# Patient Record
Sex: FEMALE | Race: WHITE | NOT HISPANIC OR LATINO | Employment: OTHER | URBAN - NONMETROPOLITAN AREA
[De-identification: names, ages, dates, MRNs, and addresses within clinical notes are randomized per-mention and may not be internally consistent; named-entity substitution may affect disease eponyms.]

---

## 2017-03-06 ENCOUNTER — OFFICE VISIT (OUTPATIENT)
Dept: CARDIOLOGY | Facility: CLINIC | Age: 52
End: 2017-03-06

## 2017-03-06 VITALS
HEIGHT: 66 IN | SYSTOLIC BLOOD PRESSURE: 148 MMHG | DIASTOLIC BLOOD PRESSURE: 88 MMHG | BODY MASS INDEX: 47.09 KG/M2 | WEIGHT: 293 LBS | HEART RATE: 81 BPM

## 2017-03-06 DIAGNOSIS — R07.2 PRECORDIAL PAIN: ICD-10-CM

## 2017-03-06 DIAGNOSIS — R94.31 ABNORMAL ECG: Primary | ICD-10-CM

## 2017-03-06 DIAGNOSIS — Z99.89 OSA ON CPAP: ICD-10-CM

## 2017-03-06 DIAGNOSIS — M54.50 CHRONIC MIDLINE LOW BACK PAIN WITHOUT SCIATICA: ICD-10-CM

## 2017-03-06 DIAGNOSIS — G47.33 OSA ON CPAP: ICD-10-CM

## 2017-03-06 DIAGNOSIS — I10 ESSENTIAL HYPERTENSION: ICD-10-CM

## 2017-03-06 DIAGNOSIS — M79.7 FIBROMYALGIA: ICD-10-CM

## 2017-03-06 DIAGNOSIS — G93.32 CFS (CHRONIC FATIGUE SYNDROME): ICD-10-CM

## 2017-03-06 DIAGNOSIS — G89.29 CHRONIC MIDLINE LOW BACK PAIN WITHOUT SCIATICA: ICD-10-CM

## 2017-03-06 PROCEDURE — 93000 ELECTROCARDIOGRAM COMPLETE: CPT | Performed by: INTERNAL MEDICINE

## 2017-03-06 PROCEDURE — 99204 OFFICE O/P NEW MOD 45 MIN: CPT | Performed by: INTERNAL MEDICINE

## 2017-03-06 RX ORDER — ASCORBIC ACID 500 MG
500 TABLET ORAL DAILY
COMMUNITY
End: 2017-10-02

## 2017-03-06 RX ORDER — NABUMETONE 750 MG/1
500 TABLET, FILM COATED ORAL 2 TIMES DAILY PRN
COMMUNITY
End: 2017-10-02

## 2017-03-06 RX ORDER — METHYLPHENIDATE HYDROCHLORIDE 10 MG/1
10 TABLET ORAL 2 TIMES DAILY
COMMUNITY
End: 2017-10-02

## 2017-03-06 NOTE — PROGRESS NOTES
Anthony Meza  [id number]  1965  51 y.o.  female    Referring Provider: Tyrell Rosas MD    Reason for  Visit: Moderate shortness of breath and chest pain at times with exertion  No radiation  No diaphoresis  Compliant with medications    History of present illness:  Anthony Meza is a 51 y.o. yo female with history of shortness of breath and chest pain who presents today for   Chief Complaint   Patient presents with   • Abnormal ECG     NEW   .    History  Past Medical History   Diagnosis Date   • Abnormal ECG    • Arthritis    • CFS (chronic fatigue syndrome)    • Chronic pain disorder    • Fibromyalgia    • Hypertension    • Migraines    ,   Past Surgical History   Procedure Laterality Date   • Cyst removal       from tailbone   • Tubal abdominal ligation     ,   Family History   Problem Relation Age of Onset   • Stroke Mother    • Heart disease Father    • Asthma Father    ,   Social History   Substance Use Topics   • Smoking status: Never Smoker   • Smokeless tobacco: Never Used   • Alcohol use No   ,     Medications  Current Outpatient Prescriptions   Medication Sig Dispense Refill   • acetaminophen (TYLENOL) 325 MG tablet Take 325 mg by mouth every 6 (six) hours as needed for mild pain (1-3).     • aspirin 81 MG tablet Take 81 mg by mouth Daily.     • Cholecalciferol (VITAMIN D3) 5000 UNITS capsule capsule Take 5,000 Units by mouth daily.     • Cyanocobalamin (VITAMIN B-12 PO) Take  by mouth.     • Fish Oil oil      • furosemide (LASIX) 20 MG tablet Take 20 mg by mouth as needed.     • gabapentin (NEURONTIN) 300 MG capsule Take 300 mg by mouth 3 (three) times a day.     • LISINOPRIL PO Take 10 mg by mouth Daily.     • methylphenidate (RITALIN) 10 MG tablet Take 10 mg by mouth 2 (Two) Times a Day.     • milnacipran (SAVELLA) 50 MG tablet tablet Take  by mouth 2 (two) times a day.     • nabumetone (RELAFEN) 750 MG tablet Take 500 mg by mouth 2 (Two) Times a Day As Needed for mild pain (1-3).     • vitamin  "C (ASCORBIC ACID) 500 MG tablet Take 500 mg by mouth Daily.       No current facility-administered medications for this visit.        Allergies:  Penicillins    Review of Systems  Review of Systems   Constitution: Negative.   HENT: Negative.    Eyes: Negative.    Cardiovascular: Positive for chest pain and dyspnea on exertion. Negative for claudication, cyanosis, irregular heartbeat, leg swelling, near-syncope, orthopnea, palpitations, paroxysmal nocturnal dyspnea and syncope.   Respiratory: Negative.    Endocrine: Negative.    Hematologic/Lymphatic: Negative.    Skin: Negative.    Gastrointestinal: Negative for anorexia.   Genitourinary: Negative.    Neurological: Negative.    Psychiatric/Behavioral: Negative.        Objective     Physical Exam:  Visit Vitals   • /88   • Pulse 81   • Ht 66\" (167.6 cm)   • Wt (!) 326 lb (148 kg)   • BMI 52.62 kg/m2     Physical Exam   Constitutional: She appears well-developed.   HENT:   Head: Normocephalic.   Neck: Normal carotid pulses and no JVD present. No tracheal tenderness present. Carotid bruit is not present. No tracheal deviation and no edema present.   Cardiovascular: Regular rhythm, normal heart sounds and normal pulses.    Pulmonary/Chest: Effort normal. No stridor.   Abdominal: Soft.   Neurological: She is alert. She has normal strength. No cranial nerve deficit or sensory deficit.   Skin: Skin is warm.   Psychiatric: She has a normal mood and affect. Her speech is normal and behavior is normal.       Results Review:       ECG 12 Lead  Date/Time: 3/6/2017 9:57 AM  Performed by: ISAEL HOUGH  Authorized by: ISAEL HOUGH   Comparison: compared with previous ECG from 2/16/2017  Similar to previous ECG  Rhythm: sinus rhythm  Rate: normal  QRS axis: normal  Comments: Poor  R wave progression            Assessment/Plan   Patient Active Problem List   Diagnosis   • Abnormal ECG   • Fibromyalgia   • CFS (chronic fatigue syndrome)   • Essential hypertension   • Chronic " midline low back pain without sciatica   • Precordial pain   • HARLAN on CPAP       No palpitations. No significant pedal edema. Compliant with medications and diet. Latest labs and medications reviewed.    Plan:  Echo and DSE  Cannot run on treadmill  TSH in past normal   Close follow up with you as scheduled.  Intensive factor modifications.  See order list.    Counseled regarding disease appropriate diet, fluid, caffeine, stimulants and sodium intake as well as importance of compliance to diet, exercise and regular follow up.  Avoid NSAIDS and COX2 inhibitors. Use Acetaminophen PRN.    Return in about 2 weeks (around 3/20/2017).

## 2017-03-16 ENCOUNTER — HOSPITAL ENCOUNTER (OUTPATIENT)
Dept: CARDIOLOGY | Facility: HOSPITAL | Age: 52
Discharge: HOME | End: 2017-03-16
Attending: INTERNAL MEDICINE

## 2017-03-16 ENCOUNTER — HOSPITAL ENCOUNTER (OUTPATIENT)
Dept: CARDIOLOGY | Facility: HOSPITAL | Age: 52
Discharge: HOME | End: 2017-03-16
Attending: INTERNAL MEDICINE | Admitting: INTERNAL MEDICINE

## 2017-03-16 VITALS
WEIGHT: 293 LBS | SYSTOLIC BLOOD PRESSURE: 147 MMHG | HEIGHT: 66 IN | DIASTOLIC BLOOD PRESSURE: 75 MMHG | BODY MASS INDEX: 47.09 KG/M2

## 2017-03-16 VITALS — HEART RATE: 76 BPM | DIASTOLIC BLOOD PRESSURE: 79 MMHG | SYSTOLIC BLOOD PRESSURE: 141 MMHG

## 2017-03-16 DIAGNOSIS — R07.2 PRECORDIAL PAIN: ICD-10-CM

## 2017-03-16 PROCEDURE — C8929 TTE W OR WO FOL WCON,DOPPLER: HCPCS

## 2017-03-16 PROCEDURE — 25010000003 DOBUTAMINE PER 250 MG: Performed by: INTERNAL MEDICINE

## 2017-03-16 PROCEDURE — 93352 ADMIN ECG CONTRAST AGENT: CPT | Performed by: INTERNAL MEDICINE

## 2017-03-16 PROCEDURE — 93306 TTE W/DOPPLER COMPLETE: CPT | Performed by: INTERNAL MEDICINE

## 2017-03-16 PROCEDURE — 93017 CV STRESS TEST TRACING ONLY: CPT

## 2017-03-16 PROCEDURE — 93350 STRESS TTE ONLY: CPT | Performed by: INTERNAL MEDICINE

## 2017-03-16 PROCEDURE — 25010000002 PERFLUTREN (DEFINITY) 8.476 MG IN SODIUM CHLORIDE 10 ML INJECTION: Performed by: INTERNAL MEDICINE

## 2017-03-16 PROCEDURE — C8928 TTE W OR W/O FOL W/CON,STRES: HCPCS

## 2017-03-16 PROCEDURE — 93018 CV STRESS TEST I&R ONLY: CPT | Performed by: INTERNAL MEDICINE

## 2017-03-16 RX ORDER — DOBUTAMINE HYDROCHLORIDE 100 MG/100ML
10-50 INJECTION INTRAVENOUS
Status: DISCONTINUED | OUTPATIENT
Start: 2017-03-16 | End: 2017-03-17 | Stop reason: HOSPADM

## 2017-03-16 RX ADMIN — SODIUM CHLORIDE 5 ML: 9 INJECTION INTRAMUSCULAR; INTRAVENOUS; SUBCUTANEOUS at 13:18

## 2017-03-16 RX ADMIN — SODIUM CHLORIDE 5 ML: 9 INJECTION INTRAMUSCULAR; INTRAVENOUS; SUBCUTANEOUS at 13:37

## 2017-03-16 RX ADMIN — DOBUTAMINE HYDROCHLORIDE 10 MCG/KG/MIN: 100 INJECTION INTRAVENOUS at 13:29

## 2017-03-18 LAB
BH CV ECHO MEAS - BSA(HAYCOCK): 2.7 M^2
BH CV ECHO MEAS - BSA: 2.4 M^2
BH CV ECHO MEAS - BZI_BMI: 51.8 KILOGRAMS/M^2
BH CV ECHO MEAS - BZI_METRIC_HEIGHT: 167.6 CM
BH CV ECHO MEAS - BZI_METRIC_WEIGHT: 145.6 KG
BH CV STRESS BP STAGE 1: NORMAL
BH CV STRESS BP STAGE 2: NORMAL
BH CV STRESS BP STAGE 3: NORMAL
BH CV STRESS DOSE DOBUTAMINE STAGE 1: 10
BH CV STRESS DOSE DOBUTAMINE STAGE 2: 20
BH CV STRESS DOSE DOBUTAMINE STAGE 3: 30
BH CV STRESS DURATION MIN STAGE 1: 3
BH CV STRESS DURATION MIN STAGE 2: 3
BH CV STRESS DURATION MIN STAGE 3: 4
BH CV STRESS DURATION SEC STAGE 1: 0
BH CV STRESS DURATION SEC STAGE 2: 0
BH CV STRESS DURATION SEC STAGE 3: 30
BH CV STRESS HR STAGE 1: 104
BH CV STRESS HR STAGE 2: 121
BH CV STRESS HR STAGE 3: 144
BH CV STRESS PROTOCOL 1: NORMAL
BH CV STRESS RECOVERY BP: NORMAL MMHG
BH CV STRESS RECOVERY HR: 100 BPM
BH CV STRESS STAGE 1: 1
BH CV STRESS STAGE 2: 2
BH CV STRESS STAGE 3: 3
LV EF 2D ECHO EST: 55 %
MAXIMAL PREDICTED HEART RATE: 168 BPM
PERCENT MAX PREDICTED HR: 85.71 %
STRESS BASELINE BP: NORMAL MMHG
STRESS BASELINE HR: 80 BPM
STRESS PERCENT HR: 101 %
STRESS POST EXERCISE DUR MIN: 10 MIN
STRESS POST EXERCISE DUR SEC: 30 SEC
STRESS POST PEAK BP: NORMAL MMHG
STRESS POST PEAK HR: 144 BPM
STRESS TARGET HR: 143 BPM

## 2017-03-19 LAB
BH CV ECHO MEAS - AO MAX PG (FULL): 11.7 MMHG
BH CV ECHO MEAS - AO MAX PG: 18.8 MMHG
BH CV ECHO MEAS - AO MEAN PG (FULL): 8 MMHG
BH CV ECHO MEAS - AO MEAN PG: 12 MMHG
BH CV ECHO MEAS - AO ROOT AREA (BSA CORRECTED): 1.1
BH CV ECHO MEAS - AO ROOT AREA: 6.2 CM^2
BH CV ECHO MEAS - AO ROOT DIAM: 2.8 CM
BH CV ECHO MEAS - AO V2 MAX: 217 CM/SEC
BH CV ECHO MEAS - AO V2 MEAN: 162 CM/SEC
BH CV ECHO MEAS - AO V2 VTI: 52.9 CM
BH CV ECHO MEAS - AVA(I,A): 1.8 CM^2
BH CV ECHO MEAS - AVA(I,D): 1.8 CM^2
BH CV ECHO MEAS - AVA(V,A): 1.8 CM^2
BH CV ECHO MEAS - AVA(V,D): 1.8 CM^2
BH CV ECHO MEAS - BSA(HAYCOCK): 2.7 M^2
BH CV ECHO MEAS - BSA: 2.5 M^2
BH CV ECHO MEAS - BZI_BMI: 52.6 KILOGRAMS/M^2
BH CV ECHO MEAS - BZI_METRIC_HEIGHT: 167.6 CM
BH CV ECHO MEAS - BZI_METRIC_WEIGHT: 147.9 KG
BH CV ECHO MEAS - CONTRAST EF 4CH: 63.6 ML/M^2
BH CV ECHO MEAS - EDV(CUBED): 91.1 ML
BH CV ECHO MEAS - EDV(MOD-SP4): 89.1 ML
BH CV ECHO MEAS - EDV(TEICH): 92.4 ML
BH CV ECHO MEAS - EF(CUBED): 80.7 %
BH CV ECHO MEAS - EF(MOD-SP4): 63.6 %
BH CV ECHO MEAS - EF(TEICH): 73.4 %
BH CV ECHO MEAS - ESV(CUBED): 17.6 ML
BH CV ECHO MEAS - ESV(MOD-SP4): 32.4 ML
BH CV ECHO MEAS - ESV(TEICH): 24.6 ML
BH CV ECHO MEAS - FS: 42.2 %
BH CV ECHO MEAS - IVS/LVPW: 1
BH CV ECHO MEAS - IVSD: 1.4 CM
BH CV ECHO MEAS - LA DIMENSION: 3.7 CM
BH CV ECHO MEAS - LA/AO: 1.3
BH CV ECHO MEAS - LV DIASTOLIC VOL/BSA (35-75): 36.2 ML/M^2
BH CV ECHO MEAS - LV MASS(C)D: 248.4 GRAMS
BH CV ECHO MEAS - LV MASS(C)DI: 100.9 GRAMS/M^2
BH CV ECHO MEAS - LV MAX PG: 7.2 MMHG
BH CV ECHO MEAS - LV MEAN PG: 4 MMHG
BH CV ECHO MEAS - LV SYSTOLIC VOL/BSA (12-30): 13.2 ML/M^2
BH CV ECHO MEAS - LV V1 MAX: 134 CM/SEC
BH CV ECHO MEAS - LV V1 MEAN: 94.2 CM/SEC
BH CV ECHO MEAS - LV V1 VTI: 33.4 CM
BH CV ECHO MEAS - LVIDD: 4.5 CM
BH CV ECHO MEAS - LVIDS: 2.6 CM
BH CV ECHO MEAS - LVLD AP4: 8.2 CM
BH CV ECHO MEAS - LVLS AP4: 6.5 CM
BH CV ECHO MEAS - LVOT AREA (M): 2.8 CM^2
BH CV ECHO MEAS - LVOT AREA: 2.8 CM^2
BH CV ECHO MEAS - LVOT DIAM: 1.9 CM
BH CV ECHO MEAS - LVPWD: 1.4 CM
BH CV ECHO MEAS - MV A MAX VEL: 96 CM/SEC
BH CV ECHO MEAS - MV DEC TIME: 0.28 SEC
BH CV ECHO MEAS - MV E MAX VEL: 106 CM/SEC
BH CV ECHO MEAS - MV E/A: 1.1
BH CV ECHO MEAS - RAP SYSTOLE: 5 MMHG
BH CV ECHO MEAS - RVSP: 34.8 MMHG
BH CV ECHO MEAS - SI(AO): 132.3 ML/M^2
BH CV ECHO MEAS - SI(CUBED): 29.9 ML/M^2
BH CV ECHO MEAS - SI(LVOT): 38.5 ML/M^2
BH CV ECHO MEAS - SI(MOD-SP4): 23 ML/M^2
BH CV ECHO MEAS - SI(TEICH): 27.6 ML/M^2
BH CV ECHO MEAS - SV(AO): 325.7 ML
BH CV ECHO MEAS - SV(CUBED): 73.5 ML
BH CV ECHO MEAS - SV(LVOT): 94.7 ML
BH CV ECHO MEAS - SV(MOD-SP4): 56.7 ML
BH CV ECHO MEAS - SV(TEICH): 67.8 ML
BH CV ECHO MEAS - TR MAX VEL: 273 CM/SEC
E/E' RATIO: 11.1
LEFT ATRIUM VOLUME INDEX: 20.6 ML/M2
LEFT ATRIUM VOLUME: 50.6 CM3
LV EF 2D ECHO EST: 60 %

## 2017-04-03 ENCOUNTER — OFFICE VISIT (OUTPATIENT)
Dept: CARDIOLOGY | Facility: CLINIC | Age: 52
End: 2017-04-03

## 2017-04-03 VITALS
WEIGHT: 293 LBS | DIASTOLIC BLOOD PRESSURE: 80 MMHG | HEIGHT: 66 IN | SYSTOLIC BLOOD PRESSURE: 130 MMHG | BODY MASS INDEX: 47.09 KG/M2 | HEART RATE: 97 BPM

## 2017-04-03 DIAGNOSIS — G47.33 OSA ON CPAP: ICD-10-CM

## 2017-04-03 DIAGNOSIS — M79.7 FIBROMYALGIA: ICD-10-CM

## 2017-04-03 DIAGNOSIS — I10 ESSENTIAL HYPERTENSION: Primary | ICD-10-CM

## 2017-04-03 DIAGNOSIS — M54.50 CHRONIC MIDLINE LOW BACK PAIN WITHOUT SCIATICA: ICD-10-CM

## 2017-04-03 DIAGNOSIS — G89.29 CHRONIC MIDLINE LOW BACK PAIN WITHOUT SCIATICA: ICD-10-CM

## 2017-04-03 DIAGNOSIS — R94.31 ABNORMAL ECG: ICD-10-CM

## 2017-04-03 DIAGNOSIS — Z99.89 OSA ON CPAP: ICD-10-CM

## 2017-04-03 DIAGNOSIS — G93.32 CFS (CHRONIC FATIGUE SYNDROME): ICD-10-CM

## 2017-04-03 PROCEDURE — 99214 OFFICE O/P EST MOD 30 MIN: CPT | Performed by: INTERNAL MEDICINE

## 2017-04-03 RX ORDER — LISINOPRIL 20 MG/1
20 TABLET ORAL DAILY
COMMUNITY
End: 2017-10-02

## 2017-04-03 NOTE — PROGRESS NOTES
Anthony Meza  [id number]  1965  51 y.o.  female    Referring Provider: Tyrell Rosas MD    Reason for  Visit: Moderate shortness of breath and chest pain at times with exertion. Chest pain resolved now. Moderate WHITE  Compliant with medications    History of present illness:  Anthony Meza is a 51 y.o. yo female with history of shortness of breath and chest pain who presents today for   Chief Complaint   Patient presents with   • Hypertension     2 wk fu   • Migraine   • Shortness of Breath   .    History  Past Medical History:   Diagnosis Date   • Abnormal ECG    • Arthritis    • CFS (chronic fatigue syndrome)    • Chronic pain disorder    • Fibromyalgia    • Hypertension    • Migraines    ,   Past Surgical History:   Procedure Laterality Date   • CYST REMOVAL      from tailbone   • TUBAL ABDOMINAL LIGATION     ,   Family History   Problem Relation Age of Onset   • Stroke Mother    • Heart disease Father    • Asthma Father    ,   Social History   Substance Use Topics   • Smoking status: Never Smoker   • Smokeless tobacco: Never Used   • Alcohol use No   ,     Medications  Current Outpatient Prescriptions   Medication Sig Dispense Refill   • acetaminophen (TYLENOL) 325 MG tablet Take 325 mg by mouth every 6 (six) hours as needed for mild pain (1-3).     • aspirin 81 MG tablet Take 81 mg by mouth Daily.     • Cholecalciferol (VITAMIN D3) 5000 UNITS capsule capsule Take 5,000 Units by mouth daily.     • Cyanocobalamin (VITAMIN B-12 PO) Take  by mouth.     • Fish Oil oil      • furosemide (LASIX) 20 MG tablet Take 20 mg by mouth as needed.     • gabapentin (NEURONTIN) 300 MG capsule Take 300 mg by mouth 3 (three) times a day.     • lisinopril (PRINIVIL,ZESTRIL) 20 MG tablet Take 20 mg by mouth Daily.     • methylphenidate (RITALIN) 10 MG tablet Take 10 mg by mouth 2 (Two) Times a Day.     • milnacipran (SAVELLA) 50 MG tablet tablet Take  by mouth 2 (two) times a day.     • nabumetone (RELAFEN) 750 MG tablet  "Take 500 mg by mouth 2 (Two) Times a Day As Needed for mild pain (1-3).     • vitamin C (ASCORBIC ACID) 500 MG tablet Take 500 mg by mouth Daily.       No current facility-administered medications for this visit.        Allergies:  Penicillins    Review of Systems  Review of Systems   Constitution: Negative.   HENT: Negative.    Eyes: Negative.    Cardiovascular: Positive for dyspnea on exertion. Negative for claudication, cyanosis, irregular heartbeat, leg swelling, near-syncope, orthopnea, palpitations, paroxysmal nocturnal dyspnea and syncope.   Respiratory: Negative.    Endocrine: Negative.    Hematologic/Lymphatic: Negative.    Skin: Negative.    Gastrointestinal: Negative for anorexia.   Genitourinary: Negative.    Neurological: Negative.    Psychiatric/Behavioral: Negative.        Objective     Physical Exam:  /80  Pulse 97  Ht 66\" (167.6 cm)  Wt (!) 325 lb (147 kg)  BMI 52.46 kg/m2  Physical Exam   Constitutional: She appears well-developed.   HENT:   Head: Normocephalic.   Neck: Normal carotid pulses and no JVD present. No tracheal tenderness present. Carotid bruit is not present. No tracheal deviation and no edema present.   Cardiovascular: Regular rhythm, normal heart sounds and normal pulses.    Pulmonary/Chest: Effort normal. No stridor.   Abdominal: Soft.   Neurological: She is alert. She has normal strength. No cranial nerve deficit or sensory deficit.   Skin: Skin is warm.   Psychiatric: She has a normal mood and affect. Her speech is normal and behavior is normal.       Results Review:     Procedures    Assessment/Plan   Patient Active Problem List   Diagnosis   • Abnormal ECG   • Fibromyalgia   • CFS (chronic fatigue syndrome)   • Essential hypertension   • Chronic midline low back pain without sciatica   • Precordial pain   • HARLAN on CPAP       No palpitations. No significant pedal edema. Compliant with medications and diet. Latest labs and medications reviewed.  Stress test normal  LV " diastolic dysfunction Normal LVEF    Plan:  Suggest weight loss clinic   She wants to think about it  TSH in past normal   Close follow up with you as scheduled.  Intensive factor modifications.  See order list.    Counseled regarding disease appropriate diet, fluid, caffeine, stimulants and sodium intake as well as importance of compliance to diet, exercise and regular follow up.  Avoid NSAIDS and COX2 inhibitors. Use Acetaminophen PRN.    Return in about 6 months (around 10/3/2017).

## 2017-05-01 ENCOUNTER — TELEPHONE (OUTPATIENT)
Dept: NEUROSURGERY | Age: 52
End: 2017-05-01

## 2017-06-27 ENCOUNTER — TELEPHONE (OUTPATIENT)
Dept: NEUROSURGERY | Age: 52
End: 2017-06-27

## 2017-06-28 ENCOUNTER — TELEPHONE (OUTPATIENT)
Dept: NEUROSURGERY | Age: 52
End: 2017-06-28

## 2017-06-28 ENCOUNTER — OFFICE VISIT (OUTPATIENT)
Dept: NEUROSURGERY | Age: 52
End: 2017-06-28
Payer: MEDICAID

## 2017-06-28 VITALS
BODY MASS INDEX: 47.09 KG/M2 | DIASTOLIC BLOOD PRESSURE: 80 MMHG | WEIGHT: 293 LBS | OXYGEN SATURATION: 98 % | SYSTOLIC BLOOD PRESSURE: 112 MMHG | HEIGHT: 66 IN | HEART RATE: 91 BPM

## 2017-06-28 DIAGNOSIS — M79.7 FIBROMYALGIA: Primary | ICD-10-CM

## 2017-06-28 DIAGNOSIS — G47.33 OSA (OBSTRUCTIVE SLEEP APNEA): ICD-10-CM

## 2017-06-28 PROCEDURE — 99203 OFFICE O/P NEW LOW 30 MIN: CPT | Performed by: PSYCHIATRY & NEUROLOGY

## 2017-06-28 RX ORDER — FUROSEMIDE 20 MG/1
20 TABLET ORAL
COMMUNITY
End: 2017-08-29

## 2017-06-28 RX ORDER — ACETAMINOPHEN 325 MG/1
325 TABLET ORAL
COMMUNITY
End: 2017-08-29 | Stop reason: SDUPTHER

## 2017-06-28 NOTE — TELEPHONE ENCOUNTER
I gave Cruz Goss the HIM MAYO to fill out and sign so that we could request the records from Dr. Ruben Acosta. It was filled out, but he/she? Did not sign it. I mailed it to them with a request that they sign and bring back into our office or mail it. I scanned the unsigned form into media. Thanks!   Cameron Chaney

## 2017-08-28 ENCOUNTER — HOSPITAL ENCOUNTER (EMERGENCY)
Facility: HOSPITAL | Age: 52
Discharge: HOME | End: 2017-08-29
Admitting: EMERGENCY MEDICINE

## 2017-08-28 DIAGNOSIS — T16.1XXA FOREIGN BODY IN EAR, RIGHT, INITIAL ENCOUNTER: Primary | ICD-10-CM

## 2017-08-28 PROCEDURE — 99282 EMERGENCY DEPT VISIT SF MDM: CPT

## 2017-08-29 ENCOUNTER — OFFICE VISIT (OUTPATIENT)
Dept: NEUROSURGERY | Age: 52
End: 2017-08-29
Payer: MEDICAID

## 2017-08-29 VITALS
HEIGHT: 66 IN | BODY MASS INDEX: 47.09 KG/M2 | HEART RATE: 92 BPM | WEIGHT: 293 LBS | TEMPERATURE: 97.8 F | DIASTOLIC BLOOD PRESSURE: 68 MMHG | SYSTOLIC BLOOD PRESSURE: 132 MMHG | RESPIRATION RATE: 18 BRPM | OXYGEN SATURATION: 100 %

## 2017-08-29 VITALS
HEART RATE: 87 BPM | BODY MASS INDEX: 47.09 KG/M2 | WEIGHT: 293 LBS | OXYGEN SATURATION: 99 % | SYSTOLIC BLOOD PRESSURE: 136 MMHG | HEIGHT: 66 IN | DIASTOLIC BLOOD PRESSURE: 78 MMHG

## 2017-08-29 DIAGNOSIS — M79.7 FIBROMYALGIA: Primary | ICD-10-CM

## 2017-08-29 DIAGNOSIS — G47.33 OSA (OBSTRUCTIVE SLEEP APNEA): ICD-10-CM

## 2017-08-29 PROCEDURE — 99214 OFFICE O/P EST MOD 30 MIN: CPT | Performed by: PSYCHIATRY & NEUROLOGY

## 2017-08-29 RX ORDER — DULOXETIN HYDROCHLORIDE 30 MG/1
30 CAPSULE, DELAYED RELEASE ORAL DAILY
Qty: 7 CAPSULE | Refills: 0 | Status: SHIPPED | OUTPATIENT
Start: 2017-08-29 | End: 2017-12-12 | Stop reason: CLARIF

## 2017-08-29 RX ORDER — ACETAMINOPHEN 325 MG/1
650 TABLET ORAL DAILY PRN
COMMUNITY

## 2017-08-29 RX ORDER — DULOXETIN HYDROCHLORIDE 60 MG/1
60 CAPSULE, DELAYED RELEASE ORAL DAILY
Qty: 30 CAPSULE | Refills: 5 | Status: SHIPPED | OUTPATIENT
Start: 2017-08-29 | End: 2018-11-06 | Stop reason: SDUPTHER

## 2017-10-02 ENCOUNTER — OFFICE VISIT (OUTPATIENT)
Dept: CARDIOLOGY | Facility: CLINIC | Age: 52
End: 2017-10-02

## 2017-10-02 VITALS
SYSTOLIC BLOOD PRESSURE: 130 MMHG | HEART RATE: 105 BPM | DIASTOLIC BLOOD PRESSURE: 88 MMHG | HEIGHT: 66 IN | BODY MASS INDEX: 47.09 KG/M2 | OXYGEN SATURATION: 97 % | WEIGHT: 293 LBS

## 2017-10-02 DIAGNOSIS — M54.50 CHRONIC MIDLINE LOW BACK PAIN WITHOUT SCIATICA: ICD-10-CM

## 2017-10-02 DIAGNOSIS — G93.32 CFS (CHRONIC FATIGUE SYNDROME): ICD-10-CM

## 2017-10-02 DIAGNOSIS — I48.91 ATRIAL FIBRILLATION, UNSPECIFIED TYPE (HCC): ICD-10-CM

## 2017-10-02 DIAGNOSIS — G89.29 CHRONIC MIDLINE LOW BACK PAIN WITHOUT SCIATICA: ICD-10-CM

## 2017-10-02 DIAGNOSIS — Z99.89 OSA ON CPAP: ICD-10-CM

## 2017-10-02 DIAGNOSIS — R94.31 ABNORMAL ECG: ICD-10-CM

## 2017-10-02 DIAGNOSIS — G47.33 OSA ON CPAP: ICD-10-CM

## 2017-10-02 DIAGNOSIS — I10 ESSENTIAL HYPERTENSION: Primary | ICD-10-CM

## 2017-10-02 DIAGNOSIS — I48.0 PAROXYSMAL ATRIAL FIBRILLATION (HCC): ICD-10-CM

## 2017-10-02 DIAGNOSIS — M79.7 FIBROMYALGIA: ICD-10-CM

## 2017-10-02 PROBLEM — R07.2 PRECORDIAL PAIN: Status: RESOLVED | Noted: 2017-03-06 | Resolved: 2017-10-02

## 2017-10-02 PROCEDURE — 99214 OFFICE O/P EST MOD 30 MIN: CPT | Performed by: INTERNAL MEDICINE

## 2017-10-02 PROCEDURE — 93000 ELECTROCARDIOGRAM COMPLETE: CPT | Performed by: INTERNAL MEDICINE

## 2017-10-02 RX ORDER — WARFARIN SODIUM 5 MG/1
5 TABLET ORAL
Qty: 60 TABLET | Refills: 11 | Status: SHIPPED | OUTPATIENT
Start: 2017-10-02 | End: 2017-12-11 | Stop reason: SDUPTHER

## 2017-10-02 RX ORDER — IBUPROFEN 800 MG/1
800 TABLET ORAL EVERY 6 HOURS PRN
COMMUNITY
End: 2017-10-02

## 2017-10-02 NOTE — PROGRESS NOTES
Anthony Meza  [id number]  1965  51 y.o.  female    Referring Provider: Tyrell Rosas MD    Reason for  Visit: Moderate shortness of breath and chest pain at times with exertion. Chest pain resolved now. Moderate WHITE  Compliant with medications New onset atrial fibrillation   With with rapid ventricular response    obstructive sleep apnea on CPAP    History of present illness:  Anthony Meza is a 51 y.o. yo female with history of shortness of breath and chest pain who presents today for   Chief Complaint   Patient presents with   • Hypertension     6 MON FU    • Shortness of Breath     WITH EXERTION    • Edema     HANDS AND FEET    .    History  Past Medical History:   Diagnosis Date   • Abnormal ECG    • Arthritis    • CFS (chronic fatigue syndrome)    • Chronic pain disorder    • Fibromyalgia    • Hypertension    • Migraines    ,   Past Surgical History:   Procedure Laterality Date   • CYST REMOVAL      from tailbone   • TUBAL ABDOMINAL LIGATION     ,   Family History   Problem Relation Age of Onset   • Stroke Mother    • Heart disease Father    • Asthma Father    ,   Social History   Substance Use Topics   • Smoking status: Never Smoker   • Smokeless tobacco: Never Used   • Alcohol use No   ,     Medications  Current Outpatient Prescriptions   Medication Sig Dispense Refill   • acetaminophen (TYLENOL) 325 MG tablet Take 325 mg by mouth every 6 (six) hours as needed for mild pain (1-3).     • aspirin 81 MG tablet Take 81 mg by mouth Daily.     • metoprolol tartrate (LOPRESSOR) 25 MG tablet Take 0.5 tablets by mouth 2 (Two) Times a Day. 60 tablet 11   • warfarin (COUMADIN) 5 MG tablet Take 1 tablet by mouth Daily. 60 tablet 11     No current facility-administered medications for this visit.        Allergies:  Penicillins    Review of Systems  Review of Systems   Constitution: Positive for weakness.   HENT: Negative.    Eyes: Negative.    Cardiovascular: Positive for dyspnea on exertion. Negative for  "claudication, cyanosis, irregular heartbeat, leg swelling, near-syncope, orthopnea, palpitations, paroxysmal nocturnal dyspnea and syncope.   Respiratory: Negative.    Endocrine: Negative.    Hematologic/Lymphatic: Negative.    Skin: Negative.    Musculoskeletal: Positive for arthritis and back pain.   Gastrointestinal: Negative for anorexia.   Genitourinary: Negative.    Psychiatric/Behavioral: Negative.        Objective     Physical Exam:  /88  Pulse 105  Ht 66\" (167.6 cm)  Wt (!) 348 lb (158 kg)  SpO2 97%  BMI 56.17 kg/m2  Physical Exam   Constitutional: She appears well-developed.   HENT:   Head: Normocephalic.   Neck: Normal carotid pulses and no JVD present. No tracheal tenderness present. Carotid bruit is not present. No tracheal deviation and no edema present.   Cardiovascular: Normal heart sounds and normal pulses.  An irregularly irregular rhythm present.   Pulmonary/Chest: Effort normal. No stridor.   Abdominal: Soft.   Neurological: She is alert. She has normal strength. No cranial nerve deficit or sensory deficit.   Skin: Skin is warm.   Psychiatric: She has a normal mood and affect. Her speech is normal and behavior is normal.       Results Review:       ECG 12 Lead  Date/Time: 10/2/2017 10:27 AM  Performed by: ISAEL HOUGH  Authorized by: ISAEL HOUGH   Comparison: compared with previous ECG from 3/6/2017  Comparison to previous ECG: New onset atrial fibrillation  Rhythm: atrial fibrillation  Rhythm comments: onset atrial fibrillation  Rate: tachycardic  Conduction: conduction normal  ST Segments: ST segments normal  QRS axis: right  Clinical impression: abnormal ECG  Comments: Poor R wave progression            Assessment/Plan   Patient Active Problem List   Diagnosis   • Abnormal ECG   • Fibromyalgia   • CFS (chronic fatigue syndrome)   • Essential hypertension   • Chronic midline low back pain without sciatica   • HARLAN on CPAP   • Atrial fibrillation   • Paroxysmal atrial fibrillation   • " Atrial fibrillation, unspecified type       No palpitations. No significant pedal edema. Compliant with medications and diet. Latest labs and medications reviewed.  Stress test normal  LV diastolic dysfunction Normal LVEF    Plan:    Recommend starting Coumadin therapy  Refer to [facility]madin Clinic  Target INR 2-3  Monitor for any signs of bleeding including red or dark stools. Fall precautions.   Lopressor 12.5 mg BID  Holter  Suggest weight loss clinic   She wants to think about it  TSH in past normal   Close follow up with you as scheduled.  Intensive factor modifications.  See order list.    Counseled regarding disease appropriate diet, fluid, caffeine, stimulants and sodium intake as well as importance of compliance to diet, exercise and regular follow up.  Avoid NSAIDS and COX2 inhibitors. Use Acetaminophen PRN.    Return in about 6 weeks (around 11/13/2017).

## 2017-10-04 ENCOUNTER — ANTICOAGULATION VISIT (OUTPATIENT)
Dept: CARDIOLOGY | Facility: CLINIC | Age: 52
End: 2017-10-04

## 2017-10-04 DIAGNOSIS — I48.91 ATRIAL FIBRILLATION, UNSPECIFIED TYPE (HCC): ICD-10-CM

## 2017-10-04 DIAGNOSIS — I48.0 PAROXYSMAL ATRIAL FIBRILLATION (HCC): ICD-10-CM

## 2017-10-04 DIAGNOSIS — I48.91 ATRIAL FIBRILLATION, UNSPECIFIED TYPE (HCC): Primary | ICD-10-CM

## 2017-10-06 ENCOUNTER — ANTICOAGULATION VISIT (OUTPATIENT)
Dept: CARDIOLOGY | Facility: CLINIC | Age: 52
End: 2017-10-06

## 2017-10-09 ENCOUNTER — LAB (OUTPATIENT)
Dept: LAB | Facility: HOSPITAL | Age: 52
End: 2017-10-09

## 2017-10-09 DIAGNOSIS — I48.91 ATRIAL FIBRILLATION, UNSPECIFIED TYPE (HCC): ICD-10-CM

## 2017-10-09 LAB — PERFORM POINT OF CARE ON DEVICE: NORMAL

## 2017-10-09 PROCEDURE — 85610 PROTHROMBIN TIME: CPT | Performed by: FAMILY MEDICINE

## 2017-10-10 ENCOUNTER — ANTICOAGULATION VISIT (OUTPATIENT)
Dept: CARDIOLOGY | Facility: CLINIC | Age: 52
End: 2017-10-10

## 2017-10-10 LAB
INR PPP: 1.1 (ref 0.91–1.09)
PROTHROMBIN TIME: 12.8 SECONDS (ref 10–13.8)

## 2017-10-16 ENCOUNTER — ANTICOAGULATION VISIT (OUTPATIENT)
Dept: CARDIOLOGY | Facility: CLINIC | Age: 52
End: 2017-10-16

## 2017-10-16 ENCOUNTER — TRANSCRIBE ORDERS (OUTPATIENT)
Dept: GENERAL RADIOLOGY | Facility: HOSPITAL | Age: 52
End: 2017-10-16

## 2017-10-16 ENCOUNTER — LAB (OUTPATIENT)
Dept: LAB | Facility: HOSPITAL | Age: 52
End: 2017-10-16

## 2017-10-16 DIAGNOSIS — I48.91 ATRIAL FIBRILLATION, UNSPECIFIED TYPE (HCC): Primary | ICD-10-CM

## 2017-10-16 LAB
INR PPP: 1.7 (ref 0.91–1.09)
PERFORM POINT OF CARE ON DEVICE: NORMAL
PROTHROMBIN TIME: 19.9 SECONDS (ref 10–13.8)

## 2017-10-16 PROCEDURE — 85610 PROTHROMBIN TIME: CPT | Performed by: FAMILY MEDICINE

## 2017-10-23 ENCOUNTER — LAB (OUTPATIENT)
Dept: LAB | Facility: HOSPITAL | Age: 52
End: 2017-10-23

## 2017-10-23 ENCOUNTER — TRANSCRIBE ORDERS (OUTPATIENT)
Dept: ADMINISTRATIVE | Facility: HOSPITAL | Age: 52
End: 2017-10-23

## 2017-10-23 ENCOUNTER — ANTICOAGULATION VISIT (OUTPATIENT)
Dept: CARDIOLOGY | Facility: CLINIC | Age: 52
End: 2017-10-23

## 2017-10-23 DIAGNOSIS — I48.91 ATRIAL FIBRILLATION, UNSPECIFIED TYPE (HCC): Primary | ICD-10-CM

## 2017-10-23 LAB
INR PPP: 2.6 (ref 0.91–1.09)
PERFORM POINT OF CARE ON DEVICE: NORMAL
PROTHROMBIN TIME: 31.2 SECONDS (ref 10–13.8)

## 2017-10-23 PROCEDURE — 85610 PROTHROMBIN TIME: CPT | Performed by: FAMILY MEDICINE

## 2017-10-30 ENCOUNTER — TRANSCRIBE ORDERS (OUTPATIENT)
Dept: ADMINISTRATIVE | Facility: HOSPITAL | Age: 52
End: 2017-10-30

## 2017-10-30 ENCOUNTER — ANTICOAGULATION VISIT (OUTPATIENT)
Dept: CARDIOLOGY | Facility: CLINIC | Age: 52
End: 2017-10-30

## 2017-10-30 ENCOUNTER — OFFICE VISIT (OUTPATIENT)
Dept: LAB | Facility: HOSPITAL | Age: 52
End: 2017-10-30

## 2017-10-30 DIAGNOSIS — I48.91 ATRIAL FIBRILLATION, UNSPECIFIED TYPE (HCC): Primary | ICD-10-CM

## 2017-10-30 LAB
INR PPP: 2.2 (ref 0.91–1.09)
PERFORM POINT OF CARE ON DEVICE: NORMAL
PROTHROMBIN TIME: 26.3 SECONDS (ref 10–13.8)

## 2017-10-30 PROCEDURE — 85610 PROTHROMBIN TIME: CPT | Performed by: FAMILY MEDICINE

## 2017-11-13 ENCOUNTER — TRANSCRIBE ORDERS (OUTPATIENT)
Dept: LAB | Facility: HOSPITAL | Age: 52
End: 2017-11-13

## 2017-11-13 ENCOUNTER — ANTICOAGULATION VISIT (OUTPATIENT)
Dept: CARDIOLOGY | Facility: CLINIC | Age: 52
End: 2017-11-13

## 2017-11-13 ENCOUNTER — APPOINTMENT (OUTPATIENT)
Dept: LAB | Facility: HOSPITAL | Age: 52
End: 2017-11-13

## 2017-11-13 ENCOUNTER — LAB (OUTPATIENT)
Dept: LAB | Facility: HOSPITAL | Age: 52
End: 2017-11-13
Attending: INTERNAL MEDICINE

## 2017-11-13 DIAGNOSIS — I48.91 ATRIAL FIBRILLATION, UNSPECIFIED TYPE (HCC): Primary | ICD-10-CM

## 2017-11-13 DIAGNOSIS — I48.91 ATRIAL FIBRILLATION, UNSPECIFIED TYPE (HCC): ICD-10-CM

## 2017-11-13 LAB
INR PPP: 2.5 (ref 0.91–1.09)
PERFORM POINT OF CARE ON DEVICE: NORMAL
PROTHROMBIN TIME: 29.6 SECONDS (ref 10–13.8)

## 2017-11-13 PROCEDURE — 85610 PROTHROMBIN TIME: CPT | Performed by: FAMILY MEDICINE

## 2017-11-14 ENCOUNTER — OFFICE VISIT (OUTPATIENT)
Dept: CARDIOLOGY | Facility: CLINIC | Age: 52
End: 2017-11-14

## 2017-11-14 VITALS
SYSTOLIC BLOOD PRESSURE: 132 MMHG | HEART RATE: 100 BPM | WEIGHT: 293 LBS | DIASTOLIC BLOOD PRESSURE: 92 MMHG | HEIGHT: 66 IN | BODY MASS INDEX: 47.09 KG/M2

## 2017-11-14 DIAGNOSIS — G93.32 CFS (CHRONIC FATIGUE SYNDROME): ICD-10-CM

## 2017-11-14 DIAGNOSIS — M79.7 FIBROMYALGIA: ICD-10-CM

## 2017-11-14 DIAGNOSIS — M54.50 CHRONIC MIDLINE LOW BACK PAIN WITHOUT SCIATICA: ICD-10-CM

## 2017-11-14 DIAGNOSIS — G89.29 CHRONIC MIDLINE LOW BACK PAIN WITHOUT SCIATICA: ICD-10-CM

## 2017-11-14 DIAGNOSIS — I10 ESSENTIAL HYPERTENSION: Primary | ICD-10-CM

## 2017-11-14 DIAGNOSIS — I48.91 ATRIAL FIBRILLATION, UNSPECIFIED TYPE (HCC): ICD-10-CM

## 2017-11-14 DIAGNOSIS — I48.0 PAROXYSMAL ATRIAL FIBRILLATION (HCC): ICD-10-CM

## 2017-11-14 DIAGNOSIS — R94.31 ABNORMAL ECG: ICD-10-CM

## 2017-11-14 PROCEDURE — 93000 ELECTROCARDIOGRAM COMPLETE: CPT | Performed by: INTERNAL MEDICINE

## 2017-11-14 PROCEDURE — 99214 OFFICE O/P EST MOD 30 MIN: CPT | Performed by: INTERNAL MEDICINE

## 2017-11-14 RX ORDER — DULOXETIN HYDROCHLORIDE 60 MG/1
60 CAPSULE, DELAYED RELEASE ORAL DAILY
COMMUNITY
Start: 2017-08-29 | End: 2019-07-12

## 2017-11-14 NOTE — PROGRESS NOTES
Anthony Meza  [id number]  1965  51 y.o.  female    Referring Provider: Tyrell Rosas MD    Reason for  Visit:  Routine follow up.  Subjective   Mild exertional shortness of breath on exertion relieved with rest  Mild cough  Occasional wheezing  Going on for several months  No palpitations  No associated chest pain  No significant pedal edema  No fever or chills  No significant expectoration  No hemoptysis  No presyncope or syncope   obstructive sleep apnea on CPAP    History of present illness:  Anthony Meza is a 51 y.o. yo female with history of atrial fibrillation    who presents today for   Chief Complaint   Patient presents with   • Atrial Fibrillation     6 wk f/u - recent holter   .    History  Past Medical History:   Diagnosis Date   • Abnormal ECG    • Arthritis    • Atrial fibrillation    • CFS (chronic fatigue syndrome)    • Chronic pain disorder    • Fibromyalgia    • Hypertension    • Migraines    ,   Past Surgical History:   Procedure Laterality Date   • CYST REMOVAL      from tailbone   • TUBAL ABDOMINAL LIGATION     ,   Family History   Problem Relation Age of Onset   • Stroke Mother    • Heart disease Father    • Asthma Father    ,   Social History   Substance Use Topics   • Smoking status: Never Smoker   • Smokeless tobacco: Never Used   • Alcohol use No   ,     Medications  Current Outpatient Prescriptions   Medication Sig Dispense Refill   • Acetaminophen-Caffeine (TENSION HEADACHE PO) Take  by mouth As Needed.     • aspirin 81 MG tablet Take 81 mg by mouth Daily.     • DULoxetine (CYMBALTA) 60 MG capsule Take 60 mg by mouth.     • metoprolol tartrate (LOPRESSOR) 25 MG tablet Take 1 tablet by mouth 2 (Two) Times a Day. 180 tablet 3   • warfarin (COUMADIN) 5 MG tablet Take 1 tablet by mouth Daily. 60 tablet 11   • acetaminophen (TYLENOL) 325 MG tablet Take 325 mg by mouth every 6 (six) hours as needed for mild pain (1-3).       No current facility-administered medications for this  "visit.        Allergies:  Penicillins    Review of Systems  Review of Systems   Constitution: Positive for weakness.   HENT: Negative.    Eyes: Negative.    Cardiovascular: Positive for dyspnea on exertion. Negative for claudication, cyanosis, irregular heartbeat, leg swelling, near-syncope, orthopnea, palpitations, paroxysmal nocturnal dyspnea and syncope.   Respiratory: Positive for cough.    Endocrine: Negative.    Hematologic/Lymphatic: Negative.    Skin: Negative.    Musculoskeletal: Positive for arthritis and back pain.   Gastrointestinal: Negative for anorexia.   Genitourinary: Negative.    Psychiatric/Behavioral: Negative.        Objective     Physical Exam:  /92  Pulse 100  Ht 66\" (167.6 cm)  Wt (!) 341 lb (155 kg)  BMI 55.04 kg/m2  Physical Exam   Constitutional: She appears well-developed.   HENT:   Head: Normocephalic.   Neck: Normal carotid pulses and no JVD present. No tracheal tenderness present. Carotid bruit is not present. No tracheal deviation and no edema present.   Cardiovascular: Normal heart sounds and normal pulses.  An irregularly irregular rhythm present.   Pulmonary/Chest: Effort normal. No stridor.   Abdominal: Soft.   Neurological: She is alert. She has normal strength. No cranial nerve deficit or sensory deficit.   Skin: Skin is warm.   Psychiatric: She has a normal mood and affect. Her speech is normal and behavior is normal.       Results Review:       ECG 12 Lead  Date/Time: 11/14/2017 11:39 AM  Performed by: ISAEL HOUGH  Authorized by: ISAEL HOUGH   Comparison: compared with previous ECG from 10/2/2017  Comparison to previous ECG: Ventricular rate similar  Rhythm: atrial fibrillation  Rate: normal  Clinical impression: abnormal ECG  Comments: Low voltage            Assessment/Plan   Patient Active Problem List   Diagnosis   • Abnormal ECG   • Fibromyalgia   • CFS (chronic fatigue syndrome)   • Essential hypertension   • Chronic midline low back pain without sciatica   • " HARLAN on CPAP   • Atrial fibrillation   • Paroxysmal atrial fibrillation   • Atrial fibrillation, unspecified type       No palpitations. No significant pedal edema. Compliant with medications and diet. Latest labs and medications reviewed.  Stress test normal  LV diastolic dysfunction Normal LVEF    Plan:    Continue same medications  Coumadin for Target INR 2-3 weekly  Monitor for any signs of bleeding including red or dark stools. Fall precautions.   Increase Lopressor to 25 mg BID  DC cardioversion weekly INR above 2 x 4 weeks  Monitor for any signs of bleeding including red or dark stools. Fall precautions.   Patient is asked to monitor BP at home or work, several times per month and return with written values at next office visit.   Close follow up with you as scheduled.  Intensive factor modifications.  See order list.    Counseled regarding disease appropriate diet, fluid, caffeine, stimulants and sodium intake as well as importance of compliance to diet, exercise and regular follow up.  Avoid NSAIDS and COX2 inhibitors. Use Acetaminophen PRN.    Return in about 3 months (around 2/14/2018).

## 2017-11-20 ENCOUNTER — LAB (OUTPATIENT)
Dept: LAB | Facility: HOSPITAL | Age: 52
End: 2017-11-20

## 2017-11-20 ENCOUNTER — TRANSCRIBE ORDERS (OUTPATIENT)
Dept: ADMINISTRATIVE | Facility: HOSPITAL | Age: 52
End: 2017-11-20

## 2017-11-20 DIAGNOSIS — I48.91 ATRIAL FIBRILLATION, UNSPECIFIED TYPE (HCC): Primary | ICD-10-CM

## 2017-11-20 LAB
INR PPP: 2.5 (ref 0.91–1.09)
PERFORM POINT OF CARE ON DEVICE: NORMAL
PROTHROMBIN TIME: 29.5 SECONDS (ref 10–13.8)

## 2017-11-20 PROCEDURE — 85610 PROTHROMBIN TIME: CPT | Performed by: FAMILY MEDICINE

## 2017-11-22 ENCOUNTER — OFFICE VISIT (OUTPATIENT)
Dept: RETAIL CLINIC | Facility: CLINIC | Age: 52
End: 2017-11-22

## 2017-11-22 VITALS
HEIGHT: 66 IN | WEIGHT: 293 LBS | HEART RATE: 84 BPM | DIASTOLIC BLOOD PRESSURE: 105 MMHG | OXYGEN SATURATION: 97 % | SYSTOLIC BLOOD PRESSURE: 158 MMHG | BODY MASS INDEX: 47.09 KG/M2 | RESPIRATION RATE: 20 BRPM | TEMPERATURE: 98.4 F

## 2017-11-22 DIAGNOSIS — Z20.818 EXPOSURE TO STREP THROAT: ICD-10-CM

## 2017-11-22 DIAGNOSIS — J06.9 ACUTE URI: Primary | ICD-10-CM

## 2017-11-22 LAB
EXPIRATION DATE: NORMAL
INTERNAL CONTROL: NORMAL
Lab: NORMAL
S PYO AG THROAT QL: NEGATIVE

## 2017-11-22 PROCEDURE — 87880 STREP A ASSAY W/OPTIC: CPT | Performed by: NURSE PRACTITIONER

## 2017-11-22 PROCEDURE — 99213 OFFICE O/P EST LOW 20 MIN: CPT | Performed by: NURSE PRACTITIONER

## 2017-11-22 RX ORDER — CEFDINIR 300 MG/1
300 CAPSULE ORAL 2 TIMES DAILY
Qty: 20 CAPSULE | Refills: 0 | Status: SHIPPED | OUTPATIENT
Start: 2017-11-22 | End: 2017-12-02

## 2017-11-22 RX ORDER — CEFPROZIL 500 MG/1
500 TABLET, FILM COATED ORAL DAILY
Qty: 10 TABLET | Refills: 0 | Status: SHIPPED | OUTPATIENT
Start: 2017-11-22 | End: 2017-11-22

## 2017-11-22 NOTE — PROGRESS NOTES
Chief Complaint   Patient presents with   • Cough   • Sore Throat     Subjective   Terris DOREEN Meza is a 51 y.o. female who presents to the clinic today with new complaints of an acute onset of fatigue, sore throat, congestion, cough, headaches and subjective fever x 4 days. She also has had post tussive emesis x 2-3 episodes. She states she was at holiday dinner last weekend where she had been exposed to strep throat. Several family members have tested positive for strep infection since family dinner. She denies any nausea/dirrhea, wheezing or difficulty breathing. She has not tried any OTC medications. She has not had any prior treatment.  HPI      Current Outpatient Prescriptions:   •  acetaminophen (TYLENOL) 325 MG tablet, Take 325 mg by mouth every 6 (six) hours as needed for mild pain (1-3)., Disp: , Rfl:   •  Acetaminophen-Caffeine (TENSION HEADACHE PO), Take  by mouth As Needed., Disp: , Rfl:   •  aspirin 81 MG tablet, Take 81 mg by mouth Daily., Disp: , Rfl:   •  DULoxetine (CYMBALTA) 60 MG capsule, Take 60 mg by mouth., Disp: , Rfl:   •  metoprolol tartrate (LOPRESSOR) 25 MG tablet, Take 1 tablet by mouth 2 (Two) Times a Day., Disp: 180 tablet, Rfl: 3  •  warfarin (COUMADIN) 5 MG tablet, Take 1 tablet by mouth Daily., Disp: 60 tablet, Rfl: 11    Allergies:  Penicillins    Past Medical History:   Diagnosis Date   • Abnormal ECG    • Arthritis    • Atrial fibrillation    • CFS (chronic fatigue syndrome)    • Chronic pain disorder    • Fibromyalgia    • Hypertension    • Migraines      Past Surgical History:   Procedure Laterality Date   • CYST REMOVAL      from tailbone   • TUBAL ABDOMINAL LIGATION       Family History   Problem Relation Age of Onset   • Stroke Mother    • Heart disease Father    • Asthma Father      Social History   Substance Use Topics   • Smoking status: Never Smoker   • Smokeless tobacco: Never Used   • Alcohol use No       Review of Systems  Review of Systems   Constitutional: Positive  "for fatigue and fever.   HENT: Positive for congestion, postnasal drip, rhinorrhea and sore throat.    Eyes: Negative.    Respiratory: Positive for cough. Negative for wheezing.    Cardiovascular: Negative.    Gastrointestinal: Positive for vomiting (post-tussive).   Endocrine: Negative.    Genitourinary: Negative.    Musculoskeletal: Negative.    Skin: Negative.    Allergic/Immunologic: Negative.    Neurological: Positive for headaches.   Hematological: Negative.    Psychiatric/Behavioral: Negative.        Objective   BP (!) 158/105  Pulse 84  Temp 98.4 °F (36.9 °C)  Resp 20  Ht 66\" (167.6 cm)  Wt (!) 336 lb 6.4 oz (153 kg)  SpO2 97%  BMI 54.3 kg/m2      Physical Exam   Constitutional: She is oriented to person, place, and time. She appears well-developed and well-nourished.   HENT:   Head: Normocephalic and atraumatic.   Right Ear: Tympanic membrane normal.   Left Ear: Tympanic membrane normal.   Nose: Mucosal edema and rhinorrhea present.   Mouth/Throat: Posterior oropharyngeal erythema (with PND and cobblestoning) present. Tonsils are 2+ on the right. Tonsils are 2+ on the left. No tonsillar exudate.   Eyes: EOM are normal. Pupils are equal, round, and reactive to light.   Neck: Normal range of motion. Neck supple.   Cardiovascular: Normal rate and regular rhythm.    Pulmonary/Chest: Effort normal and breath sounds normal.   Neurological: She is alert and oriented to person, place, and time.   Skin: Skin is warm and dry.       Assessment/Plan     Anthony was seen today for cough and sore throat.    Diagnoses and all orders for this visit:    Acute URI    Exposure to strep throat  -     POCT rapid strep A - negative    Other orders  --     cefdinir (OMNICEF) 300 MG capsule; Take 1 capsule by mouth 2 (Two) Times a Day for 10 days.    Discussed due to long weekend, exposure to infection and consideration of current medical conditions and antibiotic can be started. Discussed the low risk of cross sensitivity " reaction with PCN allergy. Advised to discontinue if rash occurs. Encourage saline nasal rinses as pt can not take decongestants or steroids at this time due to cardiac issues.

## 2017-11-22 NOTE — PATIENT INSTRUCTIONS
"Upper Respiratory Infection, Adult  Most upper respiratory infections (URIs) are a viral infection of the air passages leading to the lungs. A URI affects the nose, throat, and upper air passages. The most common type of URI is nasopharyngitis and is typically referred to as \"the common cold.\"  URIs run their course and usually go away on their own. Most of the time, a URI does not require medical attention, but sometimes a bacterial infection in the upper airways can follow a viral infection. This is called a secondary infection. Sinus and middle ear infections are common types of secondary upper respiratory infections.  Bacterial pneumonia can also complicate a URI. A URI can worsen asthma and chronic obstructive pulmonary disease (COPD). Sometimes, these complications can require emergency medical care and may be life threatening.   CAUSES  Almost all URIs are caused by viruses. A virus is a type of germ and can spread from one person to another.   RISKS FACTORS  You may be at risk for a URI if:   · You smoke.    · You have chronic heart or lung disease.  · You have a weakened defense (immune) system.    · You are very young or very old.    · You have nasal allergies or asthma.  · You work in crowded or poorly ventilated areas.  · You work in health care facilities or schools.  SIGNS AND SYMPTOMS   Symptoms typically develop 2-3 days after you come in contact with a cold virus. Most viral URIs last 7-10 days. However, viral URIs from the influenza virus (flu virus) can last 14-18 days and are typically more severe. Symptoms may include:   · Runny or stuffy (congested) nose.    · Sneezing.    · Cough.    · Sore throat.    · Headache.    · Fatigue.    · Fever.    · Loss of appetite.    · Pain in your forehead, behind your eyes, and over your cheekbones (sinus pain).  · Muscle aches.    DIAGNOSIS   Your health care provider may diagnose a URI by:  · Physical exam.  · Tests to check that your symptoms are not due to " another condition such as:  ¨ Strep throat.  ¨ Sinusitis.  ¨ Pneumonia.  ¨ Asthma.  TREATMENT   A URI goes away on its own with time. It cannot be cured with medicines, but medicines may be prescribed or recommended to relieve symptoms. Medicines may help:  · Reduce your fever.  · Reduce your cough.  · Relieve nasal congestion.  HOME CARE INSTRUCTIONS   · Take medicines only as directed by your health care provider.    · Gargle warm saltwater or take cough drops to comfort your throat as directed by your health care provider.  · Use a warm mist humidifier or inhale steam from a shower to increase air moisture. This may make it easier to breathe.  · Drink enough fluid to keep your urine clear or pale yellow.    · Eat soups and other clear broths and maintain good nutrition.    · Rest as needed.    · Return to work when your temperature has returned to normal or as your health care provider advises. You may need to stay home longer to avoid infecting others. You can also use a face mask and careful hand washing to prevent spread of the virus.  · Increase the usage of your inhaler if you have asthma.    · Do not use any tobacco products, including cigarettes, chewing tobacco, or electronic cigarettes. If you need help quitting, ask your health care provider.  PREVENTION   The best way to protect yourself from getting a cold is to practice good hygiene.   · Avoid oral or hand contact with people with cold symptoms.    · Wash your hands often if contact occurs.    There is no clear evidence that vitamin C, vitamin E, echinacea, or exercise reduces the chance of developing a cold. However, it is always recommended to get plenty of rest, exercise, and practice good nutrition.   SEEK MEDICAL CARE IF:   · You are getting worse rather than better.    · Your symptoms are not controlled by medicine.    · You have chills.  · You have worsening shortness of breath.  · You have brown or red mucus.  · You have yellow or brown nasal  discharge.  · You have pain in your face, especially when you bend forward.  · You have a fever.  · You have swollen neck glands.  · You have pain while swallowing.  · You have white areas in the back of your throat.  SEEK IMMEDIATE MEDICAL CARE IF:   · You have severe or persistent:    Headache.    Ear pain.    Sinus pain.    Chest pain.  · You have chronic lung disease and any of the following:    Wheezing.    Prolonged cough.    Coughing up blood.    A change in your usual mucus.  · You have a stiff neck.  · You have changes in your:    Vision.    Hearing.    Thinking.    Mood.  MAKE SURE YOU:   · Understand these instructions.  · Will watch your condition.  · Will get help right away if you are not doing well or get worse.     This information is not intended to replace advice given to you by your health care provider. Make sure you discuss any questions you have with your health care provider.     Document Released: 06/13/2002 Document Revised: 05/03/2016 Document Reviewed: 03/25/2015  [employer] Patient Education ©2017 [employer]

## 2017-11-28 ENCOUNTER — ANESTHESIA (OUTPATIENT)
Dept: CARDIOLOGY | Facility: HOSPITAL | Age: 52
End: 2017-11-28

## 2017-11-28 ENCOUNTER — ANESTHESIA EVENT (OUTPATIENT)
Dept: CARDIOLOGY | Facility: HOSPITAL | Age: 52
End: 2017-11-28

## 2017-11-28 ENCOUNTER — HOSPITAL ENCOUNTER (OUTPATIENT)
Dept: CARDIOLOGY | Facility: HOSPITAL | Age: 52
Discharge: HOME | End: 2017-11-28
Attending: INTERNAL MEDICINE | Admitting: INTERNAL MEDICINE

## 2017-11-28 VITALS
WEIGHT: 293 LBS | HEIGHT: 65 IN | DIASTOLIC BLOOD PRESSURE: 93 MMHG | HEART RATE: 81 BPM | TEMPERATURE: 98 F | OXYGEN SATURATION: 100 % | SYSTOLIC BLOOD PRESSURE: 148 MMHG | RESPIRATION RATE: 17 BRPM | BODY MASS INDEX: 48.82 KG/M2

## 2017-11-28 DIAGNOSIS — I48.91 ATRIAL FIBRILLATION, UNSPECIFIED TYPE (HCC): ICD-10-CM

## 2017-11-28 PROCEDURE — 93005 ELECTROCARDIOGRAM TRACING: CPT | Performed by: INTERNAL MEDICINE

## 2017-11-28 PROCEDURE — 85610 PROTHROMBIN TIME: CPT

## 2017-11-28 PROCEDURE — 92960 CARDIOVERSION ELECTRIC EXT: CPT | Performed by: INTERNAL MEDICINE

## 2017-11-28 PROCEDURE — 93010 ELECTROCARDIOGRAM REPORT: CPT | Performed by: INTERNAL MEDICINE

## 2017-11-28 PROCEDURE — 92960 CARDIOVERSION ELECTRIC EXT: CPT

## 2017-11-28 PROCEDURE — 25010000002 PROPOFOL 10 MG/ML EMULSION: Performed by: NURSE ANESTHETIST, CERTIFIED REGISTERED

## 2017-11-28 RX ORDER — PROPOFOL 10 MG/ML
VIAL (ML) INTRAVENOUS AS NEEDED
Status: DISCONTINUED | OUTPATIENT
Start: 2017-11-28 | End: 2017-11-28 | Stop reason: SURG

## 2017-11-28 RX ORDER — SODIUM CHLORIDE 9 MG/ML
20 INJECTION, SOLUTION INTRAVENOUS CONTINUOUS
Status: DISCONTINUED | OUTPATIENT
Start: 2017-11-28 | End: 2017-11-29 | Stop reason: HOSPADM

## 2017-11-28 RX ORDER — SODIUM CHLORIDE 0.9 % (FLUSH) 0.9 %
1-10 SYRINGE (ML) INJECTION AS NEEDED
Status: DISCONTINUED | OUTPATIENT
Start: 2017-11-28 | End: 2017-11-29 | Stop reason: HOSPADM

## 2017-11-28 RX ORDER — LIDOCAINE HYDROCHLORIDE 20 MG/ML
INJECTION, SOLUTION INFILTRATION; PERINEURAL AS NEEDED
Status: DISCONTINUED | OUTPATIENT
Start: 2017-11-28 | End: 2017-11-28 | Stop reason: SURG

## 2017-11-28 RX ADMIN — SODIUM CHLORIDE 20 ML/HR: 9 INJECTION, SOLUTION INTRAVENOUS at 12:51

## 2017-11-28 RX ADMIN — PROPOFOL 70 MG: 10 INJECTION, EMULSION INTRAVENOUS at 13:53

## 2017-11-28 RX ADMIN — LIDOCAINE HYDROCHLORIDE 50 MG: 20 INJECTION, SOLUTION INFILTRATION; PERINEURAL at 13:53

## 2017-11-28 NOTE — ANESTHESIA POSTPROCEDURE EVALUATION
Patient: Anthony Meza    Procedure Summary     Date Anesthesia Start Anesthesia Stop Room / Location    11/28/17 1348 1402 [facility] CLOSE OBSERVATION UNIT       Procedure Diagnosis Scheduled Providers Provider    [facility] Atrial fibrillation, unspecified type  (AF) MD Jorge Hall CRNA          Anesthesia Type: MAC  Last vitals  BP   124/69 (11/28/17 1401)   Temp   98 °F (36.7 °C) (11/28/17 1100)   Pulse   105 (11/28/17 1401)   Resp   12 (11/28/17 1401)     SpO2   98 % (11/28/17 1401)     Post Anesthesia Care and Evaluation    Patient location during evaluation: PACU  Patient participation: complete - patient participated  Level of consciousness: awake and alert  Pain management: adequate  Airway patency: patent  Anesthetic complications: No anesthetic complications    Cardiovascular status: acceptable  Respiratory status: acceptable  Hydration status: acceptable

## 2017-11-28 NOTE — ANESTHESIA PREPROCEDURE EVALUATION
Anesthesia Evaluation     Patient summary reviewed   history of anesthetic complications (hypotension):  NPO Solid Status: > 8 hours  NPO Liquid Status: > 8 hours     Airway   Mallampati: II  TM distance: >3 FB  Neck ROM: full  no difficulty expected  Dental - normal exam     Pulmonary - normal exam   (+) sleep apnea on CPAP,   (-) asthma, not a smoker  Cardiovascular     PT is on anticoagulation therapy  Patient on routine beta blocker and Beta blocker given within 24 hours of surgery  Rhythm: irregular  Rate: normal    (+) hypertension, dysrhythmias Atrial Fib,   (-) past MI, CAD      Neuro/Psych  (-) seizures, TIA, CVA  GI/Hepatic/Renal/Endo    (+) morbid obesity,   (-) liver disease, no renal disease, diabetes    Musculoskeletal     (+) chronic pain, myalgias,   Abdominal    Substance History      OB/GYN          Other   (+) arthritis                                   Anesthesia Plan    ASA 3     MAC     intravenous induction   Anesthetic plan and risks discussed with patient.

## 2017-11-29 LAB
INR PPP: 3 (ref 0.91–1.09)
PROTHROMBIN TIME: 35.4 SECONDS (ref 10–13.8)

## 2017-12-04 ENCOUNTER — OFFICE VISIT (OUTPATIENT)
Dept: LAB | Facility: HOSPITAL | Age: 52
End: 2017-12-04

## 2017-12-04 ENCOUNTER — TRANSCRIBE ORDERS (OUTPATIENT)
Dept: ADMINISTRATIVE | Facility: HOSPITAL | Age: 52
End: 2017-12-04

## 2017-12-04 DIAGNOSIS — I48.91 ATRIAL FIBRILLATION, UNSPECIFIED TYPE (HCC): Primary | ICD-10-CM

## 2017-12-04 LAB
INR PPP: 3 (ref 0.91–1.09)
PERFORM POINT OF CARE ON DEVICE: NORMAL
PROTHROMBIN TIME: 36.2 SECONDS (ref 10–13.8)

## 2017-12-04 PROCEDURE — 85610 PROTHROMBIN TIME: CPT | Performed by: FAMILY MEDICINE

## 2017-12-05 ENCOUNTER — ANTICOAGULATION VISIT (OUTPATIENT)
Dept: CARDIOLOGY | Facility: CLINIC | Age: 52
End: 2017-12-05

## 2017-12-11 ENCOUNTER — ANTICOAGULATION VISIT (OUTPATIENT)
Dept: CARDIOLOGY | Facility: CLINIC | Age: 52
End: 2017-12-11

## 2017-12-11 ENCOUNTER — LAB (OUTPATIENT)
Dept: LAB | Facility: HOSPITAL | Age: 52
End: 2017-12-11
Attending: INTERNAL MEDICINE

## 2017-12-11 ENCOUNTER — TRANSCRIBE ORDERS (OUTPATIENT)
Dept: LAB | Facility: HOSPITAL | Age: 52
End: 2017-12-11

## 2017-12-11 DIAGNOSIS — I48.91 ATRIAL FIBRILLATION, UNSPECIFIED TYPE (HCC): ICD-10-CM

## 2017-12-11 DIAGNOSIS — I48.91 ATRIAL FIBRILLATION, UNSPECIFIED TYPE (HCC): Primary | ICD-10-CM

## 2017-12-11 LAB
INR PPP: 1.83 (ref 0.91–1.09)
PERFORM POINT OF CARE ON DEVICE: NORMAL
PROTHROMBIN TIME: 21.8 SECONDS (ref 11.9–14.6)

## 2017-12-11 PROCEDURE — 36415 COLL VENOUS BLD VENIPUNCTURE: CPT

## 2017-12-11 PROCEDURE — 85610 PROTHROMBIN TIME: CPT | Performed by: INTERNAL MEDICINE

## 2017-12-11 RX ORDER — WARFARIN SODIUM 5 MG/1
TABLET ORAL
Qty: 60 TABLET | Refills: 11 | Status: SHIPPED | OUTPATIENT
Start: 2017-12-11 | End: 2018-01-25

## 2017-12-12 ENCOUNTER — OFFICE VISIT (OUTPATIENT)
Dept: NEUROSURGERY | Age: 52
End: 2017-12-12
Payer: MEDICAID

## 2017-12-12 VITALS
BODY MASS INDEX: 47.09 KG/M2 | OXYGEN SATURATION: 97 % | DIASTOLIC BLOOD PRESSURE: 64 MMHG | SYSTOLIC BLOOD PRESSURE: 128 MMHG | WEIGHT: 293 LBS | HEIGHT: 66 IN | HEART RATE: 66 BPM

## 2017-12-12 DIAGNOSIS — R51.9 INTRACTABLE HEADACHE, UNSPECIFIED CHRONICITY PATTERN, UNSPECIFIED HEADACHE TYPE: ICD-10-CM

## 2017-12-12 DIAGNOSIS — M79.7 FIBROMYALGIA: Primary | ICD-10-CM

## 2017-12-12 DIAGNOSIS — G47.33 OSA (OBSTRUCTIVE SLEEP APNEA): ICD-10-CM

## 2017-12-12 PROCEDURE — G8484 FLU IMMUNIZE NO ADMIN: HCPCS | Performed by: PSYCHIATRY & NEUROLOGY

## 2017-12-12 PROCEDURE — 1036F TOBACCO NON-USER: CPT | Performed by: PSYCHIATRY & NEUROLOGY

## 2017-12-12 PROCEDURE — 99214 OFFICE O/P EST MOD 30 MIN: CPT | Performed by: PSYCHIATRY & NEUROLOGY

## 2017-12-12 PROCEDURE — G8427 DOCREV CUR MEDS BY ELIG CLIN: HCPCS | Performed by: PSYCHIATRY & NEUROLOGY

## 2017-12-12 PROCEDURE — 3014F SCREEN MAMMO DOC REV: CPT | Performed by: PSYCHIATRY & NEUROLOGY

## 2017-12-12 PROCEDURE — 3017F COLORECTAL CA SCREEN DOC REV: CPT | Performed by: PSYCHIATRY & NEUROLOGY

## 2017-12-12 PROCEDURE — G8417 CALC BMI ABV UP PARAM F/U: HCPCS | Performed by: PSYCHIATRY & NEUROLOGY

## 2017-12-12 RX ORDER — TOPIRAMATE 50 MG/1
50 TABLET, FILM COATED ORAL 2 TIMES DAILY
Qty: 60 TABLET | Refills: 5 | Status: SHIPPED | OUTPATIENT
Start: 2017-12-12 | End: 2018-11-06 | Stop reason: SDUPTHER

## 2017-12-12 RX ORDER — WARFARIN SODIUM 5 MG/1
TABLET ORAL
COMMUNITY
Start: 2017-12-11

## 2017-12-12 NOTE — PROGRESS NOTES
[facility] Neurology Office Note      Patient:   Cale Infante  MR#:    [id number]  Account Number:                         YOB: 1965  Date of Evaluation:  12/12/2017  Time of Note:                          10:50 AM  Primary/Referring Physician:  Toñito Miller   Consulting Physician:  Desitn Hobbs D.O.    FOLLOW UP VISIT    Chief Complaint   Patient presents with    Chronic Pain     4 month follow up patient stated that nshe has good days and bad / also being seen by a heart dr . Had to hve heart shocked back into [facility]       HISTORY OF PRESENT ILLNESS    Cale Infante is a 47y.o. year old female here for fibromyalgia. Patient was diagnosed around 3 years ago. Symptoms vary, muscles aches, widespread pain, involving all four extremities and varies in severity. Doing about the same since last seen. She is has tried neurontin, savella, and elavil in the past.  Has also been on ritalin, recently stopped. Workup completed at [facility], NCS, imaging, labs. NCS reviewed and c/w polyneuropathy. MRI negative per Dr. Pipe Haddad records. Still notes fatigue. Denies focal weakness, visual changes. She does have DIMITRI and uses CPAP nightly. She also notes back and neck pain as well. No overt changes since last seen, waxing and waning. Still about the same symptomatically. Some improvement noted on Cymbalta, tolerating 60 mg daily well. Recently seen by cardiology, atrial fibrillation noted, s/p cardioversion. Past Medical History:   Diagnosis Date    Atrial fibrillation ([address]     Fibromyalgia        Past Surgical History:   Procedure Laterality Date    ANKLE SURGERY Left     CYST REMOVAL      Tail bone    TUBAL LIGATION      WISDOM TOOTH EXTRACTION         History reviewed. No pertinent family history. Social History     Social History    Marital status: Unknown     Spouse name: N/A    Number of children: N/A    Years of education: N/A     Occupational History    Not on file.      Social History Main Topics    Smoking status: Never Smoker    Smokeless tobacco: Never Used    Alcohol use No    Drug use: No    Sexual activity: Yes     Other Topics Concern    Not on file     Social History Narrative    No narrative on file       Current Outpatient Prescriptions   Medication Sig Dispense Refill    warfarin (COUMADIN) 5 MG tablet Patient taking 7.5mg daily except 5mg Monday & Friday. Dose subject to change r/t results of INR.  metoprolol tartrate (LOPRESSOR) 25 MG tablet TAKE 1 TABLET BY MOUTH 2 (TWO) TIMES A DAY. 3    acetaminophen (TYLENOL) 325 MG tablet Take 325 mg by mouth daily as needed       DULoxetine (CYMBALTA) 60 MG extended release capsule Take 1 capsule by mouth daily 30 capsule 5     No current facility-administered medications for this visit.         Allergies   Allergen Reactions    Penicillins Rash         REVIEW OF SYSTEMS    Constitutional: []Fever []Sweats []Chills [] Recent Injury [x]Fatigue  [x] Denies all unless marked  HEENT:[]Headache  [] Head Injury  [] Sore Throat  [] Ear Pain  []Dizziness [] Hearing Loss []Trouble Swallowing []Voice Change  [] Eye Pain  [] Eye Injection []Visual Disturbance  [] Ptosis  [] Tinnitus [x] Denies all unless marked  Spine:  [x] Neck pain  [x] Back pain  [] Sciaticia  [x] Denies all unless marked  Cardiovascular:[]Chest Pain []Palpitations [] Heart Disease  [x] Denies all unless marked  Pulmonary: [x]Shortness of Breath []Cough  []Wheezing  [x] Denies all unless marked  Gastrointestinal:  []Abdominal Pain  []Blood in Stool  []Diarrhea []Constipation []Nausea  []Vomiting  [x] Denies all unless marked  Genitourinary:  [] Dysuria [] Enuresis [] Incontinence [] Frequency/Urgency  [] Hematuria  [x] Denies all unless marked  Musculoskeletal: [x] Joint Pain [x] Myalgias [] Joint Swelling [] Neck Stiffness  [x] Denies all unless marked  Skin:[] Rash [] Pallor [] Color Change [] Wound  [x] Denies all unless marked  Neurological:[] Visual Disturbance [] and SCM testing normal bilaterally  COMMENTS:   Motor   [x]5/5 strength x 4 extremities  [x]Normal bulk and tone  [x]No tremor present  [x]No rigidity or bradykinesia noted  COMMENTS:   Sensory  [x]Sensation intact to light touch, pin prick, vibration, and proprioception BLE  []Sensation intact to light touch, pin prick, vibration, and proprioception BUE  COMMENTS:   Coordination [x]FTN normal bilaterally   []HTS normal bilaterally  []VONNIE normal bilaterally. COMMENTS:   Reflexes  [x]Symmetric and non-pathological  [x]Toes down going bilaterally  [x]No clonus present  COMMENTS:   Gait                  [x]Normal steady gait    []Ataxic    []Spastic     []Magnetic     []Shuffling  COMMENTS:       LABS RECORD AND IMAGING REVIEW (As below and per HPI)    Records reviewed. Dr. Marcello Pinzon records reviewed again today. NCS c/w polyneuropathy, no other records from primary care. Still no labs or MRI reports. ASSESSMENT:    Jeana Pressley is a 47y.o. year old female here for possible fibromyalgia. Has underlying DIMITRI as well. MRI was stable, and largely unrevealing per Dr. Marcello Pinzon records. NCS c/w polyneuropathy. No labs or formal MRI reports in records. Noting more headaches since last seen, frequency, duration has worsened. PLAN:  1. Need prior workup and records from [facility], formal MRI reports and labs, will re-request today. 2.  Failed neurontin, savella, elavil, and has been on ritalin in the recent past.  Doing better on Cymbalta 60 mg daily, will continue. 3.  Further workup pending record review. 4.  Continue using CPAP for DIMITRI. Consider sleep re-eval as well if worsens. 5.  Will call if worsens  6. Follow up with cardiology, atrial fibrillation noted, on coumadin and lopressor   7. Noting more headaches, plan MRI Brain, will give a trial of Topamax if worsens. Limit OTC pain meds to avoid rebound. Exam non-focal, no papilledema.      Jeffery Gillespie,   Board Certified Neurology

## 2017-12-19 ENCOUNTER — ANTICOAGULATION VISIT (OUTPATIENT)
Dept: CARDIOLOGY | Facility: CLINIC | Age: 52
End: 2017-12-19

## 2017-12-19 ENCOUNTER — APPOINTMENT (OUTPATIENT)
Dept: LAB | Facility: HOSPITAL | Age: 52
End: 2017-12-19

## 2017-12-19 ENCOUNTER — TELEPHONE (OUTPATIENT)
Dept: NEUROLOGY | Age: 52
End: 2017-12-19

## 2017-12-19 ENCOUNTER — TRANSCRIBE ORDERS (OUTPATIENT)
Dept: ADMINISTRATIVE | Facility: HOSPITAL | Age: 52
End: 2017-12-19

## 2017-12-19 ENCOUNTER — LAB (OUTPATIENT)
Dept: LAB | Facility: HOSPITAL | Age: 52
End: 2017-12-19
Attending: INTERNAL MEDICINE

## 2017-12-19 DIAGNOSIS — I48.91 ATRIAL FIBRILLATION, UNSPECIFIED TYPE (HCC): Primary | ICD-10-CM

## 2017-12-19 DIAGNOSIS — I48.91 ATRIAL FIBRILLATION, UNSPECIFIED TYPE (HCC): ICD-10-CM

## 2017-12-19 LAB
INR PPP: 1.54 (ref 0.91–1.09)
PERFORM POINT OF CARE ON DEVICE: NORMAL
PROTHROMBIN TIME: 19 SECONDS (ref 11.9–14.6)

## 2017-12-19 PROCEDURE — 85610 PROTHROMBIN TIME: CPT | Performed by: INTERNAL MEDICINE

## 2017-12-19 PROCEDURE — 36415 COLL VENOUS BLD VENIPUNCTURE: CPT

## 2017-12-27 ENCOUNTER — LAB (OUTPATIENT)
Dept: LAB | Facility: HOSPITAL | Age: 52
End: 2017-12-27
Attending: INTERNAL MEDICINE

## 2017-12-27 ENCOUNTER — ANTICOAGULATION VISIT (OUTPATIENT)
Dept: CARDIOLOGY | Facility: CLINIC | Age: 52
End: 2017-12-27

## 2017-12-27 DIAGNOSIS — I48.91 ATRIAL FIBRILLATION, UNSPECIFIED TYPE (HCC): ICD-10-CM

## 2017-12-27 LAB
INR PPP: 1.6 (ref 0.91–1.09)
PROTHROMBIN TIME: 19.4 SECONDS (ref 10–13.8)

## 2017-12-27 PROCEDURE — 85610 PROTHROMBIN TIME: CPT | Performed by: FAMILY MEDICINE

## 2017-12-27 PROCEDURE — 36415 COLL VENOUS BLD VENIPUNCTURE: CPT

## 2017-12-28 ENCOUNTER — HOSPITAL ENCOUNTER (OUTPATIENT)
Dept: MRI IMAGING | Age: 52
Discharge: HOME | End: 2017-12-28
Payer: MEDICAID

## 2017-12-28 DIAGNOSIS — R51.9 INTRACTABLE HEADACHE, UNSPECIFIED CHRONICITY PATTERN, UNSPECIFIED HEADACHE TYPE: ICD-10-CM

## 2017-12-28 PROCEDURE — 70553 MRI BRAIN STEM W/O & W/DYE: CPT

## 2017-12-28 PROCEDURE — A9577 INJ MULTIHANCE: HCPCS | Performed by: PSYCHIATRY & NEUROLOGY

## 2017-12-28 PROCEDURE — 6360000004 HC RX CONTRAST MEDICATION: Performed by: PSYCHIATRY & NEUROLOGY

## 2017-12-28 RX ADMIN — GADOBENATE DIMEGLUMINE 20 ML: 529 INJECTION, SOLUTION INTRAVENOUS at 14:06

## 2018-01-02 ENCOUNTER — TRANSCRIBE ORDERS (OUTPATIENT)
Dept: LAB | Facility: HOSPITAL | Age: 53
End: 2018-01-02

## 2018-01-02 ENCOUNTER — LAB (OUTPATIENT)
Dept: LAB | Facility: HOSPITAL | Age: 53
End: 2018-01-02
Attending: INTERNAL MEDICINE

## 2018-01-02 DIAGNOSIS — I48.91 ATRIAL FIBRILLATION, UNSPECIFIED TYPE (HCC): Primary | ICD-10-CM

## 2018-01-02 DIAGNOSIS — I48.91 ATRIAL FIBRILLATION, UNSPECIFIED TYPE (HCC): ICD-10-CM

## 2018-01-02 LAB
INR PPP: 2.2 (ref 0.91–1.09)
PERFORM POINT OF CARE ON DEVICE: NORMAL
PROTHROMBIN TIME: 26.1 SECONDS (ref 10–13.8)

## 2018-01-02 PROCEDURE — 85610 PROTHROMBIN TIME: CPT | Performed by: FAMILY MEDICINE

## 2018-01-03 ENCOUNTER — ANTICOAGULATION VISIT (OUTPATIENT)
Dept: CARDIOLOGY | Facility: CLINIC | Age: 53
End: 2018-01-03

## 2018-01-08 ENCOUNTER — TELEPHONE (OUTPATIENT)
Dept: CARDIOLOGY | Facility: CLINIC | Age: 53
End: 2018-01-08

## 2018-01-08 NOTE — TELEPHONE ENCOUNTER
PT STATES THAT SHE HAD BEEN SOA & EXPERIENCING PALPITATIONS AGAIN. SHE ALSO HAS NO ENERGY & WOULD LIKE TO BE SEEN SOONER IF POSSIBLE. SHE IS S/P [facility] & THINKS SHE MAY BE BACK IN AFIB - APPT MADE FOR 1/10/18 @ 8 AM.    SCHEDULING/PT NOTIFIED

## 2018-01-10 ENCOUNTER — HOSPITAL ENCOUNTER (OUTPATIENT)
Dept: CARDIOLOGY | Facility: HOSPITAL | Age: 53
Discharge: HOME | End: 2018-01-10
Admitting: INTERNAL MEDICINE

## 2018-01-10 ENCOUNTER — ANTICOAGULATION VISIT (OUTPATIENT)
Dept: CARDIOLOGY | Facility: CLINIC | Age: 53
End: 2018-01-10

## 2018-01-10 ENCOUNTER — OFFICE VISIT (OUTPATIENT)
Dept: CARDIOLOGY | Facility: CLINIC | Age: 53
End: 2018-01-10

## 2018-01-10 ENCOUNTER — HOSPITAL ENCOUNTER (OUTPATIENT)
Facility: HOSPITAL | Age: 53
Setting detail: HOSPITAL OUTPATIENT SURGERY
End: 2018-01-10
Attending: INTERNAL MEDICINE | Admitting: INTERNAL MEDICINE

## 2018-01-10 VITALS
WEIGHT: 293 LBS | HEIGHT: 66 IN | BODY MASS INDEX: 47.09 KG/M2 | HEART RATE: 87 BPM | DIASTOLIC BLOOD PRESSURE: 80 MMHG | SYSTOLIC BLOOD PRESSURE: 132 MMHG | OXYGEN SATURATION: 97 %

## 2018-01-10 DIAGNOSIS — Z99.89 OSA ON CPAP: ICD-10-CM

## 2018-01-10 DIAGNOSIS — Z82.49 FAMILY HISTORY OF EARLY CAD: ICD-10-CM

## 2018-01-10 DIAGNOSIS — M79.7 FIBROMYALGIA: ICD-10-CM

## 2018-01-10 DIAGNOSIS — R07.2 PRECORDIAL PAIN: ICD-10-CM

## 2018-01-10 DIAGNOSIS — I48.0 PAROXYSMAL ATRIAL FIBRILLATION (HCC): ICD-10-CM

## 2018-01-10 DIAGNOSIS — I10 ESSENTIAL HYPERTENSION: ICD-10-CM

## 2018-01-10 DIAGNOSIS — G93.32 CFS (CHRONIC FATIGUE SYNDROME): ICD-10-CM

## 2018-01-10 DIAGNOSIS — G89.29 CHRONIC MIDLINE LOW BACK PAIN WITHOUT SCIATICA: ICD-10-CM

## 2018-01-10 DIAGNOSIS — R94.31 ABNORMAL ECG: Primary | ICD-10-CM

## 2018-01-10 DIAGNOSIS — G47.33 OSA ON CPAP: ICD-10-CM

## 2018-01-10 DIAGNOSIS — M54.50 CHRONIC MIDLINE LOW BACK PAIN WITHOUT SCIATICA: ICD-10-CM

## 2018-01-10 PROBLEM — I48.91 ATRIAL FIBRILLATION, UNSPECIFIED TYPE: Status: RESOLVED | Noted: 2017-10-02 | Resolved: 2018-01-10

## 2018-01-10 LAB
ALBUMIN SERPL-MCNC: 4 G/DL (ref 3.5–5)
ALBUMIN/GLOB SERPL: 1.3 G/DL (ref 1.1–2.5)
ALP SERPL-CCNC: 107 U/L (ref 24–120)
ALT SERPL W P-5'-P-CCNC: 36 U/L (ref 0–54)
ANION GAP SERPL CALCULATED.3IONS-SCNC: 10 MMOL/L (ref 4–13)
AST SERPL-CCNC: 20 U/L (ref 7–45)
BASOPHILS # BLD AUTO: 0.03 10*3/MM3 (ref 0–0.2)
BASOPHILS NFR BLD AUTO: 0.5 % (ref 0–2)
BILIRUB SERPL-MCNC: 0.4 MG/DL (ref 0.1–1)
BUN BLD-MCNC: 16 MG/DL (ref 5–21)
BUN/CREAT SERPL: 20.8 (ref 7–25)
CALCIUM SPEC-SCNC: 9.8 MG/DL (ref 8.4–10.4)
CHLORIDE SERPL-SCNC: 109 MMOL/L (ref 98–110)
CO2 SERPL-SCNC: 29 MMOL/L (ref 24–31)
CREAT BLD-MCNC: 0.77 MG/DL (ref 0.5–1.4)
DEPRECATED RDW RBC AUTO: 47.8 FL (ref 40–54)
EOSINOPHIL # BLD AUTO: 0.12 10*3/MM3 (ref 0–0.7)
EOSINOPHIL NFR BLD AUTO: 1.8 % (ref 0–4)
ERYTHROCYTE [DISTWIDTH] IN BLOOD BY AUTOMATED COUNT: 14.2 % (ref 12–15)
GFR SERPL CREATININE-BSD FRML MDRD: 78 ML/MIN/1.73
GLOBULIN UR ELPH-MCNC: 3 GM/DL
GLUCOSE BLD-MCNC: 97 MG/DL (ref 70–100)
HCT VFR BLD AUTO: 43.7 % (ref 37–47)
HGB BLD-MCNC: 14.1 G/DL (ref 12–16)
IMM GRANULOCYTES # BLD: 0.01 10*3/MM3 (ref 0–0.03)
IMM GRANULOCYTES NFR BLD: 0.2 % (ref 0–5)
INR PPP: 2.17 (ref 0.91–1.09)
LYMPHOCYTES # BLD AUTO: 2.02 10*3/MM3 (ref 0.72–4.86)
LYMPHOCYTES NFR BLD AUTO: 30.5 % (ref 15–45)
MCH RBC QN AUTO: 29.4 PG (ref 28–32)
MCHC RBC AUTO-ENTMCNC: 32.3 G/DL (ref 33–36)
MCV RBC AUTO: 91.2 FL (ref 82–98)
MONOCYTES # BLD AUTO: 0.5 10*3/MM3 (ref 0.19–1.3)
MONOCYTES NFR BLD AUTO: 7.5 % (ref 4–12)
NEUTROPHILS # BLD AUTO: 3.95 10*3/MM3 (ref 1.87–8.4)
NEUTROPHILS NFR BLD AUTO: 59.5 % (ref 39–78)
NRBC BLD MANUAL-RTO: 0 /100 WBC (ref 0–0)
PLATELET # BLD AUTO: 224 10*3/MM3 (ref 130–400)
PMV BLD AUTO: 10.8 FL (ref 6–12)
POTASSIUM BLD-SCNC: 4.1 MMOL/L (ref 3.5–5.3)
PROT SERPL-MCNC: 7 G/DL (ref 6.3–8.7)
PROTHROMBIN TIME: 25 SECONDS (ref 11.9–14.6)
RBC # BLD AUTO: 4.79 10*6/MM3 (ref 4.2–5.4)
SODIUM BLD-SCNC: 148 MMOL/L (ref 135–145)
WBC NRBC COR # BLD: 6.63 10*3/MM3 (ref 4.8–10.8)

## 2018-01-10 PROCEDURE — 36415 COLL VENOUS BLD VENIPUNCTURE: CPT

## 2018-01-10 PROCEDURE — 99214 OFFICE O/P EST MOD 30 MIN: CPT | Performed by: INTERNAL MEDICINE

## 2018-01-10 PROCEDURE — 80053 COMPREHEN METABOLIC PANEL: CPT | Performed by: INTERNAL MEDICINE

## 2018-01-10 PROCEDURE — 85025 COMPLETE CBC W/AUTO DIFF WBC: CPT | Performed by: INTERNAL MEDICINE

## 2018-01-10 PROCEDURE — 93000 ELECTROCARDIOGRAM COMPLETE: CPT | Performed by: INTERNAL MEDICINE

## 2018-01-10 PROCEDURE — 85610 PROTHROMBIN TIME: CPT | Performed by: INTERNAL MEDICINE

## 2018-01-10 RX ORDER — SODIUM CHLORIDE 9 MG/ML
3 INJECTION, SOLUTION INTRAVENOUS ONCE
Status: CANCELLED | OUTPATIENT
Start: 2018-01-10 | End: 2018-01-10

## 2018-01-10 NOTE — PROGRESS NOTES
Anthony Meza  [id number]  1965  52 y.o.  female    Referring Provider: Tyrell Rosas MD    Reason for  Visit:  Routine follow up.    Subjective   Moderate  exertional shortness of breath on exertion relieved with rest  Mild cough  Occasional wheezing  Going on for several months  No palpitations  associated chest pain  No significant pedal edema  No fever or chills  No significant expectoration  No hemoptysis  No presyncope or syncope   obstructive sleep apnea on CPAP  Chest pain with exertion, moderate substernal, pressure like. Lasts less than 5 minutes  No diaphoresis  No nausea  No radiation  Precipitated with exertion  Moderate associated dyspnea    Chest pain increasing in severity and frequency    History of present illness:  Anthony Meza is a 52 y.o. yo female with history of atrial fibrillation    who presents today for   Chief Complaint   Patient presents with   • Shortness of Breath     On Excersion    • Rapid Heart Rate     Started 2 1/2 weeks ago    • Fatigue   • Atrial Fibrillation   .    History  Past Medical History:   Diagnosis Date   • A-fib    • Abnormal ECG    • Arthritis    • Atrial fibrillation    • CFS (chronic fatigue syndrome)    • Chronic pain disorder    • Fibromyalgia    • Hypertension    • Migraines    ,   Past Surgical History:   Procedure Laterality Date   • CYST REMOVAL      from tailbone   • TUBAL ABDOMINAL LIGATION     ,   Family History   Problem Relation Age of Onset   • Stroke Mother    • Heart disease Father    • Asthma Father    ,   Social History   Substance Use Topics   • Smoking status: Never Smoker   • Smokeless tobacco: Never Used   • Alcohol use No   ,     Medications  Current Outpatient Prescriptions   Medication Sig Dispense Refill   • acetaminophen (TYLENOL) 325 MG tablet Take 325 mg by mouth every 6 (six) hours as needed for mild pain (1-3).     • Acetaminophen-Caffeine (TENSION HEADACHE PO) Take  by mouth As Needed.     • aspirin 81 MG tablet Take 81 mg  "by mouth Daily.     • DULoxetine (CYMBALTA) 60 MG capsule Take 60 mg by mouth Daily.     • metoprolol tartrate (LOPRESSOR) 25 MG tablet Take 1 tablet by mouth 2 (Two) Times a Day. 180 tablet 3   • warfarin (COUMADIN) 5 MG tablet Patient taking 7.5mg daily except 5mg Monday & Friday.  Dose subject to change r/t results of INR. (Patient taking differently: Patient taking 7.5mg daily except 10mg Monday & Friday.  Dose subject to change r/t results of INR.) 60 tablet 11     No current facility-administered medications for this visit.        Allergies:  Penicillins    Review of Systems  Review of Systems   Constitution: Positive for weakness.   HENT: Negative.    Eyes: Negative.    Cardiovascular: Positive for chest pain and dyspnea on exertion. Negative for claudication, cyanosis, irregular heartbeat, leg swelling, near-syncope, orthopnea, palpitations, paroxysmal nocturnal dyspnea and syncope.   Respiratory: Positive for cough.    Endocrine: Negative.    Hematologic/Lymphatic: Negative.    Skin: Negative.    Musculoskeletal: Positive for arthritis and back pain.   Gastrointestinal: Negative for anorexia.   Genitourinary: Negative.    Psychiatric/Behavioral: Negative.      Results for orders placed during the hospital encounter of 03/16/17   Adult Stress Echo Only    Narrative · Left ventricular function is normal. Estimated EF = 55%.  · Normal stress echo with no significant echocardiographic evidence for   myocardial ischemia        Objective     Physical Exam:  /80 (BP Location: Left arm, Patient Position: Sitting, Cuff Size: Adult)  Pulse 87  Ht 167.6 cm (66\")  Wt (!) 152 kg (336 lb)  SpO2 97%  BMI 54.23 kg/m2  Physical Exam   Constitutional: She appears well-developed.   HENT:   Head: Normocephalic.   Neck: Normal carotid pulses and no JVD present. No tracheal tenderness present. Carotid bruit is not present. No tracheal deviation and no edema present.   Cardiovascular: Normal heart sounds and normal " pulses.  An irregularly irregular rhythm present.   Pulmonary/Chest: Effort normal. No stridor.   Abdominal: Soft.   Neurological: She is alert. She has normal strength. No cranial nerve deficit or sensory deficit.   Skin: Skin is warm.   Psychiatric: She has a normal mood and affect. Her speech is normal and behavior is normal.       Results Review:       ECG 12 Lead  Date/Time: 1/10/2018 8:42 AM  Performed by: ISAEL HOUGH  Authorized by: ISAEL HOUGH   Comparison: compared with previous ECG from 11/28/2017  Similar to previous ECG  Rhythm: sinus rhythm  Rate: normal  Conduction: conduction normal  ST Segments: ST segments normal  QRS axis: normal  Clinical impression: abnormal ECG and non-specific ECG  Comments: Poor R wave progression             Assessment/Plan   Patient Active Problem List   Diagnosis   • Abnormal ECG   • Fibromyalgia   • CFS (chronic fatigue syndrome)   • Essential hypertension   • Chronic midline low back pain without sciatica   • Precordial pain   • HARLAN on CPAP   • Atrial fibrillation   • Family history of early CAD father in 50 s       No palpitations. No significant pedal edema. Compliant with medications and diet. Latest labs and medications reviewed.  Stress test normal  LV diastolic dysfunction Normal LVEF    Plan:    Continue same medications  Coumadin for Target INR 2-3 weekly  Monitor for any signs of bleeding including red or dark stools. Fall precautions.   Increase Lopressor to 25 mg BID  DC cardioversion weekly INR above 2 x 4 weeks  Monitor for any signs of bleeding including red or dark stools. Fall precautions.   Patient is asked to monitor BP at home or work, several times per month and return with written values at next office visit.   Close follow up with you as scheduled.  Intensive factor modifications.  See order list.    Counseled regarding disease appropriate diet, fluid, caffeine, stimulants and sodium intake as well as importance of compliance to diet, exercise and  regular follow up.  Avoid NSAIDS and COX2 inhibitors. Use Acetaminophen PRN.  Recommend cardiac catheterization, selective coronary angiography, left ventriculography and percutaneous coronary intervention with application of arteriotomy hemostatic closure device.  I discussed cardiac catheterization, the procedure, risks (including bleeding, infection, vascular damage [including minor oozing, bruising, bleeding, and up to and including but not limited to the need for vascular surgery, emergency cardiothoracic surgery, contrast reaction, renal failure, respiratory failure, heart attack, stroke, arrhythmia and even death), benefits, and alternatives and the patient has voiced understanding and is willing to proceed.  No contraindication to bare metal stent  placement if required  Further recommendations pending results of cardiac catheterization    The patient was advised that NSAID-type medications have three  very important potential side effects: cardiovascular complications, gastrointestinal irritation including hemorrhage and renal injuries.  Do not use anti-inflammatories such as Motrin/ibuprofen, Alleve/naprosyn, Mobic and like medications Use Tylenol instead.The patient expresses understanding of these issues and questions were answered.   Continue ECASA 81 mg daily regimen if applicable given risk factors and monitor for any signs of bleeding including red or dark stools. Fall precautions.      Return in about 3 months (around 4/10/2018).

## 2018-01-24 ENCOUNTER — TRANSCRIBE ORDERS (OUTPATIENT)
Dept: ADMINISTRATIVE | Facility: HOSPITAL | Age: 53
End: 2018-01-24

## 2018-01-24 ENCOUNTER — LAB (OUTPATIENT)
Dept: LAB | Facility: HOSPITAL | Age: 53
End: 2018-01-24
Attending: INTERNAL MEDICINE

## 2018-01-24 ENCOUNTER — ANTICOAGULATION VISIT (OUTPATIENT)
Dept: CARDIOLOGY | Facility: CLINIC | Age: 53
End: 2018-01-24

## 2018-01-24 DIAGNOSIS — I48.91 ATRIAL FIBRILLATION, UNSPECIFIED TYPE (HCC): ICD-10-CM

## 2018-01-24 DIAGNOSIS — I48.91 ATRIAL FIBRILLATION, UNSPECIFIED TYPE (HCC): Primary | ICD-10-CM

## 2018-01-24 LAB
INR PPP: 1.9 (ref 0.91–1.09)
PERFORM POINT OF CARE ON DEVICE: NORMAL
PROTHROMBIN TIME: 22.9 SECONDS (ref 10–13.8)

## 2018-01-24 PROCEDURE — 85610 PROTHROMBIN TIME: CPT | Performed by: FAMILY MEDICINE

## 2018-01-25 ENCOUNTER — APPOINTMENT (OUTPATIENT)
Dept: GENERAL RADIOLOGY | Facility: HOSPITAL | Age: 53
End: 2018-01-25

## 2018-01-25 ENCOUNTER — HOSPITAL ENCOUNTER (OUTPATIENT)
Facility: HOSPITAL | Age: 53
Setting detail: OBSERVATION
Discharge: HOME | End: 2018-01-29
Attending: INTERNAL MEDICINE | Admitting: INTERNAL MEDICINE

## 2018-01-25 DIAGNOSIS — R07.9 CHEST PAIN, UNSPECIFIED TYPE: Primary | ICD-10-CM

## 2018-01-25 LAB
ALBUMIN SERPL-MCNC: 3.7 G/DL (ref 3.5–5)
ALBUMIN/GLOB SERPL: 1.3 G/DL (ref 1.1–2.5)
ALP SERPL-CCNC: 116 U/L (ref 24–120)
ALT SERPL W P-5'-P-CCNC: 32 U/L (ref 0–54)
AMYLASE SERPL-CCNC: 46 U/L (ref 30–110)
ANION GAP SERPL CALCULATED.3IONS-SCNC: 9 MMOL/L (ref 4–13)
APTT PPP: 35.6 SECONDS (ref 24.1–34.8)
ARTERIAL PATENCY WRIST A: POSITIVE
AST SERPL-CCNC: 18 U/L (ref 7–45)
ATMOSPHERIC PRESS: 763 MMHG
BACTERIA UR QL AUTO: ABNORMAL /HPF
BASE EXCESS BLDA CALC-SCNC: 4.6 MMOL/L (ref 0–2)
BASOPHILS # BLD AUTO: 0.02 10*3/MM3 (ref 0–0.2)
BASOPHILS NFR BLD AUTO: 0.3 % (ref 0–2)
BDY SITE: ABNORMAL
BILIRUB SERPL-MCNC: 0.6 MG/DL (ref 0.1–1)
BILIRUB UR QL STRIP: NEGATIVE
BODY TEMPERATURE: 37 C
BUN BLD-MCNC: 11 MG/DL (ref 5–21)
BUN/CREAT SERPL: 17.2 (ref 7–25)
CALCIUM SPEC-SCNC: 8.7 MG/DL (ref 8.4–10.4)
CHLORIDE SERPL-SCNC: 107 MMOL/L (ref 98–110)
CLARITY UR: CLEAR
CO2 SERPL-SCNC: 28 MMOL/L (ref 24–31)
COLOR UR: YELLOW
CREAT BLD-MCNC: 0.64 MG/DL (ref 0.5–1.4)
D DIMER PPP FEU-MCNC: 0.32 MG/L (FEU) (ref 0–0.5)
DEPRECATED RDW RBC AUTO: 46.8 FL (ref 40–54)
EOSINOPHIL # BLD AUTO: 0.12 10*3/MM3 (ref 0–0.7)
EOSINOPHIL NFR BLD AUTO: 1.9 % (ref 0–4)
ERYTHROCYTE [DISTWIDTH] IN BLOOD BY AUTOMATED COUNT: 14.3 % (ref 12–15)
GFR SERPL CREATININE-BSD FRML MDRD: 97 ML/MIN/1.73
GLOBULIN UR ELPH-MCNC: 2.9 GM/DL
GLUCOSE BLD-MCNC: 91 MG/DL (ref 70–100)
GLUCOSE UR STRIP-MCNC: NEGATIVE MG/DL
HCO3 BLDA-SCNC: 28.2 MMOL/L (ref 20–26)
HCT VFR BLD AUTO: 40.9 % (ref 37–47)
HGB BLD-MCNC: 13.4 G/DL (ref 12–16)
HGB UR QL STRIP.AUTO: NEGATIVE
HOLD SPECIMEN: NORMAL
HOLD SPECIMEN: NORMAL
HYALINE CASTS UR QL AUTO: ABNORMAL /LPF
IMM GRANULOCYTES # BLD: 0.01 10*3/MM3 (ref 0–0.03)
IMM GRANULOCYTES NFR BLD: 0.2 % (ref 0–5)
INR PPP: 1.47 (ref 0.91–1.09)
INR PPP: 1.66 (ref 0.91–1.09)
KETONES UR QL STRIP: NEGATIVE
LEUKOCYTE ESTERASE UR QL STRIP.AUTO: ABNORMAL
LIPASE SERPL-CCNC: 40 U/L (ref 23–203)
LYMPHOCYTES # BLD AUTO: 1.8 10*3/MM3 (ref 0.72–4.86)
LYMPHOCYTES NFR BLD AUTO: 28.9 % (ref 15–45)
Lab: ABNORMAL
MCH RBC QN AUTO: 29.4 PG (ref 28–32)
MCHC RBC AUTO-ENTMCNC: 32.8 G/DL (ref 33–36)
MCV RBC AUTO: 89.7 FL (ref 82–98)
MODALITY: ABNORMAL
MONOCYTES # BLD AUTO: 0.38 10*3/MM3 (ref 0.19–1.3)
MONOCYTES NFR BLD AUTO: 6.1 % (ref 4–12)
NEUTROPHILS # BLD AUTO: 3.89 10*3/MM3 (ref 1.87–8.4)
NEUTROPHILS NFR BLD AUTO: 62.6 % (ref 39–78)
NITRITE UR QL STRIP: NEGATIVE
NRBC BLD MANUAL-RTO: 0 /100 WBC (ref 0–0)
NT-PROBNP SERPL-MCNC: 81.4 PG/ML (ref 0–900)
PCO2 BLDA: 37.8 MM HG (ref 35–45)
PH BLDA: 7.48 PH UNITS (ref 7.35–7.45)
PH UR STRIP.AUTO: 8 [PH] (ref 5–8)
PLATELET # BLD AUTO: 198 10*3/MM3 (ref 130–400)
PMV BLD AUTO: 10.8 FL (ref 6–12)
PO2 BLDA: 81.8 MM HG (ref 83–108)
POTASSIUM BLD-SCNC: 3.9 MMOL/L (ref 3.5–5.3)
PROT SERPL-MCNC: 6.6 G/DL (ref 6.3–8.7)
PROT UR QL STRIP: NEGATIVE
PROTHROMBIN TIME: 18.3 SECONDS (ref 11.9–14.6)
PROTHROMBIN TIME: 20.2 SECONDS (ref 11.9–14.6)
RBC # BLD AUTO: 4.56 10*6/MM3 (ref 4.2–5.4)
RBC # UR: ABNORMAL /HPF
REF LAB TEST METHOD: ABNORMAL
SAO2 % BLDCOA: 97.7 % (ref 94–99)
SODIUM BLD-SCNC: 144 MMOL/L (ref 135–145)
SP GR UR STRIP: 1.01 (ref 1–1.03)
SQUAMOUS #/AREA URNS HPF: ABNORMAL /HPF
TROPONIN I SERPL-MCNC: <0.012 NG/ML (ref 0–0.03)
UROBILINOGEN UR QL STRIP: ABNORMAL
VENTILATOR MODE: ABNORMAL
WBC NRBC COR # BLD: 6.22 10*3/MM3 (ref 4.8–10.8)
WBC UR QL AUTO: ABNORMAL /HPF
WHOLE BLOOD HOLD SPECIMEN: NORMAL
WHOLE BLOOD HOLD SPECIMEN: NORMAL

## 2018-01-25 PROCEDURE — 93010 ELECTROCARDIOGRAM REPORT: CPT | Performed by: INTERNAL MEDICINE

## 2018-01-25 PROCEDURE — 83880 ASSAY OF NATRIURETIC PEPTIDE: CPT | Performed by: NURSE PRACTITIONER

## 2018-01-25 PROCEDURE — 82150 ASSAY OF AMYLASE: CPT | Performed by: NURSE PRACTITIONER

## 2018-01-25 PROCEDURE — 93005 ELECTROCARDIOGRAM TRACING: CPT

## 2018-01-25 PROCEDURE — 85610 PROTHROMBIN TIME: CPT | Performed by: NURSE PRACTITIONER

## 2018-01-25 PROCEDURE — G0378 HOSPITAL OBSERVATION PER HR: HCPCS

## 2018-01-25 PROCEDURE — 85025 COMPLETE CBC W/AUTO DIFF WBC: CPT | Performed by: NURSE PRACTITIONER

## 2018-01-25 PROCEDURE — 85730 THROMBOPLASTIN TIME PARTIAL: CPT | Performed by: NURSE PRACTITIONER

## 2018-01-25 PROCEDURE — 36415 COLL VENOUS BLD VENIPUNCTURE: CPT | Performed by: NURSE PRACTITIONER

## 2018-01-25 PROCEDURE — 36600 WITHDRAWAL OF ARTERIAL BLOOD: CPT

## 2018-01-25 PROCEDURE — 99285 EMERGENCY DEPT VISIT HI MDM: CPT

## 2018-01-25 PROCEDURE — 84484 ASSAY OF TROPONIN QUANT: CPT | Performed by: NURSE PRACTITIONER

## 2018-01-25 PROCEDURE — 87086 URINE CULTURE/COLONY COUNT: CPT | Performed by: NURSE PRACTITIONER

## 2018-01-25 PROCEDURE — 82803 BLOOD GASES ANY COMBINATION: CPT

## 2018-01-25 PROCEDURE — 83690 ASSAY OF LIPASE: CPT | Performed by: NURSE PRACTITIONER

## 2018-01-25 PROCEDURE — 71045 X-RAY EXAM CHEST 1 VIEW: CPT

## 2018-01-25 PROCEDURE — 99220 PR INITIAL OBSERVATION CARE/DAY 70 MINUTES: CPT | Performed by: INTERNAL MEDICINE

## 2018-01-25 PROCEDURE — 80053 COMPREHEN METABOLIC PANEL: CPT | Performed by: NURSE PRACTITIONER

## 2018-01-25 PROCEDURE — 93005 ELECTROCARDIOGRAM TRACING: CPT | Performed by: NURSE PRACTITIONER

## 2018-01-25 PROCEDURE — 81001 URINALYSIS AUTO W/SCOPE: CPT | Performed by: NURSE PRACTITIONER

## 2018-01-25 PROCEDURE — 85379 FIBRIN DEGRADATION QUANT: CPT | Performed by: NURSE PRACTITIONER

## 2018-01-25 RX ORDER — METOPROLOL TARTRATE 50 MG/1
50 TABLET, FILM COATED ORAL ONCE
Status: DISCONTINUED | OUTPATIENT
Start: 2018-01-26 | End: 2018-01-29 | Stop reason: HOSPADM

## 2018-01-25 RX ORDER — MAGNESIUM 200 MG
1 TABLET ORAL DAILY
COMMUNITY

## 2018-01-25 RX ORDER — MELATONIN
1000 DAILY
Status: DISCONTINUED | OUTPATIENT
Start: 2018-01-25 | End: 2018-01-29 | Stop reason: HOSPADM

## 2018-01-25 RX ORDER — ACETAMINOPHEN 325 MG/1
325 TABLET ORAL EVERY 6 HOURS PRN
Status: DISCONTINUED | OUTPATIENT
Start: 2018-01-25 | End: 2018-01-27

## 2018-01-25 RX ORDER — MELATONIN
1000 DAILY
COMMUNITY

## 2018-01-25 RX ORDER — METOPROLOL TARTRATE 50 MG/1
50 TABLET, FILM COATED ORAL ONCE
Status: COMPLETED | OUTPATIENT
Start: 2018-01-25 | End: 2018-01-25

## 2018-01-25 RX ORDER — SODIUM CHLORIDE 0.9 % (FLUSH) 0.9 %
10 SYRINGE (ML) INJECTION AS NEEDED
Status: DISCONTINUED | OUTPATIENT
Start: 2018-01-25 | End: 2018-01-29 | Stop reason: HOSPADM

## 2018-01-25 RX ORDER — NITROGLYCERIN 0.4 MG/1
0.4 TABLET SUBLINGUAL
Status: DISCONTINUED | OUTPATIENT
Start: 2018-01-25 | End: 2018-01-29 | Stop reason: HOSPADM

## 2018-01-25 RX ORDER — WARFARIN SODIUM 7.5 MG/1
7.5 TABLET ORAL
Status: DISCONTINUED | OUTPATIENT
Start: 2018-01-25 | End: 2018-01-27

## 2018-01-25 RX ORDER — ASPIRIN 81 MG/1
81 TABLET ORAL DAILY
Status: DISCONTINUED | OUTPATIENT
Start: 2018-01-25 | End: 2018-01-29 | Stop reason: HOSPADM

## 2018-01-25 RX ORDER — WARFARIN SODIUM 5 MG/1
5 TABLET ORAL
COMMUNITY
End: 2018-04-26 | Stop reason: SDUPTHER

## 2018-01-25 RX ORDER — DULOXETIN HYDROCHLORIDE 30 MG/1
60 CAPSULE, DELAYED RELEASE ORAL DAILY
Status: DISCONTINUED | OUTPATIENT
Start: 2018-01-25 | End: 2018-01-29 | Stop reason: HOSPADM

## 2018-01-25 RX ADMIN — METOPROLOL TARTRATE 50 MG: 50 TABLET, FILM COATED ORAL at 20:00

## 2018-01-25 RX ADMIN — DULOXETINE HYDROCHLORIDE 60 MG: 30 CAPSULE, DELAYED RELEASE ORAL at 17:15

## 2018-01-25 RX ADMIN — CHOLECALCIFEROL (VITAMIN D3) 25 MCG (1,000 UNIT) TABLET 1000 UNITS: TABLET at 17:15

## 2018-01-25 RX ADMIN — Medication 81 MG: at 17:15

## 2018-01-25 RX ADMIN — METOPROLOL TARTRATE 25 MG: 25 TABLET, FILM COATED ORAL at 20:00

## 2018-01-25 RX ADMIN — WARFARIN SODIUM 7.5 MG: 7.5 TABLET ORAL at 17:15

## 2018-01-25 RX ADMIN — NITROGLYCERIN 0.4 MG: 0.4 TABLET SUBLINGUAL at 11:51

## 2018-01-26 ENCOUNTER — APPOINTMENT (OUTPATIENT)
Dept: CT IMAGING | Facility: HOSPITAL | Age: 53
End: 2018-01-26

## 2018-01-26 LAB
GLUCOSE BLDC GLUCOMTR-MCNC: 106 MG/DL (ref 70–130)
INR PPP: 1.54 (ref 0.91–1.09)
PROTHROMBIN TIME: 19 SECONDS (ref 11.9–14.6)
TROPONIN I SERPL-MCNC: <0.012 NG/ML (ref 0–0.03)

## 2018-01-26 PROCEDURE — 82962 GLUCOSE BLOOD TEST: CPT

## 2018-01-26 PROCEDURE — G0378 HOSPITAL OBSERVATION PER HR: HCPCS

## 2018-01-26 PROCEDURE — 0 IOPAMIDOL PER 1 ML: Performed by: INTERNAL MEDICINE

## 2018-01-26 PROCEDURE — 96372 THER/PROPH/DIAG INJ SC/IM: CPT

## 2018-01-26 PROCEDURE — 85610 PROTHROMBIN TIME: CPT | Performed by: NURSE PRACTITIONER

## 2018-01-26 PROCEDURE — 99225 PR SBSQ OBSERVATION CARE/DAY 25 MINUTES: CPT | Performed by: INTERNAL MEDICINE

## 2018-01-26 PROCEDURE — 75574 CT ANGIO HRT W/3D IMAGE: CPT

## 2018-01-26 PROCEDURE — 25010000002 ENOXAPARIN PER 10 MG: Performed by: INTERNAL MEDICINE

## 2018-01-26 RX ORDER — HYDROCHLOROTHIAZIDE 25 MG/1
12.5 TABLET ORAL DAILY
Status: DISCONTINUED | OUTPATIENT
Start: 2018-01-26 | End: 2018-01-29 | Stop reason: HOSPADM

## 2018-01-26 RX ORDER — POTASSIUM CHLORIDE 750 MG/1
10 CAPSULE, EXTENDED RELEASE ORAL DAILY
Status: DISCONTINUED | OUTPATIENT
Start: 2018-01-26 | End: 2018-01-29 | Stop reason: HOSPADM

## 2018-01-26 RX ORDER — METOPROLOL TARTRATE 5 MG/5ML
INJECTION INTRAVENOUS
Status: DISPENSED
Start: 2018-01-26 | End: 2018-01-26

## 2018-01-26 RX ORDER — METOPROLOL TARTRATE 5 MG/5ML
INJECTION INTRAVENOUS
Status: COMPLETED | OUTPATIENT
Start: 2018-01-26 | End: 2018-01-26

## 2018-01-26 RX ORDER — NITROGLYCERIN 0.4 MG/1
TABLET SUBLINGUAL
Status: DISPENSED
Start: 2018-01-26 | End: 2018-01-26

## 2018-01-26 RX ADMIN — METOPROLOL TARTRATE 5 MG: 5 INJECTION, SOLUTION INTRAVENOUS at 08:55

## 2018-01-26 RX ADMIN — ENOXAPARIN SODIUM 150 MG: 150 INJECTION SUBCUTANEOUS at 20:58

## 2018-01-26 RX ADMIN — IOPAMIDOL 150 ML: 755 INJECTION, SOLUTION INTRAVENOUS at 09:15

## 2018-01-26 RX ADMIN — NITROGLYCERIN 0.4 MG: 0.4 TABLET SUBLINGUAL at 09:02

## 2018-01-26 RX ADMIN — WARFARIN SODIUM 7.5 MG: 7.5 TABLET ORAL at 18:19

## 2018-01-26 RX ADMIN — POTASSIUM CHLORIDE 10 MEQ: 750 CAPSULE, EXTENDED RELEASE ORAL at 21:08

## 2018-01-26 RX ADMIN — CHOLECALCIFEROL (VITAMIN D3) 25 MCG (1,000 UNIT) TABLET 1000 UNITS: TABLET at 10:11

## 2018-01-26 RX ADMIN — HYDROCHLOROTHIAZIDE 12.5 MG: 25 TABLET ORAL at 21:00

## 2018-01-26 RX ADMIN — DULOXETINE HYDROCHLORIDE 60 MG: 30 CAPSULE, DELAYED RELEASE ORAL at 10:11

## 2018-01-26 RX ADMIN — METOPROLOL TARTRATE 25 MG: 25 TABLET, FILM COATED ORAL at 20:59

## 2018-01-26 RX ADMIN — Medication 81 MG: at 10:11

## 2018-01-27 ENCOUNTER — APPOINTMENT (OUTPATIENT)
Dept: GENERAL RADIOLOGY | Facility: HOSPITAL | Age: 53
End: 2018-01-27

## 2018-01-27 LAB
BACTERIA SPEC AEROBE CULT: ABNORMAL
BACTERIA SPEC AEROBE CULT: ABNORMAL
INR PPP: 1.71 (ref 0.91–1.09)
PROTHROMBIN TIME: 20.7 SECONDS (ref 11.9–14.6)

## 2018-01-27 PROCEDURE — 74220 X-RAY XM ESOPHAGUS 1CNTRST: CPT

## 2018-01-27 PROCEDURE — 36415 COLL VENOUS BLD VENIPUNCTURE: CPT | Performed by: NURSE PRACTITIONER

## 2018-01-27 PROCEDURE — 85610 PROTHROMBIN TIME: CPT | Performed by: NURSE PRACTITIONER

## 2018-01-27 PROCEDURE — 96372 THER/PROPH/DIAG INJ SC/IM: CPT

## 2018-01-27 PROCEDURE — 25010000002 ENOXAPARIN PER 10 MG: Performed by: INTERNAL MEDICINE

## 2018-01-27 PROCEDURE — G0378 HOSPITAL OBSERVATION PER HR: HCPCS

## 2018-01-27 PROCEDURE — 99225 PR SBSQ OBSERVATION CARE/DAY 25 MINUTES: CPT | Performed by: INTERNAL MEDICINE

## 2018-01-27 RX ORDER — ACETAMINOPHEN 325 MG/1
650 TABLET ORAL EVERY 4 HOURS PRN
Status: DISCONTINUED | OUTPATIENT
Start: 2018-01-27 | End: 2018-01-29 | Stop reason: HOSPADM

## 2018-01-27 RX ADMIN — DULOXETINE HYDROCHLORIDE 60 MG: 30 CAPSULE, DELAYED RELEASE ORAL at 11:39

## 2018-01-27 RX ADMIN — ENOXAPARIN SODIUM 150 MG: 150 INJECTION SUBCUTANEOUS at 20:53

## 2018-01-27 RX ADMIN — Medication 81 MG: at 11:38

## 2018-01-27 RX ADMIN — POTASSIUM CHLORIDE 10 MEQ: 750 CAPSULE, EXTENDED RELEASE ORAL at 11:38

## 2018-01-27 RX ADMIN — CHOLECALCIFEROL (VITAMIN D3) 25 MCG (1,000 UNIT) TABLET 1000 UNITS: TABLET at 11:37

## 2018-01-27 RX ADMIN — HYDROCHLOROTHIAZIDE 12.5 MG: 25 TABLET ORAL at 11:38

## 2018-01-27 RX ADMIN — ENOXAPARIN SODIUM 150 MG: 150 INJECTION SUBCUTANEOUS at 09:18

## 2018-01-27 RX ADMIN — METOPROLOL TARTRATE 25 MG: 25 TABLET, FILM COATED ORAL at 11:38

## 2018-01-27 RX ADMIN — METOPROLOL TARTRATE 25 MG: 25 TABLET, FILM COATED ORAL at 20:53

## 2018-01-27 RX ADMIN — ACETAMINOPHEN 325 MG: 325 TABLET, FILM COATED ORAL at 11:37

## 2018-01-27 RX ADMIN — ACETAMINOPHEN 650 MG: 325 TABLET, FILM COATED ORAL at 17:32

## 2018-01-27 RX ADMIN — WARFARIN SODIUM 7.5 MG: 7.5 TABLET ORAL at 17:32

## 2018-01-28 LAB
INR PPP: 1.86 (ref 0.91–1.09)
PROTHROMBIN TIME: 22.1 SECONDS (ref 11.9–14.6)

## 2018-01-28 PROCEDURE — 25010000002 ENOXAPARIN PER 10 MG: Performed by: INTERNAL MEDICINE

## 2018-01-28 PROCEDURE — 96372 THER/PROPH/DIAG INJ SC/IM: CPT

## 2018-01-28 PROCEDURE — 85610 PROTHROMBIN TIME: CPT | Performed by: NURSE PRACTITIONER

## 2018-01-28 PROCEDURE — G0378 HOSPITAL OBSERVATION PER HR: HCPCS

## 2018-01-28 RX ADMIN — METOPROLOL TARTRATE 25 MG: 25 TABLET, FILM COATED ORAL at 21:56

## 2018-01-28 RX ADMIN — ENOXAPARIN SODIUM 150 MG: 150 INJECTION SUBCUTANEOUS at 09:34

## 2018-01-28 RX ADMIN — Medication 81 MG: at 09:35

## 2018-01-28 RX ADMIN — METOPROLOL TARTRATE 25 MG: 25 TABLET, FILM COATED ORAL at 13:43

## 2018-01-28 RX ADMIN — HYDROCHLOROTHIAZIDE 12.5 MG: 25 TABLET ORAL at 13:43

## 2018-01-28 RX ADMIN — ENOXAPARIN SODIUM 150 MG: 150 INJECTION SUBCUTANEOUS at 20:11

## 2018-01-28 RX ADMIN — CHOLECALCIFEROL (VITAMIN D3) 25 MCG (1,000 UNIT) TABLET 1000 UNITS: TABLET at 09:34

## 2018-01-28 RX ADMIN — DULOXETINE HYDROCHLORIDE 60 MG: 30 CAPSULE, DELAYED RELEASE ORAL at 09:35

## 2018-01-28 RX ADMIN — POTASSIUM CHLORIDE 10 MEQ: 750 CAPSULE, EXTENDED RELEASE ORAL at 13:43

## 2018-01-29 VITALS
HEIGHT: 66 IN | HEART RATE: 73 BPM | WEIGHT: 293 LBS | RESPIRATION RATE: 12 BRPM | OXYGEN SATURATION: 95 % | TEMPERATURE: 99 F | DIASTOLIC BLOOD PRESSURE: 84 MMHG | BODY MASS INDEX: 47.09 KG/M2 | SYSTOLIC BLOOD PRESSURE: 121 MMHG

## 2018-01-29 LAB
INR PPP: 1.82 (ref 0.91–1.09)
PROTHROMBIN TIME: 21.7 SECONDS (ref 11.9–14.6)

## 2018-01-29 PROCEDURE — G0378 HOSPITAL OBSERVATION PER HR: HCPCS

## 2018-01-29 PROCEDURE — 25010000002 FENTANYL CITRATE (PF) 100 MCG/2ML SOLUTION: Performed by: INTERNAL MEDICINE

## 2018-01-29 PROCEDURE — C1894 INTRO/SHEATH, NON-LASER: HCPCS | Performed by: INTERNAL MEDICINE

## 2018-01-29 PROCEDURE — 25010000002 MIDAZOLAM PER 1 MG: Performed by: INTERNAL MEDICINE

## 2018-01-29 PROCEDURE — 93458 L HRT ARTERY/VENTRICLE ANGIO: CPT | Performed by: INTERNAL MEDICINE

## 2018-01-29 PROCEDURE — C1769 GUIDE WIRE: HCPCS | Performed by: INTERNAL MEDICINE

## 2018-01-29 PROCEDURE — 25010000002 DIPHENHYDRAMINE PER 50 MG: Performed by: INTERNAL MEDICINE

## 2018-01-29 PROCEDURE — 99152 MOD SED SAME PHYS/QHP 5/>YRS: CPT | Performed by: INTERNAL MEDICINE

## 2018-01-29 PROCEDURE — 0 IOPAMIDOL PER 1 ML: Performed by: INTERNAL MEDICINE

## 2018-01-29 PROCEDURE — 85610 PROTHROMBIN TIME: CPT | Performed by: NURSE PRACTITIONER

## 2018-01-29 PROCEDURE — 36415 COLL VENOUS BLD VENIPUNCTURE: CPT | Performed by: NURSE PRACTITIONER

## 2018-01-29 RX ORDER — MIDAZOLAM HYDROCHLORIDE 1 MG/ML
INJECTION INTRAMUSCULAR; INTRAVENOUS AS NEEDED
Status: DISCONTINUED | OUTPATIENT
Start: 2018-01-29 | End: 2018-01-29 | Stop reason: HOSPADM

## 2018-01-29 RX ORDER — OMEPRAZOLE 20 MG/1
20 CAPSULE, DELAYED RELEASE ORAL DAILY
Qty: 30 CAPSULE | Refills: 5 | Status: SHIPPED | OUTPATIENT
Start: 2018-01-29 | End: 2018-03-13

## 2018-01-29 RX ORDER — SODIUM CHLORIDE 9 MG/ML
100 INJECTION, SOLUTION INTRAVENOUS CONTINUOUS
Status: DISCONTINUED | OUTPATIENT
Start: 2018-01-29 | End: 2018-01-29 | Stop reason: HOSPADM

## 2018-01-29 RX ORDER — LIDOCAINE HYDROCHLORIDE 20 MG/ML
INJECTION, SOLUTION INFILTRATION; PERINEURAL AS NEEDED
Status: DISCONTINUED | OUTPATIENT
Start: 2018-01-29 | End: 2018-01-29 | Stop reason: HOSPADM

## 2018-01-29 RX ORDER — DIPHENHYDRAMINE HYDROCHLORIDE 50 MG/ML
INJECTION INTRAMUSCULAR; INTRAVENOUS AS NEEDED
Status: DISCONTINUED | OUTPATIENT
Start: 2018-01-29 | End: 2018-01-29 | Stop reason: HOSPADM

## 2018-01-29 RX ORDER — FENTANYL CITRATE 50 UG/ML
INJECTION, SOLUTION INTRAMUSCULAR; INTRAVENOUS AS NEEDED
Status: DISCONTINUED | OUTPATIENT
Start: 2018-01-29 | End: 2018-01-29 | Stop reason: HOSPADM

## 2018-01-29 RX ORDER — HYDROCHLOROTHIAZIDE 12.5 MG/1
12.5 TABLET ORAL DAILY
Qty: 30 TABLET | Refills: 5 | Status: SHIPPED | OUTPATIENT
Start: 2018-01-30 | End: 2018-08-06

## 2018-01-29 RX ADMIN — POTASSIUM CHLORIDE 10 MEQ: 750 CAPSULE, EXTENDED RELEASE ORAL at 10:39

## 2018-01-29 RX ADMIN — DULOXETINE HYDROCHLORIDE 60 MG: 30 CAPSULE, DELAYED RELEASE ORAL at 10:39

## 2018-01-29 RX ADMIN — CHOLECALCIFEROL (VITAMIN D3) 25 MCG (1,000 UNIT) TABLET 1000 UNITS: TABLET at 10:39

## 2018-01-29 RX ADMIN — Medication 81 MG: at 10:40

## 2018-01-29 RX ADMIN — METOPROLOL TARTRATE 25 MG: 25 TABLET, FILM COATED ORAL at 10:39

## 2018-01-29 RX ADMIN — HYDROCHLOROTHIAZIDE 12.5 MG: 25 TABLET ORAL at 10:40

## 2018-02-01 ENCOUNTER — ANTICOAGULATION VISIT (OUTPATIENT)
Dept: CARDIOLOGY | Facility: CLINIC | Age: 53
End: 2018-02-01

## 2018-02-05 ENCOUNTER — OFFICE VISIT (OUTPATIENT)
Dept: LAB | Facility: HOSPITAL | Age: 53
End: 2018-02-05

## 2018-02-05 ENCOUNTER — ANTICOAGULATION VISIT (OUTPATIENT)
Dept: CARDIOLOGY | Facility: CLINIC | Age: 53
End: 2018-02-05

## 2018-02-05 ENCOUNTER — TRANSCRIBE ORDERS (OUTPATIENT)
Dept: ADMINISTRATIVE | Facility: HOSPITAL | Age: 53
End: 2018-02-05

## 2018-02-05 DIAGNOSIS — I48.91 ATRIAL FIBRILLATION, UNSPECIFIED TYPE (HCC): Primary | ICD-10-CM

## 2018-02-05 LAB
INR PPP: 2.4 (ref 0.91–1.09)
PERFORM POINT OF CARE ON DEVICE: NORMAL
PROTHROMBIN TIME: 29.1 SECONDS (ref 10–13.8)

## 2018-02-05 PROCEDURE — 85610 PROTHROMBIN TIME: CPT | Performed by: FAMILY MEDICINE

## 2018-02-19 ENCOUNTER — ANTICOAGULATION VISIT (OUTPATIENT)
Dept: CARDIOLOGY | Facility: CLINIC | Age: 53
End: 2018-02-19

## 2018-02-19 ENCOUNTER — OFFICE VISIT (OUTPATIENT)
Dept: LAB | Facility: HOSPITAL | Age: 53
End: 2018-02-19

## 2018-02-19 ENCOUNTER — TRANSCRIBE ORDERS (OUTPATIENT)
Dept: ADMINISTRATIVE | Facility: HOSPITAL | Age: 53
End: 2018-02-19

## 2018-02-19 DIAGNOSIS — I48.91 ATRIAL FIBRILLATION, UNSPECIFIED TYPE (HCC): Primary | ICD-10-CM

## 2018-02-19 LAB
INR PPP: 2 (ref 0.91–1.09)
PERFORM POINT OF CARE ON DEVICE: NORMAL
PROTHROMBIN TIME: 24.3 SECONDS (ref 10–13.8)

## 2018-02-19 PROCEDURE — 85610 PROTHROMBIN TIME: CPT | Performed by: FAMILY MEDICINE

## 2018-02-22 ENCOUNTER — OFFICE VISIT (OUTPATIENT)
Dept: CARDIOLOGY | Facility: CLINIC | Age: 53
End: 2018-02-22

## 2018-02-22 VITALS
OXYGEN SATURATION: 96 % | BODY MASS INDEX: 47.09 KG/M2 | DIASTOLIC BLOOD PRESSURE: 78 MMHG | HEIGHT: 66 IN | SYSTOLIC BLOOD PRESSURE: 130 MMHG | HEART RATE: 87 BPM | WEIGHT: 293 LBS

## 2018-02-22 DIAGNOSIS — I10 ESSENTIAL HYPERTENSION: ICD-10-CM

## 2018-02-22 DIAGNOSIS — Z82.49 FAMILY HISTORY OF EARLY CAD: ICD-10-CM

## 2018-02-22 DIAGNOSIS — G47.33 OSA ON CPAP: ICD-10-CM

## 2018-02-22 DIAGNOSIS — Z99.89 OSA ON CPAP: ICD-10-CM

## 2018-02-22 DIAGNOSIS — R94.31 ABNORMAL ECG: Primary | ICD-10-CM

## 2018-02-22 DIAGNOSIS — M54.50 CHRONIC MIDLINE LOW BACK PAIN WITHOUT SCIATICA: ICD-10-CM

## 2018-02-22 DIAGNOSIS — G93.32 CFS (CHRONIC FATIGUE SYNDROME): ICD-10-CM

## 2018-02-22 DIAGNOSIS — M79.7 FIBROMYALGIA: ICD-10-CM

## 2018-02-22 DIAGNOSIS — G89.29 CHRONIC MIDLINE LOW BACK PAIN WITHOUT SCIATICA: ICD-10-CM

## 2018-02-22 DIAGNOSIS — I48.0 PAROXYSMAL ATRIAL FIBRILLATION (HCC): ICD-10-CM

## 2018-02-22 DIAGNOSIS — E66.01 MORBID OBESITY (HCC): ICD-10-CM

## 2018-02-22 PROBLEM — R07.9 CHEST PAIN: Status: RESOLVED | Noted: 2018-01-25 | Resolved: 2018-02-22

## 2018-02-22 PROBLEM — R07.2 PRECORDIAL PAIN: Status: RESOLVED | Noted: 2017-03-06 | Resolved: 2018-02-22

## 2018-02-22 PROCEDURE — 99214 OFFICE O/P EST MOD 30 MIN: CPT | Performed by: INTERNAL MEDICINE

## 2018-02-22 NOTE — PROGRESS NOTES
Anthony Meza  [id number]  1965  52 y.o.  female    Referring Provider: Tyrell Rosas MD    Reason for  Visit:  Routine follow up.  Here for follow up after cardiac testing. Cardiac cath      Subjective   Mild chronic  exertional shortness of breath on exertion relieved with rest  Mild cough  Fatigue and weakness  Occasional wheezing  Going on for several months  No palpitations  No further chest pain  No significant pedal edema  No fever or chills  No significant expectoration  No hemoptysis  No presyncope or syncope   obstructive sleep apnea on CPAP  No diaphoresis  No nausea  No radiation  Precipitated with exertion  Moderate associated dyspnea      History of present illness:  Anthony Meza is a 52 y.o. yo female with history of atrial fibrillation    who presents today for   Chief Complaint   Patient presents with   • Chest Pain     4 WK D/C    .    History  Past Medical History:   Diagnosis Date   • A-fib    • Abnormal ECG    • Arthritis    • Atrial fibrillation    • CFS (chronic fatigue syndrome)    • Chronic pain disorder    • Fibromyalgia    • Hypertension    • Migraines    ,   Past Surgical History:   Procedure Laterality Date   • CARDIAC CATHETERIZATION Bilateral 1/29/2018    Procedure: Coronary angiography;  Surgeon: Joel Medina MD;  Location:  [facility];  Service:    • CARDIAC CATHETERIZATION N/A 1/29/2018    Procedure: Left Heart Cath;  Surgeon: Joel Medina MD;  Location:  [facility];  Service:    • CYST REMOVAL      from tailbone   • TUBAL ABDOMINAL LIGATION     ,   Family History   Problem Relation Age of Onset   • Stroke Mother    • Heart disease Father    • Asthma Father    ,   Social History   Substance Use Topics   • Smoking status: Never Smoker   • Smokeless tobacco: Never Used   • Alcohol use No   ,     Medications  Current Outpatient Prescriptions   Medication Sig Dispense Refill   • acetaminophen (TYLENOL) 325 MG tablet Take 325 mg by mouth  "Every 6 (Six) Hours As Needed for Mild Pain  or Headache.     • Acetaminophen-Caffeine (TENSION HEADACHE RELIEF) 500-65 MG tablet Take 1 tablet by mouth Every 6 (Six) Hours As Needed (migraine).     • aspirin 81 MG tablet Take 81 mg by mouth Daily.     • cholecalciferol (VITAMIN D3) 1000 units tablet Take 1,000 Units by mouth Daily.     • Cyanocobalamin (VITAMIN B-12) 1000 MCG sublingual tablet Place 1 tablet under the tongue Daily.     • DULoxetine (CYMBALTA) 60 MG capsule Take 60 mg by mouth Daily.     • hydrochlorothiazide (HYDRODIURIL) 12.5 MG tablet Take 1 tablet by mouth Daily. 30 tablet 5   • metoprolol tartrate (LOPRESSOR) 25 MG tablet Take 25 mg by mouth Every 12 (Twelve) Hours.     • warfarin (COUMADIN) 5 MG tablet Take 5 mg by mouth Daily.     • omeprazole (PRILOSEC) 20 MG capsule Take 1 capsule by mouth Daily. 30 capsule 5     No current facility-administered medications for this visit.        Allergies:  Penicillins    Review of Systems  Review of Systems   Constitution: Positive for weakness and malaise/fatigue.   HENT: Negative.    Eyes: Negative.    Cardiovascular: Positive for chest pain and dyspnea on exertion. Negative for claudication, cyanosis, irregular heartbeat, leg swelling, near-syncope, orthopnea, palpitations, paroxysmal nocturnal dyspnea and syncope.   Respiratory: Positive for cough.    Endocrine: Negative.    Hematologic/Lymphatic: Negative.    Skin: Negative.    Musculoskeletal: Positive for arthritis and back pain.   Gastrointestinal: Negative for anorexia.   Genitourinary: Negative.    Psychiatric/Behavioral: Negative.      Results for orders placed during the hospital encounter of 03/16/17   Adult Stress Echo Only    Narrative · Left ventricular function is normal. Estimated EF = 55%.  · Normal stress echo with no significant echocardiographic evidence for   myocardial ischemia        Objective     Physical Exam:  /78  Pulse 87  Ht 167.6 cm (65.98\")  Wt (!) 151 kg (333 lb) " " LMP  (LMP Unknown)  SpO2 96%  BMI 53.77 kg/m2  Physical Exam   Constitutional: She appears well-developed.   HENT:   Head: Normocephalic.   Neck: Normal carotid pulses and no JVD present. No tracheal tenderness present. Carotid bruit is not present. No tracheal deviation and no edema present.   Cardiovascular: Normal heart sounds and normal pulses.  An irregularly irregular rhythm present.   Pulmonary/Chest: Effort normal. No stridor.   Abdominal: Soft.   Neurological: She is alert. She has normal strength. No cranial nerve deficit or sensory deficit.   Skin: Skin is warm.   Psychiatric: She has a normal mood and affect. Her speech is normal and behavior is normal.       Results Review: 1/29/18  Cardiac cath  Normal epicardial coronary arteries with dominant right coronary artery   Normal LVEF  LVEDP   21    mm Hg     Procedures    Assessment/Plan   Patient Active Problem List   Diagnosis   • Abnormal ECG   • Fibromyalgia   • CFS (chronic fatigue syndrome)   • Essential hypertension   • Chronic midline low back pain without sciatica   • HARLAN on CPAP   • Atrial fibrillation   • Family history of early CAD father in 50 s   • Morbid obesity       No palpitations. No significant pedal edema. Compliant with medications and diet. Latest labs and medications reviewed.  Stress test normal  LV diastolic dysfunction Normal LVEF    Plan:    Low salt/ HTN/ Heart healthy carbohydrate restricted cardiac diet as applicable to this patient's current diagnoses.   This handout has relevant information regarding shopping for food, preparing meals, what to eat at restaurants, tracking of food intake, information regarding sodium intake and salt content, how to read food labels, knowing what to eat, tips reagarding physical activity, calorie count and calorie expenditure. What foods to avoid. Information regarding alcoholic drinks along with \"good\" and \"bad\" fats.  Relevant printed educational materials given pertinent to above " diagnoses     Target INR 2-3  Low vitamin K diet.  knowing what to eat, tips what foods to avoid. The patient was advised that NSAID-type medications have three  very important potential side effects: cardiovascular complications, gastrointestinal irritation including hemorrhage and renal injuries.  Do not use anti-inflammatories such as Motrin/ibuprofen, Alleve/naprosyn, Mobic and like medications Use Tylenol instead.The patient expresses understanding of these issues and questions were answered.   monitor for any signs of bleeding including red or dark stools. Fall precautions.     The patient was advised that NSAID-type medications have three  very important potential side effects: cardiovascular complications, gastrointestinal irritation including hemorrhage and renal injuries.  Workup for non cardiac chest pain including GI workup.   Flexible diuretic dosing   Weigh yourself frequently, at least weekly, preferably daily, call me if more than 2 pounds a day or 5 pounds a week weight gain  Small frequent meals  Do not eat for 4 hours prior to sleeping  Avoid excessive spicy and hot food intake, restrict caffeine and chocolate intake  Sleep with head end of bed elevated 6 inches   · Change the factors that you can control. Ask your caregiver for guidance concerning weight loss, quitting smoking, and alcohol consumption.  · Avoid foods and drinks that make your symptoms worse, such as:  ¨ Caffeine or alcoholic drinks.  ¨ Chocolate.  ¨ Peppermint or mint flavorings.  ¨ Garlic and onions.  ¨ Spicy foods.  ¨ Citrus fruits, such as oranges, georgi, or limes.  ¨ Tomato-based foods such as sauce, chili, salsa, and pizza.  ¨ Fried and fatty foods.  · Avoid lying down for the 3 hours prior to your bedtime or prior to taking a nap.  · Eat small, frequent meals instead of large meals.  · Wear loose-fitting clothing. Do not wear anything tight around your waist that causes pressure on your stomach.  · Raise the head of your  bed 6 to 8 inches with wood blocks to help you sleep. Extra pillows will not help.  · Only take over-the-counter or prescription medicines for pain, discomfort, or fever as directed by your caregiver.  · Do not take aspirin, ibuprofen, or other nonsteroidal anti-inflammatory drugs (NSAIDs).   No additional cardiac testing required at this point in time.   Gave a copy of my notes and relevant tests/ prior ECG etc for the patient to review and follow specific advise and relevant findings if any, prognosis, along with my current and future plans. e  Orders Placed This Encounter   Procedures   • Ambulatory Referral to Bariatric Surgery     Referral Priority:   Routine     Referral Type:   Consultation     Referral Reason:   Specialty Services Required     Requested Specialty:   Bariatrics     Number of Visits Requested:   1     Return in about 6 months (around 8/22/2018).

## 2018-03-13 ENCOUNTER — OFFICE VISIT (OUTPATIENT)
Dept: BARIATRICS/WEIGHT MGMT | Facility: CLINIC | Age: 53
End: 2018-03-13

## 2018-03-13 ENCOUNTER — LAB (OUTPATIENT)
Dept: LAB | Facility: HOSPITAL | Age: 53
End: 2018-03-13
Attending: NURSE PRACTITIONER

## 2018-03-13 ENCOUNTER — OFFICE VISIT (OUTPATIENT)
Dept: BARIATRICS/WEIGHT MGMT | Facility: HOSPITAL | Age: 53
End: 2018-03-13

## 2018-03-13 VITALS
WEIGHT: 293 LBS | BODY MASS INDEX: 48.82 KG/M2 | HEART RATE: 63 BPM | TEMPERATURE: 98 F | DIASTOLIC BLOOD PRESSURE: 84 MMHG | HEIGHT: 65 IN | SYSTOLIC BLOOD PRESSURE: 134 MMHG | OXYGEN SATURATION: 100 %

## 2018-03-13 DIAGNOSIS — G43.001 MIGRAINE WITHOUT AURA AND WITH STATUS MIGRAINOSUS, NOT INTRACTABLE: ICD-10-CM

## 2018-03-13 DIAGNOSIS — R53.83 FATIGUE, UNSPECIFIED TYPE: ICD-10-CM

## 2018-03-13 DIAGNOSIS — R63.5 WEIGHT GAIN: ICD-10-CM

## 2018-03-13 DIAGNOSIS — R79.89 ABNORMAL CBC: Primary | ICD-10-CM

## 2018-03-13 DIAGNOSIS — E66.01 OBESITY, MORBID, BMI 50 OR HIGHER (HCC): ICD-10-CM

## 2018-03-13 DIAGNOSIS — E66.01 OBESITY, MORBID, BMI 50 OR HIGHER (HCC): Primary | ICD-10-CM

## 2018-03-13 DIAGNOSIS — E55.9 VITAMIN D DEFICIENCY: ICD-10-CM

## 2018-03-13 DIAGNOSIS — G47.33 OSA ON CPAP: ICD-10-CM

## 2018-03-13 DIAGNOSIS — Z99.89 OSA ON CPAP: ICD-10-CM

## 2018-03-13 DIAGNOSIS — I10 ESSENTIAL HYPERTENSION: ICD-10-CM

## 2018-03-13 LAB
25(OH)D3 SERPL-MCNC: 28 NG/ML (ref 30–100)
ALBUMIN SERPL-MCNC: 3.8 G/DL (ref 3.5–5)
ALBUMIN/GLOB SERPL: 1.2 G/DL (ref 1.1–2.5)
ALP SERPL-CCNC: 117 U/L (ref 24–120)
ALT SERPL W P-5'-P-CCNC: 37 U/L (ref 0–54)
ANION GAP SERPL CALCULATED.3IONS-SCNC: 9 MMOL/L (ref 4–13)
AST SERPL-CCNC: 21 U/L (ref 7–45)
BILIRUB SERPL-MCNC: 0.6 MG/DL (ref 0.1–1)
BUN BLD-MCNC: 11 MG/DL (ref 5–21)
BUN/CREAT SERPL: 15.3 (ref 7–25)
CALCIUM SPEC-SCNC: 9 MG/DL (ref 8.4–10.4)
CHLORIDE SERPL-SCNC: 102 MMOL/L (ref 98–110)
CO2 SERPL-SCNC: 36 MMOL/L (ref 24–31)
CREAT BLD-MCNC: 0.72 MG/DL (ref 0.5–1.4)
DEPRECATED RDW RBC AUTO: 47.4 FL (ref 40–54)
ERYTHROCYTE [DISTWIDTH] IN BLOOD BY AUTOMATED COUNT: 14 % (ref 12–15)
FERRITIN SERPL-MCNC: 46 NG/ML (ref 11.1–264)
FOLATE SERPL-MCNC: 9.65 NG/ML
GFR SERPL CREATININE-BSD FRML MDRD: 85 ML/MIN/1.73
GLOBULIN UR ELPH-MCNC: 3.2 GM/DL
GLUCOSE BLD-MCNC: 95 MG/DL (ref 70–100)
HCT VFR BLD AUTO: 43.3 % (ref 37–47)
HGB BLD-MCNC: 14.1 G/DL (ref 12–16)
IRON 24H UR-MRATE: 108 MCG/DL (ref 42–180)
IRON SATN MFR SERPL: 29 % (ref 20–45)
MCH RBC QN AUTO: 30.3 PG (ref 28–32)
MCHC RBC AUTO-ENTMCNC: 32.6 G/DL (ref 33–36)
MCV RBC AUTO: 92.9 FL (ref 82–98)
PLATELET # BLD AUTO: 212 10*3/MM3 (ref 130–400)
PMV BLD AUTO: 10.8 FL (ref 6–12)
POTASSIUM BLD-SCNC: 3.9 MMOL/L (ref 3.5–5.3)
PROT SERPL-MCNC: 7 G/DL (ref 6.3–8.7)
RBC # BLD AUTO: 4.66 10*6/MM3 (ref 4.2–5.4)
SODIUM BLD-SCNC: 147 MMOL/L (ref 135–145)
T3FREE SERPL-MCNC: 3.4 PG/ML (ref 2.77–5.27)
T4 FREE SERPL-MCNC: 1.25 NG/DL (ref 0.78–2.19)
TIBC SERPL-MCNC: 370 MCG/DL (ref 225–420)
TSH SERPL DL<=0.05 MIU/L-ACNC: 1.3 MIU/ML (ref 0.47–4.68)
VIT B12 BLD-MCNC: 874 PG/ML (ref 239–931)
WBC NRBC COR # BLD: 6.72 10*3/MM3 (ref 4.8–10.8)

## 2018-03-13 PROCEDURE — 80053 COMPREHEN METABOLIC PANEL: CPT | Performed by: NURSE PRACTITIONER

## 2018-03-13 PROCEDURE — 99204 OFFICE O/P NEW MOD 45 MIN: CPT | Performed by: NURSE PRACTITIONER

## 2018-03-13 PROCEDURE — 84443 ASSAY THYROID STIM HORMONE: CPT | Performed by: NURSE PRACTITIONER

## 2018-03-13 PROCEDURE — 82728 ASSAY OF FERRITIN: CPT | Performed by: NURSE PRACTITIONER

## 2018-03-13 PROCEDURE — 85027 COMPLETE CBC AUTOMATED: CPT | Performed by: NURSE PRACTITIONER

## 2018-03-13 PROCEDURE — 82607 VITAMIN B-12: CPT | Performed by: NURSE PRACTITIONER

## 2018-03-13 PROCEDURE — 82746 ASSAY OF FOLIC ACID SERUM: CPT | Performed by: NURSE PRACTITIONER

## 2018-03-13 PROCEDURE — 83550 IRON BINDING TEST: CPT | Performed by: NURSE PRACTITIONER

## 2018-03-13 PROCEDURE — 84481 FREE ASSAY (FT-3): CPT | Performed by: NURSE PRACTITIONER

## 2018-03-13 PROCEDURE — 36415 COLL VENOUS BLD VENIPUNCTURE: CPT

## 2018-03-13 PROCEDURE — 84439 ASSAY OF FREE THYROXINE: CPT | Performed by: NURSE PRACTITIONER

## 2018-03-13 PROCEDURE — 83540 ASSAY OF IRON: CPT | Performed by: NURSE PRACTITIONER

## 2018-03-13 PROCEDURE — 82306 VITAMIN D 25 HYDROXY: CPT | Performed by: NURSE PRACTITIONER

## 2018-03-13 PROCEDURE — 97802 MEDICAL NUTRITION INDIV IN: CPT

## 2018-03-13 RX ORDER — TOPIRAMATE 25 MG/1
25 TABLET ORAL 2 TIMES DAILY
Qty: 60 TABLET | Refills: 3 | Status: SHIPPED | OUTPATIENT
Start: 2018-03-13 | End: 2019-07-12

## 2018-03-13 NOTE — PATIENT INSTRUCTIONS
Anthony Meza is a 52 y.o. who has a history of morbid obesity and desires medically supervised weight loss. Patient's personal weight loss goal is to lose 150 pounds total.  First goal is to lose 17 pounds, with 8 being lost in first month. Baseline labs will be ordered today. Start topamax. Take as advised. SE reviewed. Patient not pregnant. Will consider metformin at next visit. Patient will see the dietician today for make specific goals for diet, exercise, and lifestyle. Patient has received intensive behavioral therapy for obesity today. I will have them follow up in one month for a weight recheck and to further discuss options.

## 2018-03-13 NOTE — PROGRESS NOTES
"Subjective   Anthony Meza is a 52 y.o. female.     History of Present Illness   Anthony Meza is a 52 y.o. year-old who has morbid obesity and is interested in having medically supervised weight loss. Patient has been overweight for the past 25 years. The most weight ever lost was 35-40  pounds from using OTC weight loss medications. She was able to keep this weight off for a few months. Unsupervised diet attempts include: calorie counting and low carb diet. Supervised diet attempts include: none. Patient has tried the following OTC or prescription medications for weight loss: dexatrim. Patient states she eats due to stress and boredom.  Patient is limited in activities due to: chronic pain, fatigue, and fibromyalgia.   Vitals:    03/13/18 1241   BP: 134/84   BP Location: Right arm   Patient Position: Sitting   Cuff Size: Adult   Pulse: 63   Temp: 98 °F (36.7 °C)   SpO2: 100%   Weight: (!) 152 kg (334 lb)   Height: 165.1 cm (65\")     She  has a past medical history of A-fib; Abnormal ECG; Anxiety; Arthritis; Atrial fibrillation; Back pain; CFS (chronic fatigue syndrome); Chronic pain disorder; Fibromyalgia; Hypertension; Migraines; and Sleep apnea.  She  does not have any pertinent problems on file.  She  has a past surgical history that includes Cyst Removal; Tubal ligation; Cardiac catheterization (Bilateral, 1/29/2018); Cardiac catheterization (N/A, 1/29/2018); Ankle surgery; and [place] tooth extraction.  Her family history includes Asthma in her father; Fibromyalgia in her mother; Heart disease in her father and paternal grandfather; Hypertension in her father and paternal grandfather; Obesity in her father, maternal grandmother, paternal grandfather, and sister; Rheum arthritis in her father and mother; Transient ischemic attack in her mother.  She  reports that she has never smoked. She has never used smokeless tobacco. She reports that she does not drink alcohol or use drugs.  Current Outpatient Prescriptions "   Medication Sig Dispense Refill   • acetaminophen (TYLENOL) 325 MG tablet Take 325 mg by mouth Every 6 (Six) Hours As Needed for Mild Pain  or Headache.     • Acetaminophen-Caffeine (TENSION HEADACHE RELIEF) 500-65 MG tablet Take 1 tablet by mouth Every 6 (Six) Hours As Needed (migraine).     • aspirin 81 MG tablet Take 81 mg by mouth Daily.     • cholecalciferol (VITAMIN D3) 1000 units tablet Take 1,000 Units by mouth Daily.     • Cyanocobalamin (VITAMIN B-12) 1000 MCG sublingual tablet Place 1 tablet under the tongue Daily.     • DULoxetine (CYMBALTA) 60 MG capsule Take 60 mg by mouth Daily.     • hydrochlorothiazide (HYDRODIURIL) 12.5 MG tablet Take 1 tablet by mouth Daily. 30 tablet 5   • metoprolol tartrate (LOPRESSOR) 25 MG tablet Take 25 mg by mouth Every 12 (Twelve) Hours.     • warfarin (COUMADIN) 5 MG tablet Take 5 mg by mouth Daily.     • topiramate (TOPAMAX) 25 MG tablet Take 1 tablet by mouth 2 (Two) Times a Day. 60 tablet 3     No current facility-administered medications for this visit.      She is allergic to penicillins.      The following portions of the patient's history were reviewed and updated as appropriate: allergies, current medications, past family history, past medical history, past social history, past surgical history and problem list.    Review of Systems   Constitutional: Positive for fatigue. Negative for activity change and appetite change.   HENT: Positive for postnasal drip.    Eyes: Positive for visual disturbance.        Wears glasses   Respiratory: Positive for apnea. Negative for cough, chest tightness and shortness of breath.         HARLAN and has CPAP   Cardiovascular: Positive for chest pain and leg swelling. Negative for palpitations.        Hx of HTN, A-fib, sees    Gastrointestinal: Negative.  Negative for abdominal pain, anal bleeding, constipation, nausea and vomiting.        Needs screening colonoscopy   Endocrine: Negative.         Hair loss   Genitourinary:  Positive for urgency.        Stress incontinence; due for mammogram and pap smear   Musculoskeletal: Positive for arthralgias, back pain, gait problem, myalgias and neck pain.   Skin: Negative.    Allergic/Immunologic: Negative.    Neurological: Positive for dizziness, numbness and headaches. Negative for seizures and syncope.   Hematological: Bruises/bleeds easily.        On Coumadin for A-fib   Psychiatric/Behavioral: Positive for dysphoric mood and sleep disturbance. Negative for self-injury and suicidal ideas. The patient is nervous/anxious.        Objective   Physical Exam   Constitutional: She is oriented to person, place, and time. Vital signs are normal. She appears well-developed and well-nourished. She is cooperative. No distress.   HENT:   Head: Normocephalic and atraumatic.   Nose: Nose normal.   Mouth/Throat: Oropharynx is clear and moist. No oropharyngeal exudate or tonsillar abscesses.   Eyes: Conjunctivae, EOM and lids are normal. Pupils are equal, round, and reactive to light. Right eye exhibits no discharge. Left eye exhibits no discharge.   Glasses noted   Neck: Trachea normal. Neck supple. No JVD present. Carotid bruit is not present. No no neck rigidity. No tracheal deviation present. No thyromegaly present.   Cardiovascular: Normal rate, regular rhythm, S1 normal, S2 normal and normal heart sounds.    Heart tones soft, RRR at time of exam   Pulmonary/Chest: Effort normal and breath sounds normal. No stridor. No respiratory distress. She has no wheezes. She has no rales.   Abdominal: Soft. Bowel sounds are normal. She exhibits no distension. There is no tenderness.   obese   Musculoskeletal: She exhibits edema.        Right shoulder: She exhibits normal strength.   Trace edema to lower legs   Lymphadenopathy:     She has no cervical adenopathy.   Neurological: She is alert and oriented to person, place, and time. She has normal strength. No cranial nerve deficit.   Skin: Skin is warm, dry and  intact. No rash noted.   Psychiatric: She has a normal mood and affect. Her speech is normal and behavior is normal.   Alert and oriented x 3   Vitals reviewed.      Assessment/Plan   Anthony was seen today for consult, obesity, nutrition counseling and weight loss.    Diagnoses and all orders for this visit:    Obesity, morbid, BMI 50 or higher  -     CBC (No Diff); Future  -     Comprehensive Metabolic Panel; Future  -     TSH; Future  -     Vitamin B12; Future  -     Vitamin D 25 Hydroxy; Future  -     T4, Free; Future  -     T3, Free; Future    Essential hypertension  -     CBC (No Diff); Future  -     Comprehensive Metabolic Panel; Future  -     TSH; Future  -     Vitamin B12; Future  -     T4, Free; Future  -     T3, Free; Future    HARLAN on CPAP    Migraine without aura and with status migrainosus, not intractable    Vitamin D deficiency  -     Vitamin D 25 Hydroxy; Future    Fatigue, unspecified type  -     CBC (No Diff); Future  -     Comprehensive Metabolic Panel; Future  -     TSH; Future  -     Vitamin B12; Future  -     Vitamin D 25 Hydroxy; Future  -     T4, Free; Future  -     T3, Free; Future    Weight gain  -     CBC (No Diff); Future  -     Comprehensive Metabolic Panel; Future  -     TSH; Future  -     Vitamin D 25 Hydroxy; Future  -     T4, Free; Future  -     T3, Free; Future    Other orders  -     topiramate (TOPAMAX) 25 MG tablet; Take 1 tablet by mouth 2 (Two) Times a Day.          Anthony Meza is a 52 y.o. who has a history of morbid obesity and desires medically supervised weight loss. Patient's personal weight loss goal is to lose 150 pounds total.  First goal is to lose 17 pounds, with 8 being lost in first month. Baseline labs will be ordered today. Start topamax. Take as advised. SE reviewed. Patient not pregnant. Will consider metformin at next visit. Patient will see the dietician today for make specific goals for diet, exercise, and lifestyle. Patient has received intensive behavioral  therapy for obesity today. I will have them follow up in one month for a weight recheck and to further discuss options.

## 2018-03-13 NOTE — PROGRESS NOTES
"Nutrition Bariatric/[facility] Note     Visit   Initial Assessment     Anthropometrics   Height: 65\"  Weight: 334#  BMI: 55.6    Waist: 61.5\"  Hip: 64\"  Chest: 55\"  Thigh: 33\"  Arm: 17\"  Body Fat: >50%    Nutrition Recall  24 Hour recall:  (B) cereal OR toast/jelly, sausage, egg OR granola bar OR snack cake, water (L) leftovers OR sandwich, chips, water (D) meat, potato, vegetable OR casserole OR frozen pizza, water  Eating 3 meals daily   Protein lacking at some breakfasts  Snacking during day: peanut butter, cheese, crackers, apples  In evening: sweets  Excessive sweet intake  Large portions  Drinking with meals   Drinking 1-2 carbonated beverages per week  Drinking greater to or equal to 64 fluid ounces  High fat   High CHO    Exercise   None    Habits:  None    Education    Goal Setting and Information Packet    Nutrition Goals   Eat 3-4 meals per day with protein  Eat protein first at meals  Eliminate snacks  Healthier food choices  Portion control / Use smaller plate or measuring cup   Eliminate soda    Exercise Goals  Add 15-30 minutes of walking, cycling, elliptical, swimming, chair, yoga or crossfit daily    Meka Rojo RD, LD  03/13/2018  1:14 PM      "

## 2018-03-14 ENCOUNTER — TELEPHONE (OUTPATIENT)
Dept: BARIATRICS/WEIGHT MGMT | Facility: CLINIC | Age: 53
End: 2018-03-14

## 2018-03-14 RX ORDER — ERGOCALCIFEROL 1.25 MG/1
50000 CAPSULE ORAL WEEKLY
Qty: 4 CAPSULE | Refills: 2 | Status: SHIPPED | OUTPATIENT
Start: 2018-03-14 | End: 2018-08-06

## 2018-03-14 NOTE — TELEPHONE ENCOUNTER
Vitamin D level is low.  We will send in a prescription for vitamin D 50,000 units to be taken once a week.  We will recheck level in 3 months. Patient notified.

## 2018-03-15 ENCOUNTER — TRANSCRIBE ORDERS (OUTPATIENT)
Dept: GENERAL RADIOLOGY | Facility: HOSPITAL | Age: 53
End: 2018-03-15

## 2018-03-15 ENCOUNTER — TRANSCRIBE ORDERS (OUTPATIENT)
Dept: ADMINISTRATIVE | Facility: HOSPITAL | Age: 53
End: 2018-03-15

## 2018-03-15 ENCOUNTER — APPOINTMENT (OUTPATIENT)
Dept: LAB | Facility: HOSPITAL | Age: 53
End: 2018-03-15

## 2018-03-15 DIAGNOSIS — I48.91 ATRIAL FIBRILLATION, UNSPECIFIED TYPE (HCC): Primary | ICD-10-CM

## 2018-03-15 LAB
INR PPP: 1.9 (ref 0.91–1.09)
PERFORM POINT OF CARE ON DEVICE: NORMAL
PROTHROMBIN TIME: 22.5 SECONDS (ref 10–13.8)

## 2018-03-15 PROCEDURE — 85610 PROTHROMBIN TIME: CPT | Performed by: FAMILY MEDICINE

## 2018-03-19 ENCOUNTER — ANTICOAGULATION VISIT (OUTPATIENT)
Dept: CARDIOLOGY | Facility: CLINIC | Age: 53
End: 2018-03-19

## 2018-04-02 ENCOUNTER — HOSPITAL ENCOUNTER (OUTPATIENT)
Dept: CT IMAGING | Facility: HOSPITAL | Age: 53
Discharge: HOME | End: 2018-04-02

## 2018-04-02 ENCOUNTER — TRANSCRIBE ORDERS (OUTPATIENT)
Dept: GENERAL RADIOLOGY | Facility: HOSPITAL | Age: 53
End: 2018-04-02

## 2018-04-02 ENCOUNTER — HOSPITAL ENCOUNTER (OUTPATIENT)
Dept: GENERAL RADIOLOGY | Facility: HOSPITAL | Age: 53
Discharge: HOME | End: 2018-04-02
Admitting: NURSE PRACTITIONER

## 2018-04-02 ENCOUNTER — TRANSCRIBE ORDERS (OUTPATIENT)
Dept: LAB | Facility: HOSPITAL | Age: 53
End: 2018-04-02

## 2018-04-02 ENCOUNTER — LAB (OUTPATIENT)
Dept: LAB | Facility: HOSPITAL | Age: 53
End: 2018-04-02

## 2018-04-02 DIAGNOSIS — R06.00 DYSPNEA, UNSPECIFIED TYPE: ICD-10-CM

## 2018-04-02 DIAGNOSIS — R93.89 ABNORMAL FINDINGS ON DIAGNOSTIC IMAGING OF BODY STRUCTURES: ICD-10-CM

## 2018-04-02 DIAGNOSIS — R06.00 DYSPNEA, UNSPECIFIED TYPE: Primary | ICD-10-CM

## 2018-04-02 DIAGNOSIS — R93.89 ABNORMAL FINDINGS ON DIAGNOSTIC IMAGING OF BODY STRUCTURES: Primary | ICD-10-CM

## 2018-04-02 LAB
ALBUMIN SERPL-MCNC: 3.5 G/DL (ref 3.5–5)
ALBUMIN/GLOB SERPL: 1.1 G/DL (ref 1.1–2.5)
ALP SERPL-CCNC: 101 U/L (ref 24–120)
ALT SERPL W P-5'-P-CCNC: 33 U/L (ref 0–54)
ANION GAP SERPL CALCULATED.3IONS-SCNC: 6 MMOL/L (ref 4–13)
AST SERPL-CCNC: 18 U/L (ref 7–45)
AUTO MIXED CELLS #: 0.4 10*3/MM3 (ref 0.1–2.6)
AUTO MIXED CELLS %: 9.6 % (ref 0.1–24)
BILIRUB SERPL-MCNC: 0.6 MG/DL (ref 0.1–1)
BUN BLD-MCNC: 11 MG/DL (ref 5–21)
BUN/CREAT SERPL: 15.9
CALCIUM SPEC-SCNC: 8.6 MG/DL (ref 8.4–10.4)
CHLORIDE SERPL-SCNC: 109 MMOL/L (ref 98–110)
CO2 SERPL-SCNC: 31 MMOL/L (ref 24–31)
CREAT BLD-MCNC: 0.69 MG/DL (ref 0.5–1.4)
D DIMER PPP FEU-MCNC: 0.25 MG/L (FEU) (ref 0–0.5)
ERYTHROCYTE [DISTWIDTH] IN BLOOD BY AUTOMATED COUNT: 13.6 % (ref 12–15)
ERYTHROCYTE [SEDIMENTATION RATE] IN BLOOD: 1 MM/HR (ref 0–20)
FLUAV AG NPH QL: NEGATIVE
FLUBV AG NPH QL IA: POSITIVE
GFR SERPL CREATININE-BSD FRML MDRD: 89 ML/MIN/1.73
GLOBULIN UR ELPH-MCNC: 3.3 GM/DL
GLUCOSE BLD-MCNC: 100 MG/DL (ref 70–100)
HCT VFR BLD AUTO: 43.5 % (ref 37–47)
HGB BLD-MCNC: 14.5 G/DL (ref 12–16)
LYMPHOCYTES # BLD AUTO: 1.4 10*3/MM3 (ref 0.8–7)
LYMPHOCYTES NFR BLD AUTO: 31.6 % (ref 15–45)
MCH RBC QN AUTO: 31.2 PG (ref 28–32)
MCHC RBC AUTO-ENTMCNC: 33.3 G/DL (ref 33–36)
MCV RBC AUTO: 93.5 FL (ref 82–98)
NEUTROPHILS # BLD AUTO: 2.6 10*3/MM3 (ref 1.5–8.3)
NEUTROPHILS NFR BLD AUTO: 58.8 % (ref 39–78)
PLATELET # BLD AUTO: 176 10*3/MM3 (ref 130–400)
PMV BLD AUTO: 10.3 FL (ref 6–12)
POTASSIUM BLD-SCNC: 3.6 MMOL/L (ref 3.5–5.3)
PROT SERPL-MCNC: 6.8 G/DL (ref 6.3–8.7)
RBC # BLD AUTO: 4.65 10*6/MM3 (ref 4.2–5.4)
SODIUM BLD-SCNC: 146 MMOL/L (ref 135–145)
WBC NRBC COR # BLD: 4.4 10*3/MM3 (ref 4.8–10.8)

## 2018-04-02 PROCEDURE — 85025 COMPLETE CBC W/AUTO DIFF WBC: CPT

## 2018-04-02 PROCEDURE — 36415 COLL VENOUS BLD VENIPUNCTURE: CPT

## 2018-04-02 PROCEDURE — 71250 CT THORAX DX C-: CPT

## 2018-04-02 PROCEDURE — 80053 COMPREHEN METABOLIC PANEL: CPT

## 2018-04-02 PROCEDURE — 71046 X-RAY EXAM CHEST 2 VIEWS: CPT

## 2018-04-02 PROCEDURE — 85379 FIBRIN DEGRADATION QUANT: CPT | Performed by: NURSE PRACTITIONER

## 2018-04-02 PROCEDURE — 85652 RBC SED RATE AUTOMATED: CPT

## 2018-04-02 PROCEDURE — 87804 INFLUENZA ASSAY W/OPTIC: CPT

## 2018-04-09 ENCOUNTER — ANTICOAGULATION VISIT (OUTPATIENT)
Dept: CARDIOLOGY | Facility: CLINIC | Age: 53
End: 2018-04-09

## 2018-04-09 ENCOUNTER — APPOINTMENT (OUTPATIENT)
Dept: LAB | Facility: HOSPITAL | Age: 53
End: 2018-04-09

## 2018-04-09 ENCOUNTER — TRANSCRIBE ORDERS (OUTPATIENT)
Dept: ADMINISTRATIVE | Facility: HOSPITAL | Age: 53
End: 2018-04-09

## 2018-04-09 DIAGNOSIS — I48.91 ATRIAL FIBRILLATION, UNSPECIFIED TYPE (HCC): Primary | ICD-10-CM

## 2018-04-09 LAB
INR PPP: 1.8 (ref 0.91–1.09)
PERFORM POINT OF CARE ON DEVICE: NORMAL
PROTHROMBIN TIME: 21.6 SECONDS (ref 10–13.8)

## 2018-04-09 PROCEDURE — 85610 PROTHROMBIN TIME: CPT | Performed by: FAMILY MEDICINE

## 2018-04-12 ENCOUNTER — OFFICE VISIT (OUTPATIENT)
Dept: BARIATRICS/WEIGHT MGMT | Facility: CLINIC | Age: 53
End: 2018-04-12

## 2018-04-12 VITALS
DIASTOLIC BLOOD PRESSURE: 84 MMHG | HEART RATE: 80 BPM | HEIGHT: 65 IN | OXYGEN SATURATION: 100 % | TEMPERATURE: 98.8 F | SYSTOLIC BLOOD PRESSURE: 130 MMHG | BODY MASS INDEX: 48.82 KG/M2 | WEIGHT: 293 LBS

## 2018-04-12 DIAGNOSIS — E55.9 VITAMIN D DEFICIENCY: ICD-10-CM

## 2018-04-12 DIAGNOSIS — E66.01 OBESITY, MORBID, BMI 50 OR HIGHER (HCC): Primary | ICD-10-CM

## 2018-04-12 PROCEDURE — 99212 OFFICE O/P EST SF 10 MIN: CPT | Performed by: NURSE PRACTITIONER

## 2018-04-12 NOTE — PATIENT INSTRUCTIONS
Anthony LOGAN Derrick has done well this month with healthy changes. Patient has lost 4 pounds.   Today we discussed healthy changes in lifestyle, diet, and exercise.   Handout provided on reading nutrition labels/portions/servings.   Intensive behavioral therapy for obesity was done today.   Goals for this month are: 3 meals per day with protein at each; eat protein first, use smaller plate; exercise as advised.  Keep taking Vitamin D supplement. Will recheck level in June.   Get pap, mammogram, and screening colonoscopy arranged with PCP as preventative health screenings.   Follow up in one month with Dr. Luevano to discuss disease of obesity and options for treatment.

## 2018-04-12 NOTE — PROGRESS NOTES
"Subjective   Anthony Meza is a 52 y.o. female.     History of Present Illness   Anthony Meza is here with morbid obesity and desires to have medically supervised weight loss. This is the patient's 2nd visit to the office.  Patient had the flu this past month and was down for about two weeks. She will work on getting pap, mammogram, and screening colonoscopy arranged. She was placed on Vitamin D supplement and that level will be checked in June. Patient has been moving around more and doing stretches daily. Patient has been making health dietary choices and eating 3 meals per day with protein at each. Patient is drinking 60-80 ounces of water per day. 1-2 Sodas are being drank every couple of weeks.  Vitals:    04/12/18 1053   BP: 130/84   BP Location: Right arm   Patient Position: Sitting   Cuff Size: Adult   Pulse: 80   Temp: 98.8 °F (37.1 °C)   SpO2: 100%   Weight: (!) 150 kg (330 lb 6.4 oz)   Height: 165.1 cm (65\")         The following portions of the patient's history were reviewed and updated as appropriate: allergies, current medications, past family history, past medical history, past social history, past surgical history and problem list.    Review of Systems   Constitutional: Positive for fatigue. Negative for activity change and appetite change.   Eyes: Negative.    Respiratory: Positive for apnea, cough and wheezing. Negative for chest tightness and shortness of breath.         Has HARLAN and wears machine   Cardiovascular: Positive for leg swelling. Negative for chest pain and palpitations.   Gastrointestinal: Negative.  Negative for abdominal pain, anal bleeding, constipation, nausea and vomiting.   Endocrine: Negative.         Hair loss   Genitourinary: Positive for frequency.   Musculoskeletal: Positive for myalgias and neck pain. Negative for arthralgias and back pain.   Skin: Negative.    Allergic/Immunologic: Negative.    Neurological: Positive for headaches. Negative for seizures and syncope. "   Hematological: Negative.  Does not bruise/bleed easily.   Psychiatric/Behavioral: Positive for dysphoric mood. Negative for self-injury, sleep disturbance and suicidal ideas. The patient is nervous/anxious.        Objective   Physical Exam   Constitutional: She is oriented to person, place, and time. Vital signs are normal. She appears well-developed and well-nourished. She is cooperative. No distress.   HENT:   Head: Normocephalic and atraumatic.   Nose: Nose normal.   Mouth/Throat: Oropharynx is clear and moist. No oropharyngeal exudate or tonsillar abscesses.   Eyes: Conjunctivae, EOM and lids are normal. Pupils are equal, round, and reactive to light. Right eye exhibits no discharge. Left eye exhibits no discharge.   Glasses noted   Neck: Trachea normal. Neck supple. No JVD present. Carotid bruit is not present. No no neck rigidity. No tracheal deviation present. No thyromegaly present.   Cardiovascular: Normal rate, regular rhythm, S1 normal, S2 normal and normal heart sounds.    Pulmonary/Chest: Effort normal and breath sounds normal. No stridor. No respiratory distress. She has no wheezes. She has no rales.   Abdominal: Soft. Bowel sounds are normal. She exhibits no distension. There is no tenderness.   obese   Musculoskeletal: She exhibits edema.        Right shoulder: She exhibits normal strength.   Trace edema to lower legs   Lymphadenopathy:     She has no cervical adenopathy.   Neurological: She is alert and oriented to person, place, and time. She has normal strength. No cranial nerve deficit.   Skin: Skin is warm, dry and intact. No rash noted.   Psychiatric: She has a normal mood and affect. Her speech is normal and behavior is normal.   Alert and oriented x 3   Vitals reviewed.      Assessment/Plan   Anthony was seen today for follow-up, obesity, nutrition counseling and weight loss.    Diagnoses and all orders for this visit:    Obesity, morbid, BMI 50 or higher    Vitamin D  deficiency          Anthony Meza has done well this month with healthy changes. Patient has lost 4 pounds.   Today we discussed healthy changes in lifestyle, diet, and exercise.   Handout provided on reading nutrition labels/portions/servings.   Intensive behavioral therapy for obesity was done today.   Goals for this month are: 3 meals per day with protein at each; eat protein first, use smaller plate; exercise as advised.  Keep taking Vitamin D supplement. Will recheck level in June.   Get pap, mammogram, and screening colonoscopy arranged with PCP as preventative health screenings.   Follow up in one month with Dr. Luevano to discuss disease of obesity and options for treatment.

## 2018-04-16 ENCOUNTER — OFFICE VISIT (OUTPATIENT)
Dept: LAB | Facility: HOSPITAL | Age: 53
End: 2018-04-16

## 2018-04-16 ENCOUNTER — TRANSCRIBE ORDERS (OUTPATIENT)
Dept: ADMINISTRATIVE | Facility: HOSPITAL | Age: 53
End: 2018-04-16

## 2018-04-16 DIAGNOSIS — I48.91 ATRIAL FIBRILLATION, UNSPECIFIED TYPE (HCC): Primary | ICD-10-CM

## 2018-04-16 DIAGNOSIS — I48.91 ATRIAL FIBRILLATION, UNSPECIFIED TYPE (HCC): ICD-10-CM

## 2018-04-16 LAB
INR PPP: 1.72 (ref 0.91–1.09)
PROTHROMBIN TIME: 20.8 SECONDS (ref 11.9–14.6)

## 2018-04-16 PROCEDURE — 36415 COLL VENOUS BLD VENIPUNCTURE: CPT

## 2018-04-16 PROCEDURE — 85610 PROTHROMBIN TIME: CPT | Performed by: INTERNAL MEDICINE

## 2018-04-17 ENCOUNTER — ANTICOAGULATION VISIT (OUTPATIENT)
Dept: CARDIOLOGY | Facility: CLINIC | Age: 53
End: 2018-04-17

## 2018-04-18 ENCOUNTER — TELEPHONE (OUTPATIENT)
Dept: NEUROLOGY | Age: 53
End: 2018-04-18

## 2018-04-18 ENCOUNTER — OFFICE VISIT (OUTPATIENT)
Dept: NEUROSURGERY | Age: 53
End: 2018-04-18
Payer: MEDICARE

## 2018-04-18 VITALS
DIASTOLIC BLOOD PRESSURE: 62 MMHG | WEIGHT: 293 LBS | SYSTOLIC BLOOD PRESSURE: 118 MMHG | OXYGEN SATURATION: 93 % | BODY MASS INDEX: 47.09 KG/M2 | HEART RATE: 68 BPM | HEIGHT: 66 IN

## 2018-04-18 DIAGNOSIS — G47.33 OSA (OBSTRUCTIVE SLEEP APNEA): ICD-10-CM

## 2018-04-18 DIAGNOSIS — I63.449 CEREBROVASCULAR ACCIDENT (CVA) DUE TO EMBOLISM OF CEREBELLAR ARTERY, UNSPECIFIED BLOOD VESSEL LATERALITY (HCC): ICD-10-CM

## 2018-04-18 DIAGNOSIS — M79.7 FIBROMYALGIA: Primary | ICD-10-CM

## 2018-04-18 DIAGNOSIS — I63.442 STROKE DUE TO EMBOLISM OF LEFT CEREBELLAR ARTERY (HCC): ICD-10-CM

## 2018-04-18 DIAGNOSIS — R51.9 INTRACTABLE HEADACHE, UNSPECIFIED CHRONICITY PATTERN, UNSPECIFIED HEADACHE TYPE: ICD-10-CM

## 2018-04-18 PROCEDURE — 3017F COLORECTAL CA SCREEN DOC REV: CPT | Performed by: PSYCHIATRY & NEUROLOGY

## 2018-04-18 PROCEDURE — G8427 DOCREV CUR MEDS BY ELIG CLIN: HCPCS | Performed by: PSYCHIATRY & NEUROLOGY

## 2018-04-18 PROCEDURE — G8598 ASA/ANTIPLAT THER USED: HCPCS | Performed by: PSYCHIATRY & NEUROLOGY

## 2018-04-18 PROCEDURE — G8417 CALC BMI ABV UP PARAM F/U: HCPCS | Performed by: PSYCHIATRY & NEUROLOGY

## 2018-04-18 PROCEDURE — 99214 OFFICE O/P EST MOD 30 MIN: CPT | Performed by: PSYCHIATRY & NEUROLOGY

## 2018-04-18 PROCEDURE — 1036F TOBACCO NON-USER: CPT | Performed by: PSYCHIATRY & NEUROLOGY

## 2018-04-18 PROCEDURE — 3014F SCREEN MAMMO DOC REV: CPT | Performed by: PSYCHIATRY & NEUROLOGY

## 2018-04-23 ENCOUNTER — TRANSCRIBE ORDERS (OUTPATIENT)
Dept: GENERAL RADIOLOGY | Facility: HOSPITAL | Age: 53
End: 2018-04-23

## 2018-04-23 ENCOUNTER — APPOINTMENT (OUTPATIENT)
Dept: LAB | Facility: HOSPITAL | Age: 53
End: 2018-04-23

## 2018-04-23 DIAGNOSIS — I48.91 ATRIAL FIBRILLATION, UNSPECIFIED TYPE (HCC): Primary | ICD-10-CM

## 2018-04-23 LAB
INR PPP: 3.4 (ref 0.91–1.09)
PERFORM POINT OF CARE ON DEVICE: NORMAL
PROTHROMBIN TIME: 41.3 SECONDS (ref 10–13.8)

## 2018-04-23 PROCEDURE — 85610 PROTHROMBIN TIME: CPT | Performed by: FAMILY MEDICINE

## 2018-04-24 ENCOUNTER — ANTICOAGULATION VISIT (OUTPATIENT)
Dept: CARDIOLOGY | Facility: CLINIC | Age: 53
End: 2018-04-24

## 2018-04-27 RX ORDER — WARFARIN SODIUM 5 MG/1
5 TABLET ORAL
Qty: 30 TABLET | Refills: 3 | Status: SHIPPED | OUTPATIENT
Start: 2018-04-27 | End: 2018-05-15 | Stop reason: ALTCHOICE

## 2018-04-30 ENCOUNTER — TRANSCRIBE ORDERS (OUTPATIENT)
Dept: ADMINISTRATIVE | Facility: HOSPITAL | Age: 53
End: 2018-04-30

## 2018-04-30 ENCOUNTER — HOSPITAL ENCOUNTER (OUTPATIENT)
Dept: NON INVASIVE DIAGNOSTICS | Age: 53
Discharge: HOME | End: 2018-04-30
Payer: MEDICARE

## 2018-04-30 ENCOUNTER — ANTICOAGULATION VISIT (OUTPATIENT)
Dept: CARDIOLOGY | Facility: CLINIC | Age: 53
End: 2018-04-30

## 2018-04-30 ENCOUNTER — APPOINTMENT (OUTPATIENT)
Dept: LAB | Facility: HOSPITAL | Age: 53
End: 2018-04-30

## 2018-04-30 DIAGNOSIS — I63.442 STROKE DUE TO EMBOLISM OF LEFT CEREBELLAR ARTERY (HCC): ICD-10-CM

## 2018-04-30 DIAGNOSIS — I63.449 CEREBROVASCULAR ACCIDENT (CVA) DUE TO EMBOLISM OF CEREBELLAR ARTERY, UNSPECIFIED BLOOD VESSEL LATERALITY (HCC): ICD-10-CM

## 2018-04-30 DIAGNOSIS — I48.91 ATRIAL FIBRILLATION, UNSPECIFIED TYPE (HCC): Primary | ICD-10-CM

## 2018-04-30 LAB
INR PPP: 2.1 (ref 0.91–1.09)
PERFORM POINT OF CARE ON DEVICE: NORMAL
PROTHROMBIN TIME: 25.5 SECONDS (ref 10–13.8)

## 2018-04-30 PROCEDURE — 93880 EXTRACRANIAL BILAT STUDY: CPT

## 2018-04-30 PROCEDURE — 85610 PROTHROMBIN TIME: CPT | Performed by: FAMILY MEDICINE

## 2018-05-09 ENCOUNTER — OFFICE VISIT (OUTPATIENT)
Dept: LAB | Facility: HOSPITAL | Age: 53
End: 2018-05-09

## 2018-05-09 ENCOUNTER — TRANSCRIBE ORDERS (OUTPATIENT)
Dept: ADMINISTRATIVE | Facility: HOSPITAL | Age: 53
End: 2018-05-09

## 2018-05-09 ENCOUNTER — ANTICOAGULATION VISIT (OUTPATIENT)
Dept: CARDIOLOGY | Facility: CLINIC | Age: 53
End: 2018-05-09

## 2018-05-09 DIAGNOSIS — I48.91 ATRIAL FIBRILLATION, UNSPECIFIED TYPE (HCC): Primary | ICD-10-CM

## 2018-05-09 DIAGNOSIS — I48.91 ATRIAL FIBRILLATION, UNSPECIFIED TYPE (HCC): ICD-10-CM

## 2018-05-09 LAB
INR PPP: 1.89 (ref 0.91–1.09)
PROTHROMBIN TIME: 22.4 SECONDS (ref 11.9–14.6)

## 2018-05-09 PROCEDURE — 85610 PROTHROMBIN TIME: CPT | Performed by: INTERNAL MEDICINE

## 2018-05-09 PROCEDURE — 36415 COLL VENOUS BLD VENIPUNCTURE: CPT

## 2018-05-15 ENCOUNTER — OFFICE VISIT (OUTPATIENT)
Dept: BARIATRICS/WEIGHT MGMT | Facility: CLINIC | Age: 53
End: 2018-05-15

## 2018-05-15 VITALS
DIASTOLIC BLOOD PRESSURE: 78 MMHG | BODY MASS INDEX: 48.82 KG/M2 | WEIGHT: 293 LBS | HEART RATE: 74 BPM | TEMPERATURE: 98.8 F | SYSTOLIC BLOOD PRESSURE: 132 MMHG | OXYGEN SATURATION: 97 % | HEIGHT: 65 IN

## 2018-05-15 DIAGNOSIS — Z99.89 OSA ON CPAP: ICD-10-CM

## 2018-05-15 DIAGNOSIS — G47.33 OSA ON CPAP: ICD-10-CM

## 2018-05-15 PROCEDURE — 99213 OFFICE O/P EST LOW 20 MIN: CPT | Performed by: SURGERY

## 2018-05-16 NOTE — PROGRESS NOTES
Patient Care Team:  Tyrell Rosas MD as PCP - General  Tyrell Rosas MD as PCP - Family Medicine  Tyrell Rosas MD as Referring Physician (Family Medicine)  Joel Medina MD as Cardiologist (Cardiology)    Reason for Visit:  Surgical Weight loss    Subjective     Patient is a 52 y.o. female presents with morbid obesity and her Body mass index is 56.35 kg/m².     She is here for discussion about the esophagogastroduodenoscopy procedure.  She stated she has been with the disease of obesity for year(s).  She stated she suffers from arthritis, CFS and back pain due to her weight gain.  She stated that weight loss helps alleviate these symptoms.   She stated that she has tried multiple diets and is currently in a medical weight loss program to help with weight loss.  She stated that she has attempted these conservative methods for weight loss without maintaining long term success.        Review of Systems  General ROS: positive for  - fatigue and weight gain    History  Past Medical History:   Diagnosis Date   • A-fib    • Abnormal ECG    • Anxiety    • Arthritis    • Atrial fibrillation    • Back pain    • CFS (chronic fatigue syndrome)    • Chronic pain disorder    • Fibromyalgia    • Hypertension    • Migraines    • Sleep apnea     uses a c-pap     Past Surgical History:   Procedure Laterality Date   • ANKLE SURGERY      x2 left   • CARDIAC CATHETERIZATION Bilateral 1/29/2018    Procedure: Coronary angiography;  Surgeon: Joel Medina MD;  Location:  [facility];  Service:    • CARDIAC CATHETERIZATION N/A 1/29/2018    Procedure: Left Heart Cath;  Surgeon: oJel Medina MD;  Location:  [facility];  Service:    • CYST REMOVAL      from tailbone   • TUBAL ABDOMINAL LIGATION     • WISDOM TOOTH EXTRACTION       Family History   Problem Relation Age of Onset   • Transient ischemic attack Mother    • Rheum arthritis Mother    • Fibromyalgia Mother    • Heart disease Father     • Asthma Father    • Hypertension Father    • Rheum arthritis Father    • Obesity Father    • Obesity Sister    • Obesity Maternal Grandmother    • Obesity Paternal Grandfather    • Hypertension Paternal Grandfather    • Heart disease Paternal Grandfather      Social History   Substance Use Topics   • Smoking status: Never Smoker   • Smokeless tobacco: Never Used   • Alcohol use No       (Not in a hospital admission)  Allergies:  Penicillins      Current Outpatient Prescriptions:   •  acetaminophen (TYLENOL) 325 MG tablet, Take 325 mg by mouth Every 6 (Six) Hours As Needed for Mild Pain  or Headache., Disp: , Rfl:   •  Acetaminophen-Caffeine (TENSION HEADACHE RELIEF) 500-65 MG tablet, Take 1 tablet by mouth Every 6 (Six) Hours As Needed (migraine)., Disp: , Rfl:   •  aspirin 81 MG tablet, Take 81 mg by mouth Daily., Disp: , Rfl:   •  cholecalciferol (VITAMIN D3) 1000 units tablet, Take 1,000 Units by mouth Daily., Disp: , Rfl:   •  Cyanocobalamin (VITAMIN B-12) 1000 MCG sublingual tablet, Place 1 tablet under the tongue Daily., Disp: , Rfl:   •  DULoxetine (CYMBALTA) 60 MG capsule, Take 60 mg by mouth Daily., Disp: , Rfl:   •  ergocalciferol (ERGOCALCIFEROL) 09190 units capsule, Take 1 capsule by mouth 1 (One) Time Per Week., Disp: 4 capsule, Rfl: 2  •  hydrochlorothiazide (HYDRODIURIL) 12.5 MG tablet, Take 1 tablet by mouth Daily., Disp: 30 tablet, Rfl: 5  •  metoprolol tartrate (LOPRESSOR) 25 MG tablet, Take 25 mg by mouth Every 12 (Twelve) Hours., Disp: , Rfl:   •  topiramate (TOPAMAX) 25 MG tablet, Take 1 tablet by mouth 2 (Two) Times a Day., Disp: 60 tablet, Rfl: 3  •  Warfarin Sodium (COUMADIN PO), Take  by mouth. 5mg four days a week  7.5 mg 3 days a week, Disp: , Rfl:     Objective     Vital Signs     Body mass index is 56.35 kg/m².  1    05/15/18  1035   Weight: (!) 154 kg (338 lb 9.6 oz)       Physical Exam:     HEENT: extra ocular movement intact   Neurologic: alert, oriented, normal speech, no focal  findings or movement disorder noted       Results Review:   None        Assessment/Plan   Encounter Diagnoses   Name Primary?   • Body mass index (BMI) of 50-59.9 in adult Yes   • HARLAN on CPAP              1. A total of 15 minutes was spent face-to-face with this patient during the encounter and over half the time was spent on counseling coordination of care with regards to the treatment of obesity.  The patient is suffering from morbid obesity and with discussed conservative as well as surgical methods for weight loss.  We discussed the importance of reversing this disease which should not turn out reversed comorbid conditions associated with the disease of obesity such as obstructive sleep apnea.  The patient however at this time is not interested in having surgery as a option for treatment for morbid obesity.  I believe this patient will be a good candidate for weight loss surgery however at this time she is not ready for surgery.  She will continue with our program accordingly in follow-up next month.    I discussed the patients findings and my recommendations with patient.     Dr. Hawk Luevano MD [facility]    05/16/18  11:57 AM  Patient Care Team:  Tyrell Rosas MD as PCP - General  Tyrell Rosas MD as PCP - Family Medicine  Tyrell Rosas MD as Referring Physician (Family Medicine)  Joel Medina MD as Cardiologist (Cardiology)

## 2018-05-17 ENCOUNTER — APPOINTMENT (OUTPATIENT)
Dept: LAB | Facility: HOSPITAL | Age: 53
End: 2018-05-17

## 2018-05-17 ENCOUNTER — ANTICOAGULATION VISIT (OUTPATIENT)
Dept: CARDIOLOGY | Facility: CLINIC | Age: 53
End: 2018-05-17

## 2018-05-17 ENCOUNTER — TRANSCRIBE ORDERS (OUTPATIENT)
Dept: ADMINISTRATIVE | Facility: HOSPITAL | Age: 53
End: 2018-05-17

## 2018-05-17 DIAGNOSIS — I48.91 ATRIAL FIBRILLATION, UNSPECIFIED TYPE (HCC): Primary | ICD-10-CM

## 2018-05-17 LAB
INR PPP: 1.9 (ref 0.91–1.09)
PERFORM POINT OF CARE ON DEVICE: NORMAL
PROTHROMBIN TIME: 22.3 SECONDS (ref 10–13.8)

## 2018-05-17 PROCEDURE — 85610 PROTHROMBIN TIME: CPT | Performed by: FAMILY MEDICINE

## 2018-05-17 RX ORDER — WARFARIN SODIUM 5 MG/1
TABLET ORAL
Qty: 60 TABLET | Refills: 11 | Status: SHIPPED | OUTPATIENT
Start: 2018-05-17 | End: 2019-06-26 | Stop reason: SDUPTHER

## 2018-05-29 RX ORDER — ERGOCALCIFEROL 1.25 MG/1
50000 CAPSULE ORAL WEEKLY
Qty: 4 CAPSULE | Refills: 2 | OUTPATIENT
Start: 2018-05-29

## 2018-05-31 ENCOUNTER — APPOINTMENT (OUTPATIENT)
Dept: LAB | Facility: HOSPITAL | Age: 53
End: 2018-05-31

## 2018-05-31 ENCOUNTER — TRANSCRIBE ORDERS (OUTPATIENT)
Dept: ADMINISTRATIVE | Facility: HOSPITAL | Age: 53
End: 2018-05-31

## 2018-05-31 ENCOUNTER — ANTICOAGULATION VISIT (OUTPATIENT)
Dept: CARDIOLOGY | Facility: CLINIC | Age: 53
End: 2018-05-31

## 2018-05-31 DIAGNOSIS — I48.91 ATRIAL FIBRILLATION, UNSPECIFIED TYPE (HCC): Primary | ICD-10-CM

## 2018-05-31 LAB
INR PPP: 3.2 (ref 0.91–1.09)
PERFORM POINT OF CARE ON DEVICE: NORMAL
PROTHROMBIN TIME: 38.2 SECONDS (ref 10–13.8)

## 2018-05-31 PROCEDURE — 85610 PROTHROMBIN TIME: CPT | Performed by: FAMILY MEDICINE

## 2018-06-15 ENCOUNTER — ANTICOAGULATION VISIT (OUTPATIENT)
Dept: CARDIOLOGY | Facility: CLINIC | Age: 53
End: 2018-06-15

## 2018-06-15 ENCOUNTER — TRANSCRIBE ORDERS (OUTPATIENT)
Dept: ADMINISTRATIVE | Facility: HOSPITAL | Age: 53
End: 2018-06-15

## 2018-06-15 ENCOUNTER — APPOINTMENT (OUTPATIENT)
Dept: LAB | Facility: HOSPITAL | Age: 53
End: 2018-06-15

## 2018-06-15 DIAGNOSIS — I48.91 ATRIAL FIBRILLATION, UNSPECIFIED TYPE (HCC): Primary | ICD-10-CM

## 2018-06-15 LAB
INR PPP: 2.5 (ref 0.91–1.09)
PERFORM POINT OF CARE ON DEVICE: NORMAL
PROTHROMBIN TIME: 29.4 SECONDS (ref 10–13.8)

## 2018-06-15 PROCEDURE — 85610 PROTHROMBIN TIME: CPT | Performed by: FAMILY MEDICINE

## 2018-07-19 ENCOUNTER — ANTICOAGULATION VISIT (OUTPATIENT)
Dept: CARDIOLOGY | Facility: CLINIC | Age: 53
End: 2018-07-19

## 2018-07-19 ENCOUNTER — TRANSCRIBE ORDERS (OUTPATIENT)
Dept: ADMINISTRATIVE | Facility: HOSPITAL | Age: 53
End: 2018-07-19

## 2018-07-19 ENCOUNTER — APPOINTMENT (OUTPATIENT)
Dept: LAB | Facility: HOSPITAL | Age: 53
End: 2018-07-19

## 2018-07-19 DIAGNOSIS — I48.91 ATRIAL FIBRILLATION, UNSPECIFIED TYPE (HCC): Primary | ICD-10-CM

## 2018-07-19 LAB
INR PPP: 2.6 (ref 0.91–1.09)
PERFORM POINT OF CARE ON DEVICE: NORMAL
PROTHROMBIN TIME: 30.6 SECONDS (ref 10–13.8)

## 2018-07-19 PROCEDURE — 85610 PROTHROMBIN TIME: CPT | Performed by: FAMILY MEDICINE

## 2018-08-02 ENCOUNTER — OFFICE VISIT (OUTPATIENT)
Dept: RETAIL CLINIC | Facility: CLINIC | Age: 53
End: 2018-08-02

## 2018-08-02 VITALS
BODY MASS INDEX: 47.09 KG/M2 | HEART RATE: 71 BPM | HEIGHT: 66 IN | RESPIRATION RATE: 20 BRPM | WEIGHT: 293 LBS | OXYGEN SATURATION: 99 % | TEMPERATURE: 98.4 F | DIASTOLIC BLOOD PRESSURE: 78 MMHG | SYSTOLIC BLOOD PRESSURE: 128 MMHG

## 2018-08-02 DIAGNOSIS — H10.31 ACUTE BACTERIAL CONJUNCTIVITIS OF RIGHT EYE: Primary | ICD-10-CM

## 2018-08-02 PROCEDURE — 99213 OFFICE O/P EST LOW 20 MIN: CPT | Performed by: ADVANCED PRACTICE MIDWIFE

## 2018-08-02 RX ORDER — NEOMYCIN/POLYMYXIN B/HYDROCORT 3.5-10K-1
1 SUSPENSION, DROPS(FINAL DOSAGE FORM)(ML) OPHTHALMIC (EYE)
Qty: 7.5 ML | Refills: 0 | Status: SHIPPED | OUTPATIENT
Start: 2018-08-02 | End: 2018-08-06

## 2018-08-02 NOTE — PATIENT INSTRUCTIONS
Bacterial Conjunctivitis  Bacterial conjunctivitis is an infection of your conjunctiva. This is the clear membrane that covers the white part of your eye and the inner surface of your eyelid. This condition can make your eye:  · Red or pink.  · Itchy.    This condition is caused by bacteria. This condition spreads very easily from person to person (is contagious) and from one eye to the other eye.  Follow these instructions at home:  Medicines  · Take or apply your antibiotic medicine as told by your doctor. Do not stop taking or applying the antibiotic even if you start to feel better.  · Take or apply over-the-counter and prescription medicines only as told by your doctor.  · Do not touch your eyelid with the eye drop bottle or the ointment tube.  Managing discomfort  · Wipe any fluid from your eye with a warm, wet washcloth or a cotton ball.  · Place a cool, clean washcloth on your eye. Do this for 10-20 minutes, 3-4 times per day.  General instructions  · Do not wear contact lenses until the irritation is gone. Wear glasses until your doctor says it is okay to wear contacts.  · Do not wear eye makeup until your symptoms are gone. Throw away any old makeup.  · Change or wash your pillowcase every day.  · Do not share towels or washcloths with anyone.  · Wash your hands often with soap and water. Use paper towels to dry your hands.  · Do not touch or rub your eyes.  · Do not drive or use heavy machinery if your vision is blurry.  Contact a doctor if:  · You have a fever.  · Your symptoms do not get better after 10 days.  Get help right away if:  · You have a fever and your symptoms suddenly get worse.  · You have very bad pain when you move your eye.  · Your face:  ? Hurts.  ? Is red.  ? Is swollen.  · You have sudden loss of vision.  This information is not intended to replace advice given to you by your health care provider. Make sure you discuss any questions you have with your health care provider.  Document  Released: 09/26/2009 Document Revised: 05/25/2017 Document Reviewed: 09/29/2016  [employer] Interactive Patient Education © 2018 [employer]  Hydrocortisone; Neomycin; Polymyxin B eye suspension  What is this medicine?  HYDROCORTISONE; NEOMYCIN; POLYMYXIN B (erlinda droe KOR ti sone; nee oh MYE sin; bryon i MIX in B) is used to treat eye infections.  This medicine may be used for other purposes; ask your health care provider or pharmacist if you have questions.  COMMON BRAND NAME(S): AK-Spore HC, AK-Spore HC Ophthalmic, Cortisporin  What should I tell my health care provider before I take this medicine?  They need to know if you have any of these conditions:  -any other active infection  -contact lens wearer  -glaucoma  -recent cataract surgery  -an unusual or allergic reaction to hydrocortisone, neomycin, polymyxin B, corticosteroids, other medicines, foods, dyes, or preservatives  -pregnant or trying to get pregnant  -breast-feeding  How should I use this medicine?  This medicine is only for use in the eye. Follow the directions on the prescription label. Wash hands before and after use. Shake well before using. Tilt your head back slightly and pull your lower eyelid down with your index finger to form a pouch. Try not to touch the tip of the dropper to your eye, fingertips, or other surface. Squeeze the prescribed number of drops into the pouch. Close the eye gently to spread the drops. Do not use your medicine more often than directed. Finish the full course of medicine prescribed by your doctor or health care professional even if think your condition is better. Do not stop using except on the advice of your doctor or health care professional.  Talk to your pediatrician regarding the use of this medicine in children. Special care may be needed.  Overdosage: If you think you have taken too much of this medicine contact a poison control center or emergency room at once.  NOTE: This medicine is only for you. Do not share  this medicine with others.  What if I miss a dose?  If you miss a dose, use it as soon as you can. If it is almost time for your next dose, use only that dose. Do not use double or extra doses.  What may interact with this medicine?  Interactions are not expected. Do not use any other eye products without talking to your doctor or health care professional.  This list may not describe all possible interactions. Give your health care provider a list of all the medicines, herbs, non-prescription drugs, or dietary supplements you use. Also tell them if you smoke, drink alcohol, or use illegal drugs. Some items may interact with your medicine.  What should I watch for while using this medicine?  Check with your doctor or health care professional if your condition does not get better after 2 days, or if it gets worse. Do not use longer than 10 days unless instructed by your doctor or health care professional.  Tell your doctor or health care professional if you are exposed to anyone with measles or chickenpox, or if you develop sores or blisters that do not heal properly.  If you are a contact lens wearer, ask your doctor or health care professional when you can use your lenses again.  A burning or stinging reaction that does not go away may mean you are allergic to this product. Stop using and call your doctor or health care professional.  This medicine can make certain eye conditions worse. Only use it for conditions for which your doctor or health care professional has prescribed.  To prevent the spread of infection, do not share eye products, towels, and washcloths with anyone else.  What side effects may I notice from receiving this medicine?  Side effects that you should report to your doctor or health care professional as soon as possible:  -burning, stinging or swelling of the eyelids  -eye pain, decreased, or blurred vision (that will not go away)  -rash  -watery eyes  Side effects that usually do not require  medical attention (report to your doctor or health care professional if they continue or are bothersome):  -eye irritation, itching  -mild burning, redness or stinging in the eye  -temporary watering or blurring of vision  This list may not describe all possible side effects. Call your doctor for medical advice about side effects. You may report side effects to FDA at 6-861-OBH-9158.  Where should I keep my medicine?  Keep out of the reach of children.  Store at room temperature between 15 and 25 degrees C (59 and 77 degrees F). Store suspension upright. Throw away any unused medicine after the expiration date.  NOTE: This sheet is a summary. It may not cover all possible information. If you have questions about this medicine, talk to your doctor, pharmacist, or health care provider.  © 2018 [employer]vier/Gold Standard (2015-07-13 12:08:00)

## 2018-08-02 NOTE — PROGRESS NOTES
"  Chief Complaint   Patient presents with   • Conjunctivitis     Subjective   Anthony Meza is a 52 y.o. female who presents to the clinic today with complaints   Conjunctivitis    The current episode started yesterday (right eye). The onset was sudden. The problem occurs continuously. The problem has been unchanged. The problem is mild. Nothing relieves the symptoms. Nothing aggravates the symptoms. Associated symptoms include eye discharge (matted this morning and now watery with occasional yellow discharge) and eye redness. Pertinent negatives include no fever, no decreased vision, no double vision, no eye itching, no photophobia, no congestion, no ear pain, no headaches, no rhinorrhea, no sore throat and no eye pain (not pain, but \"naila, gritty\" sensation). The eye pain is mild. The right eye is affected. The eye pain is not associated with movement. The eyelid exhibits redness.         Current Outpatient Prescriptions:   •  acetaminophen (TYLENOL) 325 MG tablet, Take 325 mg by mouth Every 6 (Six) Hours As Needed for Mild Pain  or Headache., Disp: , Rfl:   •  Acetaminophen-Caffeine (TENSION HEADACHE RELIEF) 500-65 MG tablet, Take 1 tablet by mouth Every 6 (Six) Hours As Needed (migraine)., Disp: , Rfl:   •  aspirin 81 MG tablet, Take 81 mg by mouth Daily., Disp: , Rfl:   •  cholecalciferol (VITAMIN D3) 1000 units tablet, Take 1,000 Units by mouth Daily., Disp: , Rfl:   •  Cyanocobalamin (VITAMIN B-12) 1000 MCG sublingual tablet, Place 1 tablet under the tongue Daily., Disp: , Rfl:   •  DULoxetine (CYMBALTA) 60 MG capsule, Take 60 mg by mouth Daily., Disp: , Rfl:   •  hydrochlorothiazide (HYDRODIURIL) 12.5 MG tablet, Take 1 tablet by mouth Daily., Disp: 30 tablet, Rfl: 5  •  metoprolol tartrate (LOPRESSOR) 25 MG tablet, Take 25 mg by mouth Every 12 (Twelve) Hours., Disp: , Rfl:   •  topiramate (TOPAMAX) 25 MG tablet, Take 1 tablet by mouth 2 (Two) Times a Day., Disp: 60 tablet, Rfl: 3  •  warfarin (COUMADIN) 5 MG " tablet, Take 10mg every T/T/S/S and 7.5mg M/W/F w/additional tablet as directed per [facility] for sub-therapeutic INR., Disp: 60 tablet, Rfl: 11  •  ergocalciferol (ERGOCALCIFEROL) 55065 units capsule, Take 1 capsule by mouth 1 (One) Time Per Week., Disp: 4 capsule, Rfl: 2      Allergies:  Penicillins    Past Medical History:   Diagnosis Date   • A-fib (CMS/HCC)    • Abnormal ECG    • Anxiety    • Arthritis    • Atrial fibrillation (CMS/HCC)    • Back pain    • CFS (chronic fatigue syndrome)    • Chronic pain disorder    • Fibromyalgia    • Hypertension    • Migraines    • Sleep apnea     uses a c-pap     Past Surgical History:   Procedure Laterality Date   • ANKLE SURGERY      x2 left   • CARDIAC CATHETERIZATION Bilateral 1/29/2018    Procedure: Coronary angiography;  Surgeon: Joel Medina MD;  Location:  [facility];  Service:    • CARDIAC CATHETERIZATION N/A 1/29/2018    Procedure: Left Heart Cath;  Surgeon: Joel Medina MD;  Location:  [facility];  Service:    • CYST REMOVAL      from tailbone   • TUBAL ABDOMINAL LIGATION     • WISDOM TOOTH EXTRACTION       Family History   Problem Relation Age of Onset   • Transient ischemic attack Mother    • Rheum arthritis Mother    • Fibromyalgia Mother    • Heart disease Father    • Asthma Father    • Hypertension Father    • Rheum arthritis Father    • Obesity Father    • Obesity Sister    • Obesity Maternal Grandmother    • Obesity Paternal Grandfather    • Hypertension Paternal Grandfather    • Heart disease Paternal Grandfather      Social History   Substance Use Topics   • Smoking status: Never Smoker   • Smokeless tobacco: Never Used   • Alcohol use No       Review of Systems  Review of Systems   Constitutional: Negative for fever.   HENT: Negative for congestion, ear pain, rhinorrhea and sore throat.    Eyes: Positive for discharge (matted this morning and now watery with occasional yellow discharge), redness and visual  "disturbance (blurry sometimes when the eye becomes really watery). Negative for double vision, photophobia, pain (not pain, but \"naila, gritty\" sensation) and itching.   Neurological: Negative for headaches.   All other systems reviewed and are negative.      Objective   /78 (BP Location: Right arm, Patient Position: Sitting, Cuff Size: Adult)   Pulse 71   Temp 98.4 °F (36.9 °C) (Oral)   Resp 20   Ht 167.6 cm (66\")   Wt (!) 153 kg (336 lb 6.4 oz)   LMP  (LMP Unknown)   SpO2 99%   BMI 54.30 kg/m²       Physical Exam   Constitutional: She is oriented to person, place, and time. She appears well-developed and well-nourished. She is cooperative. She does not appear ill. No distress.   HENT:   Head: Normocephalic.   Eyes: Pupils are equal, round, and reactive to light. EOM and lids are normal. Right eye exhibits discharge (watery). Right eye exhibits no exudate. Left eye exhibits no chemosis, no discharge and no exudate. Right conjunctiva is injected. Right conjunctiva has no hemorrhage. Left conjunctiva is not injected. Left conjunctiva has no hemorrhage.       Cardiovascular: Normal rate.    Pulmonary/Chest: Effort normal. No respiratory distress.   Neurological: She is alert and oriented to person, place, and time.   Skin: Skin is warm and dry.   Psychiatric: She has a normal mood and affect. Her behavior is normal.       Assessment/Plan     Anthony was seen today for conjunctivitis.    Diagnoses and all orders for this visit:    Acute bacterial conjunctivitis of right eye  -     neomycin-polymyxin-hydrocortisone (CORTISPORIN) 3.5-65800-9 ophthalmic suspension; Administer 1 drop to the right eye Every 4 (Four) Hours While Awake.            See patient education instructions. Declines AVS. Recommended changing pillow case daily (she reports sleeping in recliner without pillow). Continue warm compresses. Wash hands well. If no improvement after 2-3 days using gtts and/or she develops pain and/or visual " disturbances, report to optometrist or go to ER for further evaluation. Verbalized understanding and in agreement with POC.

## 2018-08-06 ENCOUNTER — APPOINTMENT (OUTPATIENT)
Dept: LAB | Facility: HOSPITAL | Age: 53
End: 2018-08-06

## 2018-08-06 PROCEDURE — 83880 ASSAY OF NATRIURETIC PEPTIDE: CPT | Performed by: NURSE PRACTITIONER

## 2018-08-06 PROCEDURE — 80053 COMPREHEN METABOLIC PANEL: CPT | Performed by: NURSE PRACTITIONER

## 2018-08-14 ENCOUNTER — APPOINTMENT (OUTPATIENT)
Dept: LAB | Facility: HOSPITAL | Age: 53
End: 2018-08-14

## 2018-08-14 PROCEDURE — 85025 COMPLETE CBC W/AUTO DIFF WBC: CPT | Performed by: NURSE PRACTITIONER

## 2018-08-14 PROCEDURE — 80053 COMPREHEN METABOLIC PANEL: CPT | Performed by: NURSE PRACTITIONER

## 2018-08-14 PROCEDURE — 84550 ASSAY OF BLOOD/URIC ACID: CPT | Performed by: NURSE PRACTITIONER

## 2018-08-17 ENCOUNTER — OFFICE VISIT (OUTPATIENT)
Dept: INTERNAL MEDICINE | Facility: CLINIC | Age: 53
End: 2018-08-17

## 2018-08-17 ENCOUNTER — RESULTS ENCOUNTER (OUTPATIENT)
Dept: INTERNAL MEDICINE | Facility: CLINIC | Age: 53
End: 2018-08-17

## 2018-08-17 ENCOUNTER — LAB (OUTPATIENT)
Dept: LAB | Facility: HOSPITAL | Age: 53
End: 2018-08-17

## 2018-08-17 VITALS
TEMPERATURE: 98.1 F | OXYGEN SATURATION: 98 % | SYSTOLIC BLOOD PRESSURE: 142 MMHG | WEIGHT: 293 LBS | BODY MASS INDEX: 48.82 KG/M2 | DIASTOLIC BLOOD PRESSURE: 84 MMHG | RESPIRATION RATE: 16 BRPM | HEART RATE: 66 BPM | HEIGHT: 65 IN

## 2018-08-17 DIAGNOSIS — Z11.59 ENCOUNTER FOR HEPATITIS C SCREENING TEST FOR LOW RISK PATIENT: ICD-10-CM

## 2018-08-17 DIAGNOSIS — R06.09 DYSPNEA ON EXERTION: ICD-10-CM

## 2018-08-17 DIAGNOSIS — R00.2 PALPITATIONS: ICD-10-CM

## 2018-08-17 DIAGNOSIS — Z12.11 COLON CANCER SCREENING: ICD-10-CM

## 2018-08-17 DIAGNOSIS — E55.9 VITAMIN D DEFICIENCY: ICD-10-CM

## 2018-08-17 DIAGNOSIS — G47.33 OSA ON CPAP: ICD-10-CM

## 2018-08-17 DIAGNOSIS — G93.32 CFS (CHRONIC FATIGUE SYNDROME): ICD-10-CM

## 2018-08-17 DIAGNOSIS — Z99.89 OSA ON CPAP: ICD-10-CM

## 2018-08-17 DIAGNOSIS — I10 ESSENTIAL HYPERTENSION: ICD-10-CM

## 2018-08-17 DIAGNOSIS — Z12.39 BREAST CANCER SCREENING: ICD-10-CM

## 2018-08-17 DIAGNOSIS — Z13.220 LIPID SCREENING: ICD-10-CM

## 2018-08-17 DIAGNOSIS — M79.7 FIBROMYALGIA: ICD-10-CM

## 2018-08-17 DIAGNOSIS — R60.0 LOWER EXTREMITY EDEMA: ICD-10-CM

## 2018-08-17 DIAGNOSIS — I48.0 PAROXYSMAL ATRIAL FIBRILLATION (HCC): Primary | ICD-10-CM

## 2018-08-17 DIAGNOSIS — B07.8 COMMON WART: ICD-10-CM

## 2018-08-17 PROBLEM — E66.01 MORBID OBESITY (HCC): Status: RESOLVED | Noted: 2018-02-22 | Resolved: 2018-08-17

## 2018-08-17 LAB
25(OH)D3 SERPL-MCNC: 35 NG/ML (ref 30–100)
ARTICHOKE IGE QN: 134 MG/DL (ref 0–99)
CHOLEST SERPL-MCNC: 217 MG/DL (ref 130–200)
HCV AB SER DONR QL: NEGATIVE
HCV S/C RATIO: 0.01 (ref 0–0.99)
HDLC SERPL-MCNC: 51 MG/DL
LDLC/HDLC SERPL: 2.58 {RATIO}
TRIGL SERPL-MCNC: 173 MG/DL (ref 0–149)

## 2018-08-17 PROCEDURE — 99214 OFFICE O/P EST MOD 30 MIN: CPT | Performed by: NURSE PRACTITIONER

## 2018-08-17 PROCEDURE — 82306 VITAMIN D 25 HYDROXY: CPT | Performed by: NURSE PRACTITIONER

## 2018-08-17 PROCEDURE — 36415 COLL VENOUS BLD VENIPUNCTURE: CPT

## 2018-08-17 PROCEDURE — 80061 LIPID PANEL: CPT | Performed by: NURSE PRACTITIONER

## 2018-08-17 PROCEDURE — 86803 HEPATITIS C AB TEST: CPT | Performed by: NURSE PRACTITIONER

## 2018-08-17 RX ORDER — HYDROCHLOROTHIAZIDE 12.5 MG/1
12.5 TABLET ORAL DAILY
COMMUNITY
End: 2018-08-17 | Stop reason: SDUPTHER

## 2018-08-17 RX ORDER — OMEPRAZOLE 20 MG/1
20 CAPSULE, DELAYED RELEASE ORAL DAILY
COMMUNITY
Start: 2018-06-26 | End: 2018-08-17

## 2018-08-17 RX ORDER — HYDROCHLOROTHIAZIDE 25 MG/1
25 TABLET ORAL DAILY
Qty: 30 TABLET | Refills: 5 | Status: ON HOLD | OUTPATIENT
Start: 2018-08-17 | End: 2019-07-30

## 2018-08-17 NOTE — PROGRESS NOTES
Procedure   Cryotherapy, Skin Lesion  Date/Time: 8/17/2018 4:55 PM  Performed by: BETHEL STEVENS  Authorized by: BETHEL STEVENS   Local anesthesia used: no    Anesthesia:  Local anesthesia used: no    Sedation:  Patient sedated: no  Patient tolerance: Patient tolerated the procedure well with no immediate complications  Comments: Cryotherapy performed x 6 seconds to wart on pad of middle finger, right hand.

## 2018-08-17 NOTE — PROGRESS NOTES
"CC: establish care - obesity, htn., hx of a-fib.    History:  Anthony Meza is a 52 y.o. female who presents today for evaluation of the above problems.    Currently taking keflex and steroid dose pac for cellulitis of lower extremities. Has had issues with lower extremity edema for a couple of years.    Has not been weighing at home because scale only weighs to 250.  Shortness of breath with exertion and also a lot of coughing.  Was diagnosed with a-fib and had ablation in fall or winter of 2017.  This was done by Dr. Medina.   Has appointment with him in a few days.  Coumadin is monitored by Dr. Medina.  Compliant with CPAP for HARLAN.   Takes topamax one time daily for headaches.  This is helping some. Still has them, but they are now manageable.   Takes cymbalta for anxiety and depression and fibromyalgia and this is working well for her.  She has never had a colonoscopy and her last mammogram and PAP were \"years ago.\"  She complains of a wart on the pad of the middle finger of her right hand that is frequently being irritated by daily activity.  She has tried OTC therapies and these have been unsuccessful.     ROS:  Review of Systems   Respiratory: Positive for chest tightness and shortness of breath.    Cardiovascular: Positive for palpitations.   Gastrointestinal: Negative for blood in stool.   Neurological: Positive for headaches.       Allergies   Allergen Reactions   • Penicillins Rash     Past Medical History:   Diagnosis Date   • A-fib (CMS/HCC)    • Abnormal ECG    • Anxiety    • Arthritis    • Atrial fibrillation (CMS/HCC)    • Back pain    • CFS (chronic fatigue syndrome)    • Chronic pain disorder    • Fibromyalgia    • Hypertension    • Migraines    • Sleep apnea     uses a c-pap     Past Surgical History:   Procedure Laterality Date   • ANKLE SURGERY      x2 left   • CARDIAC CATHETERIZATION Bilateral 1/29/2018    Procedure: Coronary angiography;  Surgeon: Joel Medina MD;  Location: [facility] CATH INVASIVE " LOCATION;  Service:    • CARDIAC CATHETERIZATION N/A 1/29/2018    Procedure: Left Heart Cath;  Surgeon: Joel Medina MD;  Location:  [facility] CATH INVASIVE LOCATION;  Service:    • CYST REMOVAL      from tailbone   • TUBAL ABDOMINAL LIGATION     • WISDOM TOOTH EXTRACTION       Family History   Problem Relation Age of Onset   • Transient ischemic attack Mother    • Rheum arthritis Mother    • Fibromyalgia Mother    • Heart disease Father    • Asthma Father    • Hypertension Father    • Rheum arthritis Father    • Obesity Father    • Obesity Sister    • Obesity Maternal Grandmother    • Cancer Maternal Grandmother    • Obesity Paternal Grandfather    • Hypertension Paternal Grandfather    • Heart disease Paternal Grandfather       reports that she has never smoked. She has never used smokeless tobacco. She reports that she does not drink alcohol or use drugs.      Current Outpatient Prescriptions:   •  acetaminophen (TYLENOL) 325 MG tablet, Take 325 mg by mouth Every 6 (Six) Hours As Needed for Mild Pain  or Headache., Disp: , Rfl:   •  Acetaminophen-Caffeine (TENSION HEADACHE RELIEF) 500-65 MG tablet, Take 1 tablet by mouth Every 6 (Six) Hours As Needed (migraine)., Disp: , Rfl:   •  aspirin 81 MG tablet, Take 81 mg by mouth Daily., Disp: , Rfl:   •  cephalexin (KEFLEX) 500 MG capsule, Take 1 capsule by mouth 2 (Two) Times a Day for 7 days., Disp: 14 capsule, Rfl: 0  •  cholecalciferol (VITAMIN D3) 1000 units tablet, Take 1,000 Units by mouth Daily., Disp: , Rfl:   •  Cyanocobalamin (VITAMIN B-12) 1000 MCG sublingual tablet, Place 1 tablet under the tongue Daily., Disp: , Rfl:   •  DULoxetine (CYMBALTA) 60 MG capsule, Take 60 mg by mouth Daily., Disp: , Rfl:   •  hydrochlorothiazide (HYDRODIURIL) 25 MG tablet, Take 1 tablet by mouth Daily., Disp: 30 tablet, Rfl: 5  •  MethylPREDNISolone (MEDROL, JOSE,) 4 MG tablet, Take as directed on package instructions., Disp: 21 tablet, Rfl: 0  •  metoprolol tartrate (LOPRESSOR) 25  "MG tablet, Take 25 mg by mouth Every 12 (Twelve) Hours., Disp: , Rfl:   •  topiramate (TOPAMAX) 25 MG tablet, Take 1 tablet by mouth 2 (Two) Times a Day., Disp: 60 tablet, Rfl: 3  •  warfarin (COUMADIN) 5 MG tablet, Take 10mg every T/T/S/S and 7.5mg M/W/F w/additional tablet as directed per [facility] for sub-therapeutic INR., Disp: 60 tablet, Rfl: 11    OBJECTIVE:  /84 (BP Location: Left arm, Patient Position: Sitting, Cuff Size: Adult) Comment (Cuff Size): AUTOMATED CUFF  Pulse 66   Temp 98.1 °F (36.7 °C) (Oral)   Resp 16   Ht 165.1 cm (65\")   Wt (!) 153 kg (337 lb 4.8 oz)   LMP  (LMP Unknown)   SpO2 98%   BMI 56.13 kg/m²    Physical Exam   Constitutional: She is oriented to person, place, and time. Vital signs are normal. She appears well-developed and well-nourished.   Cardiovascular: Normal rate and regular rhythm.    Pulmonary/Chest: Effort normal and breath sounds normal.   Musculoskeletal: She exhibits edema (bilateral lower extremities).   Neurological: She is alert and oriented to person, place, and time.   Skin: No erythema.   Psychiatric: She has a normal mood and affect. Her behavior is normal.   Vitals reviewed.      Assessment/Plan    Diagnoses and all orders for this visit:    Paroxysmal atrial fibrillation (CMS/HCC)  She does have a history of ablation and is on coumadin for this.  She will continue to follow with Dr. Medina and continue coumadin therapy as well as metoprolol.  Plan to initiate statin therapy pending lipid panel results.    Essential hypertension  -     hydrochlorothiazide (HYDRODIURIL) 25 MG tablet; Take 1 tablet by mouth Daily.  BP is borderline today.  Will increase HCTZ to help with this as well as hopefully see some benefit to lower extremity edema.    Lower extremity edema  -     hydrochlorothiazide (HYDRODIURIL) 25 MG tablet; Take 1 tablet by mouth Daily.        -     Adult Transthoracic Echo Complete W/ Cont if  Necessary Per Protocol; Future  Cellulitis " appears resolved.  Edema is still present.  Provided a written RX for compression hose with zippers to help with getting these on.  Due to increased dyspnea and palpitations I have also ordered an echo to evaluate cardiac function as a potential cause of edema. Additional differential is peripheral vascular disease.    Palpitations  Dyspnea on exertion  -     Adult Transthoracic Echo Complete W/ Cont if Necessary Per Protocol; Future  Will order echo, however, she will be following up with Dr. Medina next week.    Body mass index (BMI) of 50-59.9 in adult (CMS/[facility])  Advised that she follow up with Bariatrics as her weight is causing stress on her joints, potentially increasing her edema, and is also placing increased stress on her heart.     HARLAN on CPAP    Colon cancer screening  -     Cologuard - Stool, Per Rectum; Future    Breast cancer screening  -     Mammo Screening Digital Tomosynthesis Bilateral With CAD; Future    Lipid screening  -     Lipid panel; Future    Encounter for hepatitis C screening test for low risk patient  -     Hepatitis C antibody; Future    Vitamin D deficiency  -     Vitamin D 25 hydroxy; Future    Common wart  Cryotherapy performed.  See note.    Fibromyalgia  CFS (chronic fatigue syndrome)  Continue Cymbalta.      An After Visit Summary was printed and given to the patient at discharge.  Return in about 3 months (around 11/17/2018).         Rhonda Zapien, APRN  8/17/2018

## 2018-08-28 ENCOUNTER — HOSPITAL ENCOUNTER (OUTPATIENT)
Dept: CARDIOLOGY | Facility: HOSPITAL | Age: 53
Discharge: HOME | End: 2018-08-28

## 2018-08-28 ENCOUNTER — HOSPITAL ENCOUNTER (OUTPATIENT)
Dept: MAMMOGRAPHY | Facility: HOSPITAL | Age: 53
Discharge: HOME | End: 2018-08-28
Admitting: NURSE PRACTITIONER

## 2018-08-28 VITALS
SYSTOLIC BLOOD PRESSURE: 142 MMHG | WEIGHT: 293 LBS | HEIGHT: 65 IN | BODY MASS INDEX: 48.82 KG/M2 | DIASTOLIC BLOOD PRESSURE: 84 MMHG

## 2018-08-28 DIAGNOSIS — R60.0 LOWER EXTREMITY EDEMA: ICD-10-CM

## 2018-08-28 DIAGNOSIS — Z12.39 BREAST CANCER SCREENING: ICD-10-CM

## 2018-08-28 DIAGNOSIS — R06.09 DYSPNEA ON EXERTION: ICD-10-CM

## 2018-08-28 DIAGNOSIS — R00.2 PALPITATIONS: ICD-10-CM

## 2018-08-28 LAB
BH CV ECHO MEAS - AO MAX PG (FULL): 5.5 MMHG
BH CV ECHO MEAS - AO MAX PG: 11.8 MMHG
BH CV ECHO MEAS - AO MEAN PG (FULL): 3 MMHG
BH CV ECHO MEAS - AO MEAN PG: 6 MMHG
BH CV ECHO MEAS - AO ROOT AREA (BSA CORRECTED): 1.1
BH CV ECHO MEAS - AO ROOT AREA: 6.2 CM^2
BH CV ECHO MEAS - AO ROOT DIAM: 2.8 CM
BH CV ECHO MEAS - AO V2 MAX: 172 CM/SEC
BH CV ECHO MEAS - AO V2 MEAN: 110 CM/SEC
BH CV ECHO MEAS - AO V2 VTI: 35.7 CM
BH CV ECHO MEAS - AVA(I,A): 1.8 CM^2
BH CV ECHO MEAS - AVA(I,D): 1.8 CM^2
BH CV ECHO MEAS - AVA(V,A): 1.7 CM^2
BH CV ECHO MEAS - AVA(V,D): 1.7 CM^2
BH CV ECHO MEAS - BSA(HAYCOCK): 2.7 M^2
BH CV ECHO MEAS - BSA: 2.5 M^2
BH CV ECHO MEAS - BZI_BMI: 56.1 KILOGRAMS/M^2
BH CV ECHO MEAS - BZI_METRIC_HEIGHT: 165.1 CM
BH CV ECHO MEAS - BZI_METRIC_WEIGHT: 152.9 KG
BH CV ECHO MEAS - EDV(CUBED): 142.2 ML
BH CV ECHO MEAS - EDV(TEICH): 130.7 ML
BH CV ECHO MEAS - EF(CUBED): 55 %
BH CV ECHO MEAS - EF(TEICH): 46.4 %
BH CV ECHO MEAS - ESV(CUBED): 64 ML
BH CV ECHO MEAS - ESV(TEICH): 70 ML
BH CV ECHO MEAS - FS: 23.4 %
BH CV ECHO MEAS - IVS/LVPW: 1.5
BH CV ECHO MEAS - IVSD: 1.1 CM
BH CV ECHO MEAS - LAT PEAK E' VEL: 10.1 CM/SEC
BH CV ECHO MEAS - LV MASS(C)D: 172.3 GRAMS
BH CV ECHO MEAS - LV MASS(C)DI: 69.8 GRAMS/M^2
BH CV ECHO MEAS - LV MAX PG: 6.4 MMHG
BH CV ECHO MEAS - LV MEAN PG: 3 MMHG
BH CV ECHO MEAS - LV V1 MAX: 126 CM/SEC
BH CV ECHO MEAS - LV V1 MEAN: 74.6 CM/SEC
BH CV ECHO MEAS - LV V1 VTI: 28.9 CM
BH CV ECHO MEAS - LVIDD: 5.2 CM
BH CV ECHO MEAS - LVIDS: 4 CM
BH CV ECHO MEAS - LVOT AREA (M): 2.3 CM^2
BH CV ECHO MEAS - LVOT AREA: 2.3 CM^2
BH CV ECHO MEAS - LVOT DIAM: 1.7 CM
BH CV ECHO MEAS - LVPWD: 0.73 CM
BH CV ECHO MEAS - MED PEAK E' VEL: 12.1 CM/SEC
BH CV ECHO MEAS - MV A MAX VEL: 95.6 CM/SEC
BH CV ECHO MEAS - MV DEC TIME: 0.21 SEC
BH CV ECHO MEAS - MV E MAX VEL: 114 CM/SEC
BH CV ECHO MEAS - MV E/A: 1.2
BH CV ECHO MEAS - RAP SYSTOLE: 5 MMHG
BH CV ECHO MEAS - RVSP: 25.8 MMHG
BH CV ECHO MEAS - SI(AO): 89 ML/M^2
BH CV ECHO MEAS - SI(CUBED): 31.7 ML/M^2
BH CV ECHO MEAS - SI(LVOT): 26.6 ML/M^2
BH CV ECHO MEAS - SI(TEICH): 24.6 ML/M^2
BH CV ECHO MEAS - SV(AO): 219.8 ML
BH CV ECHO MEAS - SV(CUBED): 78.2 ML
BH CV ECHO MEAS - SV(LVOT): 65.6 ML
BH CV ECHO MEAS - SV(TEICH): 60.7 ML
BH CV ECHO MEAS - TR MAX VEL: 228 CM/SEC
BH CV ECHO MEASUREMENTS AVERAGE E/E' RATIO: 10.27
LEFT ATRIUM VOLUME INDEX: 28.1 ML/M2
LEFT ATRIUM VOLUME: 69.4 CM3
LV EF 2D ECHO EST: 55 %
MAXIMAL PREDICTED HEART RATE: 167 BPM
STRESS TARGET HR: 142 BPM

## 2018-08-28 PROCEDURE — 25010000002 PERFLUTREN 6.52 MG/ML SUSPENSION: Performed by: NURSE PRACTITIONER

## 2018-08-28 PROCEDURE — 93306 TTE W/DOPPLER COMPLETE: CPT

## 2018-08-28 PROCEDURE — 93306 TTE W/DOPPLER COMPLETE: CPT | Performed by: INTERNAL MEDICINE

## 2018-08-28 PROCEDURE — 77067 SCR MAMMO BI INCL CAD: CPT

## 2018-08-28 PROCEDURE — 77063 BREAST TOMOSYNTHESIS BI: CPT

## 2018-08-28 RX ADMIN — PERFLUTREN: 6.52 INJECTION, SUSPENSION INTRAVENOUS at 13:46

## 2018-08-29 ENCOUNTER — TELEPHONE (OUTPATIENT)
Dept: INTERNAL MEDICINE | Facility: CLINIC | Age: 53
End: 2018-08-29

## 2018-08-29 NOTE — TELEPHONE ENCOUNTER
Please advise that her echo looked good, however, I see that she did not go to her appt. with Dr. Medina and she needs to reschedule this.

## 2018-08-29 NOTE — TELEPHONE ENCOUNTER
I spoke with the patient and explained results and she will RS appt with Dr Carol green, she had a family emergency.  MM

## 2018-08-31 DIAGNOSIS — R92.8 BREAST LESION ON MAMMOGRAPHY: Primary | ICD-10-CM

## 2018-09-07 ENCOUNTER — ANTICOAGULATION VISIT (OUTPATIENT)
Dept: CARDIOLOGY | Facility: CLINIC | Age: 53
End: 2018-09-07

## 2018-09-07 ENCOUNTER — TRANSCRIBE ORDERS (OUTPATIENT)
Dept: ADMINISTRATIVE | Facility: HOSPITAL | Age: 53
End: 2018-09-07

## 2018-09-07 ENCOUNTER — APPOINTMENT (OUTPATIENT)
Dept: LAB | Facility: HOSPITAL | Age: 53
End: 2018-09-07

## 2018-09-07 DIAGNOSIS — I48.91 ATRIAL FIBRILLATION, UNSPECIFIED TYPE (HCC): Primary | ICD-10-CM

## 2018-09-07 LAB
INR PPP: 3.3 (ref 0.91–1.09)
PERFORM POINT OF CARE ON DEVICE: NORMAL
PROTHROMBIN TIME: 39.4 SECONDS (ref 10–13.8)

## 2018-09-07 PROCEDURE — 85610 PROTHROMBIN TIME: CPT | Performed by: NURSE PRACTITIONER

## 2018-09-10 ENCOUNTER — TRANSCRIBE ORDERS (OUTPATIENT)
Dept: ADMINISTRATIVE | Facility: HOSPITAL | Age: 53
End: 2018-09-10

## 2018-09-10 DIAGNOSIS — R92.1 BREAST CALCIFICATIONS: Primary | ICD-10-CM

## 2018-09-11 ENCOUNTER — HOSPITAL ENCOUNTER (OUTPATIENT)
Dept: MAMMOGRAPHY | Facility: HOSPITAL | Age: 53
Discharge: HOME | End: 2018-09-11
Admitting: NURSE PRACTITIONER

## 2018-09-11 ENCOUNTER — TELEPHONE (OUTPATIENT)
Dept: INTERNAL MEDICINE | Facility: CLINIC | Age: 53
End: 2018-09-11

## 2018-09-11 ENCOUNTER — DOCUMENTATION (OUTPATIENT)
Dept: ULTRASOUND IMAGING | Facility: HOSPITAL | Age: 53
End: 2018-09-11

## 2018-09-11 DIAGNOSIS — R92.1 BREAST CALCIFICATIONS: ICD-10-CM

## 2018-09-11 PROCEDURE — 77065 DX MAMMO INCL CAD UNI: CPT

## 2018-09-11 NOTE — TELEPHONE ENCOUNTER
Keri Alexis from [facility] called to report the mammogram for the patient showed calcification of the left breast and Dr. Calabrese recommends a stereotactic biopsy be performed, but the patient weights 337 lbs and is over the weight limit to have the test performed at [facility].  The order will need to be sent to [facility] where they can accommodate patients up to 350 lbs.  If there are any questions, Keri can be reached at extension [phone].

## 2018-09-11 NOTE — PROGRESS NOTES
Call received to see Anthony Meza with a left breast nodule and recommendation for a left stereotactic guided breast biopsy.  Call placed to physicians office.  Message left on voicemail with nurse for Rhonda Zapien.  Patients weight is 337 lbs.which is over the limit for the stereotactic biopsy table.  Spoke with radiologist, Dr. Ernie Calabrese.  His recommendation is to have procedure done at [facility].  Their table can accommodate 350 bs.  The patient is also on Coumadin as well as ASA, therefore will have to be on hold for her Coumadin for 5 days. Rolanda with internal medicine notified of above.

## 2018-09-12 ENCOUNTER — TELEPHONE (OUTPATIENT)
Dept: INTERNAL MEDICINE | Facility: CLINIC | Age: 53
End: 2018-09-12

## 2018-09-12 DIAGNOSIS — R92.1 BREAST CALCIFICATION, LEFT: Primary | ICD-10-CM

## 2018-09-13 ENCOUNTER — TELEPHONE (OUTPATIENT)
Dept: INTERNAL MEDICINE | Facility: CLINIC | Age: 53
End: 2018-09-13

## 2018-09-13 ENCOUNTER — DOCUMENTATION (OUTPATIENT)
Dept: MAMMOGRAPHY | Facility: HOSPITAL | Age: 53
End: 2018-09-13

## 2018-09-13 ENCOUNTER — DOCUMENTATION (OUTPATIENT)
Dept: ONCOLOGY | Facility: HOSPITAL | Age: 53
End: 2018-09-13

## 2018-09-13 NOTE — PROGRESS NOTES
Call received to see Anthony Meza with a left breast calcifications and recommendation for a left stereotactic guided breast biopsy.  Call placed to Rhonda Bruner.  Approval to set up biopsy. Patients weight is 337 lbs.  Radiologist Ernie Calabrese has recommended the biopsy to be done at [facility], their machine can accommodate up to 350 lbs.  Patient is on Coumadin and must be held for 5 days.  She will stop Coumadin on Saturday, September 15.    Patient notified of date and time of biopsy.  Education given related to pre, during and post procedure.  All questions answered.  Patient scheduled for stereotactic guided breast biopsy for Thursday, September 20, 2018 at 1030 per Zohreh in central scheduling at [facility].

## 2018-09-13 NOTE — TELEPHONE ENCOUNTER
Keri Alexis called regarding patient, states that she is scheduled, for future scheduling, she needs 5 days hold to stop her Coumadin.

## 2018-09-14 ENCOUNTER — APPOINTMENT (OUTPATIENT)
Dept: LAB | Facility: HOSPITAL | Age: 53
End: 2018-09-14

## 2018-09-14 ENCOUNTER — LAB (OUTPATIENT)
Dept: LAB | Facility: HOSPITAL | Age: 53
End: 2018-09-14
Attending: INTERNAL MEDICINE

## 2018-09-14 ENCOUNTER — TELEPHONE (OUTPATIENT)
Dept: INTERNAL MEDICINE | Facility: CLINIC | Age: 53
End: 2018-09-14

## 2018-09-14 ENCOUNTER — TRANSCRIBE ORDERS (OUTPATIENT)
Dept: ADMINISTRATIVE | Facility: HOSPITAL | Age: 53
End: 2018-09-14

## 2018-09-14 ENCOUNTER — ANTICOAGULATION VISIT (OUTPATIENT)
Dept: CARDIOLOGY | Facility: CLINIC | Age: 53
End: 2018-09-14

## 2018-09-14 DIAGNOSIS — I48.91 ATRIAL FIBRILLATION, UNSPECIFIED TYPE (HCC): Primary | ICD-10-CM

## 2018-09-14 DIAGNOSIS — I48.91 ATRIAL FIBRILLATION, UNSPECIFIED TYPE (HCC): ICD-10-CM

## 2018-09-14 LAB
INR PPP: 1.63 (ref 0.91–1.09)
PERFORM POINT OF CARE ON DEVICE: NORMAL
PROTHROMBIN TIME: 19.9 SECONDS (ref 11.9–14.6)

## 2018-09-14 PROCEDURE — 85610 PROTHROMBIN TIME: CPT | Performed by: INTERNAL MEDICINE

## 2018-09-14 PROCEDURE — 36415 COLL VENOUS BLD VENIPUNCTURE: CPT

## 2018-09-14 NOTE — TELEPHONE ENCOUNTER
Alex called to let us know that her biopsy is approved and scheduled at [facility] for 9-20-18, and I have faxed the order to Zohreh in scheduling at [phone].   MM

## 2018-09-17 ENCOUNTER — DOCUMENTATION (OUTPATIENT)
Dept: MAMMOGRAPHY | Facility: HOSPITAL | Age: 53
End: 2018-09-17

## 2018-09-17 NOTE — PROGRESS NOTES
Spoke with patient on the phone.  Instructed to be at [facility] [address] for her left breast stereotactic biopsy for Thursday, September 20th 2018, 9:45 arrival and a 10:00 procedure time.  She does have the disc of images.  I pre-authed with [employer] but the patient informs me that she was approved for disability this year and now has Medicare, which does not need pre-auth.  All further questions answered.  She did stop her Coumadin on this past Saturday, 9/15/18.

## 2018-09-20 ENCOUNTER — TELEPHONE (OUTPATIENT)
Dept: INTERNAL MEDICINE | Facility: CLINIC | Age: 53
End: 2018-09-20

## 2018-09-20 ENCOUNTER — HOSPITAL ENCOUNTER (OUTPATIENT)
Dept: WOMENS IMAGING | Age: 53
Discharge: HOME | End: 2018-09-20
Payer: MEDICARE

## 2018-09-20 DIAGNOSIS — R92.0 MICROCALCIFICATIONS OF THE BREAST: ICD-10-CM

## 2018-09-20 PROCEDURE — 77065 DX MAMMO INCL CAD UNI: CPT

## 2018-09-20 PROCEDURE — 2720000010 MAM STEREO BREAST BX W LOC DEVICE 1ST LESION LEFT

## 2018-09-20 PROCEDURE — 2709999900 MAM STEREO BREAST BX W LOC DEVICE 1ST LESION LEFT

## 2018-09-20 PROCEDURE — 88305 TISSUE EXAM BY PATHOLOGIST: CPT

## 2018-09-20 NOTE — TELEPHONE ENCOUNTER
Brooke called from [facility], they are waiting to do her biopsy and have not received the order.  I faxed it to her at [phone].    MM

## 2018-09-26 ENCOUNTER — TELEPHONE (OUTPATIENT)
Dept: INTERNAL MEDICINE | Facility: CLINIC | Age: 53
End: 2018-09-26

## 2018-09-26 ENCOUNTER — DOCUMENTATION (OUTPATIENT)
Dept: MAMMOGRAPHY | Facility: HOSPITAL | Age: 53
End: 2018-09-26

## 2018-09-26 DIAGNOSIS — D05.12 DUCTAL CARCINOMA IN SITU (DCIS) OF LEFT BREAST: Primary | ICD-10-CM

## 2018-09-26 NOTE — TELEPHONE ENCOUNTER
Keri Alexis called to let you know that patient had to go to [facility] breast biopsy, she was too heavy for our machine.  Her Left breast biospy came back Low Grade DCIS.    Patient also called for results.    ASTER

## 2018-09-26 NOTE — PROGRESS NOTES
Call placed to [facility].  I am unable to see the results on care everywhere.  Radha with [facility] faxed results to me.  Neha at [facility] faxed path to Rhonda DE LA O.  Results of patients left breast stereotactic biopsy came back DCIS.  The office will need to notify the patient.

## 2018-09-27 ENCOUNTER — OFFICE VISIT (OUTPATIENT)
Dept: INTERNAL MEDICINE | Facility: CLINIC | Age: 53
End: 2018-09-27

## 2018-09-27 ENCOUNTER — TELEPHONE (OUTPATIENT)
Dept: OTHER | Age: 53
End: 2018-09-27

## 2018-09-27 VITALS
BODY MASS INDEX: 48.82 KG/M2 | DIASTOLIC BLOOD PRESSURE: 84 MMHG | SYSTOLIC BLOOD PRESSURE: 142 MMHG | TEMPERATURE: 98 F | HEIGHT: 65 IN | HEART RATE: 79 BPM | RESPIRATION RATE: 16 BRPM | OXYGEN SATURATION: 95 % | WEIGHT: 293 LBS

## 2018-09-27 DIAGNOSIS — B07.9 VIRAL WARTS, UNSPECIFIED TYPE: ICD-10-CM

## 2018-09-27 DIAGNOSIS — D05.12 DUCTAL CARCINOMA IN SITU (DCIS) OF LEFT BREAST: Primary | ICD-10-CM

## 2018-09-27 DIAGNOSIS — Z12.11 COLON CANCER SCREENING: ICD-10-CM

## 2018-09-27 PROCEDURE — 99214 OFFICE O/P EST MOD 30 MIN: CPT | Performed by: NURSE PRACTITIONER

## 2018-09-27 PROCEDURE — 17110 DESTRUCTION B9 LES UP TO 14: CPT | Performed by: NURSE PRACTITIONER

## 2018-09-27 NOTE — PROGRESS NOTES
Procedure   Cryotherapy, Skin Lesion  Date/Time: 9/27/2018 11:30 AM  Performed by: BETHEL STEVENS  Authorized by: BETHEL STEVENS   Local anesthesia used: no    Anesthesia:  Local anesthesia used: no    Sedation:  Patient sedated: no  Patient tolerance: Patient tolerated the procedure well with no immediate complications  Comments: Cryotherapy performed to viral wart on the pad of the right middle finger x 10 seconds.

## 2018-09-27 NOTE — PROGRESS NOTES
"CC:  Encounter for test results    History:  Anthony Meaz is a 52 y.o. female who presents today for evaluation of the above problems.    Anthony is here today for results of her recent biopsy of the left breast.  Additionally, she notes that she never received her cologuard kit in the mail.  Also, she still has a wart on her right middle finger.  I performed cryotherapy on this lesion previously, and it is smaller, however, it is still getting irritated and \"catching\" on things when she is using her hands.  She notes she is under quite a bit of stress currently because her  is currently in the hospital.    ROS:  Review of Systems   Skin:        Wart on right middle finger   Psychiatric/Behavioral: The patient is nervous/anxious.        Allergies   Allergen Reactions   • Penicillins Rash     Past Medical History:   Diagnosis Date   • A-fib (CMS/HCC)    • Abnormal ECG    • Anxiety    • Arthritis    • Atrial fibrillation (CMS/HCC)    • Back pain    • CFS (chronic fatigue syndrome)    • Chronic pain disorder    • Fibromyalgia    • Hypertension    • Migraines    • Sleep apnea     uses a c-pap     Past Surgical History:   Procedure Laterality Date   • ANKLE SURGERY      x2 left   • CARDIAC CATHETERIZATION Bilateral 1/29/2018    Procedure: Coronary angiography;  Surgeon: Joel Medina MD;  Location:  [facility] CATH INVASIVE LOCATION;  Service:    • CARDIAC CATHETERIZATION N/A 1/29/2018    Procedure: Left Heart Cath;  Surgeon: Joel Medina MD;  Location:  [facility] CATH INVASIVE LOCATION;  Service:    • CYST REMOVAL      from tailbone   • TUBAL ABDOMINAL LIGATION     • WISDOM TOOTH EXTRACTION       Family History   Problem Relation Age of Onset   • Transient ischemic attack Mother    • Rheum arthritis Mother    • Fibromyalgia Mother    • Heart disease Father    • Asthma Father    • Hypertension Father    • Rheum arthritis Father    • Obesity Father    • Obesity Sister    • Obesity Maternal Grandmother    • Cancer Maternal " "Grandmother    • Obesity Paternal Grandfather    • Hypertension Paternal Grandfather    • Heart disease Paternal Grandfather    • Breast cancer Neg Hx       reports that she has never smoked. She has never used smokeless tobacco. She reports that she does not drink alcohol or use drugs.      Current Outpatient Prescriptions:   •  acetaminophen (TYLENOL) 325 MG tablet, Take 325 mg by mouth Every 6 (Six) Hours As Needed for Mild Pain  or Headache., Disp: , Rfl:   •  Acetaminophen-Caffeine (TENSION HEADACHE RELIEF) 500-65 MG tablet, Take 1 tablet by mouth Every 6 (Six) Hours As Needed (migraine)., Disp: , Rfl:   •  aspirin 81 MG tablet, Take 81 mg by mouth Daily., Disp: , Rfl:   •  cholecalciferol (VITAMIN D3) 1000 units tablet, Take 1,000 Units by mouth Daily., Disp: , Rfl:   •  Cyanocobalamin (VITAMIN B-12) 1000 MCG sublingual tablet, Place 1 tablet under the tongue Daily., Disp: , Rfl:   •  DULoxetine (CYMBALTA) 60 MG capsule, Take 60 mg by mouth Daily., Disp: , Rfl:   •  hydrochlorothiazide (HYDRODIURIL) 25 MG tablet, Take 1 tablet by mouth Daily., Disp: 30 tablet, Rfl: 5  •  metoprolol tartrate (LOPRESSOR) 25 MG tablet, Take 25 mg by mouth Every 12 (Twelve) Hours., Disp: , Rfl:   •  topiramate (TOPAMAX) 25 MG tablet, Take 1 tablet by mouth 2 (Two) Times a Day., Disp: 60 tablet, Rfl: 3  •  warfarin (COUMADIN) 5 MG tablet, Take 10mg every T/T/S/S and 7.5mg M/W/F w/additional tablet as directed per [facility] for sub-therapeutic INR., Disp: 60 tablet, Rfl: 11    OBJECTIVE:  /84 (BP Location: Left arm, Patient Position: Sitting)   Pulse 79   Temp 98 °F (36.7 °C) (Oral)   Resp 16   Ht 165.1 cm (65\")   Wt (!) 154 kg (339 lb 4.8 oz)   LMP  (LMP Unknown)   SpO2 95%   BMI 56.46 kg/m²    Physical Exam   Constitutional: She is oriented to person, place, and time. Vital signs are normal. She appears well-developed and well-nourished.   Cardiovascular: Normal rate.    Pulmonary/Chest: Effort normal. "   Neurological: She is alert and oriented to person, place, and time.   Psychiatric: She has a normal mood and affect. Her behavior is normal.   Vitals reviewed.      Assessment/Plan    Diagnoses and all orders for this visit:    Ductal carcinoma in situ (DCIS) of left breast  Discussed diagnosis with patient in full and provided written information regarding diagnosis.  Patient educated of follow up appointments with surgery and oncology.  She was allowed to ask any questions that she had and her questions were addressed fully.  She was encouraged to contact our office for any additional questions that she may have.     Viral warts, unspecified type  -     Cryotherapy, Skin Lesion  Cryotherapy performed.  See note.    Colon cancer screening  Will check with company regarding the status of this order.       An After Visit Summary was printed and given to the patient at discharge.  No Follow-up on file.         Rhonda Zapien, JAYLYN  9/27/2018

## 2018-10-04 ENCOUNTER — TRANSCRIBE ORDERS (OUTPATIENT)
Dept: ADMINISTRATIVE | Facility: HOSPITAL | Age: 53
End: 2018-10-04

## 2018-10-04 DIAGNOSIS — C50.912 MALIGNANT NEOPLASM OF LEFT FEMALE BREAST, UNSPECIFIED ESTROGEN RECEPTOR STATUS, UNSPECIFIED SITE OF BREAST (HCC): Primary | ICD-10-CM

## 2018-10-08 ENCOUNTER — APPOINTMENT (OUTPATIENT)
Dept: PREADMISSION TESTING | Facility: HOSPITAL | Age: 53
End: 2018-10-08

## 2018-10-08 ENCOUNTER — HOSPITAL ENCOUNTER (OUTPATIENT)
Dept: GENERAL RADIOLOGY | Facility: HOSPITAL | Age: 53
Discharge: HOME | End: 2018-10-08
Admitting: SPECIALIST

## 2018-10-08 VITALS
HEIGHT: 66 IN | WEIGHT: 293 LBS | DIASTOLIC BLOOD PRESSURE: 73 MMHG | SYSTOLIC BLOOD PRESSURE: 134 MMHG | OXYGEN SATURATION: 97 % | BODY MASS INDEX: 47.09 KG/M2 | RESPIRATION RATE: 18 BRPM | HEART RATE: 86 BPM

## 2018-10-08 LAB
ALBUMIN SERPL-MCNC: 4.1 G/DL (ref 3.5–5)
ALBUMIN/GLOB SERPL: 1.4 G/DL (ref 1.1–2.5)
ALP SERPL-CCNC: 95 U/L (ref 24–120)
ALT SERPL W P-5'-P-CCNC: 35 U/L (ref 0–54)
ANION GAP SERPL CALCULATED.3IONS-SCNC: 9 MMOL/L (ref 4–13)
AST SERPL-CCNC: 26 U/L (ref 7–45)
BASOPHILS # BLD AUTO: 0.04 10*3/MM3 (ref 0–0.2)
BASOPHILS NFR BLD AUTO: 0.5 % (ref 0–2)
BILIRUB SERPL-MCNC: 0.6 MG/DL (ref 0.1–1)
BUN BLD-MCNC: 14 MG/DL (ref 5–21)
BUN/CREAT SERPL: 16.5 (ref 7–25)
CALCIUM SPEC-SCNC: 9.1 MG/DL (ref 8.4–10.4)
CHLORIDE SERPL-SCNC: 103 MMOL/L (ref 98–110)
CO2 SERPL-SCNC: 31 MMOL/L (ref 24–31)
CREAT BLD-MCNC: 0.85 MG/DL (ref 0.5–1.4)
DEPRECATED RDW RBC AUTO: 47.9 FL (ref 40–54)
EOSINOPHIL # BLD AUTO: 0.11 10*3/MM3 (ref 0–0.7)
EOSINOPHIL NFR BLD AUTO: 1.5 % (ref 0–4)
ERYTHROCYTE [DISTWIDTH] IN BLOOD BY AUTOMATED COUNT: 14.2 % (ref 12–15)
GFR SERPL CREATININE-BSD FRML MDRD: 70 ML/MIN/1.73
GLOBULIN UR ELPH-MCNC: 2.9 GM/DL
GLUCOSE BLD-MCNC: 103 MG/DL (ref 70–100)
HCT VFR BLD AUTO: 44.5 % (ref 37–47)
HGB BLD-MCNC: 14.4 G/DL (ref 12–16)
IMM GRANULOCYTES # BLD: 0.02 10*3/MM3 (ref 0–0.03)
IMM GRANULOCYTES NFR BLD: 0.3 % (ref 0–5)
LYMPHOCYTES # BLD AUTO: 1.94 10*3/MM3 (ref 0.72–4.86)
LYMPHOCYTES NFR BLD AUTO: 26.4 % (ref 15–45)
MCH RBC QN AUTO: 29.8 PG (ref 28–32)
MCHC RBC AUTO-ENTMCNC: 32.4 G/DL (ref 33–36)
MCV RBC AUTO: 91.9 FL (ref 82–98)
MONOCYTES # BLD AUTO: 0.6 10*3/MM3 (ref 0.19–1.3)
MONOCYTES NFR BLD AUTO: 8.2 % (ref 4–12)
NEUTROPHILS # BLD AUTO: 4.64 10*3/MM3 (ref 1.87–8.4)
NEUTROPHILS NFR BLD AUTO: 63.1 % (ref 39–78)
NRBC BLD MANUAL-RTO: 0 /100 WBC (ref 0–0)
PLATELET # BLD AUTO: 232 10*3/MM3 (ref 130–400)
PMV BLD AUTO: 10.8 FL (ref 6–12)
POTASSIUM BLD-SCNC: 3.9 MMOL/L (ref 3.5–5.3)
PROT SERPL-MCNC: 7 G/DL (ref 6.3–8.7)
RBC # BLD AUTO: 4.84 10*6/MM3 (ref 4.2–5.4)
SODIUM BLD-SCNC: 143 MMOL/L (ref 135–145)
WBC NRBC COR # BLD: 7.35 10*3/MM3 (ref 4.8–10.8)

## 2018-10-08 PROCEDURE — 93010 ELECTROCARDIOGRAM REPORT: CPT | Performed by: INTERNAL MEDICINE

## 2018-10-08 PROCEDURE — 71046 X-RAY EXAM CHEST 2 VIEWS: CPT

## 2018-10-08 PROCEDURE — 36415 COLL VENOUS BLD VENIPUNCTURE: CPT

## 2018-10-08 PROCEDURE — 80053 COMPREHEN METABOLIC PANEL: CPT | Performed by: SPECIALIST

## 2018-10-08 PROCEDURE — 93005 ELECTROCARDIOGRAM TRACING: CPT

## 2018-10-08 PROCEDURE — 85025 COMPLETE CBC W/AUTO DIFF WBC: CPT | Performed by: SPECIALIST

## 2018-10-08 NOTE — DISCHARGE INSTRUCTIONS
DAY OF SURGERY INSTRUCTIONS        YOUR SURGEON: Rafael Porter    PROCEDURE: Left Breast Lumpectomy with Needle Localization Mammogram Guided and [place] Lymph Node Biopsy Radiologist to Inject and Scan     DATE OF SURGERY: October 15, 2018     ARRIVAL TIME: AS DIRECTED BY OFFICE    DAY OF SURGERY TAKE ONLY THESE MEDICATIONS UNLESS OTHERWISE INSTRUCTED BY YOUR PHYSICIAN: Metoprolol     Stop Coumadin and Aspirin 10/9/2018           MANAGING PAIN AFTER SURGERY    We know you are probably wondering what your pain will be like after surgery.  Following surgery it is unrealistic to expect you will not have pain.   Pain is how our bodies let us know that something is wrong or cautions us to be careful.  That said, our goal is to make your pain tolerable.    Methods we may use to treat your pain include (oral or IV medications, PCAs, epidurals, nerve blocks, etc.)   While some procedures require IV pain medications for a short time after surgery, transitioning to pain medications by mouth allows for better management of pain.   Your nurse will encourage you to take oral pain medications whenever possible.  IV medications work almost immediately, but only last a short while.  Taking medications by mouth allows for a more constant level of medication in your blood stream for a longer period of time.      Once your pain is out of control it is harder to get back under control.  It is important you are aware when your next dose of pain medication is due.  If you are admitted, your nurse may write the time of your next dose on the white board in your room to help you remember.      We are interested in your pain and encourage you to inform us about aggravating factors during your visit.   Many times a simple repositioning every few hours can make a big difference.    If your physician says it is okay, do not let your pain prevent you from getting out of bed. Be sure to call your nurse for assistance prior to getting up so  you do not fall.      Before surgery, please decide your tolerable pain goal.  These faces help describe the pain ratings we use on a 0-10 scale.   Be prepared to tell us your goal and whether or not you take pain or anxiety medications at home.          BEFORE YOU COME TO THE HOSPITAL  (Pre-op instructions)  • Do not eat, drink, smoke or chew gum after midnight the night before surgery.  This also includes no mints.  • Morning of surgery take only the medicines you have been instructed with a sip of water unless otherwise instructed  by your physician.  • Do not shave, wear makeup or dark nail polish.  • Remove all jewelry including rings.  • Leave anything you consider valuable at home.  • Leave your suitcase in the car until after your surgery.  • Bring the following with you if applicable:  o Picture ID and insurance, Medicare or Medicaid cards  o Co-pay/deductible required by insurance (cash, check, credit card)  o Copy of advance directive, living will or power-of- documents if not brought to PAT  o CPAP or BIPAP mask and tubing  o Relaxation aids (MP3 player, book, magazine)  • On the day of surgery check in at registration located at the main entrance of the hospital.       Outpatient Surgery Guidelines, Adult  Outpatient procedures are those for which the person having the procedure is allowed to go home the same day as the procedure. Various procedures are done on an outpatient basis. You should follow some general guidelines if you will be having an outpatient procedure.  LET YOUR HEALTH CARE PROVIDER KNOW ABOUT:  · Any allergies you have.  · All medicines you are taking, including vitamins, herbs, eye drops, creams, and over-the-counter medicines.  · Previous problems you or members of your family have had with the use of anesthetics.  · Any blood disorders you have.  · Previous surgeries you have had.  · Medical conditions you have.  RISKS AND COMPLICATIONS  Your health care provider will  discuss possible risks and complications with you before surgery. Common risks and complications include:    · Problems due to the use of anesthetics.  · Blood loss and replacement (does not apply to minor surgical procedures).  · Temporary increase in pain due to surgery.  · Uncorrected pain or problems that the surgery was meant to correct.  · Infection.  · New damage.  BEFORE THE PROCEDURE  · Ask your health care provider about changing or stopping your regular medicines. You may need to stop taking certain medicines in the days or weeks before the procedure.  · Stop smoking at least 2 weeks before surgery. This lowers your risk for complications during and after surgery. Ask your health care provider for help with this if needed.  · Eat your usual meals and a light supper the day before surgery. Continue fluid intake. Do not drink alcohol.  · Do not eat or drink after midnight the night before your surgery.   · Arrange for someone to take you home and to stay with you for 24 hours after the procedure. Medicine given for your procedure may affect your ability to drive or to care for yourself.  · Call your health care provider's office if you develop an illness or problem that may prevent you from safely having your procedure.  AFTER THE PROCEDURE  After surgery, you will be taken to a recovery area, where your progress will be monitored. If there are no complications, you will be allowed to go home when you are awake, stable, and taking fluids well. You may have numbness around the surgical site. Healing will take some time. You will have tenderness at the surgical site and may have some swelling and bruising. You may also have some nausea.  HOME CARE INSTRUCTIONS  · Do not drive for 24 hours, or as directed by your health care provider. Do not drive while taking prescription pain medicines.  · Do not drink alcohol for 24 hours.  · Do not make important decisions or sign legal documents for 24 hours.  · You may  resume a normal diet and activities as directed.  · Do not lift anything heavier than 10 pounds (4.5 kg) or play contact sports until your health care provider says it is okay.  · Change your bandages (dressings) as directed.  · Only take over-the-counter or prescription medicines as directed by your health care provider.  · Follow up with your health care provider as directed.  SEEK MEDICAL CARE IF:  · You have increased bleeding (more than a small spot) from the surgical site.  · You have redness, swelling, or increasing pain in the wound.  · You see pus coming from the wound.  · You have a fever.  · You notice a bad smell coming from the wound or dressing.  · You feel lightheaded or faint.  · You develop a rash.  · You have trouble breathing.  · You develop allergies.  MAKE SURE YOU:  · Understand these instructions.  · Will watch your condition.  · Will get help right away if you are not doing well or get worse.     This information is not intended to replace advice given to you by your health care provider. Make sure you discuss any questions you have with your health care provider.     Document Released: 09/12/2002 Document Revised: 05/03/2016 Document Reviewed: 05/22/2014  [employer] Interactive Patient Education ©2016 [employer]       Fall Prevention in Hospitals, Adult  As a hospital patient, your condition and the treatments you receive can increase your risk for falls. Some additional risk factors for falls in a hospital include:  · Being in an unfamiliar environment.  · Being on bed rest.  · Your surgery.  · Taking certain medicines.  · Your tubing requirements, such as intravenous (IV) therapy or catheters.  It is important that you learn how to decrease fall risks while at the hospital. Below are important tips that can help prevent falls.  SAFETY TIPS FOR PREVENTING FALLS  Talk about your risk of falling.  · Ask your health care provider why you are at risk for falling. Is it your medicine, illness,  tubing placement, or something else?  · Make a plan with your health care provider to keep you safe from falls.  · Ask your health care provider or pharmacist about side effects of your medicines. Some medicines can make you dizzy or affect your coordination.  Ask for help.  · Ask for help before getting out of bed. You may need to press your call button.  · Ask for assistance in getting safely to the toilet.  · Ask for a walker or cane to be put at your bedside. Ask that most of the side rails on your bed be placed up before your health care provider leaves the room.  · Ask family or friends to sit with you.  · Ask for things that are out of your reach, such as your glasses, hearing aids, telephone, bedside table, or call button.  Follow these tips to avoid falling:  · Stay lying or seated, rather than standing, while waiting for help.  · Wear rubber-soled slippers or shoes whenever you walk in the hospital.  · Avoid quick, sudden movements.  ¨ Change positions slowly.  ¨ Sit on the side of your bed before standing.  ¨ Stand up slowly and wait before you start to walk.  · Let your health care provider know if there is a spill on the floor.  · Pay careful attention to the medical equipment, electrical cords, and tubes around you.  · When you need help, use your call button by your bed or in the bathroom. Wait for one of your health care providers to help you.  · If you feel dizzy or unsure of your footing, return to bed and wait for assistance.  · Avoid being distracted by the TV, telephone, or another person in your room.  · Do not lean or support yourself on rolling objects, such as IV poles or bedside tables.     This information is not intended to replace advice given to you by your health care provider. Make sure you discuss any questions you have with your health care provider.     Document Released: 12/15/2001 Document Revised: 01/08/2016 Document Reviewed: 08/25/2013  [employer] Interactive Patient Education  ©2016 [employer]       Surgical Site Infections FAQs  What is a Surgical Site Infection (SSI)?  A surgical site infection is an infection that occurs after surgery in the part of the body where the surgery took place. Most patients who have surgery do not develop an infection. However, infections develop in about 1 to 3 out of every 100 patients who have surgery.  Some of the common symptoms of a surgical site infection are:  · Redness and pain around the area where you had surgery  · Drainage of cloudy fluid from your surgical wound  · Fever  Can SSIs be treated?  Yes. Most surgical site infections can be treated with antibiotics. The antibiotic given to you depends on the bacteria (germs) causing the infection. Sometimes patients with SSIs also need another surgery to treat the infection.  What are some of the things that hospitals are doing to prevent SSIs?  To prevent SSIs, doctors, nurses, and other healthcare providers:  · Clean their hands and arms up to their elbows with an antiseptic agent just before the surgery.  · Clean their hands with soap and water or an alcohol-based hand rub before and after caring for each patient.  · May remove some of your hair immediately before your surgery using electric clippers if the hair is in the same area where the procedure will occur. They should not shave you with a razor.  · Wear special hair covers, masks, gowns, and gloves during surgery to keep the surgery area clean.  · Give you antibiotics before your surgery starts. In most cases, you should get antibiotics within 60 minutes before the surgery starts and the antibiotics should be stopped within 24 hours after surgery.  · Clean the skin at the site of your surgery with a special soap that kills germs.  What can I do to help prevent SSIs?  Before your surgery:  · Tell your doctor about other medical problems you may have. Health problems such as allergies, diabetes, and obesity could affect your surgery and  your treatment.  · Quit smoking. Patients who smoke get more infections. Talk to your doctor about how you can quit before your surgery.  · Do not shave near where you will have surgery. Shaving with a razor can irritate your skin and make it easier to develop an infection.  At the time of your surgery:  · Speak up if someone tries to shave you with a razor before surgery. Ask why you need to be shaved and talk with your surgeon if you have any concerns.  · Ask if you will get antibiotics before surgery.  After your surgery:  · Make sure that your healthcare providers clean their hands before examining you, either with soap and water or an alcohol-based hand rub.  · If you do not see your providers clean their hands, please ask them to do so.  · Family and friends who visit you should not touch the surgical wound or dressings.  · Family and friends should clean their hands with soap and water or an alcohol-based hand rub before and after visiting you. If you do not see them clean their hands, ask them to clean their hands.  What do I need to do when I go home from the hospital?  · Before you go home, your doctor or nurse should explain everything you need to know about taking care of your wound. Make sure you understand how to care for your wound before you leave the hospital.  · Always clean your hands before and after caring for your wound.  · Before you go home, make sure you know who to contact if you have questions or problems after you get home.  · If you have any symptoms of an infection, such as redness and pain at the surgery site, drainage, or fever, call your doctor immediately.  If you have additional questions, please ask your doctor or nurse.  Developed and co-sponsored by The Society for Healthcare Epidemiology of [place] (SHEA); Infectious Diseases Society of [place] (IDSA); American Hospital Association; Association for Professionals in Infection Control and Epidemiology (APIC); Centers for Disease  Control and Prevention (CDC); and The Joint Commission.     This information is not intended to replace advice given to you by your health care provider. Make sure you discuss any questions you have with your health care provider.     Document Released: 12/23/2014 Document Revised: 01/08/2016 Document Reviewed: 03/02/2016  [employer] Interactive Patient Education ©2016 [employer].       [facility]  CHG 4% Patient Instruction Sheet    Preparing the Skin Before Surgery  Preparing or “prepping” skin before surgery can reduce the risk of infection at the surgical site. To make the process easier,[facility] has chosen 4% Chlorhexidine Gluconate (CHG) antiseptic solution.   The steps below outline the prepping process and should be carefully followed.                                                                                                                                                      Prep the skin at the following time(s):                                                      We recommend you shower the night before surgery, and again the morning of surgery with the 4% CHG antiseptic solution using half of the bottle and a cloth each time.  Dress in clean clothes/sleepwear after showering.  See instructions below for application.          Do not apply any lotions or moisturizers.       Do not shave the area to be prepped for at least 2 days prior to surgery.    Clipping the hair may be done immediately prior to your surgery at the hospital    if needed.    Directions:  Thoroughly rinse your body with water.  Apply 4% CHG to a cloth and wash skin gently, paying special attention to the operative site.  Rinse again thoroughly.  Once you have begun using this product do not apply anything else to your skin. If itching or redness persists, rinse affected areas and discontinue use.    When using this product:  • Keep out of eyes, ears, and mouth.  • If solution should contact these areas, rinse out promptly  and thoroughly with water.  • For external use only.  • Do not use in genital area, on your face or head.      PATIENT/FAMILY/RESPONSIBLE PARTY VERBALIZES UNDERSTANDING OF ABOVE EDUCATION.  COPY OF PAIN SCALE GIVEN AND REVIEWED WITH VERBALIZED UNDERSTANDING.

## 2018-10-15 ENCOUNTER — ANESTHESIA (OUTPATIENT)
Dept: PERIOP | Facility: HOSPITAL | Age: 53
End: 2018-10-15

## 2018-10-15 ENCOUNTER — HOSPITAL ENCOUNTER (OUTPATIENT)
Facility: HOSPITAL | Age: 53
Setting detail: HOSPITAL OUTPATIENT SURGERY
Discharge: HOME | End: 2018-10-15
Attending: SPECIALIST | Admitting: SPECIALIST

## 2018-10-15 ENCOUNTER — ANTICOAGULATION VISIT (OUTPATIENT)
Dept: CARDIOLOGY | Facility: CLINIC | Age: 53
End: 2018-10-15

## 2018-10-15 ENCOUNTER — HOSPITAL ENCOUNTER (OUTPATIENT)
Dept: NUCLEAR MEDICINE | Facility: HOSPITAL | Age: 53
Discharge: HOME | End: 2018-10-15
Attending: SPECIALIST

## 2018-10-15 ENCOUNTER — ANESTHESIA EVENT (OUTPATIENT)
Dept: PERIOP | Facility: HOSPITAL | Age: 53
End: 2018-10-15

## 2018-10-15 ENCOUNTER — HOSPITAL ENCOUNTER (OUTPATIENT)
Dept: MAMMOGRAPHY | Facility: HOSPITAL | Age: 53
Discharge: HOME | End: 2018-10-15
Attending: SPECIALIST

## 2018-10-15 VITALS
RESPIRATION RATE: 16 BRPM | SYSTOLIC BLOOD PRESSURE: 132 MMHG | DIASTOLIC BLOOD PRESSURE: 56 MMHG | OXYGEN SATURATION: 93 % | TEMPERATURE: 100 F | HEART RATE: 67 BPM

## 2018-10-15 DIAGNOSIS — R92.1 BREAST CALCIFICATIONS: ICD-10-CM

## 2018-10-15 DIAGNOSIS — C50.912 MALIGNANT NEOPLASM OF LEFT FEMALE BREAST, UNSPECIFIED ESTROGEN RECEPTOR STATUS, UNSPECIFIED SITE OF BREAST (HCC): ICD-10-CM

## 2018-10-15 LAB
GLUCOSE BLDC GLUCOMTR-MCNC: 98 MG/DL (ref 70–130)
INR PPP: 0.91 (ref 0.91–1.09)
PROTHROMBIN TIME: 12.5 SECONDS (ref 11.9–14.6)

## 2018-10-15 PROCEDURE — 25010000002 MIDAZOLAM PER 1 MG: Performed by: ANESTHESIOLOGY

## 2018-10-15 PROCEDURE — 0 TECHNETIUM FILTERED SULFUR COLLOID: Performed by: SPECIALIST

## 2018-10-15 PROCEDURE — 82962 GLUCOSE BLOOD TEST: CPT

## 2018-10-15 PROCEDURE — A9541 TC99M SULFUR COLLOID: HCPCS | Performed by: SPECIALIST

## 2018-10-15 PROCEDURE — 25010000002 FENTANYL CITRATE (PF) 100 MCG/2ML SOLUTION: Performed by: ANESTHESIOLOGY

## 2018-10-15 PROCEDURE — 25010000002 DEXAMETHASONE PER 1 MG: Performed by: ANESTHESIOLOGY

## 2018-10-15 PROCEDURE — 25010000002 VANCOMYCIN PER 500 MG: Performed by: SPECIALIST

## 2018-10-15 PROCEDURE — 88307 TISSUE EXAM BY PATHOLOGIST: CPT | Performed by: SPECIALIST

## 2018-10-15 PROCEDURE — 25010000002 FENTANYL CITRATE (PF) 250 MCG/5ML SOLUTION: Performed by: NURSE ANESTHETIST, CERTIFIED REGISTERED

## 2018-10-15 PROCEDURE — 78195 LYMPH SYSTEM IMAGING: CPT

## 2018-10-15 PROCEDURE — 85610 PROTHROMBIN TIME: CPT | Performed by: SPECIALIST

## 2018-10-15 PROCEDURE — 25010000002 PROPOFOL 10 MG/ML EMULSION: Performed by: NURSE ANESTHETIST, CERTIFIED REGISTERED

## 2018-10-15 PROCEDURE — 88342 IMHCHEM/IMCYTCHM 1ST ANTB: CPT | Performed by: SPECIALIST

## 2018-10-15 PROCEDURE — 76098 X-RAY EXAM SURGICAL SPECIMEN: CPT

## 2018-10-15 RX ORDER — METOCLOPRAMIDE HYDROCHLORIDE 5 MG/ML
10 INJECTION INTRAMUSCULAR; INTRAVENOUS ONCE AS NEEDED
Status: DISCONTINUED | OUTPATIENT
Start: 2018-10-15 | End: 2018-10-15 | Stop reason: HOSPADM

## 2018-10-15 RX ORDER — ONDANSETRON HCL 8 MG
8 TABLET ORAL EVERY 8 HOURS PRN
Qty: 10 TABLET | Refills: 1 | Status: SHIPPED | OUTPATIENT
Start: 2018-10-15 | End: 2018-12-05

## 2018-10-15 RX ORDER — DEXAMETHASONE SODIUM PHOSPHATE 4 MG/ML
4 INJECTION, SOLUTION INTRA-ARTICULAR; INTRALESIONAL; INTRAMUSCULAR; INTRAVENOUS; SOFT TISSUE ONCE AS NEEDED
Status: COMPLETED | OUTPATIENT
Start: 2018-10-15 | End: 2018-10-15

## 2018-10-15 RX ORDER — ROCURONIUM BROMIDE 10 MG/ML
INJECTION, SOLUTION INTRAVENOUS AS NEEDED
Status: DISCONTINUED | OUTPATIENT
Start: 2018-10-15 | End: 2018-10-15 | Stop reason: SURG

## 2018-10-15 RX ORDER — FENTANYL CITRATE 50 UG/ML
INJECTION, SOLUTION INTRAMUSCULAR; INTRAVENOUS AS NEEDED
Status: DISCONTINUED | OUTPATIENT
Start: 2018-10-15 | End: 2018-10-15 | Stop reason: SURG

## 2018-10-15 RX ORDER — MAGNESIUM HYDROXIDE 1200 MG/15ML
LIQUID ORAL AS NEEDED
Status: DISCONTINUED | OUTPATIENT
Start: 2018-10-15 | End: 2018-10-15 | Stop reason: HOSPADM

## 2018-10-15 RX ORDER — MIDAZOLAM HYDROCHLORIDE 1 MG/ML
2 INJECTION INTRAMUSCULAR; INTRAVENOUS
Status: DISCONTINUED | OUTPATIENT
Start: 2018-10-15 | End: 2018-10-15 | Stop reason: HOSPADM

## 2018-10-15 RX ORDER — MIDAZOLAM HYDROCHLORIDE 1 MG/ML
1 INJECTION INTRAMUSCULAR; INTRAVENOUS
Status: DISCONTINUED | OUTPATIENT
Start: 2018-10-15 | End: 2018-10-15 | Stop reason: HOSPADM

## 2018-10-15 RX ORDER — OXYCODONE AND ACETAMINOPHEN 7.5; 325 MG/1; MG/1
2 TABLET ORAL ONCE AS NEEDED
Status: COMPLETED | OUTPATIENT
Start: 2018-10-15 | End: 2018-10-15

## 2018-10-15 RX ORDER — IPRATROPIUM BROMIDE AND ALBUTEROL SULFATE 2.5; .5 MG/3ML; MG/3ML
3 SOLUTION RESPIRATORY (INHALATION) ONCE AS NEEDED
Status: DISCONTINUED | OUTPATIENT
Start: 2018-10-15 | End: 2018-10-15 | Stop reason: HOSPADM

## 2018-10-15 RX ORDER — METOCLOPRAMIDE HYDROCHLORIDE 5 MG/ML
5 INJECTION INTRAMUSCULAR; INTRAVENOUS
Status: DISCONTINUED | OUTPATIENT
Start: 2018-10-15 | End: 2018-10-15 | Stop reason: HOSPADM

## 2018-10-15 RX ORDER — LIDOCAINE AND PRILOCAINE 25; 25 MG/G; MG/G
CREAM TOPICAL ONCE
Status: COMPLETED | OUTPATIENT
Start: 2018-10-15 | End: 2018-10-15

## 2018-10-15 RX ORDER — BUPIVACAINE HYDROCHLORIDE AND EPINEPHRINE 5; 5 MG/ML; UG/ML
INJECTION, SOLUTION EPIDURAL; INTRACAUDAL; PERINEURAL AS NEEDED
Status: DISCONTINUED | OUTPATIENT
Start: 2018-10-15 | End: 2018-10-15 | Stop reason: HOSPADM

## 2018-10-15 RX ORDER — IBUPROFEN 600 MG/1
600 TABLET ORAL ONCE AS NEEDED
Status: DISCONTINUED | OUTPATIENT
Start: 2018-10-15 | End: 2018-10-15 | Stop reason: HOSPADM

## 2018-10-15 RX ORDER — ONDANSETRON 2 MG/ML
4 INJECTION INTRAMUSCULAR; INTRAVENOUS ONCE AS NEEDED
Status: DISCONTINUED | OUTPATIENT
Start: 2018-10-15 | End: 2018-10-15 | Stop reason: HOSPADM

## 2018-10-15 RX ORDER — METHYLCELLULOSE 2 G/19G
2 POWDER, FOR SOLUTION ORAL DAILY
Qty: 60 G | Refills: 6 | Status: SHIPPED | OUTPATIENT
Start: 2018-10-15 | End: 2018-11-14

## 2018-10-15 RX ORDER — PROPOFOL 10 MG/ML
VIAL (ML) INTRAVENOUS AS NEEDED
Status: DISCONTINUED | OUTPATIENT
Start: 2018-10-15 | End: 2018-10-15 | Stop reason: SURG

## 2018-10-15 RX ORDER — LABETALOL HYDROCHLORIDE 5 MG/ML
5 INJECTION, SOLUTION INTRAVENOUS
Status: DISCONTINUED | OUTPATIENT
Start: 2018-10-15 | End: 2018-10-15 | Stop reason: HOSPADM

## 2018-10-15 RX ORDER — LIDOCAINE AND PRILOCAINE 25; 25 MG/G; MG/G
CREAM TOPICAL ONCE
Status: DISCONTINUED | OUTPATIENT
Start: 2018-10-15 | End: 2018-10-15

## 2018-10-15 RX ORDER — SODIUM CHLORIDE 0.9 % (FLUSH) 0.9 %
3 SYRINGE (ML) INJECTION EVERY 12 HOURS SCHEDULED
Status: DISCONTINUED | OUTPATIENT
Start: 2018-10-15 | End: 2018-10-15 | Stop reason: HOSPADM

## 2018-10-15 RX ORDER — NALOXONE HCL 0.4 MG/ML
0.4 VIAL (ML) INJECTION AS NEEDED
Status: DISCONTINUED | OUTPATIENT
Start: 2018-10-15 | End: 2018-10-15 | Stop reason: HOSPADM

## 2018-10-15 RX ORDER — SODIUM CHLORIDE 0.9 % (FLUSH) 0.9 %
1-10 SYRINGE (ML) INJECTION AS NEEDED
Status: DISCONTINUED | OUTPATIENT
Start: 2018-10-15 | End: 2018-10-15 | Stop reason: HOSPADM

## 2018-10-15 RX ORDER — SODIUM CHLORIDE, SODIUM LACTATE, POTASSIUM CHLORIDE, CALCIUM CHLORIDE 600; 310; 30; 20 MG/100ML; MG/100ML; MG/100ML; MG/100ML
1000 INJECTION, SOLUTION INTRAVENOUS CONTINUOUS
Status: DISCONTINUED | OUTPATIENT
Start: 2018-10-15 | End: 2018-10-15 | Stop reason: HOSPADM

## 2018-10-15 RX ORDER — LIDOCAINE AND PRILOCAINE 25; 25 MG/G; MG/G
CREAM TOPICAL ONCE
Status: DISCONTINUED | OUTPATIENT
Start: 2018-10-15 | End: 2018-10-16 | Stop reason: HOSPADM

## 2018-10-15 RX ORDER — HYDROCODONE BITARTRATE AND ACETAMINOPHEN 7.5; 325 MG/1; MG/1
1 TABLET ORAL EVERY 4 HOURS PRN
Qty: 40 TABLET | Refills: 0 | Status: SHIPPED | OUTPATIENT
Start: 2018-10-15 | End: 2018-12-05

## 2018-10-15 RX ORDER — OXYCODONE AND ACETAMINOPHEN 10; 325 MG/1; MG/1
1 TABLET ORAL ONCE AS NEEDED
Status: DISCONTINUED | OUTPATIENT
Start: 2018-10-15 | End: 2018-10-15 | Stop reason: HOSPADM

## 2018-10-15 RX ORDER — ACETAMINOPHEN 500 MG
1000 TABLET ORAL ONCE
Status: COMPLETED | OUTPATIENT
Start: 2018-10-15 | End: 2018-10-15

## 2018-10-15 RX ORDER — SODIUM CHLORIDE, SODIUM LACTATE, POTASSIUM CHLORIDE, CALCIUM CHLORIDE 600; 310; 30; 20 MG/100ML; MG/100ML; MG/100ML; MG/100ML
100 INJECTION, SOLUTION INTRAVENOUS CONTINUOUS
Status: DISCONTINUED | OUTPATIENT
Start: 2018-10-15 | End: 2018-10-15 | Stop reason: HOSPADM

## 2018-10-15 RX ORDER — SULFAMETHOXAZOLE AND TRIMETHOPRIM 800; 160 MG/1; MG/1
1 TABLET ORAL 2 TIMES DAILY
Qty: 14 TABLET | Refills: 0 | Status: SHIPPED | OUTPATIENT
Start: 2018-10-15 | End: 2018-10-22

## 2018-10-15 RX ORDER — MEPERIDINE HYDROCHLORIDE 25 MG/ML
12.5 INJECTION INTRAMUSCULAR; INTRAVENOUS; SUBCUTANEOUS
Status: COMPLETED | OUTPATIENT
Start: 2018-10-15 | End: 2018-10-15

## 2018-10-15 RX ORDER — FENTANYL CITRATE 50 UG/ML
25 INJECTION, SOLUTION INTRAMUSCULAR; INTRAVENOUS AS NEEDED
Status: COMPLETED | OUTPATIENT
Start: 2018-10-15 | End: 2018-10-15

## 2018-10-15 RX ORDER — SODIUM CHLORIDE 0.9 % (FLUSH) 0.9 %
3 SYRINGE (ML) INJECTION AS NEEDED
Status: DISCONTINUED | OUTPATIENT
Start: 2018-10-15 | End: 2018-10-15 | Stop reason: HOSPADM

## 2018-10-15 RX ORDER — LIDOCAINE HYDROCHLORIDE 20 MG/ML
INJECTION, SOLUTION INFILTRATION; PERINEURAL AS NEEDED
Status: DISCONTINUED | OUTPATIENT
Start: 2018-10-15 | End: 2018-10-15 | Stop reason: SURG

## 2018-10-15 RX ADMIN — FENTANYL CITRATE 25 MCG: 50 INJECTION, SOLUTION INTRAMUSCULAR; INTRAVENOUS at 16:40

## 2018-10-15 RX ADMIN — MEPERIDINE HYDROCHLORIDE 12.5 MG: 25 INJECTION INTRAMUSCULAR; INTRAVENOUS; SUBCUTANEOUS at 16:42

## 2018-10-15 RX ADMIN — TECHNETIUM TC 99M SULFUR COLLOID 1 DOSE: KIT at 10:50

## 2018-10-15 RX ADMIN — ROCURONIUM BROMIDE 50 MG: 10 INJECTION INTRAVENOUS at 14:21

## 2018-10-15 RX ADMIN — DEXAMETHASONE SODIUM PHOSPHATE 4 MG: 4 INJECTION, SOLUTION INTRA-ARTICULAR; INTRALESIONAL; INTRAMUSCULAR; INTRAVENOUS; SOFT TISSUE at 13:53

## 2018-10-15 RX ADMIN — VANCOMYCIN HYDROCHLORIDE 1000 MG: 1 INJECTION, POWDER, LYOPHILIZED, FOR SOLUTION INTRAVENOUS at 13:56

## 2018-10-15 RX ADMIN — MIDAZOLAM HYDROCHLORIDE 2 MG: 1 INJECTION, SOLUTION INTRAMUSCULAR; INTRAVENOUS at 13:53

## 2018-10-15 RX ADMIN — FENTANYL CITRATE 25 MCG: 50 INJECTION, SOLUTION INTRAMUSCULAR; INTRAVENOUS at 16:56

## 2018-10-15 RX ADMIN — MEPERIDINE HYDROCHLORIDE 12.5 MG: 25 INJECTION INTRAMUSCULAR; INTRAVENOUS; SUBCUTANEOUS at 16:48

## 2018-10-15 RX ADMIN — FENTANYL CITRATE 25 MCG: 50 INJECTION, SOLUTION INTRAMUSCULAR; INTRAVENOUS at 16:50

## 2018-10-15 RX ADMIN — OXYCODONE HYDROCHLORIDE AND ACETAMINOPHEN 2 TABLET: 7.5; 325 TABLET ORAL at 16:51

## 2018-10-15 RX ADMIN — LIDOCAINE HYDROCHLORIDE 100 MG: 20 INJECTION, SOLUTION INFILTRATION; PERINEURAL at 14:21

## 2018-10-15 RX ADMIN — ACETAMINOPHEN 1000 MG: 500 TABLET, FILM COATED ORAL at 13:53

## 2018-10-15 RX ADMIN — FENTANYL CITRATE 250 MCG: 50 INJECTION INTRAMUSCULAR; INTRAVENOUS at 14:21

## 2018-10-15 RX ADMIN — PROPOFOL 200 MG: 10 INJECTION, EMULSION INTRAVENOUS at 14:21

## 2018-10-15 RX ADMIN — FENTANYL CITRATE 25 MCG: 50 INJECTION, SOLUTION INTRAMUSCULAR; INTRAVENOUS at 17:01

## 2018-10-15 RX ADMIN — SODIUM CHLORIDE, POTASSIUM CHLORIDE, SODIUM LACTATE AND CALCIUM CHLORIDE 1000 ML: 600; 310; 30; 20 INJECTION, SOLUTION INTRAVENOUS at 17:22

## 2018-10-15 RX ADMIN — SODIUM CHLORIDE, POTASSIUM CHLORIDE, SODIUM LACTATE AND CALCIUM CHLORIDE 1000 ML: 600; 310; 30; 20 INJECTION, SOLUTION INTRAVENOUS at 12:12

## 2018-10-15 RX ADMIN — LIDOCAINE AND PRILOCAINE: 25; 25 CREAM TOPICAL at 09:34

## 2018-10-15 RX ADMIN — FENTANYL CITRATE 250 MCG: 50 INJECTION INTRAMUSCULAR; INTRAVENOUS at 15:08

## 2018-10-15 NOTE — ANESTHESIA PREPROCEDURE EVALUATION
Anesthesia Evaluation     Patient summary reviewed   history of anesthetic complications (hypotension):  NPO Solid Status: > 8 hours  NPO Liquid Status: > 8 hours           Airway   Mallampati: III  TM distance: <3 FB  Neck ROM: full  Possible difficult intubation  Dental - normal exam     Pulmonary    (+) shortness of breath, sleep apnea on CPAP,   (-) asthma, not a smoker  Cardiovascular     ECG reviewed  PT is on anticoagulation therapy  Patient on routine beta blocker and Beta blocker given within 24 hours of surgery    (+) hypertension, dysrhythmias Atrial Fib,   (-) past MI, CAD, angina      Neuro/Psych  (+) psychiatric history Anxiety,     (-) seizures, TIA, CVA  GI/Hepatic/Renal/Endo    (+) morbid obesity,    (-) liver disease, no renal disease, diabetes    Musculoskeletal     (+) chronic pain, myalgias,   Abdominal    Substance History      OB/GYN          Other   (+) arthritis   history of cancer (ductal carcinoma of left breast)                    Anesthesia Plan    ASA 3     general   (Possible difficult intubation, would have CMAC or glidescope available)  intravenous induction   Anesthetic plan, all risks, benefits, and alternatives have been provided, discussed and informed consent has been obtained with: patient.

## 2018-10-15 NOTE — DISCHARGE INSTRUCTIONS

## 2018-10-15 NOTE — OP NOTE
BREAST LUMPECTOMY WITH SENTINEL NODE BIOPSY AND NEEDLE LOCALIZATION  Procedure Note    Anthony Meza  10/15/2018    Pre-op Diagnosis:   LEFT BREAST CANCER     Post-op Diagnosis:     Left breast cancer    Procedure/CPT® Codes:      Procedure(s):  LEFT BREAST LUMPECTOMY WITH NEEDLE LOCALIZATION - MAMMOGRAM GUIDED AND SENTINEL LYMPH  NODE BIOPSY -     Surgeon(s):  Rafael Porter MD    Anesthesia: General    Staff:   Circulator: Yogesh Fang RN; Peggy Salinas RN  Scrub Person: Manju Mustafa; Josey Waddell; Rosa Wynne  Assistant: Kelvin Bael    Estimated Blood Loss: 50 mL    Specimens:                ID Type Source Tests Collected by Time   A :  Tissue Breast, Left TISSUE PATHOLOGY EXAM Rafael Porter MD 10/15/2018 1452   B : left  Tissue [place] Lymph Node TISSUE PATHOLOGY EXAM Rafael Porter MD 10/15/2018 1455         Drains:   Closed/Suction Drain 1 Left Breast Bulb 15 Fr. (Active)       Indications: Mrs. Anthony Meza is  a 52-year-old female with a breast abnormality.  She also had a mammography completed with calcifications noted in the upper outer aspect of the left breast.  A screening mammogram was completed and this showed multiple calcifications in the breast previous on the current mammogram not noted on previous mammograms from 2010.  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  She is aware the procedure its risk and benefits and with full knowledge of this an apparent understanding she gives her informed consent for surgery.      Findings: Left needle localized lumpectomy completed from the left upper outer breast also sentinel lymph node biopsy was completed the breast was marked with 2 sutures medially one suture superior and a  ellipse of skin was completed with the wire extruding from this.  Modifier 22 on this secondary to a BMI of over 50.    Complications: There were no complications modifier 22 secondary to a BMI of over 50    Procedure: Patient is placed in the supine position surgical suite and after adequate prepping and draping of been completed with alcohol and Betadine and Ioban was placed around the periphery.  Using the needle localized position to advise us the position of the lesion with the made calculations to plan the resection needed to be accomplished and marked these on the breast.  With this accomplished a elliptical excision of the previous biopsy site as well as the needle localized wire was planned elliptical excision was completed the overlying skin and using the Harmonic scalpel dissection around the periphery was completed down to the pectoralis major muscle.  With circumferential dissection completed down to the pectoralis major excision the area was completed and the margins were marked.  2 sutures were placed medial one suture was superior and the guidewire and the skin was of course on the skin layer.  With this marked it was then sent for pathologic evaluation prior to this that was sent for specimen mammography assuring that the lesion in question was removed.  With the lesion in question removed we then used the navigator probe to further isolate and locate the sentinel node.  A grouping of nodes were noted and the sentinel lymph node was marked with a single suture and it was sent for pathologic evaluation.  Having completed this the wound was irrigated with a liter of sterile water and with clear fluid noted no bleeding appreciated a 15 mm round Narendra-Licona drain was placed in this cavity affixed to the skin using a 3-0 nylon suture and the deep dermis was reapproximated using simple inverted interrupted sutures of 3-0 Vicryl and a running subcuticular suture of 4-0 Vicryl.  Following this Mastisol,  Steri-Strips, 4 x 4 and Tegaderm applied the patient was then extubated and transported to the recovery room in good condition.    Rafael Porter MD     Date: 10/15/2018  Time: 4:06 PM    EMR Dragon/Transcription disclaimer: Much of this encounter note is an electronic transcription/translation of spoken language to printed text. The electronic translation of spoken language may permit erroneous, or at times, nonsensical words or phrases to be inadvertently transcribed; although I have reviewed the note for such errors, some may still exist.

## 2018-10-15 NOTE — ANESTHESIA PROCEDURE NOTES
Airway  Urgency: elective    Airway not difficult    General Information and Staff    Patient location during procedure: OR  CRNA: SEAN ARGUELLO    Indications and Patient Condition  Indications for airway management: airway protection    Preoxygenated: yes  MILS maintained throughout  Mask difficulty assessment: 1 - vent by mask    Final Airway Details  Final airway type: endotracheal airway      Successful airway: ETT  Cuffed: yes   Successful intubation technique: direct laryngoscopy  Facilitating devices/methods: intubating stylet  Endotracheal tube insertion site: oral  Blade: Marina  Blade size: 3  ETT size: 7.0 mm  Cormack-Lehane Classification: grade IIb - view of arytenoids or posterior of glottis only  Placement verified by: chest auscultation and capnometry   Cuff volume (mL): 7  Measured from: lips  ETT to lips (cm): 21  Number of attempts at approach: 1

## 2018-10-15 NOTE — ANESTHESIA POSTPROCEDURE EVALUATION
Patient: Anthony Meza    Procedure Summary     Date:  10/15/18 Room / Location:  [facility] OR  /  [facility] OR    Anesthesia Start:  1417 Anesthesia Stop:  1601    Procedure:  LEFT BREAST LUMPECTOMY WITH NEEDLE LOCALIZATION - MAMMOGRAM GUIDED AND SENTINEL LYMPH  NODE BIOPSY - RADIOLOGIST TO INJECT AND SCAN (Left Breast) Diagnosis:  (LEFT BREAST CANCER )    Surgeon:  Rafael Porter MD Provider:  Nickolas Moe CRNA    Anesthesia Type:  general ASA Status:  3          Anesthesia Type: general  Last vitals  BP   148/74 (10/15/18 0839)   Temp   98.2 °F (36.8 °C) (10/15/18 0839)   Pulse   80 (10/15/18 1339)   Resp   18 (10/15/18 1339)     SpO2   100 % (10/15/18 1339)     Post Anesthesia Care and Evaluation    Patient location during evaluation: PACU  Patient participation: complete - patient participated  Level of consciousness: awake and alert  Pain management: adequate  Airway patency: patent  Anesthetic complications: No anesthetic complications    Cardiovascular status: acceptable  Respiratory status: acceptable  Hydration status: acceptable    Comments: Blood pressure 148/74, pulse 80, temperature 98.2 °F (36.8 °C), temperature source Temporal Artery , resp. rate 18, SpO2 100 %, not currently breastfeeding.    Pt discharged from PACU based on nestor score >8

## 2018-10-21 LAB
CYTO UR: NORMAL
LAB AP CASE REPORT: NORMAL
LAB AP CLINICAL INFORMATION: NORMAL
PATH REPORT.FINAL DX SPEC: NORMAL
PATH REPORT.GROSS SPEC: NORMAL

## 2018-11-06 ENCOUNTER — OFFICE VISIT (OUTPATIENT)
Dept: NEUROSURGERY | Age: 53
End: 2018-11-06
Payer: MEDICARE

## 2018-11-06 VITALS
WEIGHT: 293 LBS | HEART RATE: 75 BPM | SYSTOLIC BLOOD PRESSURE: 139 MMHG | BODY MASS INDEX: 47.09 KG/M2 | HEIGHT: 66 IN | DIASTOLIC BLOOD PRESSURE: 77 MMHG

## 2018-11-06 DIAGNOSIS — M79.7 FIBROMYALGIA: Primary | ICD-10-CM

## 2018-11-06 DIAGNOSIS — G47.33 OSA (OBSTRUCTIVE SLEEP APNEA): ICD-10-CM

## 2018-11-06 DIAGNOSIS — I63.449 CEREBROVASCULAR ACCIDENT (CVA) DUE TO EMBOLISM OF CEREBELLAR ARTERY, UNSPECIFIED BLOOD VESSEL LATERALITY (HCC): ICD-10-CM

## 2018-11-06 DIAGNOSIS — I63.89 OTHER CEREBRAL INFARCTION (HCC): ICD-10-CM

## 2018-11-06 PROCEDURE — 3017F COLORECTAL CA SCREEN DOC REV: CPT | Performed by: NURSE PRACTITIONER

## 2018-11-06 PROCEDURE — G8484 FLU IMMUNIZE NO ADMIN: HCPCS | Performed by: NURSE PRACTITIONER

## 2018-11-06 PROCEDURE — G8598 ASA/ANTIPLAT THER USED: HCPCS | Performed by: NURSE PRACTITIONER

## 2018-11-06 PROCEDURE — G8427 DOCREV CUR MEDS BY ELIG CLIN: HCPCS | Performed by: NURSE PRACTITIONER

## 2018-11-06 PROCEDURE — G8417 CALC BMI ABV UP PARAM F/U: HCPCS | Performed by: NURSE PRACTITIONER

## 2018-11-06 PROCEDURE — 1036F TOBACCO NON-USER: CPT | Performed by: NURSE PRACTITIONER

## 2018-11-06 PROCEDURE — 99213 OFFICE O/P EST LOW 20 MIN: CPT | Performed by: NURSE PRACTITIONER

## 2018-11-06 RX ORDER — MULTIVIT-MIN/IRON/FOLIC ACID/K 18-600-40
4000 CAPSULE ORAL DAILY
COMMUNITY

## 2018-11-06 RX ORDER — CHOLECALCIFEROL (VITAMIN D3) 125 MCG
500 CAPSULE ORAL DAILY
COMMUNITY

## 2018-11-06 RX ORDER — DULOXETIN HYDROCHLORIDE 60 MG/1
60 CAPSULE, DELAYED RELEASE ORAL DAILY
Qty: 30 CAPSULE | Refills: 5 | Status: SHIPPED | OUTPATIENT
Start: 2018-11-06

## 2018-11-06 RX ORDER — TOPIRAMATE 50 MG/1
50 TABLET, FILM COATED ORAL 2 TIMES DAILY
Qty: 60 TABLET | Refills: 5 | Status: SHIPPED | OUTPATIENT
Start: 2018-11-06

## 2018-11-06 NOTE — PROGRESS NOTES
REVIEW OF SYSTEMS    Constitutional: []Fever []Sweat []Chills [] Recent Injury [x] Denies all unless marked  HEENT:[x]Headache  [] Head Injury/Hearing Loss  [] Sore Throat  [] Ear Ache/Dizziness  [x] Denies all unless marked  Spine:  [x] Neck pain  [x] Back pain  [] Sciaticia  [x] Denies all unless marked  Cardiovascular:[x]Heart Disease []Chest Pain [] Palpitations  [x] Denies all unless marked  Pulmonary: []Shortness of Breath []Cough   [x] Denies all unless marke  Gastrointestinal: []Nausea  []Vomiting  []Abdominal Pain  []Constipation  []Diarrhea  []Dark Bloody Stools  [x] Denies all unless marked  Psychiatric/Behavioral:[] Depression [] Anxiety [x] Denies all unless marked  Genitourinary:   [] Frequency  [x] Urgency  [x] Incontinence [] Pain with Urination  [x] Denies all unless marked  Extremities: [x]Pain  [x]Swelling  [x] Denies all unless marked  Musculoskeletal: [x] Muscle Pain  [x] Joint Pain  [] Arthritis [] Muscle Cramps [] Muscle Twitches  [x] Denies all unless marked  Sleep: [] Insomnia [x] Snoring [x] Restless Legs [x] Sleep Apnea  [] Daytime Sleepiness  [x] Denies all unless marked  Skin:[] Rash [] Skin Discoloration [x] Denies all unless marked   Neurological: [x]Visual Disturbance/Memory Loss [] Loss of Balance [] Slurred Speech/Weakness [] Seizures  [] Vertigo/Dizziness [x] Denies all unless marked

## 2018-11-06 NOTE — PROGRESS NOTES
[facility] Neurology Office Note      Patient:   Robert Taylor  MR#:    [id number]  Account Number:                         YOB: 1965  Date of Evaluation:  11/6/2018  Time of Note:                          1:19 PM  Primary/Referring Physician:  KOTA Brown, KOTA - CNP   Consulting Physician:  KOTA Aviles     FOLLOW UP VISIT    Chief Complaint   Patient presents with    Follow-up     6 month    Chronic Pain     Pt reports cymbalta is doing good. HISTORY OF PRESENT ILLNESS    Robert Taylor is a 47y.o. year old female here for fibromyalgia. Reports that Cymbalta has been helping with symptoms. Had a recent breast biopsy/lumpectomy and notes some soreness related to this. Notes widespread pain/myalgias that wax and wane in intensity. Also notes fatigue. Denies focal weakness or visual changes. Does note headaches, improved with Topamax. No change in characteristics. She does have DIMITRI and uses CPAP nightly. Last sleep study was approximately 11 years ago. She believes her machine as this old as well. Symptoms are relieved with CPAP however has been noting issues with CPAP recently. She can tell a difference if she does not use CPAP at night. Cardiology is following for atrial fibrillation, s/p cardioversion previously. On Coumadin. She is previously tried and failed Neurontin, Savella and Elavil. No other complaints today. Past Medical History:   Diagnosis Date    Atrial fibrillation ([address]     Fibromyalgia        Past Surgical History:   Procedure Laterality Date    ANKLE SURGERY Left     CARDIAC CATHETERIZATION      CYST REMOVAL      Tail bone    TUBAL LIGATION      WISDOM TOOTH EXTRACTION         No family history on file. Social History     Social History    Marital status:      Spouse name: N/A    Number of children: N/A    Years of education: N/A     Occupational History    Not on file.      Social History Main Topics    Smoking status: Never Smoker

## 2018-11-15 PROBLEM — Z98.890 S/P LUMPECTOMY, LEFT BREAST: Status: ACTIVE | Noted: 2018-11-15

## 2018-11-15 PROBLEM — Z78.9 NON-SMOKER: Status: ACTIVE | Noted: 2018-11-15

## 2018-11-15 PROBLEM — Z71.9 ENCOUNTER FOR CONSULTATION: Status: ACTIVE | Noted: 2018-11-15

## 2018-11-15 PROBLEM — Z98.890 S/P LYMPH NODE BIOPSY: Status: ACTIVE | Noted: 2018-11-15

## 2018-11-16 ENCOUNTER — CONSULT (OUTPATIENT)
Dept: RADIATION ONCOLOGY | Facility: HOSPITAL | Age: 53
End: 2018-11-16

## 2018-11-16 ENCOUNTER — HOSPITAL ENCOUNTER (OUTPATIENT)
Dept: RADIATION ONCOLOGY | Facility: HOSPITAL | Age: 53
Setting detail: RADIATION/ONCOLOGY SERIES
End: 2018-11-16

## 2018-11-16 ENCOUNTER — HOSPITAL ENCOUNTER (OUTPATIENT)
Dept: RADIATION ONCOLOGY | Facility: HOSPITAL | Age: 53
Discharge: HOME | End: 2018-11-16

## 2018-11-16 VITALS
BODY MASS INDEX: 47.09 KG/M2 | HEIGHT: 66 IN | WEIGHT: 293 LBS | DIASTOLIC BLOOD PRESSURE: 68 MMHG | SYSTOLIC BLOOD PRESSURE: 146 MMHG

## 2018-11-16 DIAGNOSIS — D05.12 DUCTAL CARCINOMA IN SITU (DCIS) OF LEFT BREAST: Primary | ICD-10-CM

## 2018-11-16 DIAGNOSIS — Z78.9 NON-SMOKER: ICD-10-CM

## 2018-11-16 DIAGNOSIS — Z71.9 ENCOUNTER FOR CONSULTATION: ICD-10-CM

## 2018-11-16 DIAGNOSIS — Z98.890 S/P LYMPH NODE BIOPSY: ICD-10-CM

## 2018-11-16 DIAGNOSIS — Z98.890 S/P LUMPECTOMY, LEFT BREAST: ICD-10-CM

## 2018-11-16 PROCEDURE — 77334 RADIATION TREATMENT AID(S): CPT | Performed by: RADIOLOGY

## 2018-11-16 PROCEDURE — 77290 THER RAD SIMULAJ FIELD CPLX: CPT | Performed by: RADIOLOGY

## 2018-11-19 ENCOUNTER — RESULTS ENCOUNTER (OUTPATIENT)
Dept: INTERNAL MEDICINE | Facility: CLINIC | Age: 53
End: 2018-11-19

## 2018-11-19 ENCOUNTER — OFFICE VISIT (OUTPATIENT)
Dept: INTERNAL MEDICINE | Facility: CLINIC | Age: 53
End: 2018-11-19

## 2018-11-19 VITALS
HEIGHT: 66 IN | WEIGHT: 293 LBS | DIASTOLIC BLOOD PRESSURE: 86 MMHG | BODY MASS INDEX: 47.09 KG/M2 | SYSTOLIC BLOOD PRESSURE: 128 MMHG | RESPIRATION RATE: 12 BRPM | OXYGEN SATURATION: 98 % | HEART RATE: 80 BPM | TEMPERATURE: 97.8 F

## 2018-11-19 DIAGNOSIS — I48.0 PAROXYSMAL ATRIAL FIBRILLATION (HCC): ICD-10-CM

## 2018-11-19 DIAGNOSIS — Z23 ENCOUNTER FOR IMMUNIZATION: ICD-10-CM

## 2018-11-19 DIAGNOSIS — N32.81 OVERACTIVE BLADDER: ICD-10-CM

## 2018-11-19 DIAGNOSIS — Z12.11 COLON CANCER SCREENING: ICD-10-CM

## 2018-11-19 DIAGNOSIS — D05.12 DUCTAL CARCINOMA IN SITU (DCIS) OF LEFT BREAST: ICD-10-CM

## 2018-11-19 DIAGNOSIS — R60.0 LOWER EXTREMITY EDEMA: ICD-10-CM

## 2018-11-19 DIAGNOSIS — I10 ESSENTIAL HYPERTENSION: Primary | ICD-10-CM

## 2018-11-19 PROBLEM — Z71.9 ENCOUNTER FOR CONSULTATION: Status: RESOLVED | Noted: 2018-11-15 | Resolved: 2018-11-19

## 2018-11-19 PROBLEM — R92.1 BREAST CALCIFICATION, LEFT: Status: RESOLVED | Noted: 2018-09-12 | Resolved: 2018-11-19

## 2018-11-19 PROCEDURE — G0439 PPPS, SUBSEQ VISIT: HCPCS | Performed by: NURSE PRACTITIONER

## 2018-11-19 PROCEDURE — 99214 OFFICE O/P EST MOD 30 MIN: CPT | Performed by: NURSE PRACTITIONER

## 2018-11-19 PROCEDURE — G0008 ADMIN INFLUENZA VIRUS VAC: HCPCS | Performed by: NURSE PRACTITIONER

## 2018-11-19 PROCEDURE — 90674 CCIIV4 VAC NO PRSV 0.5 ML IM: CPT | Performed by: NURSE PRACTITIONER

## 2018-11-19 RX ORDER — OXYBUTYNIN CHLORIDE 5 MG/1
5 TABLET, EXTENDED RELEASE ORAL DAILY
Qty: 30 TABLET | Refills: 5 | Status: SHIPPED | OUTPATIENT
Start: 2018-11-19 | End: 2019-08-26 | Stop reason: SDUPTHER

## 2018-11-19 NOTE — PROGRESS NOTES
QUICK REFERENCE INFORMATION:  The ABCs of the Annual Wellness Visit    Initial Medicare Wellness Visit    HEALTH RISK ASSESSMENT    1965    Recent Hospitalizations:  Recently treated at the following:  [facility].        Current Medical Providers:  Patient Care Team:  Rhonda Zapien APRN as PCP - General (Nurse Practitioner)  Tyrell Rosas MD as Referring Physician (Family Medicine)  Joel Medina MD as Cardiologist (Cardiology)        Smoking Status:  Social History     Tobacco Use   Smoking Status Never Smoker   Smokeless Tobacco Never Used       Alcohol Consumption:  Social History     Substance and Sexual Activity   Alcohol Use No       Depression Screen:   PHQ-2/PHQ-9 Depression Screening 8/17/2018   Little interest or pleasure in doing things 0   Feeling down, depressed, or hopeless 1   Total Score 1       Health Habits and Functional and Cognitive Screening:  Functional & Cognitive Status 11/19/2018   Do you have difficulty preparing food and eating? No   Do you have difficulty bathing yourself, getting dressed or grooming yourself? No   Do you have difficulty using the toilet? No   Do you have difficulty moving around from place to place? No   Do you have trouble with steps or getting out of a bed or a chair? Yes   In the past year have you fallen or experienced a near fall? Yes   Current Diet Well Balanced Diet   Dental Exam Not up to date   Eye Exam Up to date   Exercise (times per week) 3 times per week   Current Exercise Activities Include Walking   Do you need help using the phone?  No   Are you deaf or do you have serious difficulty hearing?  No   Do you need help with transportation? No   Do you need help shopping? No   Do you need help preparing meals?  No   Do you need help with housework?  No   Do you need help with laundry? No   Do you need help taking your medications? No   Do you need help managing money? No   Do you ever drive or ride in a car without wearing a seat  belt? No   Have you felt unusual stress, anger or loneliness in the last month? No   Who do you live with? Spouse   If you need help, do you have trouble finding someone available to you? No   Have you been bothered in the last four weeks by sexual problems? No   Do you have difficulty concentrating, remembering or making decisions? Yes           Does the patient have evidence of cognitive impairment? No    Asiprin use counseling: Taking ASA appropriately as indicated      Recent Lab Results:    Visual Acuity:  No exam data present    Age-appropriate Screening Schedule:  Refer to the list below for future screening recommendations based on patient's age, sex and/or medical conditions. Orders for these recommended tests are listed in the plan section. The patient has been provided with a written plan.    Health Maintenance   Topic Date Due   • TDAP/TD VACCINES (1 - Tdap) 11/30/1983   • ZOSTER VACCINE (1 of 2) 11/30/2014   • PAP SMEAR  04/03/2017   • COLONOSCOPY  04/03/2017   • INFLUENZA VACCINE  08/01/2018   • MAMMOGRAM  09/20/2020        Subjective   History of Present Illness    Anthony Meza is a 52 y.o. female who presents for an Annual Wellness Visit.    The following portions of the patient's history were reviewed and updated as appropriate: allergies, current medications, past family history, past medical history, past social history, past surgical history and problem list.    Outpatient Medications Prior to Visit   Medication Sig Dispense Refill   • Acetaminophen-Caffeine (TENSION HEADACHE RELIEF) 500-65 MG tablet Take 1 tablet by mouth Every 6 (Six) Hours As Needed (migraine).     • aspirin 81 MG tablet Take 81 mg by mouth Daily. Hold 10/8/2018     • cholecalciferol (VITAMIN D3) 1000 units tablet Take 1,000 Units by mouth Daily. 10/8/2018     • Cyanocobalamin (VITAMIN B-12) 1000 MCG sublingual tablet Place 1 tablet under the tongue Daily. Hold 10/8/2018     • DULoxetine (CYMBALTA) 60 MG capsule Take 60 mg by  mouth Daily.     • hydrochlorothiazide (HYDRODIURIL) 25 MG tablet Take 1 tablet by mouth Daily. 30 tablet 5   • metoprolol tartrate (LOPRESSOR) 25 MG tablet Take 25 mg by mouth Every 12 (Twelve) Hours.     • warfarin (COUMADIN) 5 MG tablet Take 10mg every T/T/S/S and 7.5mg M/W/F w/additional tablet as directed per [facility] for sub-therapeutic INR. (Patient taking differently: Take 10mg every T/T/S/S and 7.5mg M/W/F w/additional tablet as directed per Coumadin Clinic for sub-therapeutic INR.  Hold 10/8/2018) 60 tablet 11   • acetaminophen (TYLENOL) 325 MG tablet Take 650 mg by mouth Every 6 (Six) Hours As Needed for Mild Pain  or Headache.     • HYDROcodone-acetaminophen (NORCO) 7.5-325 MG per tablet Take 1 tablet by mouth Every 4 (Four) Hours As Needed for Moderate Pain  for up to 40 doses. 40 tablet 0   • topiramate (TOPAMAX) 25 MG tablet Take 1 tablet by mouth 2 (Two) Times a Day. 60 tablet 3   • ZOFRAN 8 MG tablet Take 1 tablet by mouth Every 8 (Eight) Hours As Needed for Nausea or Vomiting for up to 10 doses. 10 tablet 1     No facility-administered medications prior to visit.        Patient Active Problem List   Diagnosis   • Abnormal ECG   • Fibromyalgia   • CFS (chronic fatigue syndrome)   • Essential hypertension   • Chronic midline low back pain without sciatica   • HARLAN on CPAP   • Atrial fibrillation (CMS/HCC)   • Family history of early CAD father in 50 s   • Body mass index (BMI) of 50-59.9 in adult (CMS/HCC)   • Lower extremity edema   • Vitamin D deficiency   • Palpitations   • Dyspnea on exertion   • Ductal carcinoma in situ (DCIS) of left breast   • S/P lymph node biopsy   • S/P lumpectomy, left breast   • Non-smoker       Advance Care Planning:  has NO advance directive - information provided to the patient today    Identification of Risk Factors:  Risk factors include: weight  and cardiovascular risk.    Review of Systems    Compared to one year ago, the patient feels her physical health is  "the same.  Compared to one year ago, the patient feels her mental health is the same.    Objective     Physical Exam    Vitals:    11/19/18 1314   BP: 128/86   BP Location: Right arm   Patient Position: Sitting   Cuff Size: Large Adult   Pulse: 80   Resp: 12   Temp: 97.8 °F (36.6 °C)   TempSrc: Oral   SpO2: 98%   Weight: (!) 152 kg (334 lb 1 oz)   Height: 167.6 cm (66\")       Patient's Body mass index is 53.92 kg/m². BMI is above normal parameters. Recommendations include: educational material.      Assessment/Plan   Patient Self-Management and Personalized Health Advice  The patient has been provided with information about: diet, exercise, weight management and designing advance directives and preventive services including:   · Advance directive, Colorectal cancer screening, cologuard test ordered, Influenza vaccine.    Visit Diagnoses:    ICD-10-CM ICD-9-CM   1. Essential hypertension I10 401.9   2. Paroxysmal atrial fibrillation (CMS/HCC) I48.0 427.31   3. Ductal carcinoma in situ (DCIS) of left breast D05.12 233.0   4. Lower extremity edema R60.0 782.3   5. Encounter for immunization Z23 V03.89   6. Colon cancer screening Z12.11 V76.51   7. Overactive bladder N32.81 596.51       Orders Placed This Encounter   Procedures   • Flucelvax Quad=>4Years ([id number])   • Cologuard - Stool, Per Rectum     Standing Status:   Future     Standing Expiration Date:   11/19/2019       Outpatient Encounter Medications as of 11/19/2018   Medication Sig Dispense Refill   • Acetaminophen-Caffeine (TENSION HEADACHE RELIEF) 500-65 MG tablet Take 1 tablet by mouth Every 6 (Six) Hours As Needed (migraine).     • aspirin 81 MG tablet Take 81 mg by mouth Daily. Hold 10/8/2018     • cholecalciferol (VITAMIN D3) 1000 units tablet Take 1,000 Units by mouth Daily. 10/8/2018     • Cyanocobalamin (VITAMIN B-12) 1000 MCG sublingual tablet Place 1 tablet under the tongue Daily. Hold 10/8/2018     • DULoxetine (CYMBALTA) 60 MG capsule Take 60 mg " by mouth Daily.     • hydrochlorothiazide (HYDRODIURIL) 25 MG tablet Take 1 tablet by mouth Daily. 30 tablet 5   • metoprolol tartrate (LOPRESSOR) 25 MG tablet Take 25 mg by mouth Every 12 (Twelve) Hours.     • warfarin (COUMADIN) 5 MG tablet Take 10mg every T/T/S/S and 7.5mg M/W/F w/additional tablet as directed per [facility]mad[facility] Clinic for sub-therapeutic INR. (Patient taking differently: Take 10mg every T/T/S/S and 7.5mg M/W/F w/additional tablet as directed per [facility]madin Clinic for sub-therapeutic INR.  Hold 10/8/2018) 60 tablet 11   • acetaminophen (TYLENOL) 325 MG tablet Take 650 mg by mouth Every 6 (Six) Hours As Needed for Mild Pain  or Headache.     • HYDROcodone-acetaminophen (NORCO) 7.5-325 MG per tablet Take 1 tablet by mouth Every 4 (Four) Hours As Needed for Moderate Pain  for up to 40 doses. 40 tablet 0   • oxybutynin XL (DITROPAN-XL) 5 MG 24 hr tablet Take 1 tablet by mouth Daily. 30 tablet 5   • topiramate (TOPAMAX) 25 MG tablet Take 1 tablet by mouth 2 (Two) Times a Day. 60 tablet 3   • ZOFRAN 8 MG tablet Take 1 tablet by mouth Every 8 (Eight) Hours As Needed for Nausea or Vomiting for up to 10 doses. 10 tablet 1     No facility-administered encounter medications on file as of 11/19/2018.        Reviewed use of high risk medication in the elderly: yes  Reviewed for potential of harmful drug interactions in the elderly: yes    Follow Up:  Return in about 3 months (around 2/19/2019).     An After Visit Summary and PPPS with all of these plans were given to the patient.

## 2018-11-19 NOTE — PROGRESS NOTES
CC: follow up HTN, a fib, ductal carcinoma in situ    History:  Anthony Meza is a 52 y.o. female who presents today for evaluation of the above problems.    Did have lumpectomy.  Radiation has been recommended.  See oncology next week.   Interested in getting PAP up to date.    Consents to flu shot today.   Dr. Richardson (neurology) is ordering a sleep study so her CPAP can be titrated.   Experiences urinary urgency and difficulty making it to bathroom in time.   She states that she did not ever receive her Cologuard kit in the mail.   She does continue to take coumadin, though she admits she has not followed up appropriately with coumadin clinic for monitoring.  She notes that while she does have some lower extremity edema at times, this has improved significantly.     ROS:  Review of Systems   Respiratory: Negative for shortness of breath.    Gastrointestinal: Negative for constipation, diarrhea, nausea and vomiting.   Genitourinary: Positive for urgency. Negative for vaginal bleeding.   Neurological: Negative for dizziness and light-headedness.   Psychiatric/Behavioral: Negative for dysphoric mood. The patient is not nervous/anxious.        Allergies   Allergen Reactions   • Penicillins Rash     Past Medical History:   Diagnosis Date   • A-fib (CMS/HCC)    • Abnormal ECG    • Anxiety    • Arthritis    • Atrial fibrillation (CMS/HCC)    • Back pain    • CFS (chronic fatigue syndrome)    • Chest pain    • Chronic pain disorder    • Depression    • Dizziness    • Ductal carcinoma in situ (DCIS) of left breast    • Fibromyalgia    • Hypertension    • IBS (irritable bowel syndrome)    • Incontinence    • Migraines    • Mononucleosis    • Sleep apnea     uses a c-pap   • Wears glasses      Past Surgical History:   Procedure Laterality Date   • ANKLE SURGERY      x2 left   • CYST REMOVAL      from tailbone   • TUBAL ABDOMINAL LIGATION     • WISDOM TOOTH EXTRACTION       Family History   Problem Relation Age of Onset   •  Transient ischemic attack Mother    • Rheum arthritis Mother    • Fibromyalgia Mother    • Heart disease Father    • Asthma Father    • Hypertension Father    • Rheum arthritis Father    • Obesity Father    • Obesity Sister    • Obesity Maternal Grandmother    • Ovarian cancer Maternal Grandmother    • Obesity Paternal Grandfather    • Hypertension Paternal Grandfather    • Heart disease Paternal Grandfather    • Liver cancer Maternal Aunt    • Breast cancer Neg Hx       reports that  has never smoked. she has never used smokeless tobacco. She reports that she does not drink alcohol or use drugs.      Current Outpatient Medications:   •  Acetaminophen-Caffeine (TENSION HEADACHE RELIEF) 500-65 MG tablet, Take 1 tablet by mouth Every 6 (Six) Hours As Needed (migraine)., Disp: , Rfl:   •  aspirin 81 MG tablet, Take 81 mg by mouth Daily. Hold 10/8/2018, Disp: , Rfl:   •  cholecalciferol (VITAMIN D3) 1000 units tablet, Take 1,000 Units by mouth Daily. 10/8/2018, Disp: , Rfl:   •  Cyanocobalamin (VITAMIN B-12) 1000 MCG sublingual tablet, Place 1 tablet under the tongue Daily. Hold 10/8/2018, Disp: , Rfl:   •  DULoxetine (CYMBALTA) 60 MG capsule, Take 60 mg by mouth Daily., Disp: , Rfl:   •  hydrochlorothiazide (HYDRODIURIL) 25 MG tablet, Take 1 tablet by mouth Daily., Disp: 30 tablet, Rfl: 5  •  metoprolol tartrate (LOPRESSOR) 25 MG tablet, Take 25 mg by mouth Every 12 (Twelve) Hours., Disp: , Rfl:   •  warfarin (COUMADIN) 5 MG tablet, Take 10mg every T/T/S/S and 7.5mg M/W/F w/additional tablet as directed per [facility]madin Clinic for sub-therapeutic INR. (Patient taking differently: Take 10mg every T/T/S/S and 7.5mg M/W/F w/additional tablet as directed per [facility]madin Clinic for sub-therapeutic INR. Hold 10/8/2018), Disp: 60 tablet, Rfl: 11  •  acetaminophen (TYLENOL) 325 MG tablet, Take 650 mg by mouth Every 6 (Six) Hours As Needed for Mild Pain  or Headache., Disp: , Rfl:   •  HYDROcodone-acetaminophen (NORCO) 7.5-325 MG per  "tablet, Take 1 tablet by mouth Every 4 (Four) Hours As Needed for Moderate Pain  for up to 40 doses., Disp: 40 tablet, Rfl: 0  •  oxybutynin XL (DITROPAN-XL) 5 MG 24 hr tablet, Take 1 tablet by mouth Daily., Disp: 30 tablet, Rfl: 5  •  topiramate (TOPAMAX) 25 MG tablet, Take 1 tablet by mouth 2 (Two) Times a Day., Disp: 60 tablet, Rfl: 3  •  ZOFRAN 8 MG tablet, Take 1 tablet by mouth Every 8 (Eight) Hours As Needed for Nausea or Vomiting for up to 10 doses., Disp: 10 tablet, Rfl: 1    OBJECTIVE:  /86 (BP Location: Right arm, Patient Position: Sitting, Cuff Size: Large Adult)   Pulse 80   Temp 97.8 °F (36.6 °C) (Oral)   Resp 12   Ht 167.6 cm (66\")   Wt (!) 152 kg (334 lb 1 oz)   LMP  (LMP Unknown)   SpO2 98%   BMI 53.92 kg/m²    Physical Exam   Constitutional: She is oriented to person, place, and time. Vital signs are normal. She appears well-developed and well-nourished.   Cardiovascular: Normal rate and regular rhythm.   Pulmonary/Chest: Effort normal and breath sounds normal.   Neurological: She is alert and oriented to person, place, and time.   Psychiatric: She has a normal mood and affect. Her behavior is normal.   Vitals reviewed.      Assessment/Plan    Anthony was seen today for atrial fibrillation.    Diagnoses and all orders for this visit:    Essential hypertension  Well controlled. No changes at this time.     Paroxysmal atrial fibrillation (CMS/HCC)  She is encouraged to follow up with coumadin clinic for monitoring and reiterated the importance of this.     Ductal carcinoma in situ (DCIS) of left breast  Continue to follow with oncology.    Lower extremity edema    Encounter for immunization  -     Flucelvax Quad=>4Years (0402-1884)    Colon cancer screening  -     Cologuard - Stool, Per Rectum; Future    Overactive bladder  -     oxybutynin XL (DITROPAN-XL) 5 MG 24 hr tablet; Take 1 tablet by mouth Daily.  Advised to contact our office after two weeks for consideration of dose increase " if treatment ineffective.     An After Visit Summary was printed and given to the patient at discharge.  Return in about 3 months (around 2/19/2019).         Rhonda Zapien, JAYLYN  11/19/2018

## 2018-11-19 NOTE — PATIENT INSTRUCTIONS

## 2018-11-26 PROCEDURE — 77334 RADIATION TREATMENT AID(S): CPT | Performed by: RADIOLOGY

## 2018-11-26 PROCEDURE — 77295 3-D RADIOTHERAPY PLAN: CPT | Performed by: RADIOLOGY

## 2018-11-26 PROCEDURE — 77300 RADIATION THERAPY DOSE PLAN: CPT | Performed by: RADIOLOGY

## 2018-12-03 ENCOUNTER — HOSPITAL ENCOUNTER (OUTPATIENT)
Dept: NON INVASIVE DIAGNOSTICS | Age: 53
Discharge: HOME | End: 2018-12-03
Payer: MEDICARE

## 2018-12-03 ENCOUNTER — HOSPITAL ENCOUNTER (OUTPATIENT)
Dept: RADIATION ONCOLOGY | Facility: HOSPITAL | Age: 53
Setting detail: RADIATION/ONCOLOGY SERIES
End: 2018-12-03

## 2018-12-03 DIAGNOSIS — I63.449 CEREBROVASCULAR ACCIDENT (CVA) DUE TO EMBOLISM OF CEREBELLAR ARTERY, UNSPECIFIED BLOOD VESSEL LATERALITY (HCC): ICD-10-CM

## 2018-12-03 DIAGNOSIS — I63.89 OTHER CEREBRAL INFARCTION (HCC): ICD-10-CM

## 2018-12-03 PROCEDURE — 93880 EXTRACRANIAL BILAT STUDY: CPT

## 2018-12-04 ENCOUNTER — HOSPITAL ENCOUNTER (OUTPATIENT)
Dept: RADIATION ONCOLOGY | Facility: HOSPITAL | Age: 53
Discharge: HOME | End: 2018-12-04

## 2018-12-04 PROCEDURE — 77412 RADIATION TX DELIVERY LVL 3: CPT | Performed by: RADIOLOGY

## 2018-12-04 PROCEDURE — 77280 THER RAD SIMULAJ FIELD SMPL: CPT | Performed by: RADIOLOGY

## 2018-12-05 ENCOUNTER — OFFICE VISIT (OUTPATIENT)
Dept: INTERNAL MEDICINE | Facility: CLINIC | Age: 53
End: 2018-12-05

## 2018-12-05 ENCOUNTER — HOSPITAL ENCOUNTER (OUTPATIENT)
Dept: RADIATION ONCOLOGY | Facility: HOSPITAL | Age: 53
Discharge: HOME | End: 2018-12-05

## 2018-12-05 VITALS
SYSTOLIC BLOOD PRESSURE: 114 MMHG | RESPIRATION RATE: 12 BRPM | DIASTOLIC BLOOD PRESSURE: 76 MMHG | OXYGEN SATURATION: 97 % | HEIGHT: 66 IN | BODY MASS INDEX: 47.09 KG/M2 | WEIGHT: 293 LBS | HEART RATE: 72 BPM | TEMPERATURE: 97.8 F

## 2018-12-05 DIAGNOSIS — Z12.4 CERVICAL CANCER SCREENING: Primary | ICD-10-CM

## 2018-12-05 DIAGNOSIS — Z01.419 WELL WOMAN EXAM WITH ROUTINE GYNECOLOGICAL EXAM: ICD-10-CM

## 2018-12-05 PROCEDURE — 77412 RADIATION TX DELIVERY LVL 3: CPT | Performed by: RADIOLOGY

## 2018-12-05 PROCEDURE — 77417 THER RADIOLOGY PORT IMAGE(S): CPT | Performed by: RADIOLOGY

## 2018-12-05 PROCEDURE — 99396 PREV VISIT EST AGE 40-64: CPT | Performed by: NURSE PRACTITIONER

## 2018-12-05 PROCEDURE — G0123 SCREEN CERV/VAG THIN LAYER: HCPCS | Performed by: NURSE PRACTITIONER

## 2018-12-05 PROCEDURE — 87624 HPV HI-RISK TYP POOLED RSLT: CPT | Performed by: NURSE PRACTITIONER

## 2018-12-05 NOTE — PROGRESS NOTES
Subjective   Chief Complaint   Patient presents with   • Annual Exam     Patient also wants a breast exam for reassurance today   • Gynecologic Exam       History of Present Illness   Anthony Meza is a 53 y.o. year old  presenting to be seen for her annual well woman exam.      Concerns: None    Menstrual History:  No LMP recorded (lmp unknown). Patient is postmenopausal.  Menses: no (postmenopausal)      Gynecologic History:  She is sexually active. In the past 12 months there has not been new sexual partners.  In the past 12 months there has been 1 sexual partners. Condoms are not typically used.  She would not like to be screened for STD's at today's exam.   She is using none for contraception.    Hormone use: no.  She complains of urinary leakage/incontinence and current breast cancer.  She denies vaginal discharge, vaginal itching, vaginal odor, pain with intercourse, vaginal dryness, pelvic pain, hot flashes, nipple discharge and excessive bloating.  Hx of abnormal pap: yes.but was fine on retest, no procedure needed.      OB History:  OB History      Para Term  AB Living    2 2 2          SAB TAB Ectopic Molar Multiple Live Births                         Preventative:   Last colonoscopy or FIT test: Cologuard ordered.   Last PAP: 5-6 years ago, by Dr. Gallagher in [place]  Last Mammo: 10/15/18    Family History:  There is family history of Breast cancer and ovarian cancer. 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  No other family history.)    The following portions of the patient's history were reviewed and updated as appropriate:    Past Medical History:  Past Medical History:   Diagnosis Date   • A-fib (CMS/HCC)    • Abnormal ECG    • Anxiety    • Arthritis    • Atrial fibrillation (CMS/HCC)    • Back pain    • CFS (chronic fatigue syndrome)    • Chest pain    • Chronic pain disorder    • Depression    • Dizziness    • Ductal carcinoma in situ  (DCIS) of left breast    • Fibromyalgia    • Hypertension    • IBS (irritable bowel syndrome)    • Incontinence    • Migraines    • Mononucleosis    • Sleep apnea     uses a c-pap   • Wears glasses        Past Surgical History:  Past Surgical History:   Procedure Laterality Date   • ANKLE SURGERY      x2 left   • CYST REMOVAL      from tailbone   • TUBAL ABDOMINAL LIGATION     • WISDOM TOOTH EXTRACTION         Family History:  Family History   Problem Relation Age of Onset   • Transient ischemic attack Mother    • Rheum arthritis Mother    • Fibromyalgia Mother    • Heart disease Father    • Asthma Father    • Hypertension Father    • Rheum arthritis Father    • Obesity Father    • Obesity Sister    • Obesity Maternal Grandmother    • Ovarian cancer Maternal Grandmother    • Obesity Paternal Grandfather    • Hypertension Paternal Grandfather    • Heart disease Paternal Grandfather    • Liver cancer Maternal Aunt    • Breast cancer Neg Hx        Social History:  Social History     Socioeconomic History   • Marital status:      Spouse name: Not on file   • Number of children: Not on file   • Years of education: Not on file   • Highest education level: Not on file   Social Needs   • Financial resource strain: Not on file   • Food insecurity - worry: Not on file   • Food insecurity - inability: Not on file   • Transportation needs - medical: Not on file   • Transportation needs - non-medical: Not on file   Occupational History   • Not on file   Tobacco Use   • Smoking status: Never Smoker   • Smokeless tobacco: Never Used   Substance and Sexual Activity   • Alcohol use: No   • Drug use: No   • Sexual activity: Yes     Birth control/protection: Surgical     Comment: LMP: Menopausal   Other Topics Concern   • Not on file   Social History Narrative   • Not on file       Medications:  Outpatient Medications Prior to Visit   Medication Sig Dispense Refill   • aspirin 81 MG tablet Take 81 mg by mouth Daily. Hold  10/8/2018     • cholecalciferol (VITAMIN D3) 1000 units tablet Take 1,000 Units by mouth Daily. 10/8/2018     • Cyanocobalamin (VITAMIN B-12) 1000 MCG sublingual tablet Place 1 tablet under the tongue Daily. Hold 10/8/2018     • DULoxetine (CYMBALTA) 60 MG capsule Take 60 mg by mouth Daily.     • hydrochlorothiazide (HYDRODIURIL) 25 MG tablet Take 1 tablet by mouth Daily. (Patient taking differently: Take 25 mg by mouth Daily As Needed.) 30 tablet 5   • metoprolol tartrate (LOPRESSOR) 25 MG tablet Take 25 mg by mouth Every 12 (Twelve) Hours.     • oxybutynin XL (DITROPAN-XL) 5 MG 24 hr tablet Take 1 tablet by mouth Daily. 30 tablet 5   • warfarin (COUMADIN) 5 MG tablet Take 10mg every T/T/S/S and 7.5mg M/W/F w/additional tablet as directed per [facility] for sub-therapeutic INR. (Patient taking differently: Take 10mg every T/T/S/S and 7.5mg M/W/F w/additional tablet as directed per [facility] for sub-therapeutic INR.  Hold 10/8/2018) 60 tablet 11   • acetaminophen (TYLENOL) 325 MG tablet Take 650 mg by mouth Every 6 (Six) Hours As Needed for Mild Pain  or Headache.     • Acetaminophen-Caffeine (TENSION HEADACHE RELIEF) 500-65 MG tablet Take 1 tablet by mouth Every 6 (Six) Hours As Needed (migraine).     • topiramate (TOPAMAX) 25 MG tablet Take 1 tablet by mouth 2 (Two) Times a Day. 60 tablet 3   • HYDROcodone-acetaminophen (NORCO) 7.5-325 MG per tablet Take 1 tablet by mouth Every 4 (Four) Hours As Needed for Moderate Pain  for up to 40 doses. 40 tablet 0   • ZOFRAN 8 MG tablet Take 1 tablet by mouth Every 8 (Eight) Hours As Needed for Nausea or Vomiting for up to 10 doses. 10 tablet 1     No facility-administered medications prior to visit.        Allergies:  Allergies   Allergen Reactions   • Penicillins Rash       Review of Systems  Breast ROS: positive for breast cancer    Objective   Visit Vitals  /76 (BP Location: Left arm, Patient Position: Sitting, Cuff Size: Adult)   Pulse 72   Temp 97.8 °F  "(36.6 °C) (Oral)   Resp 12   Ht 167.6 cm (66\")   Wt (!) 150 kg (330 lb 4 oz)   LMP  (LMP Unknown)   SpO2 97%   BMI 53.30 kg/m²       Physical Exam   Constitutional: She is oriented to person, place, and time. Vital signs are normal. She appears well-developed and well-nourished.   Cardiovascular: Normal rate.   Pulmonary/Chest: Effort normal.   Genitourinary: Pelvic exam was performed with patient supine. There is no rash, tenderness or lesion on the right labia. There is no rash, tenderness or lesion on the left labia. Cervix exhibits no motion tenderness, no discharge and no friability. No erythema, tenderness or bleeding in the vagina. No signs of injury around the vagina. Vaginal discharge found.   Neurological: She is alert and oriented to person, place, and time.   Psychiatric: She has a normal mood and affect. Her behavior is normal.   Vitals reviewed.      Assessment/Plan   Anthony Meza is a 53 y.o. year old  seen today for her annual well woman exam:    1. Cervical cancer screening  - Liquid-based Pap Smear, Screening; Future  2. Well woman exam with routine gynecological exam               "

## 2018-12-06 ENCOUNTER — HOSPITAL ENCOUNTER (OUTPATIENT)
Dept: RADIATION ONCOLOGY | Facility: HOSPITAL | Age: 53
Discharge: HOME | End: 2018-12-06

## 2018-12-06 ENCOUNTER — TELEPHONE (OUTPATIENT)
Dept: INTERNAL MEDICINE | Facility: CLINIC | Age: 53
End: 2018-12-06

## 2018-12-06 PROCEDURE — 77336 RADIATION PHYSICS CONSULT: CPT | Performed by: RADIOLOGY

## 2018-12-06 PROCEDURE — 77412 RADIATION TX DELIVERY LVL 3: CPT | Performed by: RADIOLOGY

## 2018-12-07 ENCOUNTER — DOCUMENTATION (OUTPATIENT)
Dept: RADIATION ONCOLOGY | Facility: HOSPITAL | Age: 53
End: 2018-12-07

## 2018-12-07 ENCOUNTER — HOSPITAL ENCOUNTER (OUTPATIENT)
Dept: RADIATION ONCOLOGY | Facility: HOSPITAL | Age: 53
Discharge: HOME | End: 2018-12-07

## 2018-12-07 PROCEDURE — 77412 RADIATION TX DELIVERY LVL 3: CPT | Performed by: RADIOLOGY

## 2018-12-07 NOTE — PROGRESS NOTES
Patient requesting information on lymphedema treatment.  She does have lymphedema of her right arm s/p right lumpectomy with lymph node dissection for DCIS cancer of right breast.  Educational material given to patient.  Call placed to [facility] rehab.  Referral order is needed first.

## 2018-12-10 ENCOUNTER — HOSPITAL ENCOUNTER (OUTPATIENT)
Dept: RADIATION ONCOLOGY | Facility: HOSPITAL | Age: 53
Discharge: HOME | End: 2018-12-10

## 2018-12-10 PROCEDURE — 77412 RADIATION TX DELIVERY LVL 3: CPT | Performed by: RADIOLOGY

## 2018-12-11 ENCOUNTER — HOSPITAL ENCOUNTER (OUTPATIENT)
Dept: RADIATION ONCOLOGY | Facility: HOSPITAL | Age: 53
Discharge: HOME | End: 2018-12-11

## 2018-12-11 PROCEDURE — 77412 RADIATION TX DELIVERY LVL 3: CPT | Performed by: RADIOLOGY

## 2018-12-12 ENCOUNTER — HOSPITAL ENCOUNTER (OUTPATIENT)
Dept: RADIATION ONCOLOGY | Facility: HOSPITAL | Age: 53
Discharge: HOME | End: 2018-12-12

## 2018-12-12 PROCEDURE — 77417 THER RADIOLOGY PORT IMAGE(S): CPT | Performed by: RADIOLOGY

## 2018-12-12 PROCEDURE — 77412 RADIATION TX DELIVERY LVL 3: CPT | Performed by: RADIOLOGY

## 2018-12-13 ENCOUNTER — HOSPITAL ENCOUNTER (OUTPATIENT)
Dept: RADIATION ONCOLOGY | Facility: HOSPITAL | Age: 53
Discharge: HOME | End: 2018-12-13

## 2018-12-13 PROCEDURE — 77336 RADIATION PHYSICS CONSULT: CPT | Performed by: RADIOLOGY

## 2018-12-13 PROCEDURE — 77412 RADIATION TX DELIVERY LVL 3: CPT | Performed by: RADIOLOGY

## 2018-12-14 ENCOUNTER — HOSPITAL ENCOUNTER (OUTPATIENT)
Dept: RADIATION ONCOLOGY | Facility: HOSPITAL | Age: 53
Discharge: HOME | End: 2018-12-14

## 2018-12-14 ENCOUNTER — TELEPHONE (OUTPATIENT)
Dept: INTERNAL MEDICINE | Facility: CLINIC | Age: 53
End: 2018-12-14

## 2018-12-14 LAB
GEN CATEG CVX/VAG CYTO-IMP: NORMAL
LAB AP CASE REPORT: NORMAL
LAB AP GYN ADDITIONAL INFORMATION: NORMAL
LAB AP GYN OTHER FINDINGS: NORMAL
PATH INTERP SPEC-IMP: NORMAL
STAT OF ADQ CVX/VAG CYTO-IMP: NORMAL

## 2018-12-14 PROCEDURE — 77412 RADIATION TX DELIVERY LVL 3: CPT | Performed by: RADIOLOGY

## 2018-12-14 NOTE — TELEPHONE ENCOUNTER
----- Message from JAYLYN Coleman sent at 12/14/2018  8:20 AM CST -----  Please advise that her PAP was normal and HPV was negative.

## 2018-12-17 ENCOUNTER — HOSPITAL ENCOUNTER (OUTPATIENT)
Dept: RADIATION ONCOLOGY | Facility: HOSPITAL | Age: 53
Discharge: HOME | End: 2018-12-17

## 2018-12-17 PROCEDURE — 77412 RADIATION TX DELIVERY LVL 3: CPT | Performed by: RADIOLOGY

## 2018-12-18 ENCOUNTER — HOSPITAL ENCOUNTER (OUTPATIENT)
Dept: PHYSICAL THERAPY | Facility: HOSPITAL | Age: 53
Setting detail: THERAPIES SERIES
Discharge: HOME | End: 2018-12-18

## 2018-12-18 ENCOUNTER — HOSPITAL ENCOUNTER (OUTPATIENT)
Dept: RADIATION ONCOLOGY | Facility: HOSPITAL | Age: 53
Discharge: HOME | End: 2018-12-18

## 2018-12-18 DIAGNOSIS — I89.0 LYMPHEDEMA OF BREAST: Primary | ICD-10-CM

## 2018-12-18 PROCEDURE — 97162 PT EVAL MOD COMPLEX 30 MIN: CPT | Performed by: PHYSICAL THERAPIST

## 2018-12-18 PROCEDURE — G8993 SUB PT/OT CURRENT STATUS: HCPCS | Performed by: PHYSICAL THERAPIST

## 2018-12-18 PROCEDURE — G8994 SUB PT/OT GOAL STATUS: HCPCS | Performed by: PHYSICAL THERAPIST

## 2018-12-18 PROCEDURE — 97140 MANUAL THERAPY 1/> REGIONS: CPT | Performed by: PHYSICAL THERAPIST

## 2018-12-18 PROCEDURE — 77412 RADIATION TX DELIVERY LVL 3: CPT | Performed by: RADIOLOGY

## 2018-12-18 NOTE — THERAPY EVALUATION
Physical Therapy Lymphedema Initial Evaluation  [facility]     Patient Name: Anthony Meza  : 1965  MRN: [id number]  Today's Date: 2018      Visit Date: 2018    Visit Dx:    ICD-10-CM ICD-9-CM   1. Lymphedema of breast I89.0 457.1       Patient Active Problem List   Diagnosis   • Abnormal ECG   • Fibromyalgia   • CFS (chronic fatigue syndrome)   • Essential hypertension   • Chronic midline low back pain without sciatica   • HARLAN on CPAP   • Atrial fibrillation (CMS/HCC)   • Family history of early CAD father in 50 s   • Body mass index (BMI) of 50-59.9 in adult (CMS/HCC)   • Lower extremity edema   • Vitamin D deficiency   • Palpitations   • Dyspnea on exertion   • Ductal carcinoma in situ (DCIS) of left breast   • S/P lymph node biopsy   • S/P lumpectomy, left breast   • Non-smoker        Past Medical History:   Diagnosis Date   • A-fib (CMS/HCC)    • Abnormal ECG    • Anxiety    • Arthritis    • Atrial fibrillation (CMS/HCC)    • Back pain    • CFS (chronic fatigue syndrome)    • Chest pain    • Chronic pain disorder    • Depression    • Dizziness    • Ductal carcinoma in situ (DCIS) of left breast    • Fibromyalgia    • Hypertension    • IBS (irritable bowel syndrome)    • Incontinence    • Migraines    • Mononucleosis    • Sleep apnea     uses a c-pap   • Wears glasses         Past Surgical History:   Procedure Laterality Date   • ANKLE SURGERY      x2 left   • CYST REMOVAL      from tailbone   • TUBAL ABDOMINAL LIGATION     • WISDOM TOOTH EXTRACTION         Visit Dx:    ICD-10-CM ICD-9-CM   1. Lymphedema of breast I89.0 457.1       Patient History     Row Name 18 1300             History    Chief Complaint  Swelling  -HR      Date Current Problem(s) Began  10/15/18  -HR      Brief Description of Current Complaint  She had L breast biopsy and then lumpectomy with SNB. She has had pain and swelling in the axillary region since surgery.   -HR      Patient/Caregiver Goals  Decrease  swelling;Know what to do to help the symptoms;Relieve pain  -HR      Patient's Rating of General Health  Fair  -HR      Hand Dominance  right-handed  -HR      Occupation/sports/leisure activities  disabled- does not work outside the home  -HR         Pain     Pain Location  Breast;Arm  -HR      Pain at Present  5  -HR      Pain at Best  2  -HR      Pain at Worst  8  -HR      Pain Frequency  Constant/continuous  -HR      Pain Description  Sore;Tender;Tightness  -HR      Pain Comments  Less when she first gets up in the morning. Worse as the day goes on.   -HR      What position do you sleep in?  Supine discussed resting arm away from trunk  -HR         Fall Risk Assessment    Any falls in the past year:  Yes  -HR      Number of falls reported in the last 12 months  2  -HR      Factors that contributed to the fall:  Tripped  -HR      Does patient have a fear of falling  No  -HR         Services    Prior Rehab/Home Health Experiences  No  -HR      Are you currently receiving Home Health services  No  -HR      Do you plan to receive Home Health services in the near future  No  -HR         Daily Activities    Primary Language  English  -HR      Are you able to read  Yes  -HR      Are you able to write  Yes  -HR      How does patient learn best?  Reading;Demonstration  -HR      Teaching needs identified  Home Exercise Program;Management of Condition  -HR      Does patient have problems with the following?  Depression;Anxiety  -HR      Barriers to learning  None  -HR      Pt Participated in POC and Goals  Yes  -HR         Safety    Are you being hurt, hit, or frightened by anyone at home or in your life?  No  -HR      Are you being neglected by a caregiver  No  -HR        User Key  (r) = Recorded By, (t) = Taken By, (c) = Cosigned By    Initials Name Provider Type    HR Aminata Ryder, PT, DPT, CLT-CAREN Physical Therapist          Lymphedema     Row Name 12/18/18 1300             Subjective Pain    Able to rate  "subjective pain?  yes  -HR      Pre-Treatment Pain Level  5  -HR      Post-Treatment Pain Level  3  -HR         Subjective Comments    Subjective Comments  She has had a lot of discomfort in the left axilla and chest since surgery.  -HR         Lymphedema Assessment    Lymphedema Classification  Trunk:;LUE:;secondary;stage 1 (Spontaneously Reversible);stage 2 (Spontaneously Irreversible)  -HR      Lymphedema Cancer Related Sx  left;lumpectomy;sentinel node biopsy;axillary dissection  -HR      Lymphedema Surgery Comments  Dr. Porter 10/15/2018  -HR      Lymph Nodes Removed #  2  -HR      Positive Lymph Nodes #  0  -HR      Chemo Received  no  -HR      Radiation Therapy Received  yes  -HR      Radiation Treatments #/Timeframe  11. Started 12/4/18  -HR      Infections or Cellulitis?  no  -HR         General ROM    GENERAL ROM COMMENTS  BUE AROM WFL  -HR         Lymphedema Edema Assessment    Edema Assessment Comment  no pitting. Tightness in tissues across pec major as well as inferior border of breast.   -HR         Skin Changes/Observations    Location/Assessment  Upper Quadrant  -HR      Upper Quadrant Conditions  left:;clean;dry;fragile  -HR      Upper Quadrant Color/Pigment  left:;fibrosis;fat necrosis  -HR      Upper Quadrant Skin Details  darkening from radiation noted lateral inferior breast and in axilla.   -HR      Skin Observations Comment  No wrinkling in superficial skin layers along L trunk and axilla that are present on R.  -HR         Lymphedema Sensation    Lymphedema Sensation Comments  Some numbness, \"not normal\" feeling in L axilla  -HR         Lymphedema Measurements    Measurement Type(s)  Quick Girth  -HR      Quick Girth Areas  Upper extremities  -HR         LUE Quick Girth (cm)    Axilla  48.6 cm  -HR      Mid upper arm  47.3 cm  -HR      Elbow  35 cm  -HR      Mid forearm  30.9 cm  -HR      Wrist crease  17.4 cm  -HR      Web space  18.4 cm  -HR      LUE Quick Girth Total  197.6  -HR         " RUE Quick Girth (cm)    Axilla  49.9 cm  -HR      Mid upper arm  46.5 cm  -HR      Elbow  33.9 cm  -HR      Mid forearm  28.8 cm  -HR      Wrist crease  17.5 cm  -HR      Web space  19 cm  -HR      RUE Quick Girth Total  195.6  -HR         Manual Lymphatic Drainage    Manual Lymphatic Drainage  initial sequence;opened regional lymph nodes;opened anastamoses;extremity treatment  -HR      Initial Sequence  short neck;diaphragmatic breathing  -HR      Diaphragmatic Breathing  10  -HR      Opened Regional Lymph Nodes  axillary;inguinal  -HR      Axillary  right  -HR      Inguinal  left  -HR      Opened Anastamoses  anterior axillo-axillary;axillo-inguinal  -HR      Axillo-Inguinal  left  -HR      Extremity Treatment  --  -HR      Manual Lymphatic Drainage Comments  Instructed her on self massage as I performed MLD. Discussed direction and force. I gave her name of video to watch with family members to help her at home with massage  -HR         Compression/Skin Care    Compression/Skin Care Comments  She got some sports bras today to start wearing.   -HR        User Key  (r) = Recorded By, (t) = Taken By, (c) = Cosigned By    Initials Name Provider Type    HR Aminata Ryder, PT, DPT, CLT-CAREN Physical Therapist                          Therapy Education  Education Details: Self MLD  Given: Edema management, HEP  Program: New  How Provided: Verbal, Demonstration, Written  Provided to: Patient  Level of Understanding: Verbalized      Exercises     Row Name 12/18/18 1300             Subjective Comments    Subjective Comments  She has had a lot of discomfort in the left axilla and chest since surgery.  -HR         Subjective Pain    Able to rate subjective pain?  yes  -HR      Pre-Treatment Pain Level  5  -HR      Post-Treatment Pain Level  3  -HR        User Key  (r) = Recorded By, (t) = Taken By, (c) = Cosigned By    Initials Name Provider Type    HR Aminata Ryder, PT, DPT, CLT-CAREN Physical Therapist                         PT OP Goals     Row Name 12/18/18 1300          PT Short Term Goals    STG Date to Achieve  01/17/19  -HR     STG 1  Patient will be independent with remedial HEP for LUQ lymphedema.  -HR     STG 1 Progress  New  -HR     STG 2  Patient will be independent with self MLD with assist of family as needed.  -HR     STG 2 Progress  New  -HR     STG 3  Patient will report decrease in discomfort of L axillary region with initiation of MLD.  -HR     STG 3 Progress  New  -HR        Long Term Goals    LTG Date to Achieve  02/16/19  -HR     LTG 1  Patient will be reassessed after completion of radiation.  -HR     LTG 1 Progress  New  -HR        Time Calculation    PT Goal Re-Cert Due Date  01/17/19  -HR       User Key  (r) = Recorded By, (t) = Taken By, (c) = Cosigned By    Initials Name Provider Type    HR Aminata Ryder, PT, DPT, CLT-CAREN Physical Therapist          PT Assessment/Plan     Row Name 12/18/18 1300          PT Assessment    Functional Limitations  Decreased safety during functional activities;Performance in leisure activities  -HR     Impairments  Impaired lymphatic circulation;Edema;Integumentary integrity;Pain  -HR     Assessment Comments  Mrs. Meza is having signs and symptoms of truncal lymphedema due to her L breast cancer treatments. She had surgery in October and has had 16 radiation treatments at this time. She has had a fullness and discomfort in the L axilla since surgery. She would benefit from MLD and being able to perform self MLD or have family member do MLD at home. With short session of truncal drainage today, the tissues decreased in temperature to touch and felt more loose and comfortable to her. She is going to work on HEP and the massage at home and we will follow up again next week after Renetta. She may need further interventions once she has completed radiation and has had time to recover. We will address these needs as indicated.   -HR     Please refer to paper  survey for additional self-reported information  Yes  -HR     Rehab Potential  Good  -HR     Patient/caregiver participated in establishment of treatment plan and goals  Yes  -HR     Patient would benefit from skilled therapy intervention  Yes  -HR        PT Plan    PT Frequency  1x/week  -HR     Predicted Duration of Therapy Intervention (Therapy Eval)  up to 8 weeks  -HR     Planned CPT's?  PT MANUAL THERAPY EA 15 MIN: 10622;PT THER PROC EA 15 MIN: 97519  -HR     PT Plan Comments  PT to see for treatments of L upper quadrant lymphedema.  -HR       User Key  (r) = Recorded By, (t) = Taken By, (c) = Cosigned By    Initials Name Provider Type    HR Aminata Ryder, PT, DPT, CLT-CAREN Physical Therapist                       Time Calculation:       Therapy Suggested Charges     Code   Minutes Charges    None           Therapy Charges for Today     Code Description Service Date Service Provider Modifiers Qty    76450346818 HC PT OTHER SUBSEQ FUNCT CURRENT 12/18/2018 Aminata Ryder, PT, DPT, CLT-CAREN GP, CI 1    65313251220 HC PT OTHER SUBSEQ FUNCT PROJECTED 12/18/2018 Aminata Ryder, PT, DPT, CLT-CAREN GP,  1    79926615151 HC PT EVAL MOD COMPLEXITY 4 12/18/2018 Aminata Ryder, PT, DPT, CLT-CAREN GP 1    19491072699 HC PT MANUAL THERAPY EA 15 MIN 12/18/2018 Aminata Ryder, PT, DPT, CLT-CAREN GP 2          PT G-Codes  PT Professional Judgement Used?: Yes  Functional Limitation: Other PT subsequent  Other PT Secondary Current Status (): At least 1 percent but less than 20 percent impaired, limited or restricted  Other PT Secondary Goal Status (): 0 percent impaired, limited or restricted         Aminata Ryder, PT, DPT, CLT-CAREN  12/18/2018

## 2018-12-19 ENCOUNTER — HOSPITAL ENCOUNTER (OUTPATIENT)
Dept: RADIATION ONCOLOGY | Facility: HOSPITAL | Age: 53
Discharge: HOME | End: 2018-12-19

## 2018-12-19 PROCEDURE — 77412 RADIATION TX DELIVERY LVL 3: CPT | Performed by: RADIOLOGY

## 2018-12-19 PROCEDURE — 77417 THER RADIOLOGY PORT IMAGE(S): CPT | Performed by: RADIOLOGY

## 2018-12-21 ENCOUNTER — HOSPITAL ENCOUNTER (OUTPATIENT)
Dept: RADIATION ONCOLOGY | Facility: HOSPITAL | Age: 53
Discharge: HOME | End: 2018-12-21

## 2018-12-21 PROCEDURE — 77336 RADIATION PHYSICS CONSULT: CPT | Performed by: RADIOLOGY

## 2018-12-21 PROCEDURE — 77412 RADIATION TX DELIVERY LVL 3: CPT | Performed by: RADIOLOGY

## 2018-12-26 ENCOUNTER — TELEPHONE (OUTPATIENT)
Dept: RADIATION ONCOLOGY | Facility: HOSPITAL | Age: 53
End: 2018-12-26

## 2018-12-26 ENCOUNTER — HOSPITAL ENCOUNTER (OUTPATIENT)
Dept: RADIATION ONCOLOGY | Facility: HOSPITAL | Age: 53
Discharge: HOME | End: 2018-12-26

## 2018-12-26 PROCEDURE — 77412 RADIATION TX DELIVERY LVL 3: CPT | Performed by: RADIOLOGY

## 2018-12-26 NOTE — TELEPHONE ENCOUNTER
[facility]enecare cream called into patient's pharmacy per Dr. Bryant's order.    Directions - Apply to affected area 2-3 times daily as needed.  2 refills given.

## 2018-12-27 ENCOUNTER — HOSPITAL ENCOUNTER (OUTPATIENT)
Dept: PHYSICAL THERAPY | Facility: HOSPITAL | Age: 53
Setting detail: THERAPIES SERIES
Discharge: HOME | End: 2018-12-27

## 2018-12-27 DIAGNOSIS — I89.0 LYMPHEDEMA OF BREAST: Primary | ICD-10-CM

## 2018-12-27 PROCEDURE — 77412 RADIATION TX DELIVERY LVL 3: CPT | Performed by: RADIOLOGY

## 2018-12-27 PROCEDURE — 97140 MANUAL THERAPY 1/> REGIONS: CPT

## 2018-12-27 NOTE — THERAPY TREATMENT NOTE
Outpatient Physical Therapy Lymphedema Treatment Note  [facility]     Patient Name: Anthony Meza  : 1965  MRN: [id number]  Today's Date: 2018        Visit Date: 2018    Visit Dx:    ICD-10-CM ICD-9-CM   1. Lymphedema of breast I89.0 457.1       Patient Active Problem List   Diagnosis   • Abnormal ECG   • Fibromyalgia   • CFS (chronic fatigue syndrome)   • Essential hypertension   • Chronic midline low back pain without sciatica   • HARLAN on CPAP   • Atrial fibrillation (CMS/HCC)   • Family history of early CAD father in 50 s   • Body mass index (BMI) of 50-59.9 in adult (CMS/HCC)   • Lower extremity edema   • Vitamin D deficiency   • Palpitations   • Dyspnea on exertion   • Ductal carcinoma in situ (DCIS) of left breast   • S/P lymph node biopsy   • S/P lumpectomy, left breast   • Non-smoker        Lymphedema     Row Name 18 1020             Subjective Pain    Able to rate subjective pain?  yes  -AL      Pre-Treatment Pain Level  3  -AL      Subjective Pain Comment  She rates her pain today 2-3/10 in the left breast and under the left arm.    -AL         Subjective Comments    Subjective Comments  She is hurting from the radiaiton, she has some Regenecare cream and this has helped.  She was not able to do the MLD yesterday because she was hurting so bad.  Pain is better with the cream, now 2-3/10, was 7/10.  -AL         Lymphedema Assessment    Radiation Treatments #/Timeframe  15/28  -AL         Manual Lymphatic Drainage    Manual Lymphatic Drainage  initial sequence;opened regional lymph nodes;opened anastamoses;extremity treatment  -AL      Initial Sequence  short neck;abdomen;diaphragmatic breathing  -AL      Abdomen  superficial  -AL      Diaphragmatic Breathing  x 10 with superficial abdominals  -AL      Opened Regional Lymph Nodes  axillary;inguinal  -AL      Axillary  right  -AL      Inguinal  left  -AL      Opened Anastamoses  anterior axillo-axillary;posterior  axillo-axillary;axillo-inguinal  -AL      Posterior Axillo-Axillary  R sidelying  -AL      Axillo-Inguinal  left  -AL      Extremity Treatment  MLD to full limb;other extremity treatment;extremity treatment focus on  -AL      MLD to Full Limb  L UE  -AL      Other Extremity Treatment  Extra time spent on MLD to the  L truncal edema and MLD to the L breast.  Did not do MLD to fragile tissue L armpit.   -AL      Manual Lymphatic Drainage Comments  Went over MLD and HEP.  Educated about lymphedema  -AL        User Key  (r) = Recorded By, (t) = Taken By, (c) = Cosigned By    Initials Name Provider Type    Alondra Andrea PTA Physical Therapy Assistant                        PT Assessment/Plan     Row Name 12/27/18 1020          PT Assessment    Assessment Comments  Patient has a dense tissue present L breast near the lumpectomy incision.  Patient is compliant with the MLD and HEP and feels like the MLD has helped the pain and discomfort she has been feeling in the L arm and L pec/breast areas.   -AL        PT Plan    PT Plan Comments  Continue with POC.  -AL       User Key  (r) = Recorded By, (t) = Taken By, (c) = Cosigned By    Initials Name Provider Type    Alondra Andrea PTA Physical Therapy Assistant               Exercises     Row Name 12/27/18 1020             Subjective Comments    Subjective Comments  She is hurting from the [facility], she has some Regenecare cream and this has helped.  She was not able to do the MLD yesterday because she was hurting so bad.  Pain is better with the cream, now 2-3/10, was 7/10.  -AL         Subjective Pain    Able to rate subjective pain?  yes  -AL      Pre-Treatment Pain Level  3  -AL      Subjective Pain Comment  She rates her pain today 2-3/10 in the left breast and under the left arm.    -AL         Total Minutes    28465 - PT Manual Therapy Minutes  70  -AL        User Key  (r) = Recorded By, (t) = Taken By, (c) = Cosigned By    Initials Name Provider Type    AL  Alondra Turk PTA Physical Therapy Assistant                        PT OP Goals     Row Name 12/27/18 1020          PT Short Term Goals    STG Date to Achieve  01/17/19  -AL     STG 1  Patient will be independent with remedial HEP for LUQ lymphedema.  -AL     STG 1 Progress  Ongoing  -AL     STG 1 Progress Comments  She is working on the HEP  -AL     STG 2  Patient will be independent with self MLD with assist of family as needed.  -AL     STG 2 Progress  Ongoing  -AL     STG 2 Progress Comments  She is working on the MLD  -AL     STG 3  Patient will report decrease in discomfort of L axillary region with initiation of MLD.  -AL     STG 3 Progress  Ongoing  -AL     STG 3 Progress Comments  Discomfort has decreased  -AL        Long Term Goals    LTG Date to Achieve  02/16/19  -AL     LTG 1  Patient will be reassessed after completion of radiation.  -AL     LTG 1 Progress  New  -AL        Time Calculation    PT Goal Re-Cert Due Date  01/17/19  -AL       User Key  (r) = Recorded By, (t) = Taken By, (c) = Cosigned By    Initials Name Provider Type    AL Alondra Turk PTA Physical Therapy Assistant          Therapy Education  Education Details: Work on HEP and MLD  Given: Edema management  Program: Reinforced  How Provided: Verbal, Demonstration  Provided to: Patient  Level of Understanding: Verbalized, Demonstrated              Time Calculation:   Start Time: 1020  Stop Time: 1130  Time Calculation (min): 70 min  Total Timed Code Minutes- PT: 70 minute(s)   Therapy Suggested Charges     Code   Minutes Charges    07011 (CPT®) Hc Pt Neuromusc Re Education Ea 15 Min      44002 (CPT®) Hc Pt Ther Proc Ea 15 Min      19233 (CPT®) Hc Gait Training Ea 15 Min      07602 (CPT®) Hc Pt Therapeutic Act Ea 15 Min      70555 (CPT®) Hc Pt Manual Therapy Ea 15 Min 70 5    01159 (CPT®) Hc Pt Ther Massage- Per 15 Min      50250 (CPT®) Hc Pt Iontophoresis Ea 15 Min      20118 (CPT®) Hc Pt Elec Stim Ea-Per 15 Min      73119 (CPT®) Hc Pt  Ultrasound Ea 15 Min      41966 (CPT®) Hc Pt Self Care/Mgmt/Train Ea 15 Min      18331 (CPT®) Hc Pt Prosthetic (S) Train Initial Encounter, Each 15 Min      46212 (CPT®) Hc Orthotic(S) Mgmt/Train Initial Encounter, Each 15min      47558 (CPT®) Hc Pt Aquatic Therapy Ea 15 Min      39354 (CPT®) Hc Pt Orthotic(S)/Prosthetic(S) Encounter, Each 15 Min       (CPT®) Hc Pt Electrical Stim Unattended      Total  70 5        Therapy Charges for Today     Code Description Service Date Service Provider Modifiers Qty    32617798504 HC PT MANUAL THERAPY EA 15 MIN 12/27/2018 Alondra Turk, PTA GP 5                    Alondra Turk, PTA  12/27/2018

## 2018-12-28 ENCOUNTER — HOSPITAL ENCOUNTER (OUTPATIENT)
Dept: RADIATION ONCOLOGY | Facility: HOSPITAL | Age: 53
Discharge: HOME | End: 2018-12-28

## 2018-12-28 PROCEDURE — 77412 RADIATION TX DELIVERY LVL 3: CPT | Performed by: RADIOLOGY

## 2018-12-31 ENCOUNTER — HOSPITAL ENCOUNTER (OUTPATIENT)
Dept: RADIATION ONCOLOGY | Facility: HOSPITAL | Age: 53
Discharge: HOME | End: 2018-12-31

## 2018-12-31 ENCOUNTER — HOSPITAL ENCOUNTER (OUTPATIENT)
Dept: PHYSICAL THERAPY | Facility: HOSPITAL | Age: 53
Setting detail: THERAPIES SERIES
Discharge: HOME | End: 2018-12-31

## 2018-12-31 DIAGNOSIS — I89.0 LYMPHEDEMA OF BREAST: Primary | ICD-10-CM

## 2018-12-31 PROCEDURE — 97140 MANUAL THERAPY 1/> REGIONS: CPT | Performed by: PHYSICAL THERAPIST

## 2018-12-31 PROCEDURE — 77417 THER RADIOLOGY PORT IMAGE(S): CPT | Performed by: RADIOLOGY

## 2018-12-31 PROCEDURE — 77412 RADIATION TX DELIVERY LVL 3: CPT | Performed by: RADIOLOGY

## 2019-01-02 ENCOUNTER — HOSPITAL ENCOUNTER (OUTPATIENT)
Dept: RADIATION ONCOLOGY | Facility: HOSPITAL | Age: 54
Discharge: HOME | End: 2019-01-02

## 2019-01-02 ENCOUNTER — HOSPITAL ENCOUNTER (OUTPATIENT)
Dept: RADIATION ONCOLOGY | Facility: HOSPITAL | Age: 54
Setting detail: RADIATION/ONCOLOGY SERIES
End: 2019-01-02

## 2019-01-02 PROCEDURE — 77412 RADIATION TX DELIVERY LVL 3: CPT | Performed by: RADIOLOGY

## 2019-01-02 PROCEDURE — 77336 RADIATION PHYSICS CONSULT: CPT | Performed by: RADIOLOGY

## 2019-01-04 ENCOUNTER — TRANSCRIBE ORDERS (OUTPATIENT)
Dept: ADMINISTRATIVE | Facility: HOSPITAL | Age: 54
End: 2019-01-04

## 2019-01-04 ENCOUNTER — ANTICOAGULATION VISIT (OUTPATIENT)
Dept: CARDIOLOGY | Facility: CLINIC | Age: 54
End: 2019-01-04

## 2019-01-04 ENCOUNTER — LAB (OUTPATIENT)
Dept: LAB | Facility: HOSPITAL | Age: 54
End: 2019-01-04

## 2019-01-04 DIAGNOSIS — I48.91 ATRIAL FIBRILLATION, UNSPECIFIED TYPE (HCC): Primary | ICD-10-CM

## 2019-01-04 LAB
INR PPP: 1.1 (ref 0.91–1.09)
PERFORM POINT OF CARE ON DEVICE: NORMAL
PROTHROMBIN TIME: 13.4 SECONDS (ref 10–13.8)

## 2019-01-04 PROCEDURE — 85610 PROTHROMBIN TIME: CPT | Performed by: NURSE PRACTITIONER

## 2019-01-07 ENCOUNTER — HOSPITAL ENCOUNTER (OUTPATIENT)
Dept: RADIATION ONCOLOGY | Facility: HOSPITAL | Age: 54
Discharge: HOME | End: 2019-01-07

## 2019-01-07 PROCEDURE — 77412 RADIATION TX DELIVERY LVL 3: CPT | Performed by: RADIOLOGY

## 2019-01-08 ENCOUNTER — HOSPITAL ENCOUNTER (OUTPATIENT)
Dept: PHYSICAL THERAPY | Facility: HOSPITAL | Age: 54
Setting detail: THERAPIES SERIES
Discharge: HOME | End: 2019-01-08

## 2019-01-08 ENCOUNTER — HOSPITAL ENCOUNTER (OUTPATIENT)
Dept: RADIATION ONCOLOGY | Facility: HOSPITAL | Age: 54
Discharge: HOME | End: 2019-01-08

## 2019-01-08 DIAGNOSIS — I89.0 LYMPHEDEMA OF BREAST: Primary | ICD-10-CM

## 2019-01-08 PROCEDURE — 77412 RADIATION TX DELIVERY LVL 3: CPT | Performed by: RADIOLOGY

## 2019-01-08 PROCEDURE — 97140 MANUAL THERAPY 1/> REGIONS: CPT | Performed by: PHYSICAL THERAPIST

## 2019-01-08 NOTE — THERAPY PROGRESS REPORT/RE-CERT
Outpatient Physical Therapy Lymphedema Progress Note  [facility]     Patient Name: Anthony Meza  : 1965  MRN: [id number]  Today's Date: 2019        Visit Date: 2019    Visit Dx:    ICD-10-CM ICD-9-CM   1. Lymphedema of breast I89.0 457.1       Patient Active Problem List   Diagnosis   • Abnormal ECG   • Fibromyalgia   • CFS (chronic fatigue syndrome)   • Essential hypertension   • Chronic midline low back pain without sciatica   • HARLAN on CPAP   • Atrial fibrillation (CMS/HCC)   • Family history of early CAD father in 50 s   • Body mass index (BMI) of 50-59.9 in adult (CMS/HCC)   • Lower extremity edema   • Vitamin D deficiency   • Palpitations   • Dyspnea on exertion   • Ductal carcinoma in situ (DCIS) of left breast   • S/P lymph node biopsy   • S/P lumpectomy, left breast   • Non-smoker        Lymphedema     Row Name 19 1000             Subjective Pain    Able to rate subjective pain?  yes  -HR      Pre-Treatment Pain Level  3  -HR         Subjective Comments    Subjective Comments  She took Thursday and Friday off from radiation and that helped a lot.   -HR         Manual Lymphatic Drainage    Manual Lymphatic Drainage  initial sequence;opened regional lymph nodes;opened anastamoses;extremity treatment  -HR      Initial Sequence  short neck;abdomen;diaphragmatic breathing  -HR      Abdomen  superficial  -HR      Opened Regional Lymph Nodes  axillary;inguinal  -HR      Axillary  right  -HR      Inguinal  left  -HR      Opened Anastamoses  anterior axillo-axillary;posterior axillo-axillary;axillo-inguinal  -HR      Axillo-Inguinal  left  -HR      Extremity Treatment  MLD to full limb;other extremity treatment;extremity treatment focus on  -HR      MLD to Full Limb  LUE  -HR      Manual Lymphatic Drainage Comments  Did MLD to L breast, axilla and upper L arm with arm up on pillow. Careful not to rub across the skin at all. Nothing open or blistered. Tender but not too painful for gentle  MLD.  -HR         Compression/Skin Care    Compression/Skin Care Comments  She is keeping a non-adherent bandage under the L breast to avoid skin on skin contact.. She is using the regenecare cream as well.   -HR        User Key  (r) = Recorded By, (t) = Taken By, (c) = Cosigned By    Initials Name Provider Type    HR Aminata Ryder, PT, DPT, CLT-CAREN Physical Therapist                        PT Assessment/Plan     Row Name 01/08/19 1000          PT Assessment    Assessment Comments  She took 2 extra days off from radiation last week and this has made her feel much better with less pain. She is now more optimistic that she can finish out her radiation. She was willing to have me perform MLD to the L axillary region and L breast today as she reported it was not too sore.  We will see how she feels next week and then probably put her on hold at that time until after she finishes radiation and has some time to recover. Did speak to her today about making sure she does what she can tolerate as far as self massage on her own.  -HR        PT Plan    PT Frequency  1x/week  -HR     PT Plan Comments  Cont 1 more week. Then unless she wants to continue, will place on hold until she has completed radiation and has some time to recover.   -HR       User Key  (r) = Recorded By, (t) = Taken By, (c) = Cosigned By    Initials Name Provider Type    Aminata Aquino, PT, DPT, CLT-CAREN Physical Therapist               Exercises     Row Name 01/08/19 1000             Subjective Comments    Subjective Comments  She took Thursday and Friday off from radiation and that helped a lot.   -HR         Subjective Pain    Able to rate subjective pain?  yes  -HR      Pre-Treatment Pain Level  3  -HR        User Key  (r) = Recorded By, (t) = Taken By, (c) = Cosigned By    Initials Name Provider Type    Aminata Aquino, PT, DPT, CLT-CAREN Physical Therapist                        PT OP Goals     Row Name 01/08/19 1300           PT Short Term Goals    STG Date to Achieve  01/17/19  -HR     STG 1  Patient will be independent with remedial HEP for LUQ lymphedema.  -HR     STG 1 Progress  Ongoing  -HR     STG 2  Patient will be independent with self MLD with assist of family as needed.  -HR     STG 2 Progress  Ongoing  -HR     STG 3  Patient will report decrease in discomfort of L axillary region with initiation of MLD.  -HR     STG 3 Progress  Met;Ongoing  -HR     STG 3 Progress Comments  The original discomfort she reported with the fullness under the arm was gone after the first visit. Now her pain is from radiaition.  -HR        Long Term Goals    LTG Date to Achieve  02/16/19  -HR     LTG 1  Patient will be reassessed after completion of radiation.  -HR     LTG 1 Progress  New  -HR        Time Calculation    PT Goal Re-Cert Due Date  01/17/19  -HR       User Key  (r) = Recorded By, (t) = Taken By, (c) = Cosigned By    Initials Name Provider Type    HR Aminata Ryder, PT, DPT, MONSE Physical Therapist          Therapy Education  Education Details: do the massage as tolerated to the sore areas but continue with the massage avoiding anything too tender  Given: Edema management  Program: Reinforced  How Provided: Verbal, Demonstration  Provided to: Patient  Level of Understanding: Verbalized, Demonstrated              Time Calculation:       Therapy Suggested Charges     Code   Minutes Charges    None           Therapy Charges for Today     Code Description Service Date Service Provider Modifiers Qty    57159574062 HC PT MANUAL THERAPY EA 15 MIN 1/8/2019 Aminata Ryder PT, DPT, MONSE GP 3                    Aminata Ryder PT, DPMISHEL, MONSE  1/8/2019

## 2019-01-09 ENCOUNTER — HOSPITAL ENCOUNTER (OUTPATIENT)
Dept: RADIATION ONCOLOGY | Facility: HOSPITAL | Age: 54
Discharge: HOME | End: 2019-01-09

## 2019-01-09 PROCEDURE — 77412 RADIATION TX DELIVERY LVL 3: CPT | Performed by: RADIOLOGY

## 2019-01-09 PROCEDURE — 77336 RADIATION PHYSICS CONSULT: CPT | Performed by: RADIOLOGY

## 2019-01-10 ENCOUNTER — HOSPITAL ENCOUNTER (OUTPATIENT)
Dept: RADIATION ONCOLOGY | Facility: HOSPITAL | Age: 54
Discharge: HOME | End: 2019-01-10

## 2019-01-10 PROCEDURE — 77412 RADIATION TX DELIVERY LVL 3: CPT | Performed by: RADIOLOGY

## 2019-01-10 PROCEDURE — 77417 THER RADIOLOGY PORT IMAGE(S): CPT | Performed by: RADIOLOGY

## 2019-01-11 ENCOUNTER — LAB (OUTPATIENT)
Dept: LAB | Facility: HOSPITAL | Age: 54
End: 2019-01-11

## 2019-01-11 ENCOUNTER — TRANSCRIBE ORDERS (OUTPATIENT)
Dept: ADMINISTRATIVE | Facility: HOSPITAL | Age: 54
End: 2019-01-11

## 2019-01-11 ENCOUNTER — HOSPITAL ENCOUNTER (OUTPATIENT)
Dept: RADIATION ONCOLOGY | Facility: HOSPITAL | Age: 54
Discharge: HOME | End: 2019-01-11

## 2019-01-11 DIAGNOSIS — I48.91 ATRIAL FIBRILLATION, UNSPECIFIED TYPE (HCC): Primary | ICD-10-CM

## 2019-01-11 LAB
INR PPP: 1 (ref 0.91–1.09)
PERFORM POINT OF CARE ON DEVICE: NORMAL
PROTHROMBIN TIME: 11.8 SECONDS (ref 10–13.8)

## 2019-01-11 PROCEDURE — 36415 COLL VENOUS BLD VENIPUNCTURE: CPT

## 2019-01-11 PROCEDURE — 85610 PROTHROMBIN TIME: CPT | Performed by: NURSE PRACTITIONER

## 2019-01-11 RX ORDER — LIDOCAINE 40 MG/G
CREAM TOPICAL AS NEEDED
Qty: 120 G | Refills: 5 | Status: SHIPPED | OUTPATIENT
Start: 2019-01-11 | End: 2019-04-04

## 2019-01-14 ENCOUNTER — HOSPITAL ENCOUNTER (OUTPATIENT)
Dept: PHYSICAL THERAPY | Facility: HOSPITAL | Age: 54
Setting detail: THERAPIES SERIES
Discharge: HOME | End: 2019-01-14

## 2019-01-14 DIAGNOSIS — I89.0 LYMPHEDEMA OF BREAST: Primary | ICD-10-CM

## 2019-01-14 PROCEDURE — 97140 MANUAL THERAPY 1/> REGIONS: CPT

## 2019-01-14 NOTE — THERAPY TREATMENT NOTE
Outpatient Physical Therapy Lymphedema Treatment Note  [facility]     Patient Name: Anthony Meza  : 1965  MRN: [id number]  Today's Date: 2019        Visit Date: 2019    Visit Dx:    ICD-10-CM ICD-9-CM   1. Lymphedema of breast I89.0 457.1       Patient Active Problem List   Diagnosis   • Abnormal ECG   • Fibromyalgia   • CFS (chronic fatigue syndrome)   • Essential hypertension   • Chronic midline low back pain without sciatica   • HARLAN on CPAP   • Atrial fibrillation (CMS/HCC)   • Family history of early CAD father in 50 s   • Body mass index (BMI) of 50-59.9 in adult (CMS/HCC)   • Lower extremity edema   • Vitamin D deficiency   • Palpitations   • Dyspnea on exertion   • Ductal carcinoma in situ (DCIS) of left breast   • S/P lymph node biopsy   • S/P lumpectomy, left breast   • Non-smoker        Lymphedema     Row Name 19 1030             Subjective Pain    Able to rate subjective pain?  yes  -AL      Pre-Treatment Pain Level  2  -AL      Subjective Pain Comment  Under the L brest  -AL         Subjective Comments    Subjective Comments  She took a break from radiation today and last Friday.  She plans to finish her radiation by next week.  She has had an open area under the L breast.  She has used Aquphor and Regenecare and Lanacane all weekend.   -AL         Lymphedema Assessment    Radiation Treatments #/Timeframe    -AL         Lymphedema Measurements    Measurement Type(s)  Quick Girth  -AL      Quick Girth Areas  Upper extremities  -AL         LUE Quick Girth (cm)    Axilla  50.4 cm  -AL      Mid upper arm  50.3 cm  -AL      Elbow  35.9 cm  -AL      Mid forearm  29.9 cm  -AL      Wrist crease  18.2 cm  -AL      Web space  19.5 cm  -AL      LUE Quick Girth Total  204.2  -AL         Manual Lymphatic Drainage    Manual Lymphatic Drainage  initial sequence;opened regional lymph nodes;opened anastamoses;extremity treatment  -AL      Initial Sequence  short  neck;abdomen;diaphragmatic breathing  -AL      Abdomen  superficial  -AL      Diaphragmatic Breathing  x 10 with superficial abdominals  -AL      Opened Regional Lymph Nodes  axillary;inguinal  -AL      Axillary  right  -AL      Inguinal  left  -AL      Opened Anastamoses  anterior axillo-axillary;posterior axillo-axillary;axillo-inguinal  -AL      Axillo-Inguinal  left  -AL      Extremity Treatment  MLD to full limb;other extremity treatment;extremity treatment focus on  -AL      MLD to Full Limb  LUE  -AL      Manual Lymphatic Drainage Comments  MLD to the L truncal  edema as well as the L UE.  When working on the trunk, did not do the MLD to any areas that had fragile skin.    -AL         Compression/Skin Care    Compression/Skin Care Comments  She continues to use the 4x4 and regenecare cream under the L breast.   -AL        User Key  (r) = Recorded By, (t) = Taken By, (c) = Cosigned By    Initials Name Provider Type    Alondra Andrea PTA Physical Therapy Assistant                        PT Assessment/Plan     Row Name 01/14/19 1030          PT Assessment    Assessment Comments  Patient is compliant with the HEP and MLD.  She still has an open area under the L breast, her skin feels better to her since she has taken a break from radiation.  She has had an increase in size in the L UE, will take measurements again when she returns after she has finished her radiaiton.  She plans to see us again on 2/11/19.  -AL        PT Plan    PT Plan Comments  Conitinue with POC.  -AL       User Key  (r) = Recorded By, (t) = Taken By, (c) = Cosigned By    Initials Name Provider Type    Alondra Andrea PTA Physical Therapy Assistant               Exercises     Row Name 01/14/19 1030             Subjective Comments    Subjective Comments  She took a break from radiation today and last Friday.  She plans to finish her radiation by next week.  She has had an open area under the L breast.  She has used Aquphor and Regenecare  and Lanacane all weekend.   -AL         Subjective Pain    Able to rate subjective pain?  yes  -AL      Pre-Treatment Pain Level  2  -AL      Subjective Pain Comment  Under the L brest  -AL         Total Minutes    45034 - PT Manual Therapy Minutes  73  -AL        User Key  (r) = Recorded By, (t) = Taken By, (c) = Cosigned By    Initials Name Provider Type    Alondra Andrea PTA Physical Therapy Assistant                        PT OP Goals     Row Name 01/14/19 1030          PT Short Term Goals    STG Date to Achieve  01/17/19  -AL     STG 1  Patient will be independent with remedial HEP for LUQ lymphedema.  -AL     STG 1 Progress  Ongoing  -AL     STG 1 Progress Comments  She is compliant with the HEP  -AL     STG 2  Patient will be independent with self MLD with assist of family as needed.  -AL     STG 2 Progress  Ongoing  -AL     STG 2 Progress Comments  She is compliant with the MLD  -AL     STG 3  Patient will report decrease in discomfort of L axillary region with initiation of MLD.  -AL     STG 3 Progress  Met;Ongoing  -AL        Long Term Goals    LTG Date to Achieve  02/16/19  -AL     LTG 1  Patient will be reassessed after completion of radiation.  -AL     LTG 1 Progress  New  -AL        Time Calculation    PT Goal Re-Cert Due Date  02/07/19  -AL       User Key  (r) = Recorded By, (t) = Taken By, (c) = Cosigned By    Initials Name Provider Type    Alondra Andrea PTA Physical Therapy Assistant          Therapy Education  Education Details: Continue with HEP and MLD where able.  Given: Edema management  Program: Reinforced  How Provided: Verbal  Provided to: Patient  Level of Understanding: Verbalized              Time Calculation:   Start Time: 1030  Stop Time: 1143  Time Calculation (min): 73 min  Total Timed Code Minutes- PT: 73 minute(s)   Therapy Suggested Charges     Code   Minutes Charges    99556 (CPT®) Hc Pt Neuromusc Re Education Ea 15 Min      68234 (CPT®) Hc Pt Ther Proc Ea 15 Min       24661 (CPT®) Hc Gait Training Ea 15 Min      48216 (CPT®) Hc Pt Therapeutic Act Ea 15 Min      99302 (CPT®) Hc Pt Manual Therapy Ea 15 Min 73 5    87615 (CPT®) Hc Pt Ther Massage- Per 15 Min      52296 (CPT®) Hc Pt Iontophoresis Ea 15 Min      28422 (CPT®) Hc Pt Elec Stim Ea-Per 15 Min      90102 (CPT®) Hc Pt Ultrasound Ea 15 Min      57997 (CPT®) Hc Pt Self Care/Mgmt/Train Ea 15 Min      25523 (CPT®) Hc Pt Prosthetic (S) Train Initial Encounter, Each 15 Min      97071 (CPT®) Hc Orthotic(S) Mgmt/Train Initial Encounter, Each 15min      38891 (CPT®) Hc Pt Aquatic Therapy Ea 15 Min      83715 (CPT®) Hc Pt Orthotic(S)/Prosthetic(S) Encounter, Each 15 Min       (CPT®) Hc Pt Electrical Stim Unattended      Total  73 5        Therapy Charges for Today     Code Description Service Date Service Provider Modifiers Qty    47485095505 HC PT MANUAL THERAPY EA 15 MIN 1/14/2019 Alondra Turk, PTA GP 5                    Alondra Turk, PTA  1/14/2019

## 2019-01-15 PROCEDURE — 77412 RADIATION TX DELIVERY LVL 3: CPT | Performed by: RADIOLOGY

## 2019-01-16 ENCOUNTER — HOSPITAL ENCOUNTER (OUTPATIENT)
Dept: RADIATION ONCOLOGY | Facility: HOSPITAL | Age: 54
Discharge: HOME | End: 2019-01-16

## 2019-01-16 PROCEDURE — 77412 RADIATION TX DELIVERY LVL 3: CPT | Performed by: RADIOLOGY

## 2019-01-17 ENCOUNTER — HOSPITAL ENCOUNTER (OUTPATIENT)
Dept: RADIATION ONCOLOGY | Facility: HOSPITAL | Age: 54
Discharge: HOME | End: 2019-01-17

## 2019-01-17 PROCEDURE — 77412 RADIATION TX DELIVERY LVL 3: CPT | Performed by: RADIOLOGY

## 2019-01-18 ENCOUNTER — TRANSCRIBE ORDERS (OUTPATIENT)
Dept: ADMINISTRATIVE | Facility: HOSPITAL | Age: 54
End: 2019-01-18

## 2019-01-18 ENCOUNTER — LAB (OUTPATIENT)
Dept: LAB | Facility: HOSPITAL | Age: 54
End: 2019-01-18

## 2019-01-18 ENCOUNTER — ANTICOAGULATION VISIT (OUTPATIENT)
Dept: CARDIOLOGY | Facility: CLINIC | Age: 54
End: 2019-01-18

## 2019-01-18 DIAGNOSIS — I48.91 ATRIAL FIBRILLATION, UNSPECIFIED TYPE (HCC): Primary | ICD-10-CM

## 2019-01-18 LAB
INR PPP: 1.2 (ref 0.91–1.09)
PERFORM POINT OF CARE ON DEVICE: NORMAL
PROTHROMBIN TIME: 14.1 SECONDS (ref 10–13.8)

## 2019-01-18 PROCEDURE — 85610 PROTHROMBIN TIME: CPT | Performed by: NURSE PRACTITIONER

## 2019-01-21 ENCOUNTER — APPOINTMENT (OUTPATIENT)
Dept: PHYSICAL THERAPY | Facility: HOSPITAL | Age: 54
End: 2019-01-21

## 2019-01-22 ENCOUNTER — HOSPITAL ENCOUNTER (OUTPATIENT)
Dept: RADIATION ONCOLOGY | Facility: HOSPITAL | Age: 54
Discharge: HOME | End: 2019-01-22

## 2019-01-22 PROCEDURE — 77412 RADIATION TX DELIVERY LVL 3: CPT | Performed by: RADIOLOGY

## 2019-01-23 ENCOUNTER — HOSPITAL ENCOUNTER (OUTPATIENT)
Dept: RADIATION ONCOLOGY | Facility: HOSPITAL | Age: 54
Discharge: HOME | End: 2019-01-23

## 2019-01-23 PROCEDURE — 77417 THER RADIOLOGY PORT IMAGE(S): CPT | Performed by: RADIOLOGY

## 2019-01-23 PROCEDURE — 77412 RADIATION TX DELIVERY LVL 3: CPT | Performed by: RADIOLOGY

## 2019-01-24 ENCOUNTER — HOSPITAL ENCOUNTER (OUTPATIENT)
Dept: RADIATION ONCOLOGY | Facility: HOSPITAL | Age: 54
Discharge: HOME | End: 2019-01-24

## 2019-01-24 PROCEDURE — 77412 RADIATION TX DELIVERY LVL 3: CPT | Performed by: RADIOLOGY

## 2019-01-24 PROCEDURE — 77336 RADIATION PHYSICS CONSULT: CPT | Performed by: RADIOLOGY

## 2019-01-25 ENCOUNTER — TRANSCRIBE ORDERS (OUTPATIENT)
Dept: ADMINISTRATIVE | Facility: HOSPITAL | Age: 54
End: 2019-01-25

## 2019-01-25 ENCOUNTER — LAB (OUTPATIENT)
Dept: LAB | Facility: HOSPITAL | Age: 54
End: 2019-01-25

## 2019-01-25 ENCOUNTER — ANTICOAGULATION VISIT (OUTPATIENT)
Dept: CARDIOLOGY | Facility: CLINIC | Age: 54
End: 2019-01-25

## 2019-01-25 DIAGNOSIS — I48.91 ATRIAL FIBRILLATION, UNSPECIFIED TYPE (HCC): Primary | ICD-10-CM

## 2019-01-25 LAB
INR PPP: 2.7 (ref 0.91–1.09)
PERFORM POINT OF CARE ON DEVICE: NORMAL
PROTHROMBIN TIME: 32.8 SECONDS (ref 10–13.8)

## 2019-01-25 PROCEDURE — 85610 PROTHROMBIN TIME: CPT | Performed by: NURSE PRACTITIONER

## 2019-02-01 ENCOUNTER — ANTICOAGULATION VISIT (OUTPATIENT)
Dept: CARDIOLOGY | Facility: CLINIC | Age: 54
End: 2019-02-01

## 2019-02-01 ENCOUNTER — TRANSCRIBE ORDERS (OUTPATIENT)
Dept: ADMINISTRATIVE | Facility: HOSPITAL | Age: 54
End: 2019-02-01

## 2019-02-01 ENCOUNTER — LAB (OUTPATIENT)
Dept: LAB | Facility: HOSPITAL | Age: 54
End: 2019-02-01

## 2019-02-01 DIAGNOSIS — I48.91 ATRIAL FIBRILLATION, UNSPECIFIED TYPE (HCC): Primary | ICD-10-CM

## 2019-02-01 LAB
INR PPP: 2.3 (ref 0.91–1.09)
PERFORM POINT OF CARE ON DEVICE: NORMAL
PROTHROMBIN TIME: 27.6 SECONDS (ref 10–13.8)

## 2019-02-01 PROCEDURE — 85610 PROTHROMBIN TIME: CPT | Performed by: NURSE PRACTITIONER

## 2019-02-11 ENCOUNTER — HOSPITAL ENCOUNTER (OUTPATIENT)
Dept: PHYSICAL THERAPY | Facility: HOSPITAL | Age: 54
Setting detail: THERAPIES SERIES
End: 2019-02-11

## 2019-02-15 ENCOUNTER — HOSPITAL ENCOUNTER (OUTPATIENT)
Dept: PHYSICAL THERAPY | Facility: HOSPITAL | Age: 54
Setting detail: THERAPIES SERIES
Discharge: HOME | End: 2019-02-15

## 2019-02-15 DIAGNOSIS — I89.0 LYMPHEDEMA OF BREAST: Primary | ICD-10-CM

## 2019-02-15 PROCEDURE — 97140 MANUAL THERAPY 1/> REGIONS: CPT | Performed by: PHYSICAL THERAPIST

## 2019-02-15 NOTE — THERAPY DISCHARGE NOTE
Outpatient Physical Therapy Lymphedema Progress Note/Discharge Summary  [facility]     Patient Name: Anthony Meza  : 1965  MRN: [id number]  Today's Date: 2/15/2019      Visit Date: 02/15/2019    Visit Dx:    ICD-10-CM ICD-9-CM   1. Lymphedema of breast I89.0 457.1       Patient Active Problem List   Diagnosis   • Abnormal ECG   • Fibromyalgia   • CFS (chronic fatigue syndrome)   • Essential hypertension   • Chronic midline low back pain without sciatica   • HARLAN on CPAP   • Atrial fibrillation (CMS/HCC)   • Family history of early CAD father in 50 s   • Body mass index (BMI) of 50-59.9 in adult (CMS/HCC)   • Lower extremity edema   • Vitamin D deficiency   • Palpitations   • Dyspnea on exertion   • Ductal carcinoma in situ (DCIS) of left breast   • S/P lymph node biopsy   • S/P lumpectomy, left breast   • Non-smoker        Lymphedema     Row Name 02/15/19 0800             Subjective Pain    Able to rate subjective pain?  yes  -HR      Pre-Treatment Pain Level  1  -HR      Subjective Pain Comment  still slightly tender under the breast if she wears a bra  -HR         Subjective Comments    Subjective Comments  She is getting better each day.  -HR         Lymphedema Assessment    Radiation Treatments #/Timeframe  28 completed   -HR         Lymphedema Measurements    Measurement Type(s)  Quick Girth  -HR      Quick Girth Areas  Upper extremities  -HR         LUE Quick Girth (cm)    Axilla  48.9 cm  -HR      Mid upper arm  48.6 cm  -HR      Elbow  35.9 cm  -HR      Mid forearm  31 cm  -HR      Wrist crease  17.5 cm  -HR      Web space  19 cm  -HR      LUE Quick Girth Total  200.9  -HR         Manual Lymphatic Drainage    Manual Lymphatic Drainage  initial sequence;opened regional lymph nodes;opened anastamoses;extremity treatment  -HR      Initial Sequence  short neck;abdomen;diaphragmatic breathing  -HR      Abdomen  superficial  -HR      Opened Regional Lymph Nodes  axillary;inguinal  -HR       Axillary  right  -HR      Inguinal  left  -HR      Opened Anastamoses  anterior axillo-axillary;posterior axillo-axillary;axillo-inguinal  -HR      Axillo-Inguinal  left  -HR      Extremity Treatment  MLD to full limb;other extremity treatment;extremity treatment focus on  -HR      MLD to Full Limb  LUE  -HR      Manual Lymphatic Drainage Comments  skin is healed from radiation. Brown areas have mostly peeled off.  -HR         Compression/Skin Care    Compression/Skin Care Comments  working up tolerance to bra  -HR        User Key  (r) = Recorded By, (t) = Taken By, (c) = Cosigned By    Initials Name Provider Type    HR Aminata Ryder, PT, DPT, CLT-CAREN Physical Therapist                        PT Assessment/Plan     Row Name 02/15/19 0800          PT Assessment    Assessment Comments  She has recovered well since completing radiation. She continues to work on her self massage and feels comfortable with it. The breast and lateral trunk are soft today which supports the fact that she is keeping up with her self MLD. She has no further concerns at this time and will be discharged.   -HR        PT Plan    PT Plan Comments  DC to continue home maintenance and return with new referral as needed.   -HR       User Key  (r) = Recorded By, (t) = Taken By, (c) = Cosigned By    Initials Name Provider Type    HR Aminata Ryder, PT, DPT, CLT-CAREN Physical Therapist               Exercises     Row Name 02/15/19 0800             Subjective Comments    Subjective Comments  She is getting better each day.  -HR         Subjective Pain    Able to rate subjective pain?  yes  -HR      Pre-Treatment Pain Level  1  -HR      Subjective Pain Comment  still slightly tender under the breast if she wears a bra  -HR        User Key  (r) = Recorded By, (t) = Taken By, (c) = Cosigned By    Initials Name Provider Type    Aminata Aquino, PT, DPT, CLT-CAREN Physical Therapist                        PT OP Goals     Row Name  02/15/19 0800          PT Short Term Goals    STG Date to Achieve  --  -HR     STG 1  Patient will be independent with remedial HEP for LUQ lymphedema.  -HR     STG 1 Progress  Met  -HR     STG 2  Patient will be independent with self MLD with assist of family as needed.  -HR     STG 2 Progress  Met  -HR     STG 3  Patient will report decrease in discomfort of L axillary region with initiation of MLD.  -HR     STG 3 Progress  Met  -HR        Long Term Goals    LTG Date to Achieve  02/16/19  -HR     LTG 1  Patient will be reassessed after completion of radiation.  -HR     LTG 1 Progress  Met  -HR       User Key  (r) = Recorded By, (t) = Taken By, (c) = Cosigned By    Initials Name Provider Type    HR Aminata Ryder, PT, DPT, MONSE Physical Therapist          Therapy Education  Given: Edema management  Program: Reinforced  How Provided: Verbal  Provided to: Patient  Level of Understanding: Verbalized              Time Calculation:       Therapy Suggested Charges     Code   Minutes Charges    None           Therapy Charges for Today     Code Description Service Date Service Provider Modifiers Qty    [id number]  PT MANUAL THERAPY EA 15 MIN 2/15/2019 Aminata Ryder, PT, DPT, MONSE GP 3                 OP PT Discharge Summary  Date of Discharge: 02/15/19  Reason for Discharge: All goals achieved  Outcomes Achieved: Able to achieve all goals within established timeline  Discharge Destination: Home with home program      Aminata Ryder PT, ADAMARIS, MONSE  2/15/2019

## 2019-02-19 ENCOUNTER — OFFICE VISIT (OUTPATIENT)
Dept: INTERNAL MEDICINE | Facility: CLINIC | Age: 54
End: 2019-02-19

## 2019-02-19 VITALS
HEIGHT: 66 IN | WEIGHT: 293 LBS | DIASTOLIC BLOOD PRESSURE: 80 MMHG | BODY MASS INDEX: 47.09 KG/M2 | HEART RATE: 70 BPM | OXYGEN SATURATION: 99 % | SYSTOLIC BLOOD PRESSURE: 116 MMHG | RESPIRATION RATE: 12 BRPM

## 2019-02-19 DIAGNOSIS — I10 ESSENTIAL HYPERTENSION: Primary | ICD-10-CM

## 2019-02-19 DIAGNOSIS — J30.9 ALLERGIC RHINITIS, UNSPECIFIED SEASONALITY, UNSPECIFIED TRIGGER: ICD-10-CM

## 2019-02-19 DIAGNOSIS — D05.12 DUCTAL CARCINOMA IN SITU (DCIS) OF LEFT BREAST: ICD-10-CM

## 2019-02-19 DIAGNOSIS — I48.0 PAROXYSMAL ATRIAL FIBRILLATION (HCC): ICD-10-CM

## 2019-02-19 PROBLEM — R06.09 DYSPNEA ON EXERTION: Status: RESOLVED | Noted: 2018-08-17 | Resolved: 2019-02-19

## 2019-02-19 PROCEDURE — 99214 OFFICE O/P EST MOD 30 MIN: CPT | Performed by: NURSE PRACTITIONER

## 2019-02-19 RX ORDER — LORATADINE 10 MG/1
10 TABLET ORAL DAILY
Qty: 30 TABLET | Refills: 2 | Status: SHIPPED | OUTPATIENT
Start: 2019-02-19 | End: 2020-08-28 | Stop reason: SDUPTHER

## 2019-02-19 NOTE — PROGRESS NOTES
CC: 3 month follow up     History:  Anthony Meza is a 53 y.o. female who presents today for evaluation of the above problems.      Has finished radiation.  Does not believe that she will be having chemo.   Has had a cough for about a month. Also has rhinorrhea at times.  No other symptoms.    Has tenderness in her left breast at times and does still have fatigue related to her radiation therapy.    No other issues at this time.    ROS:  Review of Systems   Constitutional: Positive for fatigue. Negative for fever.   HENT: Positive for rhinorrhea. Negative for congestion, sinus pressure, sinus pain, sneezing and sore throat.    Respiratory: Positive for cough. Negative for shortness of breath and wheezing.    Cardiovascular: Negative for chest pain.   Genitourinary:        Left breast tenderness   Neurological: Negative for dizziness, light-headedness and headaches.       Allergies   Allergen Reactions   • Penicillins Rash     Past Medical History:   Diagnosis Date   • A-fib (CMS/HCC)    • Abnormal ECG    • Anxiety    • Arthritis    • Atrial fibrillation (CMS/HCC)    • Back pain    • CFS (chronic fatigue syndrome)    • Chest pain    • Chronic pain disorder    • Depression    • Dizziness    • Ductal carcinoma in situ (DCIS) of left breast    • Fibromyalgia    • Hypertension    • IBS (irritable bowel syndrome)    • Incontinence    • Migraines    • Mononucleosis    • Sleep apnea     uses a c-pap   • Wears glasses      Past Surgical History:   Procedure Laterality Date   • ANKLE SURGERY      x2 left   • BREAST LUMPECTOMY WITH SENTINEL NODE BIOPSY Left 10/15/2018    Procedure: LEFT BREAST LUMPECTOMY WITH NEEDLE LOCALIZATION - MAMMOGRAM GUIDED AND SENTINEL LYMPH  NODE BIOPSY - RADIOLOGIST TO INJECT AND SCAN;  Surgeon: Rafael Porter MD;  Location: [facility] OR;  Service: General   • CARDIAC CATHETERIZATION Bilateral 1/29/2018    Procedure: Coronary angiography;  Surgeon: Joel Medina MD;  Location: [facility] CATH INVASIVE  LOCATION;  Service:    • CARDIAC CATHETERIZATION N/A 1/29/2018    Procedure: Left Heart Cath;  Surgeon: Joel Medina MD;  Location:  [facility] CATH INVASIVE LOCATION;  Service:    • CYST REMOVAL      from tailbone   • TUBAL ABDOMINAL LIGATION     • WISDOM TOOTH EXTRACTION       Family History   Problem Relation Age of Onset   • Transient ischemic attack Mother    • Rheum arthritis Mother    • Fibromyalgia Mother    • Heart disease Father    • Asthma Father    • Hypertension Father    • Rheum arthritis Father    • Obesity Father    • Obesity Sister    • Obesity Maternal Grandmother    • Ovarian cancer Maternal Grandmother    • Obesity Paternal Grandfather    • Hypertension Paternal Grandfather    • Heart disease Paternal Grandfather    • Liver cancer Maternal Aunt    • Breast cancer Neg Hx       reports that  has never smoked. she has never used smokeless tobacco. She reports that she does not drink alcohol or use drugs.      Current Outpatient Medications:   •  acetaminophen (TYLENOL) 325 MG tablet, Take 650 mg by mouth Every 6 (Six) Hours As Needed for Mild Pain  or Headache., Disp: , Rfl:   •  aspirin 81 MG tablet, Take 81 mg by mouth Daily., Disp: , Rfl:   •  cholecalciferol (VITAMIN D3) 1000 units tablet, Take 1,000 Units by mouth Daily. 10/8/2018, Disp: , Rfl:   •  Cyanocobalamin (VITAMIN B-12) 1000 MCG sublingual tablet, Place 1 tablet under the tongue Daily. Hold 10/8/2018, Disp: , Rfl:   •  DULoxetine (CYMBALTA) 60 MG capsule, Take 60 mg by mouth Daily., Disp: , Rfl:   •  metoprolol tartrate (LOPRESSOR) 25 MG tablet, TAKE 1 TABLET BY MOUTH 2 (TWO) TIMES A DAY., Disp: 180 tablet, Rfl: 0  •  oxybutynin XL (DITROPAN-XL) 5 MG 24 hr tablet, Take 1 tablet by mouth Daily., Disp: 30 tablet, Rfl: 5  •  topiramate (TOPAMAX) 25 MG tablet, Take 1 tablet by mouth 2 (Two) Times a Day., Disp: 60 tablet, Rfl: 3  •  Acetaminophen-Caffeine (TENSION HEADACHE RELIEF) 500-65 MG tablet, Take 1 tablet by mouth Every 6 (Six) Hours As  "Needed (migraine)., Disp: , Rfl:   •  hydrochlorothiazide (HYDRODIURIL) 25 MG tablet, Take 1 tablet by mouth Daily. (Patient taking differently: Take 25 mg by mouth Daily As Needed.), Disp: 30 tablet, Rfl: 5  •  lidocaine (LMX) 4 % cream, Apply  topically to the appropriate area as directed As Needed for Mild Pain ., Disp: 120 g, Rfl: 5  •  loratadine (CLARITIN) 10 MG tablet, Take 1 tablet by mouth Daily., Disp: 30 tablet, Rfl: 2  •  warfarin (COUMADIN) 5 MG tablet, Take 10mg every T/T/S/S and 7.5mg M/W/F w/additional tablet as directed per [facility] for sub-therapeutic INR. (Patient taking differently: 7.5 mg  Sunday, Monday,  10mg T,W,T,Friday,Sat), Disp: 60 tablet, Rfl: 11    OBJECTIVE:  /80 (BP Location: Left arm, Patient Position: Sitting, Cuff Size: Large Adult)   Pulse 70   Resp 12   Ht 167.6 cm (66\")   Wt (!) 153 kg (336 lb 7 oz)   LMP  (LMP Unknown)   SpO2 99%   BMI 54.30 kg/m²    Physical Exam   Constitutional: She is oriented to person, place, and time. Vital signs are normal. She appears well-developed and well-nourished.   Cardiovascular: Normal rate and regular rhythm.   Pulmonary/Chest: Effort normal and breath sounds normal.   Neurological: She is alert and oriented to person, place, and time.   Psychiatric: She has a normal mood and affect. Her behavior is normal.   Vitals reviewed.      Assessment/Plan    Anthony was seen today for follow-up and cough.    Diagnoses and all orders for this visit:    Essential hypertension  Well controlled.  Continue metoprolol.     Paroxysmal atrial fibrillation (CMS/HCC)  Stable on aspirin, coumadin,  and metoprolol.   Managed by cardiology.  No changes at this time.     Ductal carcinoma in situ (DCIS) of left breast    Allergic rhinitis, unspecified seasonality, unspecified trigger  -     loratadine (CLARITIN) 10 MG tablet; Take 1 tablet by mouth Daily.  Will start claritin for allergic rhinitis.         She is advised to complete Cologuard " testing that was previously ordered.  She does have kit at home.     An After Visit Summary was printed and given to the patient at discharge.  Return in about 6 months (around 8/19/2019).         Rhonda Zapien, APRN  2/19/2019

## 2019-03-01 ENCOUNTER — TRANSCRIBE ORDERS (OUTPATIENT)
Dept: ADMINISTRATIVE | Facility: HOSPITAL | Age: 54
End: 2019-03-01

## 2019-03-01 ENCOUNTER — LAB (OUTPATIENT)
Dept: LAB | Facility: HOSPITAL | Age: 54
End: 2019-03-01

## 2019-03-01 DIAGNOSIS — I48.91 ATRIAL FIBRILLATION, UNSPECIFIED TYPE (HCC): Primary | ICD-10-CM

## 2019-03-01 LAB — PERFORM POINT OF CARE ON DEVICE: NORMAL

## 2019-03-01 PROCEDURE — 85610 PROTHROMBIN TIME: CPT | Performed by: NURSE PRACTITIONER

## 2019-03-08 ENCOUNTER — HOSPITAL ENCOUNTER (OUTPATIENT)
Dept: RADIATION ONCOLOGY | Facility: HOSPITAL | Age: 54
Setting detail: RADIATION/ONCOLOGY SERIES
End: 2019-03-08

## 2019-03-11 LAB
INR PPP: 3.4 (ref 0.91–1.09)
PROTHROMBIN TIME: 40.7 SECONDS (ref 10–13.8)

## 2019-03-13 PROBLEM — Z92.3 HISTORY OF RADIATION THERAPY: Status: ACTIVE | Noted: 2018-11-15

## 2019-03-15 ENCOUNTER — ANTICOAGULATION VISIT (OUTPATIENT)
Dept: CARDIOLOGY | Facility: CLINIC | Age: 54
End: 2019-03-15

## 2019-03-20 ENCOUNTER — DOCUMENTATION (OUTPATIENT)
Dept: PHYSICAL THERAPY | Facility: HOSPITAL | Age: 54
End: 2019-03-20

## 2019-04-01 ENCOUNTER — HOSPITAL ENCOUNTER (OUTPATIENT)
Dept: RADIATION ONCOLOGY | Facility: HOSPITAL | Age: 54
Setting detail: RADIATION/ONCOLOGY SERIES
End: 2019-04-01

## 2019-04-03 NOTE — PROGRESS NOTES
[facility] Marguerite Brown, MALATHIN, PA-C  ____________________________________________________________  [facility]  Department of Radiation Oncology  [address] [place] [id number]  Office:  [phone]  Fax: [phone]    DATE: 04/04/2019    PATIENT:   Anthony Meza    1965                                   MEDICAL RECORD #:  [id number]                                                       Reason for Visit: Anthony Meza is a very pleasant 53 y.o. patient that has completed radiation therapy and returns to the clinic today for inital follow up exam. Last seen in our clinic on 11/16/2018, the patient is doing well with no new significant treatment or disease related. Reports fatigue and seasonal allergies. Denies activity change, appetite change, unexpected weight change, nausea/vomiting, diarrhea, light-headedness, weakness, and headaches.    History of Present Illness:  Anthony Meza was diagnosed in September 2018 with Stage 0 (Tis, N0, cM0, G1) DCIS of left breast. She underwent lumpectomy, 4 LN negative for metastatic carcinoma, was treated with adjuvant radiation therapy with completion on 01/24/2019.     08/28/2018 - Bilateral Screening Mammogram:  • New cluster of microcalcifications in the upper outer left breast that need additional magnification views. BI-RADS 0.  • Multiple bilateral breast nodules are likely benign. Nodules are more easily seen on the tomographic images but the overall pattern as well as bilaterality suggest a benign process.  • Computer aided detection was utilized. 3-D Tomosynthesis was performed.    09/11/2018 - Diagnostic Left Breast Mammogram:  • Indeterminate left breast calcifications.  • ACR BI-RADS Category 4, suspicious, biopsy recommended.    09/20/2018 - Left breast, 2:00, core biopsies:  • Low-grade ductal carcinoma in situ with associated microcalcifications.  • Fibrocystic changes with associated  microcalcifications.  Comment: Ductal carcinoma in situ is also present in areas not associated with microcalcifications.    10/15/2018 - Left breast lumpectomy and lymph node biopsy performed by :  • Left breast, upper outer quadrant, lumpectomy with needle localization:  o No residual mammary carcinoma identified.  o The inked margins of surgical excision are negative for malignancy.  o Changes of previous biopsy site including fat necrosis and reactive fibrosis.  o Fibrocystic changes of breast with papillary apocrine change.  o Columnar cell change without atypia.  o Foci of ductal hyperplasia without atypia.  o Microcalcifications identified.  o Lymph nodes (3), negative for metastatic mammary carcinoma.  o Overlying skin, negative for malignancy.  • [place] and non-sentinel lymph nodes, left axilla, excisional biopsy:  o [place] lymph node (1), negative for metastatic mammary carcinoma.  o Non-sentinel lymph node (1), negative for metastatic mammary carcinoma.    11/16/2018 - Consult with :  • We will simulate treatment fields at this time, with plans to begin adjuvant radiation therapy in the near future.    • I anticipate a dose of 5040 cGy in 28 fractions.    12/04/2018 - 1/24/2019 - Completed Radiation course:  • Received 5040 cGy in 28 fractions to left breast via external beam radiation therapy.    History obtained from  PATIENT and CHART    PAST MEDICAL HISTORY   Past Medical History:   Diagnosis Date   • A-fib (CMS/HCC)    • Abnormal ECG    • Anxiety    • Arthritis    • Atrial fibrillation (CMS/HCC)    • Back pain    • CFS (chronic fatigue syndrome)    • Chest pain    • Chronic pain disorder    • Depression    • Dizziness    • Ductal carcinoma in situ (DCIS) of left breast    • Fibromyalgia    • Hypertension    • IBS (irritable bowel syndrome)    • Incontinence    • Migraines    • Mononucleosis    • Sleep apnea     uses a c-pap   • Wears glasses       PAST SURGICAL HISTORY   Past  Surgical History:   Procedure Laterality Date   • ANKLE SURGERY      x2 left   • BREAST LUMPECTOMY WITH SENTINEL NODE BIOPSY Left 10/15/2018    Procedure: LEFT BREAST LUMPECTOMY WITH NEEDLE LOCALIZATION - MAMMOGRAM GUIDED AND SENTINEL LYMPH  NODE BIOPSY - RADIOLOGIST TO INJECT AND SCAN;  Surgeon: Rafael Porter MD;  Location:  [facility] OR;  Service: General   • CARDIAC CATHETERIZATION Bilateral 1/29/2018    Procedure: Coronary angiography;  Surgeon: Joel Medina MD;  Location:  [facility] CATH INVASIVE LOCATION;  Service:    • CARDIAC CATHETERIZATION N/A 1/29/2018    Procedure: Left Heart Cath;  Surgeon: Joel Medina MD;  Location:  [facility] CATH INVASIVE LOCATION;  Service:    • CYST REMOVAL      from tailbone   • TUBAL ABDOMINAL LIGATION     • WISDOM TOOTH EXTRACTION        FAMILY HISTORY  family history includes Asthma in her father; Fibromyalgia in her mother; Heart disease in her father and paternal grandfather; Hypertension in her father and paternal grandfather; Liver cancer in her maternal aunt; Obesity in her father, maternal grandmother, paternal grandfather, and sister; Ovarian cancer in her maternal grandmother; Rheum arthritis in her father and mother; Transient ischemic attack in her mother.     SOCIAL HISTORY   Social History     Tobacco Use   • Smoking status: Never Smoker   • Smokeless tobacco: Never Used   Substance Use Topics   • Alcohol use: No   • Drug use: No      ALLERGIES  Penicillins      MEDICATIONS   Current Outpatient Medications   Medication Sig Dispense Refill   • acetaminophen (TYLENOL) 325 MG tablet Take 650 mg by mouth Every 6 (Six) Hours As Needed for Mild Pain  or Headache.     • Acetaminophen-Caffeine (TENSION HEADACHE RELIEF) 500-65 MG tablet Take 1 tablet by mouth Every 6 (Six) Hours As Needed (migraine).     • aspirin 81 MG tablet Take 81 mg by mouth Daily.     • cholecalciferol (VITAMIN D3) 1000 units tablet Take 1,000 Units by mouth Daily. 10/8/2018     • Cyanocobalamin (VITAMIN  B-12) 1000 MCG sublingual tablet Place 1 tablet under the tongue Daily. Hold 10/8/2018     • DULoxetine (CYMBALTA) 60 MG capsule Take 60 mg by mouth Daily.     • hydrochlorothiazide (HYDRODIURIL) 25 MG tablet Take 1 tablet by mouth Daily. (Patient taking differently: Take 25 mg by mouth Daily As Needed (Takes as needed for swelling).) 30 tablet 5   • loratadine (CLARITIN) 10 MG tablet Take 1 tablet by mouth Daily. 30 tablet 2   • metoprolol tartrate (LOPRESSOR) 25 MG tablet TAKE 1 TABLET BY MOUTH 2 (TWO) TIMES A DAY. 180 tablet 0   • oxybutynin XL (DITROPAN-XL) 5 MG 24 hr tablet Take 1 tablet by mouth Daily. 30 tablet 5   • topiramate (TOPAMAX) 25 MG tablet Take 1 tablet by mouth 2 (Two) Times a Day. 60 tablet 3   • warfarin (COUMADIN) 5 MG tablet Take 10mg every T/T/S/S and 7.5mg M/W/F w/additional tablet as directed per [facility] for sub-therapeutic INR. (Patient taking differently: 7.5 mg  Sunday, Monday,  10mg T,W,T,Friday,Sat) 60 tablet 11     No current facility-administered medications for this visit.       The following portions of the patient's history were reviewed and updated as appropriate: allergies, current medications, past family history, past medical history, past social history, past surgical history and problem list.     REVIEW OF SYSTEMS  Review of Systems   Constitutional: Positive for fatigue. Negative for activity change, appetite change, chills, diaphoresis, fever and unexpected weight change.   HENT: Negative.         Seasonal allergies   Eyes: Negative.    Respiratory: Negative.    Cardiovascular: Negative.         Atrial fibrillation     Gastrointestinal: Negative.    Endocrine: Negative.    Genitourinary: Negative.         Ditropan   Musculoskeletal: Negative.         Fibromyalgia   Skin: Negative.    Allergic/Immunologic: Negative.    Neurological: Negative.    Hematological: Negative.    Psychiatric/Behavioral: Negative.      PHYSICAL EXAM  VITAL SIGNS:   Vitals:    04/04/19 1505  "  BP: 126/64   Weight: (!) 154 kg (339 lb)   Height: 167.6 cm (66\")   PainSc: 0-No pain      General:  Alert and oriented, no acute distress, well developed, Vitals reviewed.  Head/Neck:  Normocephalic, without obvious abnormality, atraumatic.  The trachea is midline.   Nose/Sinuses:  Nares normal. Septum midline. Mucosa normal. No drainage or sinus tenderness.  Mouth/Throat:  Mucosa moist, no lesions; pharynx without erythema, edema or exudate.  Neck:  supple, no mass, non-tender  Eyes: No gross abnormalities,  no scleral jaundice or erythema.    Ears: Ears appear intact with no abnormalities noted, hearing grossly normal  Chest:  Respiratory efforts are normal and unlabored, chest is clear to auscultation. There are no wheezes, rhonchi, rales.  Cardiovascular: Regular rate and rhythm without murmurs, rubs, or gallops.   Abdomen:  Soft, non-tender, normal bowel sounds; no noted organomegaly or masses. no CVA tenderness   Breast: right breast normal without mass, skin or nipple changes or axillary nodes, s/p radiation therapy to left breast, surgical scars noted left breast, risk and benefit of breast self-exam was discussed.  Extremities:  MENDOZA well, warm to touch, no evidence of cyanosis or edema.  Lymphatics:  No evidence of adenopathy in the cervical or supraclavicular areas.  Skin: No suspicious lesions or rashes of concern, skin color, texture, turgor are normal  Neurologic:  Alert and oriented, gait normal, strength and sensation grossly normal  Psych: Mood and affect are appropriate for circumstance. Eye contact is appropriate.     Performance Status: ECOG (0) Fully active, able to carry on all predisease performance without restriction    Clinical Quality Measures  -Pain Documented  Pain severity quantified; no pain present, no followup plan required.0   -Advanced Care Planning Care plan discussed, no care plan provided  -Body Mass Index Screening and Follow-Up Plan  Patient's Body mass index is 54.72 kg/m². " "BMI is above normal parameters. Recommendations include: educational material.  -Tobacco Use: Screening and Cessation Intervention Social History    Tobacco Use      Smoking status: Never Smoker      Smokeless tobacco: Never Used    BREAST PQRS #71   TMX/AI Stg IC-IIIC: Is documented/Prescribed  AJCC Stage: 0    ASSESSMENT AND PLAN   1. Ductal carcinoma in situ (DCIS) of left breast    2. S/P lumpectomy, left breast    3. History of radiation therapy    4. Non-smoker       Orders Placed This Encounter   Procedures   • Mammo Diagnostic Left With CAD     Standing Status:   Future     Standing Expiration Date:   4/4/2020     Order Specific Question:   Is an Ultrasound Breast required?  If 'Yes\", Please enter an order for the US Breast as well.     Answer:   No     Order Specific Question:   Reason for Exam:     Answer:   s/p surgery and radiation for DCIS   • Mammo Screening Bilateral With CAD     Standing Status:   Future     Standing Expiration Date:   4/4/2020     Order Specific Question:   Reason for Exam:     Answer:   annual screening with history of DCIS in left breast treated with surgery and radiation     RECOMMENDATIONS:   Anthony Meza is status post completion of radiation therapy and presents to our clinic today for inital follow up exam. Mrs Meza was diagnosed in September 2018 with Stage 0 (Tis, N0, cM0, G1) DCIS of left breast. She underwent lumpectomy and LN biopsy, 4 LN negative for metastatic carcinoma, was treated with adjuvant radiation therapy with completion on 01/24/2019.  She has done well since completion of radiation therapy, we will schedule a left diagnostic mammogram for post radiation baseline. Pt is without symptoms or evidence for recurrent or metastatic disease at this time and denies untoward side effects from treatment.     Will continue follow-up/surveillance as discussed and they will continue to see the other health care providers as per their scheduling.    Patient Instructions "   Will schedule a LEFT sided diagnostic mammogram.   Continue self breast exams and report any unusual findings.  Continue to follow with your PCP  Return to  in 6 months for routine evaluation or sooner if needed.       Todays appointment time was spent in counseling and coordination of care as follows: diagnosis, intent of treatment discussing radiation therapy specifics: logistics, possible and probable side effects and after effects, staging of cancer, standard of care in for this stage of this cancer and treatment options  Rajat Bryant III, MD  04/04/2019

## 2019-04-04 ENCOUNTER — OFFICE VISIT (OUTPATIENT)
Dept: RADIATION ONCOLOGY | Facility: HOSPITAL | Age: 54
End: 2019-04-04

## 2019-04-04 VITALS
HEIGHT: 66 IN | BODY MASS INDEX: 47.09 KG/M2 | SYSTOLIC BLOOD PRESSURE: 126 MMHG | DIASTOLIC BLOOD PRESSURE: 64 MMHG | WEIGHT: 293 LBS

## 2019-04-04 DIAGNOSIS — Z98.890 S/P LUMPECTOMY, LEFT BREAST: ICD-10-CM

## 2019-04-04 DIAGNOSIS — Z78.9 NON-SMOKER: ICD-10-CM

## 2019-04-04 DIAGNOSIS — Z92.3 HISTORY OF RADIATION THERAPY: ICD-10-CM

## 2019-04-04 DIAGNOSIS — D05.12 DUCTAL CARCINOMA IN SITU (DCIS) OF LEFT BREAST: Primary | ICD-10-CM

## 2019-04-04 NOTE — PATIENT INSTRUCTIONS
Will schedule a LEFT sided diagnostic mammogram.   Continue self breast exams and report any unusual findings.  Continue to follow with your PCP  Return to  in 6 months for routine evaluation or sooner if needed.

## 2019-04-05 PROBLEM — Z12.31 ENCOUNTER FOR SCREENING MAMMOGRAM FOR MALIGNANT NEOPLASM OF BREAST: Status: ACTIVE | Noted: 2019-04-05

## 2019-05-20 ENCOUNTER — OFFICE VISIT (OUTPATIENT)
Dept: CARDIOLOGY | Facility: CLINIC | Age: 54
End: 2019-05-20

## 2019-05-20 VITALS
OXYGEN SATURATION: 95 % | HEART RATE: 57 BPM | WEIGHT: 293 LBS | HEIGHT: 66 IN | SYSTOLIC BLOOD PRESSURE: 120 MMHG | DIASTOLIC BLOOD PRESSURE: 80 MMHG | BODY MASS INDEX: 47.09 KG/M2

## 2019-05-20 DIAGNOSIS — G89.29 CHRONIC MIDLINE LOW BACK PAIN WITHOUT SCIATICA: ICD-10-CM

## 2019-05-20 DIAGNOSIS — R00.2 PALPITATIONS: ICD-10-CM

## 2019-05-20 DIAGNOSIS — M54.50 CHRONIC MIDLINE LOW BACK PAIN WITHOUT SCIATICA: ICD-10-CM

## 2019-05-20 DIAGNOSIS — I48.0 PAROXYSMAL ATRIAL FIBRILLATION (HCC): Primary | ICD-10-CM

## 2019-05-20 DIAGNOSIS — R60.0 LOWER EXTREMITY EDEMA: ICD-10-CM

## 2019-05-20 PROCEDURE — 99214 OFFICE O/P EST MOD 30 MIN: CPT | Performed by: INTERNAL MEDICINE

## 2019-05-20 PROCEDURE — 93000 ELECTROCARDIOGRAM COMPLETE: CPT | Performed by: INTERNAL MEDICINE

## 2019-05-20 NOTE — PROGRESS NOTES
Anthony Meza  [id number]  1965  53 y.o.  female    Referring Provider: Rhonda Zapien APRN    Reason for  Visit:  Routine follow up.  Here for follow up after cardiac testing. Cardiac cath    Subjective    Increasing exertional shortness of breath on exertion relieved with rest  No significant cough or wheezing  Going on for several months or longer    No palpitations  No associated chest pain  No significant pedal edema    No fever or chills  No significant expectoration    No hemoptysis  No presyncope or syncope     Generalized weakness   Feels tired     History of present illness:  Anthony Meza is a 53 y.o. yo female with history of atrial fibrillation    who presents today for   Chief Complaint   Patient presents with   • Shortness of Breath     3 MON FU    .    History  Past Medical History:   Diagnosis Date   • A-fib (CMS/HCC)    • Abnormal ECG    • Anxiety    • Arthritis    • Atrial fibrillation (CMS/HCC)    • Back pain    • CFS (chronic fatigue syndrome)    • Chest pain    • Chronic pain disorder    • Depression    • Dizziness    • Ductal carcinoma in situ (DCIS) of left breast     BREAST LEFT   • Fibromyalgia    • Hypertension    • IBS (irritable bowel syndrome)    • Incontinence    • Migraines    • Mononucleosis    • Sleep apnea     uses a c-pap   • Wears glasses    ,   Past Surgical History:   Procedure Laterality Date   • ANKLE SURGERY      x2 left   • BREAST LUMPECTOMY WITH SENTINEL NODE BIOPSY Left 10/15/2018    Procedure: LEFT BREAST LUMPECTOMY WITH NEEDLE LOCALIZATION - MAMMOGRAM GUIDED AND SENTINEL LYMPH  NODE BIOPSY - RADIOLOGIST TO INJECT AND SCAN;  Surgeon: Rafael Porter MD;  Location: [facility] OR;  Service: General   • CARDIAC CATHETERIZATION Bilateral 1/29/2018    Procedure: Coronary angiography;  Surgeon: Joel Medina MD;  Location:  [facility] CATH INVASIVE LOCATION;  Service:    • CARDIAC CATHETERIZATION N/A 1/29/2018    Procedure: Left Heart Cath;  Surgeon: Joel Medina MD;  Location:   [facility] CATH INVASIVE LOCATION;  Service:    • CYST REMOVAL      from tailbone   • TUBAL ABDOMINAL LIGATION     • WISDOM TOOTH EXTRACTION     ,   Family History   Problem Relation Age of Onset   • Transient ischemic attack Mother    • Rheum arthritis Mother    • Fibromyalgia Mother    • Heart disease Father    • Asthma Father    • Hypertension Father    • Rheum arthritis Father    • Obesity Father    • Obesity Sister    • Obesity Maternal Grandmother    • Ovarian cancer Maternal Grandmother    • Obesity Paternal Grandfather    • Hypertension Paternal Grandfather    • Heart disease Paternal Grandfather    • Liver cancer Maternal Aunt    • Breast cancer Neg Hx    ,   Social History     Tobacco Use   • Smoking status: Never Smoker   • Smokeless tobacco: Never Used   Substance Use Topics   • Alcohol use: No   • Drug use: No   ,     Medications  Current Outpatient Medications   Medication Sig Dispense Refill   • acetaminophen (TYLENOL) 325 MG tablet Take 650 mg by mouth Every 6 (Six) Hours As Needed for Mild Pain  or Headache.     • Acetaminophen-Caffeine (TENSION HEADACHE RELIEF) 500-65 MG tablet Take 1 tablet by mouth Every 6 (Six) Hours As Needed (migraine).     • aspirin 81 MG tablet Take 81 mg by mouth Daily.     • cholecalciferol (VITAMIN D3) 1000 units tablet Take 1,000 Units by mouth Daily. 10/8/2018     • Cyanocobalamin (VITAMIN B-12) 1000 MCG sublingual tablet Place 1 tablet under the tongue Daily. Hold 10/8/2018     • DULoxetine (CYMBALTA) 60 MG capsule Take 60 mg by mouth Daily.     • hydrochlorothiazide (HYDRODIURIL) 25 MG tablet Take 1 tablet by mouth Daily. (Patient taking differently: Take 25 mg by mouth Daily As Needed (Takes as needed for swelling).) 30 tablet 5   • loratadine (CLARITIN) 10 MG tablet Take 1 tablet by mouth Daily. 30 tablet 2   • metoprolol tartrate (LOPRESSOR) 25 MG tablet TAKE 1 TABLET BY MOUTH 2 (TWO) TIMES A DAY. 180 tablet 0   • oxybutynin XL (DITROPAN-XL) 5 MG 24 hr tablet Take 1  "tablet by mouth Daily. 30 tablet 5   • topiramate (TOPAMAX) 25 MG tablet Take 1 tablet by mouth 2 (Two) Times a Day. 60 tablet 3   • warfarin (COUMADIN) 5 MG tablet Take 10mg every T/T/S/S and 7.5mg M/W/F w/additional tablet as directed per [facility] for sub-therapeutic INR. (Patient taking differently: 7.5 mg  Sunday, Monday,  10mg T,W,T,Friday,Sat) 60 tablet 11     No current facility-administered medications for this visit.        Allergies:  Penicillins    Review of Systems  Review of Systems   Constitution: Positive for weakness and malaise/fatigue.   HENT: Negative.    Eyes: Negative.    Cardiovascular: Positive for chest pain and dyspnea on exertion. Negative for claudication, cyanosis, irregular heartbeat, leg swelling, near-syncope, orthopnea, palpitations, paroxysmal nocturnal dyspnea and syncope.   Respiratory: Positive for cough.    Endocrine: Negative.    Hematologic/Lymphatic: Negative.    Skin: Negative.    Musculoskeletal: Positive for arthritis and back pain.   Gastrointestinal: Negative for anorexia.   Genitourinary: Negative.    Psychiatric/Behavioral: Negative.      Results for orders placed during the hospital encounter of 08/28/18   Adult Transthoracic Echo Complete W/ Cont if Necessary Per Protocol    Narrative · Left ventricular systolic function is normal. Estimated EF = 55%.  · Normal        Objective     Physical Exam:  /80   Pulse 57   Ht 167.6 cm (65.98\")   Wt (!) 154 kg (339 lb)   LMP  (LMP Unknown)   SpO2 95%   BMI 54.74 kg/m²   Physical Exam   Constitutional: She appears well-developed.   HENT:   Head: Normocephalic.   Neck: Normal carotid pulses and no JVD present. No tracheal tenderness present. Carotid bruit is not present. No tracheal deviation and no edema present.   Cardiovascular: Normal heart sounds and normal pulses. An irregularly irregular rhythm present.   Pulmonary/Chest: Effort normal. No stridor.   Abdominal: Soft.   Neurological: She is alert. She has " normal strength. No cranial nerve deficit or sensory deficit.   Skin: Skin is warm.   Psychiatric: She has a normal mood and affect. Her speech is normal and behavior is normal.       Results Review: 1/29/18  Cardiac cath  Normal epicardial coronary arteries with dominant right coronary artery   Normal LVEF  LVEDP   21    mm Hg       ECG 12 Lead  Date/Time: 5/20/2019 1:19 PM  Performed by: Joel Medina MD  Authorized by: Joel Medina MD   Comparison: compared with previous ECG from 10/8/2018  Comparison to previous ECG: atrial fibrillation  replaced  sinus rhythm   Rhythm: atrial fibrillation  Rate: normal  Conduction: conduction normal  ST Segments: ST segments normal  QRS axis: right  Other findings: non-specific ST-T wave changes    Clinical impression: abnormal EKG            Assessment/Plan   Patient Active Problem List   Diagnosis   • Fibromyalgia   • CFS (chronic fatigue syndrome)   • Essential hypertension   • Chronic midline low back pain without sciatica   • HARLAN on CPAP   • Atrial fibrillation (CMS/HCC)   • Family history of early CAD father in 50 s   • Body mass index (BMI) of 50-59.9 in adult (CMS/HCC)   • Lower extremity edema   • Vitamin D deficiency   • Palpitations   • Ductal carcinoma in situ (DCIS) of left breast   • History of radiation therapy   • S/P lumpectomy, left breast   • Non-smoker   • Encounter for screening mammogram for malignant neoplasm of breast             Plan    DC cardioversion after adequate duration and therapeutic anticoagulation     Orders Placed This Encounter   Procedures   • Cardioversion External in Cardiology Department     Check INR weekly x 4 weeks and then do cardioversion if INR above 2     Standing Status:   Future     Standing Expiration Date:   5/19/2020     Order Specific Question:   What type of sedation does the patient require?     Answer:   Moderate Sedation     Order Specific Question:   At which hospital location will this be performed?     Answer:    [place]     Order Specific Question:   Reason for Exam:     Answer:   AF   • ECG 12 Lead     This order was created via procedure documentation      OK to stop Aspirin but stay on Coumadin    ____________________________________________________________________________________________________________________________________________  Health maintenance and recommendations    Low vitamin K diet.  knowing what to eat, tips what foods to avoid. Printed dietary instructions with food items and Vitamin K content with websites provided.  Target INR 2-3      The patient was advised to avoid long term NSAIDS , use Tylenol PRN instead  Avoid cardiac stimulants including common drugs like Pseudoephedrine or excessive amounts of caffeine    Monitor for any signs of bleeding including red or dark stools. Fall precautions.        Advised staying uptodate with immunizations per established standard guidelines.      Offered to give patient  a copy      Questions were encouraged, asked and answered to the patient's  understanding and satisfaction. Questions if any regarding current medications and side effects, need for refills and importance of compliance to medications stressed.    Reviewed available prior notes, consults, prior visits, laboratory findings, radiology and cardiology relevant reports. Updated chart as applicable. I have reviewed the patient's medical history in detail and updated the computerized patient record as relevant.      Updated patient regarding any new or relevant abnormalities on review of records or any new findings on physical exam. Mentioned to patient about purpose of visit and desirable health short and long term goals and objectives.    Primary to monitor CBC CMP Lipid panel and TSH as applicable    ___________________________________________________________________________________________________________________________________________                Return in about 3 months (around  8/20/2019).

## 2019-05-21 ENCOUNTER — ANTICOAGULATION VISIT (OUTPATIENT)
Dept: CARDIOLOGY | Facility: CLINIC | Age: 54
End: 2019-05-21

## 2019-05-21 ENCOUNTER — LAB (OUTPATIENT)
Dept: LAB | Facility: HOSPITAL | Age: 54
End: 2019-05-21

## 2019-05-21 DIAGNOSIS — I48.91 ATRIAL FIBRILLATION, UNSPECIFIED TYPE (HCC): ICD-10-CM

## 2019-05-21 LAB
INR PPP: 1.4 (ref 0.91–1.09)
PROTHROMBIN TIME: 16.5 SECONDS (ref 10–13.8)

## 2019-05-21 PROCEDURE — 85610 PROTHROMBIN TIME: CPT | Performed by: NURSE PRACTITIONER

## 2019-05-21 PROCEDURE — 36415 COLL VENOUS BLD VENIPUNCTURE: CPT

## 2019-05-28 ENCOUNTER — LAB (OUTPATIENT)
Dept: LAB | Facility: HOSPITAL | Age: 54
End: 2019-05-28

## 2019-05-28 DIAGNOSIS — I48.91 ATRIAL FIBRILLATION, UNSPECIFIED TYPE (HCC): ICD-10-CM

## 2019-05-28 LAB
INR PPP: 1.9 (ref 0.91–1.09)
PROTHROMBIN TIME: 23.1 SECONDS (ref 10–13.8)

## 2019-05-28 PROCEDURE — 36415 COLL VENOUS BLD VENIPUNCTURE: CPT

## 2019-05-28 PROCEDURE — 85610 PROTHROMBIN TIME: CPT | Performed by: NURSE PRACTITIONER

## 2019-05-29 ENCOUNTER — ANTICOAGULATION VISIT (OUTPATIENT)
Dept: CARDIOLOGY | Facility: CLINIC | Age: 54
End: 2019-05-29

## 2019-06-04 ENCOUNTER — ANTICOAGULATION VISIT (OUTPATIENT)
Dept: CARDIOLOGY | Facility: CLINIC | Age: 54
End: 2019-06-04

## 2019-06-04 ENCOUNTER — LAB (OUTPATIENT)
Dept: LAB | Facility: HOSPITAL | Age: 54
End: 2019-06-04

## 2019-06-04 DIAGNOSIS — I48.91 ATRIAL FIBRILLATION, UNSPECIFIED TYPE (HCC): ICD-10-CM

## 2019-06-04 LAB
INR PPP: 3.5 (ref 0.91–1.09)
PROTHROMBIN TIME: 41.5 SECONDS (ref 10–13.8)

## 2019-06-04 PROCEDURE — 36415 COLL VENOUS BLD VENIPUNCTURE: CPT

## 2019-06-04 PROCEDURE — 85610 PROTHROMBIN TIME: CPT | Performed by: NURSE PRACTITIONER

## 2019-06-11 ENCOUNTER — ANTICOAGULATION VISIT (OUTPATIENT)
Dept: CARDIOLOGY | Facility: CLINIC | Age: 54
End: 2019-06-11

## 2019-06-11 ENCOUNTER — LAB (OUTPATIENT)
Dept: LAB | Facility: HOSPITAL | Age: 54
End: 2019-06-11

## 2019-06-11 DIAGNOSIS — I48.91 ATRIAL FIBRILLATION, UNSPECIFIED TYPE (HCC): ICD-10-CM

## 2019-06-11 LAB
INR PPP: 3.47 (ref 0.91–1.09)
PROTHROMBIN TIME: 36.2 SECONDS (ref 11.9–14.6)

## 2019-06-11 PROCEDURE — 85610 PROTHROMBIN TIME: CPT | Performed by: INTERNAL MEDICINE

## 2019-06-11 PROCEDURE — 36415 COLL VENOUS BLD VENIPUNCTURE: CPT

## 2019-06-18 ENCOUNTER — LAB (OUTPATIENT)
Dept: LAB | Facility: HOSPITAL | Age: 54
End: 2019-06-18

## 2019-06-18 DIAGNOSIS — I48.91 ATRIAL FIBRILLATION, UNSPECIFIED TYPE (HCC): ICD-10-CM

## 2019-06-18 LAB
INR PPP: 3 (ref 0.91–1.09)
PROTHROMBIN TIME: 32.3 SECONDS (ref 11.9–14.6)

## 2019-06-18 PROCEDURE — 85610 PROTHROMBIN TIME: CPT | Performed by: INTERNAL MEDICINE

## 2019-06-18 PROCEDURE — 36415 COLL VENOUS BLD VENIPUNCTURE: CPT

## 2019-06-25 ENCOUNTER — LAB (OUTPATIENT)
Dept: LAB | Facility: HOSPITAL | Age: 54
End: 2019-06-25

## 2019-06-25 DIAGNOSIS — I48.91 ATRIAL FIBRILLATION, UNSPECIFIED TYPE (HCC): ICD-10-CM

## 2019-06-25 PROCEDURE — 36415 COLL VENOUS BLD VENIPUNCTURE: CPT

## 2019-06-25 PROCEDURE — 85610 PROTHROMBIN TIME: CPT | Performed by: NURSE PRACTITIONER

## 2019-06-26 ENCOUNTER — LAB (OUTPATIENT)
Dept: LAB | Facility: HOSPITAL | Age: 54
End: 2019-06-26

## 2019-06-26 ENCOUNTER — TELEPHONE (OUTPATIENT)
Dept: CARDIOLOGY | Facility: CLINIC | Age: 54
End: 2019-06-26

## 2019-06-26 DIAGNOSIS — I48.91 ATRIAL FIBRILLATION, UNSPECIFIED TYPE (HCC): ICD-10-CM

## 2019-06-26 LAB
INR PPP: 2.24 (ref 0.91–1.09)
INR PPP: 4 (ref 0.91–1.09)
PROTHROMBIN TIME: 25.6 SECONDS (ref 11.9–14.6)
PROTHROMBIN TIME: 48.5 SECONDS (ref 10–13.8)

## 2019-06-26 PROCEDURE — 85610 PROTHROMBIN TIME: CPT | Performed by: INTERNAL MEDICINE

## 2019-06-26 RX ORDER — WARFARIN SODIUM 5 MG/1
TABLET ORAL
Qty: 60 TABLET | Refills: 5 | Status: SHIPPED | OUTPATIENT
Start: 2019-06-26 | End: 2019-07-09 | Stop reason: SDUPTHER

## 2019-06-28 ENCOUNTER — ANTICOAGULATION VISIT (OUTPATIENT)
Dept: CARDIOLOGY | Facility: CLINIC | Age: 54
End: 2019-06-28

## 2019-07-01 ENCOUNTER — ANTICOAGULATION VISIT (OUTPATIENT)
Dept: CARDIOLOGY | Facility: CLINIC | Age: 54
End: 2019-07-01

## 2019-07-01 ENCOUNTER — HOSPITAL ENCOUNTER (OUTPATIENT)
Dept: CARDIOLOGY | Facility: HOSPITAL | Age: 54
Setting detail: HOSPITAL OUTPATIENT SURGERY
End: 2019-07-01

## 2019-07-03 ENCOUNTER — LAB (OUTPATIENT)
Dept: LAB | Facility: HOSPITAL | Age: 54
End: 2019-07-03

## 2019-07-03 ENCOUNTER — ANTICOAGULATION VISIT (OUTPATIENT)
Dept: CARDIOLOGY | Facility: CLINIC | Age: 54
End: 2019-07-03

## 2019-07-03 DIAGNOSIS — I48.91 ATRIAL FIBRILLATION, UNSPECIFIED TYPE (HCC): ICD-10-CM

## 2019-07-03 LAB
INR PPP: 3.2 (ref 0.91–1.09)
PROTHROMBIN TIME: 34 SECONDS (ref 11.9–14.6)

## 2019-07-03 PROCEDURE — 85610 PROTHROMBIN TIME: CPT | Performed by: INTERNAL MEDICINE

## 2019-07-03 PROCEDURE — 36415 COLL VENOUS BLD VENIPUNCTURE: CPT

## 2019-07-08 ENCOUNTER — HOSPITAL ENCOUNTER (OUTPATIENT)
Dept: MAMMOGRAPHY | Facility: HOSPITAL | Age: 54
Discharge: HOME | End: 2019-07-08
Admitting: RADIOLOGY

## 2019-07-08 DIAGNOSIS — D05.12 DUCTAL CARCINOMA IN SITU (DCIS) OF LEFT BREAST: ICD-10-CM

## 2019-07-08 DIAGNOSIS — Z98.890 S/P LUMPECTOMY, LEFT BREAST: ICD-10-CM

## 2019-07-08 DIAGNOSIS — Z78.9 NON-SMOKER: ICD-10-CM

## 2019-07-08 DIAGNOSIS — Z92.3 HISTORY OF RADIATION THERAPY: ICD-10-CM

## 2019-07-08 PROCEDURE — 77065 DX MAMMO INCL CAD UNI: CPT

## 2019-07-08 PROCEDURE — G0279 TOMOSYNTHESIS, MAMMO: HCPCS

## 2019-07-09 ENCOUNTER — LAB (OUTPATIENT)
Dept: LAB | Facility: HOSPITAL | Age: 54
End: 2019-07-09

## 2019-07-09 ENCOUNTER — ANTICOAGULATION VISIT (OUTPATIENT)
Dept: CARDIOLOGY | Facility: CLINIC | Age: 54
End: 2019-07-09

## 2019-07-09 ENCOUNTER — TELEPHONE (OUTPATIENT)
Dept: RADIATION ONCOLOGY | Facility: HOSPITAL | Age: 54
End: 2019-07-09

## 2019-07-09 ENCOUNTER — APPOINTMENT (OUTPATIENT)
Dept: LAB | Facility: HOSPITAL | Age: 54
End: 2019-07-09

## 2019-07-09 DIAGNOSIS — I48.91 ATRIAL FIBRILLATION, UNSPECIFIED TYPE (HCC): ICD-10-CM

## 2019-07-09 LAB
INR PPP: 2.73 (ref 0.91–1.09)
PROTHROMBIN TIME: 30 SECONDS (ref 11.9–14.6)

## 2019-07-09 PROCEDURE — 36415 COLL VENOUS BLD VENIPUNCTURE: CPT

## 2019-07-09 PROCEDURE — 85610 PROTHROMBIN TIME: CPT | Performed by: INTERNAL MEDICINE

## 2019-07-09 RX ORDER — WARFARIN SODIUM 5 MG/1
TABLET ORAL
Qty: 60 TABLET | Refills: 5 | Status: ON HOLD | OUTPATIENT
Start: 2019-07-09 | End: 2019-07-30

## 2019-07-09 NOTE — TELEPHONE ENCOUNTER
Attempted to call patient with mammogram results.  Unable to leave message on patient's phone.  Will try calling again later.

## 2019-07-11 ENCOUNTER — TELEPHONE (OUTPATIENT)
Dept: RADIATION ONCOLOGY | Facility: HOSPITAL | Age: 54
End: 2019-07-11

## 2019-07-11 NOTE — TELEPHONE ENCOUNTER
Left message stating that mammogram was normal.  Instructed patient that she could call our office with any further questions/concerns.

## 2019-07-11 NOTE — TELEPHONE ENCOUNTER
----- Message from Rajat Bryant III, MD sent at 7/8/2019  4:28 PM CDT -----  Please notify patient of results  ----- Message -----  From: Lara, Rad Results [place] In  Sent: 7/8/2019   2:37 PM  To: Rajat Bryant III, MD

## 2019-07-12 ENCOUNTER — HOSPITAL ENCOUNTER (OUTPATIENT)
Dept: CARDIOLOGY | Facility: HOSPITAL | Age: 54
Setting detail: HOSPITAL OUTPATIENT SURGERY
Discharge: HOME | End: 2019-07-12
Admitting: INTERNAL MEDICINE

## 2019-07-12 ENCOUNTER — ANESTHESIA EVENT (OUTPATIENT)
Dept: CARDIOLOGY | Facility: HOSPITAL | Age: 54
End: 2019-07-12

## 2019-07-12 ENCOUNTER — ANESTHESIA (OUTPATIENT)
Dept: CARDIOLOGY | Facility: HOSPITAL | Age: 54
End: 2019-07-12

## 2019-07-12 VITALS
HEART RATE: 93 BPM | DIASTOLIC BLOOD PRESSURE: 91 MMHG | OXYGEN SATURATION: 99 % | SYSTOLIC BLOOD PRESSURE: 127 MMHG | RESPIRATION RATE: 18 BRPM | BODY MASS INDEX: 47.09 KG/M2 | WEIGHT: 293 LBS | HEIGHT: 66 IN | TEMPERATURE: 99 F

## 2019-07-12 DIAGNOSIS — I48.0 PAROXYSMAL ATRIAL FIBRILLATION (HCC): ICD-10-CM

## 2019-07-12 PROCEDURE — 92960 CARDIOVERSION ELECTRIC EXT: CPT

## 2019-07-12 PROCEDURE — 25010000002 PROPOFOL 10 MG/ML EMULSION: Performed by: NURSE ANESTHETIST, CERTIFIED REGISTERED

## 2019-07-12 PROCEDURE — 92960 CARDIOVERSION ELECTRIC EXT: CPT | Performed by: INTERNAL MEDICINE

## 2019-07-12 RX ORDER — SODIUM CHLORIDE 0.9 % (FLUSH) 0.9 %
5 SYRINGE (ML) INJECTION AS NEEDED
Status: DISCONTINUED | OUTPATIENT
Start: 2019-07-12 | End: 2019-07-13 | Stop reason: HOSPADM

## 2019-07-12 RX ORDER — SODIUM CHLORIDE 0.9 % (FLUSH) 0.9 %
3 SYRINGE (ML) INJECTION EVERY 12 HOURS SCHEDULED
Status: DISCONTINUED | OUTPATIENT
Start: 2019-07-12 | End: 2019-07-13 | Stop reason: HOSPADM

## 2019-07-12 RX ORDER — SODIUM CHLORIDE 9 MG/ML
INJECTION, SOLUTION INTRAVENOUS CONTINUOUS PRN
Status: DISCONTINUED | OUTPATIENT
Start: 2019-07-12 | End: 2019-07-12 | Stop reason: SURG

## 2019-07-12 RX ORDER — PROPOFOL 10 MG/ML
VIAL (ML) INTRAVENOUS AS NEEDED
Status: DISCONTINUED | OUTPATIENT
Start: 2019-07-12 | End: 2019-07-12 | Stop reason: SURG

## 2019-07-12 RX ADMIN — SODIUM CHLORIDE: 9 INJECTION, SOLUTION INTRAVENOUS at 14:46

## 2019-07-12 RX ADMIN — PROPOFOL 50 MG: 10 INJECTION, EMULSION INTRAVENOUS at 14:51

## 2019-07-12 RX ADMIN — PROPOFOL 50 MG: 10 INJECTION, EMULSION INTRAVENOUS at 14:53

## 2019-07-12 RX ADMIN — PROPOFOL 50 MG: 10 INJECTION, EMULSION INTRAVENOUS at 14:52

## 2019-07-12 NOTE — ANESTHESIA POSTPROCEDURE EVALUATION
"Patient: Anthony Meza    Procedure Summary     Date:  07/12/19 Room / Location:  [facility] CATH LAB    Anesthesia Start:  1446 Anesthesia Stop:  1458    Procedure:  CARDIOVERSION EXTERNAL IN CARDIOLOGY DEPARTMENT Diagnosis:       Paroxysmal atrial fibrillation (CMS/HCC)      (AF)    Scheduled Providers:  Joel Medina MD Provider:  Jorge Mejia CRNA    Anesthesia Type:  MAC ASA Status:  3          Anesthesia Type: MAC  Last vitals  BP   129/86 (07/12/19 1520)   Temp   99 °F (37.2 °C) (07/12/19 1255)   Pulse   90 (07/12/19 1520)   Resp   20 (07/12/19 1520)     SpO2   99 % (07/12/19 1520)     Post Anesthesia Care and Evaluation    Patient location during evaluation: PHASE II  Patient participation: complete - patient participated  Level of consciousness: awake and awake and alert  Pain score: 0  Pain management: adequate  Airway patency: patent  Anesthetic complications: No anesthetic complications  PONV Status: none  Cardiovascular status: acceptable  Respiratory status: acceptable  Hydration status: acceptable    Comments: Patient discharged according to acceptable Dwyane score per RN assessment. See nursing records for further information.     Blood pressure 129/86, pulse 90, temperature 99 °F (37.2 °C), temperature source Tympanic, resp. rate 20, height 167.6 cm (66\"), weight (!) 154 kg (340 lb 8 oz), SpO2 99 %, not currently breastfeeding.        "

## 2019-07-12 NOTE — ANESTHESIA PREPROCEDURE EVALUATION
Anesthesia Evaluation     Patient summary reviewed   no history of anesthetic complications:  NPO Solid Status: > 8 hours             Airway   Mallampati: II  TM distance: >3 FB  Neck ROM: full  Dental      Pulmonary    (+) sleep apnea on CPAP,   Cardiovascular     PT is on anticoagulation therapy    (+) hypertension, dysrhythmias Atrial Fib,       Neuro/Psych  (+) TIA,     GI/Hepatic/Renal/Endo    (+) morbid obesity,      Musculoskeletal     (+) chronic pain (fibromyalgia),   Abdominal    Substance History      OB/GYN          Other                        Anesthesia Plan    ASA 3     MAC     intravenous induction   Anesthetic plan, all risks, benefits, and alternatives have been provided, discussed and informed consent has been obtained with: patient.

## 2019-07-15 ENCOUNTER — APPOINTMENT (OUTPATIENT)
Dept: CARDIOLOGY | Facility: HOSPITAL | Age: 54
End: 2019-07-15

## 2019-07-17 ENCOUNTER — ANTICOAGULATION VISIT (OUTPATIENT)
Dept: CARDIOLOGY | Facility: CLINIC | Age: 54
End: 2019-07-17

## 2019-07-17 ENCOUNTER — TRANSCRIBE ORDERS (OUTPATIENT)
Dept: ADMINISTRATIVE | Facility: HOSPITAL | Age: 54
End: 2019-07-17

## 2019-07-17 ENCOUNTER — LAB (OUTPATIENT)
Dept: LAB | Facility: HOSPITAL | Age: 54
End: 2019-07-17

## 2019-07-17 DIAGNOSIS — I48.91 ATRIAL FIBRILLATION, UNSPECIFIED TYPE (HCC): ICD-10-CM

## 2019-07-17 DIAGNOSIS — I48.91 ATRIAL FIBRILLATION, UNSPECIFIED TYPE (HCC): Primary | ICD-10-CM

## 2019-07-17 LAB
INR PPP: 2.7 (ref 0.91–1.09)
PROTHROMBIN TIME: 29.7 SECONDS (ref 11.9–14.6)

## 2019-07-17 PROCEDURE — 36415 COLL VENOUS BLD VENIPUNCTURE: CPT

## 2019-07-17 PROCEDURE — 85610 PROTHROMBIN TIME: CPT | Performed by: INTERNAL MEDICINE

## 2019-07-24 ENCOUNTER — LAB (OUTPATIENT)
Dept: LAB | Facility: HOSPITAL | Age: 54
End: 2019-07-24

## 2019-07-24 DIAGNOSIS — I48.91 ATRIAL FIBRILLATION, UNSPECIFIED TYPE (HCC): ICD-10-CM

## 2019-07-24 LAB
INR PPP: 4.2 (ref 0.91–1.09)
PROTHROMBIN TIME: 42.2 SECONDS (ref 11.9–14.6)

## 2019-07-24 PROCEDURE — 85610 PROTHROMBIN TIME: CPT | Performed by: INTERNAL MEDICINE

## 2019-07-24 PROCEDURE — 36415 COLL VENOUS BLD VENIPUNCTURE: CPT

## 2019-07-28 ENCOUNTER — HOSPITAL ENCOUNTER (OUTPATIENT)
Facility: HOSPITAL | Age: 54
Setting detail: OBSERVATION
Discharge: HOME | End: 2019-07-30
Attending: INTERNAL MEDICINE | Admitting: INTERNAL MEDICINE

## 2019-07-28 ENCOUNTER — APPOINTMENT (OUTPATIENT)
Dept: GENERAL RADIOLOGY | Facility: HOSPITAL | Age: 54
End: 2019-07-28

## 2019-07-28 DIAGNOSIS — K92.2 LOWER GI BLEED: ICD-10-CM

## 2019-07-28 DIAGNOSIS — I48.91 ATRIAL FIBRILLATION, UNSPECIFIED TYPE (HCC): Primary | ICD-10-CM

## 2019-07-28 DIAGNOSIS — Z92.29 HISTORY OF COUMADIN THERAPY: ICD-10-CM

## 2019-07-28 DIAGNOSIS — R50.9 FEVER AND CHILLS: ICD-10-CM

## 2019-07-28 LAB
ALBUMIN SERPL-MCNC: 4.1 G/DL (ref 3.5–5)
ALBUMIN/GLOB SERPL: 1.3 G/DL (ref 1.1–2.5)
ALP SERPL-CCNC: 107 U/L (ref 24–120)
ALT SERPL W P-5'-P-CCNC: 25 U/L (ref 0–54)
ANION GAP SERPL CALCULATED.3IONS-SCNC: 8 MMOL/L (ref 4–13)
AST SERPL-CCNC: 23 U/L (ref 7–45)
BASOPHILS # BLD AUTO: 0.03 10*3/MM3 (ref 0–0.2)
BASOPHILS NFR BLD AUTO: 0.2 % (ref 0–2)
BILIRUB SERPL-MCNC: 0.8 MG/DL (ref 0.1–1)
BUN BLD-MCNC: 16 MG/DL (ref 5–21)
BUN/CREAT SERPL: 20.8 (ref 7–25)
CALCIUM SPEC-SCNC: 9 MG/DL (ref 8.4–10.4)
CHLORIDE SERPL-SCNC: 107 MMOL/L (ref 98–110)
CO2 SERPL-SCNC: 28 MMOL/L (ref 24–31)
CREAT BLD-MCNC: 0.77 MG/DL (ref 0.5–1.4)
DEPRECATED RDW RBC AUTO: 46.9 FL (ref 40–54)
EOSINOPHIL # BLD AUTO: 0.06 10*3/MM3 (ref 0–0.7)
EOSINOPHIL NFR BLD AUTO: 0.5 % (ref 0–4)
ERYTHROCYTE [DISTWIDTH] IN BLOOD BY AUTOMATED COUNT: 13.9 % (ref 12–15)
GFR SERPL CREATININE-BSD FRML MDRD: 78 ML/MIN/1.73
GLOBULIN UR ELPH-MCNC: 3.2 GM/DL
GLUCOSE BLD-MCNC: 104 MG/DL (ref 70–100)
HCT VFR BLD AUTO: 43.9 % (ref 37–47)
HGB BLD-MCNC: 14.4 G/DL (ref 12–16)
IMM GRANULOCYTES # BLD AUTO: 0.03 10*3/MM3 (ref 0–0.05)
IMM GRANULOCYTES NFR BLD AUTO: 0.2 % (ref 0–5)
INR PPP: 3.89 (ref 0.91–1.09)
LYMPHOCYTES # BLD AUTO: 0.68 10*3/MM3 (ref 0.72–4.86)
LYMPHOCYTES NFR BLD AUTO: 5.7 % (ref 15–45)
MCH RBC QN AUTO: 30.1 PG (ref 28–32)
MCHC RBC AUTO-ENTMCNC: 32.8 G/DL (ref 33–36)
MCV RBC AUTO: 91.6 FL (ref 82–98)
MONOCYTES # BLD AUTO: 0.49 10*3/MM3 (ref 0.19–1.3)
MONOCYTES NFR BLD AUTO: 4.1 % (ref 4–12)
NEUTROPHILS # BLD AUTO: 10.73 10*3/MM3 (ref 1.87–8.4)
NEUTROPHILS NFR BLD AUTO: 89.3 % (ref 39–78)
NRBC BLD AUTO-RTO: 0 /100 WBC (ref 0–0.2)
NT-PROBNP SERPL-MCNC: 507 PG/ML (ref 0–900)
PLATELET # BLD AUTO: 206 10*3/MM3 (ref 130–400)
PMV BLD AUTO: 10.6 FL (ref 6–12)
POTASSIUM BLD-SCNC: 3.9 MMOL/L (ref 3.5–5.3)
PROT SERPL-MCNC: 7.3 G/DL (ref 6.3–8.7)
PROTHROMBIN TIME: 39.7 SECONDS (ref 11.9–14.6)
RBC # BLD AUTO: 4.79 10*6/MM3 (ref 4.2–5.4)
SODIUM BLD-SCNC: 143 MMOL/L (ref 135–145)
TROPONIN I SERPL-MCNC: <0.012 NG/ML (ref 0–0.03)
WBC NRBC COR # BLD: 12.02 10*3/MM3 (ref 4.8–10.8)

## 2019-07-28 PROCEDURE — 93010 ELECTROCARDIOGRAM REPORT: CPT | Performed by: INTERNAL MEDICINE

## 2019-07-28 PROCEDURE — 93005 ELECTROCARDIOGRAM TRACING: CPT

## 2019-07-28 PROCEDURE — 85025 COMPLETE CBC W/AUTO DIFF WBC: CPT | Performed by: NURSE PRACTITIONER

## 2019-07-28 PROCEDURE — 93005 ELECTROCARDIOGRAM TRACING: CPT | Performed by: NURSE PRACTITIONER

## 2019-07-28 PROCEDURE — 71045 X-RAY EXAM CHEST 1 VIEW: CPT

## 2019-07-28 PROCEDURE — 85610 PROTHROMBIN TIME: CPT | Performed by: NURSE PRACTITIONER

## 2019-07-28 PROCEDURE — 80053 COMPREHEN METABOLIC PANEL: CPT | Performed by: NURSE PRACTITIONER

## 2019-07-28 PROCEDURE — 93005 ELECTROCARDIOGRAM TRACING: CPT | Performed by: INTERNAL MEDICINE

## 2019-07-28 PROCEDURE — 99285 EMERGENCY DEPT VISIT HI MDM: CPT

## 2019-07-28 PROCEDURE — 96365 THER/PROPH/DIAG IV INF INIT: CPT

## 2019-07-28 PROCEDURE — 84484 ASSAY OF TROPONIN QUANT: CPT | Performed by: NURSE PRACTITIONER

## 2019-07-28 PROCEDURE — 83880 ASSAY OF NATRIURETIC PEPTIDE: CPT | Performed by: NURSE PRACTITIONER

## 2019-07-28 RX ORDER — DILTIAZEM HYDROCHLORIDE 5 MG/ML
10 INJECTION INTRAVENOUS ONCE
Status: COMPLETED | OUTPATIENT
Start: 2019-07-28 | End: 2019-07-28

## 2019-07-28 RX ORDER — SODIUM CHLORIDE 0.9 % (FLUSH) 0.9 %
10 SYRINGE (ML) INJECTION AS NEEDED
Status: DISCONTINUED | OUTPATIENT
Start: 2019-07-28 | End: 2019-07-30 | Stop reason: HOSPADM

## 2019-07-28 RX ADMIN — DILTIAZEM HYDROCHLORIDE 10 MG: 5 INJECTION INTRAVENOUS at 23:17

## 2019-07-28 RX ADMIN — DILTIAZEM HYDROCHLORIDE 5 MG/HR: 5 INJECTION INTRAVENOUS at 23:20

## 2019-07-29 ENCOUNTER — APPOINTMENT (OUTPATIENT)
Dept: CT IMAGING | Facility: HOSPITAL | Age: 54
End: 2019-07-29

## 2019-07-29 PROBLEM — K62.5 BRBPR (BRIGHT RED BLOOD PER RECTUM): Status: ACTIVE | Noted: 2019-07-29

## 2019-07-29 PROBLEM — I48.91 ATRIAL FIBRILLATION, UNSPECIFIED TYPE: Status: ACTIVE | Noted: 2017-10-02

## 2019-07-29 PROBLEM — R79.1 SUPRATHERAPEUTIC INR: Status: ACTIVE | Noted: 2019-07-29

## 2019-07-29 PROBLEM — R50.9 FEVER: Status: ACTIVE | Noted: 2019-07-29

## 2019-07-29 LAB
ALBUMIN SERPL-MCNC: 3.1 G/DL (ref 3.5–5)
ALBUMIN/GLOB SERPL: 1.2 G/DL (ref 1.1–2.5)
ALP SERPL-CCNC: 84 U/L (ref 24–120)
ALT SERPL W P-5'-P-CCNC: 21 U/L (ref 0–54)
ANION GAP SERPL CALCULATED.3IONS-SCNC: 6 MMOL/L (ref 4–13)
AST SERPL-CCNC: 15 U/L (ref 7–45)
BACTERIA UR QL AUTO: ABNORMAL /HPF
BASOPHILS # BLD AUTO: 0.02 10*3/MM3 (ref 0–0.2)
BASOPHILS NFR BLD AUTO: 0.2 % (ref 0–2)
BILIRUB SERPL-MCNC: 0.9 MG/DL (ref 0.1–1)
BILIRUB UR QL STRIP: NEGATIVE
BUN BLD-MCNC: 13 MG/DL (ref 5–21)
BUN/CREAT SERPL: 19.4 (ref 7–25)
CALCIUM SPEC-SCNC: 8.3 MG/DL (ref 8.4–10.4)
CHLORIDE SERPL-SCNC: 109 MMOL/L (ref 98–110)
CLARITY UR: CLEAR
CO2 SERPL-SCNC: 26 MMOL/L (ref 24–31)
COLOR UR: YELLOW
CREAT BLD-MCNC: 0.67 MG/DL (ref 0.5–1.4)
D-LACTATE SERPL-SCNC: 1.8 MMOL/L (ref 0.5–2)
DEPRECATED RDW RBC AUTO: 46.1 FL (ref 40–54)
EOSINOPHIL # BLD AUTO: 0.02 10*3/MM3 (ref 0–0.7)
EOSINOPHIL NFR BLD AUTO: 0.2 % (ref 0–4)
ERYTHROCYTE [DISTWIDTH] IN BLOOD BY AUTOMATED COUNT: 13.9 % (ref 12–15)
GFR SERPL CREATININE-BSD FRML MDRD: 92 ML/MIN/1.73
GLOBULIN UR ELPH-MCNC: 2.6 GM/DL
GLUCOSE BLD-MCNC: 108 MG/DL (ref 70–100)
GLUCOSE UR STRIP-MCNC: NEGATIVE MG/DL
HCT VFR BLD AUTO: 38.9 % (ref 37–47)
HGB BLD-MCNC: 12.8 G/DL (ref 12–16)
HGB UR QL STRIP.AUTO: ABNORMAL
HOLD SPECIMEN: NORMAL
HOLD SPECIMEN: NORMAL
HYALINE CASTS UR QL AUTO: ABNORMAL /LPF
IMM GRANULOCYTES # BLD AUTO: 0.02 10*3/MM3 (ref 0–0.05)
IMM GRANULOCYTES NFR BLD AUTO: 0.2 % (ref 0–5)
INR PPP: 3.97 (ref 0.91–1.09)
KETONES UR QL STRIP: NEGATIVE
LEUKOCYTE ESTERASE UR QL STRIP.AUTO: ABNORMAL
LYMPHOCYTES # BLD AUTO: 0.95 10*3/MM3 (ref 0.72–4.86)
LYMPHOCYTES NFR BLD AUTO: 8.9 % (ref 15–45)
MCH RBC QN AUTO: 30.1 PG (ref 28–32)
MCHC RBC AUTO-ENTMCNC: 32.9 G/DL (ref 33–36)
MCV RBC AUTO: 91.5 FL (ref 82–98)
MONOCYTES # BLD AUTO: 0.67 10*3/MM3 (ref 0.19–1.3)
MONOCYTES NFR BLD AUTO: 6.3 % (ref 4–12)
NEUTROPHILS # BLD AUTO: 9.02 10*3/MM3 (ref 1.87–8.4)
NEUTROPHILS NFR BLD AUTO: 84.2 % (ref 39–78)
NITRITE UR QL STRIP: NEGATIVE
NRBC BLD AUTO-RTO: 0 /100 WBC (ref 0–0.2)
PH UR STRIP.AUTO: 7 [PH] (ref 5–8)
PLATELET # BLD AUTO: 184 10*3/MM3 (ref 130–400)
PMV BLD AUTO: 10.9 FL (ref 6–12)
POTASSIUM BLD-SCNC: 3.6 MMOL/L (ref 3.5–5.3)
PROT SERPL-MCNC: 5.7 G/DL (ref 6.3–8.7)
PROT UR QL STRIP: NEGATIVE
PROTHROMBIN TIME: 40.3 SECONDS (ref 11.9–14.6)
RBC # BLD AUTO: 4.25 10*6/MM3 (ref 4.2–5.4)
RBC # UR: ABNORMAL /HPF
REF LAB TEST METHOD: ABNORMAL
SODIUM BLD-SCNC: 141 MMOL/L (ref 135–145)
SP GR UR STRIP: 1.01 (ref 1–1.03)
SQUAMOUS #/AREA URNS HPF: ABNORMAL /HPF
TROPONIN I SERPL-MCNC: <0.012 NG/ML (ref 0–0.03)
UROBILINOGEN UR QL STRIP: ABNORMAL
WBC NRBC COR # BLD: 10.7 10*3/MM3 (ref 4.8–10.8)
WBC UR QL AUTO: ABNORMAL /HPF
WHOLE BLOOD HOLD SPECIMEN: NORMAL
WHOLE BLOOD HOLD SPECIMEN: NORMAL

## 2019-07-29 PROCEDURE — 96361 HYDRATE IV INFUSION ADD-ON: CPT

## 2019-07-29 PROCEDURE — 0 IOPAMIDOL PER 1 ML: Performed by: INTERNAL MEDICINE

## 2019-07-29 PROCEDURE — 74174 CTA ABD&PLVS W/CONTRAST: CPT

## 2019-07-29 PROCEDURE — 87040 BLOOD CULTURE FOR BACTERIA: CPT | Performed by: NURSE PRACTITIONER

## 2019-07-29 PROCEDURE — 96366 THER/PROPH/DIAG IV INF ADDON: CPT

## 2019-07-29 PROCEDURE — 84484 ASSAY OF TROPONIN QUANT: CPT | Performed by: INTERNAL MEDICINE

## 2019-07-29 PROCEDURE — 94799 UNLISTED PULMONARY SVC/PX: CPT

## 2019-07-29 PROCEDURE — G0378 HOSPITAL OBSERVATION PER HR: HCPCS

## 2019-07-29 PROCEDURE — 85025 COMPLETE CBC W/AUTO DIFF WBC: CPT | Performed by: INTERNAL MEDICINE

## 2019-07-29 PROCEDURE — 99214 OFFICE O/P EST MOD 30 MIN: CPT | Performed by: INTERNAL MEDICINE

## 2019-07-29 PROCEDURE — 83605 ASSAY OF LACTIC ACID: CPT | Performed by: NURSE PRACTITIONER

## 2019-07-29 PROCEDURE — 25010000002 CEFTRIAXONE PER 250 MG: Performed by: NURSE PRACTITIONER

## 2019-07-29 PROCEDURE — 85610 PROTHROMBIN TIME: CPT | Performed by: INTERNAL MEDICINE

## 2019-07-29 PROCEDURE — 96367 TX/PROPH/DG ADDL SEQ IV INF: CPT

## 2019-07-29 PROCEDURE — 81001 URINALYSIS AUTO W/SCOPE: CPT | Performed by: NURSE PRACTITIONER

## 2019-07-29 PROCEDURE — 94760 N-INVAS EAR/PLS OXIMETRY 1: CPT

## 2019-07-29 PROCEDURE — 80053 COMPREHEN METABOLIC PANEL: CPT | Performed by: INTERNAL MEDICINE

## 2019-07-29 RX ORDER — SODIUM CHLORIDE 0.9 % (FLUSH) 0.9 %
3 SYRINGE (ML) INJECTION EVERY 12 HOURS SCHEDULED
Status: DISCONTINUED | OUTPATIENT
Start: 2019-07-29 | End: 2019-07-30 | Stop reason: HOSPADM

## 2019-07-29 RX ORDER — SODIUM CHLORIDE 0.9 % (FLUSH) 0.9 %
3-10 SYRINGE (ML) INJECTION AS NEEDED
Status: DISCONTINUED | OUTPATIENT
Start: 2019-07-29 | End: 2019-07-30 | Stop reason: HOSPADM

## 2019-07-29 RX ORDER — ACETAMINOPHEN 500 MG
1000 TABLET ORAL ONCE
Status: COMPLETED | OUTPATIENT
Start: 2019-07-29 | End: 2019-07-29

## 2019-07-29 RX ORDER — ONDANSETRON 2 MG/ML
4 INJECTION INTRAMUSCULAR; INTRAVENOUS EVERY 6 HOURS PRN
Status: DISCONTINUED | OUTPATIENT
Start: 2019-07-29 | End: 2019-07-30 | Stop reason: HOSPADM

## 2019-07-29 RX ORDER — ACETAMINOPHEN 325 MG/1
650 TABLET ORAL EVERY 6 HOURS PRN
Status: DISCONTINUED | OUTPATIENT
Start: 2019-07-29 | End: 2019-07-29 | Stop reason: SDUPTHER

## 2019-07-29 RX ORDER — ACETAMINOPHEN 325 MG/1
650 TABLET ORAL EVERY 4 HOURS PRN
Status: DISCONTINUED | OUTPATIENT
Start: 2019-07-29 | End: 2019-07-30 | Stop reason: HOSPADM

## 2019-07-29 RX ORDER — SODIUM CHLORIDE 9 MG/ML
75 INJECTION, SOLUTION INTRAVENOUS CONTINUOUS
Status: DISCONTINUED | OUTPATIENT
Start: 2019-07-29 | End: 2019-07-30 | Stop reason: HOSPADM

## 2019-07-29 RX ORDER — WARFARIN SODIUM 10 MG/1
10 TABLET ORAL
Status: DISCONTINUED | OUTPATIENT
Start: 2019-07-29 | End: 2019-07-29

## 2019-07-29 RX ORDER — ACETAMINOPHEN, ASPIRIN AND CAFFEINE 250; 250; 65 MG/1; MG/1; MG/1
1 TABLET, FILM COATED ORAL EVERY 6 HOURS PRN
Status: DISCONTINUED | OUTPATIENT
Start: 2019-07-29 | End: 2019-07-30 | Stop reason: HOSPADM

## 2019-07-29 RX ORDER — OXYBUTYNIN CHLORIDE 5 MG/1
5 TABLET, EXTENDED RELEASE ORAL DAILY
Status: DISCONTINUED | OUTPATIENT
Start: 2019-07-29 | End: 2019-07-30 | Stop reason: HOSPADM

## 2019-07-29 RX ORDER — MELATONIN
1000 DAILY
Status: DISCONTINUED | OUTPATIENT
Start: 2019-07-29 | End: 2019-07-30 | Stop reason: HOSPADM

## 2019-07-29 RX ADMIN — CHOLECALCIFEROL (VITAMIN D3) 25 MCG (1,000 UNIT) TABLET 1000 UNITS: TABLET at 08:21

## 2019-07-29 RX ADMIN — METOPROLOL TARTRATE 25 MG: 25 TABLET, FILM COATED ORAL at 08:21

## 2019-07-29 RX ADMIN — SODIUM CHLORIDE 75 ML/HR: 9 INJECTION, SOLUTION INTRAVENOUS at 17:29

## 2019-07-29 RX ADMIN — DILTIAZEM HYDROCHLORIDE 30 MG: 30 TABLET, FILM COATED ORAL at 17:28

## 2019-07-29 RX ADMIN — ACETAMINOPHEN 650 MG: 325 TABLET, FILM COATED ORAL at 13:04

## 2019-07-29 RX ADMIN — DILTIAZEM HYDROCHLORIDE 30 MG: 30 TABLET, FILM COATED ORAL at 11:56

## 2019-07-29 RX ADMIN — DILTIAZEM HYDROCHLORIDE 30 MG: 30 TABLET, FILM COATED ORAL at 23:48

## 2019-07-29 RX ADMIN — SODIUM CHLORIDE 100 ML/HR: 9 INJECTION, SOLUTION INTRAVENOUS at 05:43

## 2019-07-29 RX ADMIN — SODIUM CHLORIDE 1000 ML: 9 INJECTION, SOLUTION INTRAVENOUS at 01:49

## 2019-07-29 RX ADMIN — IOPAMIDOL 200 ML: 755 INJECTION, SOLUTION INTRAVENOUS at 13:30

## 2019-07-29 RX ADMIN — METOPROLOL TARTRATE 25 MG: 25 TABLET, FILM COATED ORAL at 20:41

## 2019-07-29 RX ADMIN — ACETAMINOPHEN 1000 MG: 500 TABLET, FILM COATED ORAL at 01:10

## 2019-07-29 RX ADMIN — OXYBUTYNIN CHLORIDE 5 MG: 5 TABLET, EXTENDED RELEASE ORAL at 08:21

## 2019-07-29 RX ADMIN — CEFTRIAXONE SODIUM 1 G: 1 INJECTION, POWDER, FOR SOLUTION INTRAMUSCULAR; INTRAVENOUS at 01:32

## 2019-07-30 VITALS
WEIGHT: 293 LBS | OXYGEN SATURATION: 95 % | HEART RATE: 69 BPM | DIASTOLIC BLOOD PRESSURE: 59 MMHG | BODY MASS INDEX: 47.09 KG/M2 | SYSTOLIC BLOOD PRESSURE: 100 MMHG | HEIGHT: 66 IN | RESPIRATION RATE: 18 BRPM | TEMPERATURE: 99 F

## 2019-07-30 LAB
ANION GAP SERPL CALCULATED.3IONS-SCNC: 3 MMOL/L (ref 4–13)
BUN BLD-MCNC: 10 MG/DL (ref 5–21)
BUN/CREAT SERPL: 15.6 (ref 7–25)
CALCIUM SPEC-SCNC: 8.4 MG/DL (ref 8.4–10.4)
CHLORIDE SERPL-SCNC: 110 MMOL/L (ref 98–110)
CO2 SERPL-SCNC: 28 MMOL/L (ref 24–31)
CREAT BLD-MCNC: 0.64 MG/DL (ref 0.5–1.4)
DEPRECATED RDW RBC AUTO: 49.4 FL (ref 40–54)
ERYTHROCYTE [DISTWIDTH] IN BLOOD BY AUTOMATED COUNT: 14.2 % (ref 12–15)
GFR SERPL CREATININE-BSD FRML MDRD: 97 ML/MIN/1.73
GLUCOSE BLD-MCNC: 87 MG/DL (ref 70–100)
HCT VFR BLD AUTO: 39.8 % (ref 37–47)
HGB BLD-MCNC: 12.6 G/DL (ref 12–16)
INR PPP: 2.78 (ref 0.91–1.09)
MCH RBC QN AUTO: 30.2 PG (ref 28–32)
MCHC RBC AUTO-ENTMCNC: 31.7 G/DL (ref 33–36)
MCV RBC AUTO: 95.4 FL (ref 82–98)
PLATELET # BLD AUTO: 147 10*3/MM3 (ref 130–400)
PMV BLD AUTO: 11.5 FL (ref 6–12)
POTASSIUM BLD-SCNC: 4 MMOL/L (ref 3.5–5.3)
PROTHROMBIN TIME: 30.4 SECONDS (ref 11.9–14.6)
RBC # BLD AUTO: 4.17 10*6/MM3 (ref 4.2–5.4)
SODIUM BLD-SCNC: 141 MMOL/L (ref 135–145)
WBC NRBC COR # BLD: 6.06 10*3/MM3 (ref 4.8–10.8)

## 2019-07-30 PROCEDURE — 94799 UNLISTED PULMONARY SVC/PX: CPT

## 2019-07-30 PROCEDURE — 99213 OFFICE O/P EST LOW 20 MIN: CPT | Performed by: INTERNAL MEDICINE

## 2019-07-30 PROCEDURE — 85027 COMPLETE CBC AUTOMATED: CPT | Performed by: NURSE PRACTITIONER

## 2019-07-30 PROCEDURE — 94760 N-INVAS EAR/PLS OXIMETRY 1: CPT

## 2019-07-30 PROCEDURE — 96361 HYDRATE IV INFUSION ADD-ON: CPT

## 2019-07-30 PROCEDURE — 93010 ELECTROCARDIOGRAM REPORT: CPT | Performed by: INTERNAL MEDICINE

## 2019-07-30 PROCEDURE — 85610 PROTHROMBIN TIME: CPT | Performed by: INTERNAL MEDICINE

## 2019-07-30 PROCEDURE — 80048 BASIC METABOLIC PNL TOTAL CA: CPT | Performed by: NURSE PRACTITIONER

## 2019-07-30 PROCEDURE — 93005 ELECTROCARDIOGRAM TRACING: CPT | Performed by: INTERNAL MEDICINE

## 2019-07-30 PROCEDURE — G0378 HOSPITAL OBSERVATION PER HR: HCPCS

## 2019-07-30 RX ORDER — WARFARIN SODIUM 5 MG/1
10 TABLET ORAL
Status: ON HOLD | COMMUNITY
End: 2019-07-30 | Stop reason: SDUPTHER

## 2019-07-30 RX ORDER — WARFARIN SODIUM 5 MG/1
7.5 TABLET ORAL NIGHTLY
Start: 2019-07-31 | End: 2019-08-08

## 2019-07-30 RX ORDER — WARFARIN SODIUM 5 MG/1
7.5 TABLET ORAL 3 TIMES WEEKLY
COMMUNITY
End: 2019-07-30 | Stop reason: HOSPADM

## 2019-07-30 RX ORDER — DILTIAZEM HYDROCHLORIDE 120 MG/1
120 CAPSULE, COATED, EXTENDED RELEASE ORAL
Qty: 30 CAPSULE | Refills: 1 | Status: SHIPPED | OUTPATIENT
Start: 2019-07-31 | End: 2019-10-01 | Stop reason: SDUPTHER

## 2019-07-30 RX ORDER — DILTIAZEM HYDROCHLORIDE 120 MG/1
120 CAPSULE, COATED, EXTENDED RELEASE ORAL
Status: DISCONTINUED | OUTPATIENT
Start: 2019-07-30 | End: 2019-07-30 | Stop reason: HOSPADM

## 2019-07-30 RX ADMIN — ACETAMINOPHEN 650 MG: 325 TABLET, FILM COATED ORAL at 06:38

## 2019-07-30 RX ADMIN — METOPROLOL TARTRATE 25 MG: 25 TABLET, FILM COATED ORAL at 08:13

## 2019-07-30 RX ADMIN — SODIUM CHLORIDE 75 ML/HR: 9 INJECTION, SOLUTION INTRAVENOUS at 07:41

## 2019-07-30 RX ADMIN — CHOLECALCIFEROL (VITAMIN D3) 25 MCG (1,000 UNIT) TABLET 1000 UNITS: TABLET at 08:13

## 2019-07-30 RX ADMIN — DILTIAZEM HYDROCHLORIDE 120 MG: 120 CAPSULE, EXTENDED RELEASE ORAL at 08:13

## 2019-07-30 RX ADMIN — OXYBUTYNIN CHLORIDE 5 MG: 5 TABLET, EXTENDED RELEASE ORAL at 08:13

## 2019-07-30 RX ADMIN — DILTIAZEM HYDROCHLORIDE 30 MG: 30 TABLET, FILM COATED ORAL at 06:39

## 2019-08-02 ENCOUNTER — ANTICOAGULATION VISIT (OUTPATIENT)
Dept: CARDIOLOGY | Facility: CLINIC | Age: 54
End: 2019-08-02

## 2019-08-03 LAB
BACTERIA SPEC AEROBE CULT: NORMAL
BACTERIA SPEC AEROBE CULT: NORMAL

## 2019-08-06 ENCOUNTER — TELEPHONE (OUTPATIENT)
Dept: INTERNAL MEDICINE | Facility: CLINIC | Age: 54
End: 2019-08-06

## 2019-08-06 ENCOUNTER — OFFICE VISIT (OUTPATIENT)
Dept: INTERNAL MEDICINE | Facility: CLINIC | Age: 54
End: 2019-08-06

## 2019-08-06 ENCOUNTER — LAB (OUTPATIENT)
Dept: LAB | Facility: HOSPITAL | Age: 54
End: 2019-08-06

## 2019-08-06 ENCOUNTER — ANTICOAGULATION VISIT (OUTPATIENT)
Dept: CARDIOLOGY | Facility: CLINIC | Age: 54
End: 2019-08-06

## 2019-08-06 VITALS
HEART RATE: 80 BPM | TEMPERATURE: 98.1 F | SYSTOLIC BLOOD PRESSURE: 135 MMHG | BODY MASS INDEX: 47.09 KG/M2 | HEIGHT: 66 IN | OXYGEN SATURATION: 98 % | DIASTOLIC BLOOD PRESSURE: 69 MMHG | WEIGHT: 293 LBS | RESPIRATION RATE: 16 BRPM

## 2019-08-06 DIAGNOSIS — I48.91 ATRIAL FIBRILLATION, UNSPECIFIED TYPE (HCC): Primary | ICD-10-CM

## 2019-08-06 DIAGNOSIS — I48.91 ATRIAL FIBRILLATION, UNSPECIFIED TYPE (HCC): ICD-10-CM

## 2019-08-06 LAB
INR PPP: 1.7 (ref 0.91–1.09)
PROTHROMBIN TIME: 20.5 SECONDS (ref 10–13.8)

## 2019-08-06 PROCEDURE — 99213 OFFICE O/P EST LOW 20 MIN: CPT | Performed by: FAMILY MEDICINE

## 2019-08-06 PROCEDURE — 85610 PROTHROMBIN TIME: CPT | Performed by: FAMILY MEDICINE

## 2019-08-06 NOTE — TELEPHONE ENCOUNTER
----- Message from Jamar Saldana DO sent at 8/6/2019  3:38 PM CDT -----  INR is low, please have her contact cardiology office for adjustment since hospital discharge

## 2019-08-06 NOTE — TELEPHONE ENCOUNTER
I tried to call the patient, NA.  I LM for her that INR was low, to contact Cardiology and let us know if changes are made to medications.    MM

## 2019-08-06 NOTE — PROGRESS NOTES
CC:   Chief Complaint   Patient presents with   • Follow-up   • Atrial Fibrillation       History:  Anthony Meza is a 53 y.o. female who presents today for follow-up for evaluation of the above:    Admitted to hospital from 7/28-7/30/19 for fever, palpitations, shortness of breath, shivering.  She was passing bright red blood per rectum with a Hemoccult positive stool and a white count of 12,000 with supratherapeutic INR 3.89.  EKG significant for A. fib with RVR and she also had temp of 102.4.  She was placed on Cardizem drip as well as oral Cardizem in addition to her beta-blocker with good control of her heart rate.  She was not treated for any acute infectious process and monitored off antibiotic therapy and had resolution of her leukocytosis.  She had a CT angiogram abdomen pelvis without any acute abnormality.    She feels better since hospital discharge but with increased exertion she will have occasional palpitations and shortness of breath that is relieved shortly with rest.  She has an appointment with cardiology later on this week.  She has had no further fevers or chills, abdominal pain, hematochezia.  Her Coumadin dose was lowered in the hospital, and she says that she needs a repeat INR today    ROS:  Review of Systems   Constitutional: Positive for fatigue.   Respiratory: Shortness of breath: hpi.    Cardiovascular: Positive for palpitations. Negative for chest pain.   Gastrointestinal: Negative for abdominal pain, blood in stool, nausea and vomiting.       Ms. Meza  reports that she has never smoked. She has never used smokeless tobacco. She reports that she does not drink alcohol or use drugs.      Current Outpatient Medications:   •  acetaminophen (TYLENOL) 325 MG tablet, Take 650 mg by mouth Every 6 (Six) Hours As Needed for Mild Pain  or Headache., Disp: , Rfl:   •  cholecalciferol (VITAMIN D3) 1000 units tablet, Take 1,000 Units by mouth Daily. 10/8/2018, Disp: , Rfl:   •  Cyanocobalamin (VITAMIN  "B-12) 1000 MCG sublingual tablet, Place 1 tablet under the tongue Daily. Hold 10/8/2018, Disp: , Rfl:   •  diltiaZEM CD (CARDIZEM CD) 120 MG 24 hr capsule, Take 1 capsule by mouth Daily., Disp: 30 capsule, Rfl: 1  •  metoprolol tartrate (LOPRESSOR) 25 MG tablet, Take 25 mg by mouth 2 (Two) Times a Day., Disp: , Rfl:   •  oxybutynin XL (DITROPAN-XL) 5 MG 24 hr tablet, Take 1 tablet by mouth Daily., Disp: 30 tablet, Rfl: 5  •  warfarin (COUMADIN) 5 MG tablet, Take 1.5 tablets by mouth Every Night., Disp: , Rfl:   •  Acetaminophen-Caffeine (TENSION HEADACHE RELIEF) 500-65 MG tablet, Take 1 tablet by mouth Every 6 (Six) Hours As Needed (migraine)., Disp: , Rfl:   •  loratadine (CLARITIN) 10 MG tablet, Take 1 tablet by mouth Daily., Disp: 30 tablet, Rfl: 2    OBJECTIVE:  /69 (BP Location: Right arm, Patient Position: Sitting, Cuff Size: Large Adult)   Pulse 80   Temp 98.1 °F (36.7 °C) (Temporal)   Resp 16   Ht 167.6 cm (66\")   Wt (!) 156 kg (343 lb 8 oz)   LMP  (LMP Unknown)   SpO2 98%   BMI 55.44 kg/m²    Physical Exam   Constitutional: She is oriented to person, place, and time. No distress.   HENT:   Head: Normocephalic and atraumatic.   Nose: Nose normal.   Eyes: Conjunctivae are normal. Right eye exhibits no discharge. Left eye exhibits no discharge. No scleral icterus.   Neck: No tracheal deviation present.   Cardiovascular:   No murmur heard.  Irregularly irregular   Pulmonary/Chest: Effort normal and breath sounds normal.   Abdominal: Soft. Bowel sounds are normal. There is no tenderness.   Neurological: She is alert and oriented to person, place, and time.   Skin: Skin is warm and dry. She is not diaphoretic. No pallor.   Psychiatric: She has a normal mood and affect. Her behavior is normal. Judgment and thought content normal.   Nursing note and vitals reviewed.      Assessment/Plan     Diagnosis Plan   1. Atrial fibrillation, unspecified type (CMS/HCC)  Protime-INR   -Repeat INR  -currently " stable, continue with cardiology follow-up    An After Visit Summary was printed and given to the patient at discharge.  Return in about 6 months (around 2/6/2020). Sooner if problems arise.         Jamar Saldana D.O.  Family Medicine  Osteopathic Neuromusculoskeletal Medicine

## 2019-08-08 ENCOUNTER — OFFICE VISIT (OUTPATIENT)
Dept: CARDIOLOGY | Facility: CLINIC | Age: 54
End: 2019-08-08

## 2019-08-08 VITALS
BODY MASS INDEX: 47.09 KG/M2 | WEIGHT: 293 LBS | SYSTOLIC BLOOD PRESSURE: 110 MMHG | OXYGEN SATURATION: 97 % | HEIGHT: 66 IN | DIASTOLIC BLOOD PRESSURE: 78 MMHG | HEART RATE: 67 BPM

## 2019-08-08 DIAGNOSIS — Z98.890 S/P LUMPECTOMY, LEFT BREAST: ICD-10-CM

## 2019-08-08 DIAGNOSIS — I48.91 ATRIAL FIBRILLATION, UNSPECIFIED TYPE (HCC): Primary | ICD-10-CM

## 2019-08-08 DIAGNOSIS — Z92.3 HISTORY OF RADIATION THERAPY: ICD-10-CM

## 2019-08-08 DIAGNOSIS — M79.7 FIBROMYALGIA: ICD-10-CM

## 2019-08-08 DIAGNOSIS — R00.2 PALPITATIONS: ICD-10-CM

## 2019-08-08 PROCEDURE — 99214 OFFICE O/P EST MOD 30 MIN: CPT | Performed by: INTERNAL MEDICINE

## 2019-08-08 RX ORDER — FLECAINIDE ACETATE 50 MG/1
50 TABLET ORAL 2 TIMES DAILY
Qty: 60 TABLET | Refills: 11 | Status: SHIPPED | OUTPATIENT
Start: 2019-08-08 | End: 2019-10-08 | Stop reason: HOSPADM

## 2019-08-08 RX ORDER — DILTIAZEM HYDROCHLORIDE 120 MG/1
CAPSULE, EXTENDED RELEASE ORAL DAILY
COMMUNITY
Start: 2019-07-30 | End: 2019-09-06 | Stop reason: SDUPTHER

## 2019-08-08 NOTE — PROGRESS NOTES
Anthony Meza  [id number]  1965  53 y.o.  female    Referring Provider: Jamar Saldana DO    Reason for  Visit:  Routine follow up.  Was admitted to hospital  Still in atrial fibrillation   Waiting to hear back from EP     Subjective    Persistent exertional shortness of breath on exertion relieved with rest  No significant cough or wheezing  Going on for several months or longer    No palpitations  No associated chest pain  No significant pedal edema    No fever or chills  No significant expectoration    No hemoptysis  No presyncope or syncope     Generalized weakness   Feels tired     Recent discharge from hospital with atrial fibrillation    and with rapid ventricular response    Now controlled ventricular rate     History of present illness:  Anthony Meza is a 53 y.o. yo female with history of atrial fibrillation    who presents today for   Chief Complaint   Patient presents with   • Atrial Fibrillation     1 wk d/c f/u   .    History  Past Medical History:   Diagnosis Date   • A-fib (CMS/HCC)    • Abnormal ECG    • Anxiety    • Arthritis    • Atrial fibrillation (CMS/HCC)    • Back pain    • CFS (chronic fatigue syndrome)    • Chest pain    • Chronic pain disorder    • Depression    • Dizziness    • Ductal carcinoma in situ (DCIS) of left breast     BREAST LEFT   • Fibromyalgia    • Hx of radiation therapy    • Hypertension    • IBS (irritable bowel syndrome)    • Incontinence    • Migraines    • Mononucleosis    • Sleep apnea     uses a c-pap   • Wears glasses    ,   Past Surgical History:   Procedure Laterality Date   • ANKLE SURGERY      x2 left   • BREAST LUMPECTOMY Left 09/2018   • BREAST LUMPECTOMY WITH SENTINEL NODE BIOPSY Left 10/15/2018    Procedure: LEFT BREAST LUMPECTOMY WITH NEEDLE LOCALIZATION - MAMMOGRAM GUIDED AND SENTINEL LYMPH  NODE BIOPSY - RADIOLOGIST TO INJECT AND SCAN;  Surgeon: Rafael Porter MD;  Location: [facility] OR;  Service: General   • CARDIAC CATHETERIZATION Bilateral  1/29/2018    Procedure: Coronary angiography;  Surgeon: Joel Medina MD;  Location:  [facility] CATH INVASIVE LOCATION;  Service:    • CARDIAC CATHETERIZATION N/A 1/29/2018    Procedure: Left Heart Cath;  Surgeon: Joel Medina MD;  Location:  [facility] CATH INVASIVE LOCATION;  Service:    • CYST REMOVAL      from tailbone   • TUBAL ABDOMINAL LIGATION     • WISDOM TOOTH EXTRACTION     ,   Family History   Problem Relation Age of Onset   • Transient ischemic attack Mother    • Rheum arthritis Mother    • Fibromyalgia Mother    • Heart disease Father    • Asthma Father    • Hypertension Father    • Rheum arthritis Father    • Obesity Father    • Obesity Sister    • Obesity Maternal Grandmother    • Ovarian cancer Maternal Grandmother    • Obesity Paternal Grandfather    • Hypertension Paternal Grandfather    • Heart disease Paternal Grandfather    • Liver cancer Maternal Aunt    • Breast cancer Neg Hx    ,   Social History     Tobacco Use   • Smoking status: Never Smoker   • Smokeless tobacco: Never Used   Substance Use Topics   • Alcohol use: No   • Drug use: No   ,     Medications  Current Outpatient Medications   Medication Sig Dispense Refill   • acetaminophen (TYLENOL) 325 MG tablet Take 650 mg by mouth Every 6 (Six) Hours As Needed for Mild Pain  or Headache.     • Acetaminophen-Caffeine (TENSION HEADACHE RELIEF) 500-65 MG tablet Take 1 tablet by mouth Every 6 (Six) Hours As Needed (migraine).     • cholecalciferol (VITAMIN D3) 1000 units tablet Take 1,000 Units by mouth Daily. 10/8/2018     • Cyanocobalamin (VITAMIN B-12) 1000 MCG sublingual tablet Place 1 tablet under the tongue Daily. Hold 10/8/2018     • diltiaZEM CD (CARDIZEM CD) 120 MG 24 hr capsule Take 1 capsule by mouth Daily. 30 capsule 1   • diltiaZEM SR (CARDIZEM SR) 120 MG 12 hr capsule Daily.     • metoprolol tartrate (LOPRESSOR) 25 MG tablet Take 1 tablet by mouth 2 (Two) Times a Day. 180 tablet 3   • oxybutynin XL (DITROPAN-XL) 5 MG 24 hr tablet Take 1  "tablet by mouth Daily. 30 tablet 5   • apixaban (ELIQUIS) 5 MG tablet tablet Take 1 tablet by mouth Every 12 (Twelve) Hours. 60 tablet 11   • flecainide (TAMBOCOR) 50 MG tablet Take 1 tablet by mouth 2 (Two) Times a Day. 60 tablet 11   • loratadine (CLARITIN) 10 MG tablet Take 1 tablet by mouth Daily. 30 tablet 2     No current facility-administered medications for this visit.        Allergies:  Penicillins    Review of Systems  Review of Systems   Constitution: Positive for weakness and malaise/fatigue.   HENT: Negative.    Eyes: Negative.    Cardiovascular: Positive for chest pain and dyspnea on exertion. Negative for claudication, cyanosis, irregular heartbeat, leg swelling, near-syncope, orthopnea, palpitations, paroxysmal nocturnal dyspnea and syncope.   Respiratory: Positive for cough.    Endocrine: Negative.    Hematologic/Lymphatic: Negative.    Skin: Negative.    Musculoskeletal: Positive for arthritis and back pain.   Gastrointestinal: Negative for anorexia.   Genitourinary: Negative.    Psychiatric/Behavioral: Negative.      Results for orders placed during the hospital encounter of 08/28/18   Adult Transthoracic Echo Complete W/ Cont if Necessary Per Protocol    Narrative · Left ventricular systolic function is normal. Estimated EF = 55%.  · Normal        Objective     Physical Exam:  /78   Pulse 67   Ht 167.6 cm (66\")   Wt (!) 157 kg (346 lb)   LMP  (LMP Unknown)   SpO2 97%   BMI 55.85 kg/m²   Physical Exam   Constitutional: She appears well-developed.   HENT:   Head: Normocephalic.   Neck: Normal carotid pulses and no JVD present. No tracheal tenderness present. Carotid bruit is not present. No tracheal deviation and no edema present.   Cardiovascular: Normal heart sounds and normal pulses. An irregularly irregular rhythm present.   Pulmonary/Chest: Effort normal. No stridor.   Abdominal: Soft.   Neurological: She is alert. She has normal strength. No cranial nerve deficit or sensory " deficit.   Skin: Skin is warm.   Psychiatric: She has a normal mood and affect. Her speech is normal and behavior is normal.     Advisory     Will see the patient soon per appointment or sooner if required  Patient advised in advance that there may be a longer wait time next follow up with  The patient given the option to see another provider that the patient declined     Results Review: 1/29/18  Cardiac cath  Normal epicardial coronary arteries with dominant right coronary artery   Normal LVEF  LVEDP   21    mm Hg     Procedures    Assessment/Plan   Patient Active Problem List   Diagnosis   • Fibromyalgia   • CFS (chronic fatigue syndrome)   • Essential hypertension   • Chronic midline low back pain without sciatica   • HARLAN on CPAP   • Atrial fibrillation (CMS/HCC)   • Atrial fibrillation, unspecified type (CMS/HCC)   • Family history of early CAD father in 50 s   • Body mass index (BMI) of 50-59.9 in adult (CMS/HCC)   • Lower extremity edema   • Vitamin D deficiency   • Palpitations   • Ductal carcinoma in situ (DCIS) of left breast   • History of radiation therapy   • S/P lumpectomy, left breast   • Non-smoker   • Encounter for screening mammogram for malignant neoplasm of breast    • Fever   • BRBPR (bright red blood per rectum)   • Supratherapeutic INR            Plan        Requested Prescriptions     Signed Prescriptions Disp Refills   • flecainide (TAMBOCOR) 50 MG tablet 60 tablet 11     Sig: Take 1 tablet by mouth 2 (Two) Times a Day.   • apixaban (ELIQUIS) 5 MG tablet tablet 60 tablet 11     Sig: Take 1 tablet by mouth Every 12 (Twelve) Hours.   • metoprolol tartrate (LOPRESSOR) 25 MG tablet 180 tablet 3     Sig: Take 1 tablet by mouth 2 (Two) Times a Day.    Will see her back in 5 weeks and see if can cardiovert her again  Start Flecainide 2 weeks after starting Eliquis today    Eliquis Samples given    Keep F/U with EP        ____________________________________________________________________________________________________________________________________________  Health maintenance and recommendations    Similar recommendations as last visit       Offered to give patient  a copy      Questions were encouraged, asked and answered to the patient's  understanding and satisfaction. Questions if any regarding current medications and side effects, need for refills and importance of compliance to medications stressed.    Reviewed available prior notes, consults, prior visits, laboratory findings, radiology and cardiology relevant reports. Updated chart as applicable. I have reviewed the patient's medical history in detail and updated the computerized patient record as relevant.      Updated patient regarding any new or relevant abnormalities on review of records or any new findings on physical exam. Mentioned to patient about purpose of visit and desirable health short and long term goals and objectives.    Primary to monitor CBC CMP Lipid panel and TSH as applicable    ___________________________________________________________________________________________________________________________________________                Return in about 5 weeks (around 9/12/2019).

## 2019-08-24 DIAGNOSIS — N32.81 OVERACTIVE BLADDER: ICD-10-CM

## 2019-08-24 RX ORDER — OXYBUTYNIN CHLORIDE 5 MG/1
TABLET, EXTENDED RELEASE ORAL
Qty: 30 TABLET | Refills: 0 | Status: CANCELLED | OUTPATIENT
Start: 2019-08-24

## 2019-08-26 DIAGNOSIS — N32.81 OVERACTIVE BLADDER: ICD-10-CM

## 2019-08-26 RX ORDER — OXYBUTYNIN CHLORIDE 5 MG/1
5 TABLET, EXTENDED RELEASE ORAL DAILY
Qty: 30 TABLET | Refills: 5 | Status: SHIPPED | OUTPATIENT
Start: 2019-08-26 | End: 2019-10-21 | Stop reason: SDUPTHER

## 2019-09-05 ENCOUNTER — TELEPHONE (OUTPATIENT)
Dept: CARDIOLOGY | Facility: CLINIC | Age: 54
End: 2019-09-05

## 2019-09-05 NOTE — TELEPHONE ENCOUNTER
PT CALLED STATED ELIQUIS 5 MG COAST TO MUCH PER MONTH, AND SHE CAN NOT USE THE DISCOUNT CARD BECAUSE OF MEDICARE.    I OFFERED HER PT ASST FORMS ALONG WITH SOME SAMPLES

## 2019-09-06 ENCOUNTER — OFFICE VISIT (OUTPATIENT)
Dept: INTERNAL MEDICINE | Facility: CLINIC | Age: 54
End: 2019-09-06

## 2019-09-06 VITALS
SYSTOLIC BLOOD PRESSURE: 126 MMHG | RESPIRATION RATE: 12 BRPM | WEIGHT: 293 LBS | OXYGEN SATURATION: 98 % | HEART RATE: 82 BPM | BODY MASS INDEX: 47.09 KG/M2 | DIASTOLIC BLOOD PRESSURE: 84 MMHG | HEIGHT: 66 IN | TEMPERATURE: 99.1 F

## 2019-09-06 DIAGNOSIS — F33.2 SEVERE EPISODE OF RECURRENT MAJOR DEPRESSIVE DISORDER, WITHOUT PSYCHOTIC FEATURES (HCC): Primary | ICD-10-CM

## 2019-09-06 PROCEDURE — 99214 OFFICE O/P EST MOD 30 MIN: CPT | Performed by: FAMILY MEDICINE

## 2019-09-06 RX ORDER — BUPROPION HYDROCHLORIDE 150 MG/1
150 TABLET ORAL EVERY MORNING
Qty: 30 TABLET | Refills: 1 | Status: SHIPPED | OUTPATIENT
Start: 2019-09-06 | End: 2019-10-21 | Stop reason: SDUPTHER

## 2019-09-06 NOTE — PATIENT INSTRUCTIONS
Bupropion tablets (Depression/Mood Disorders)  What is this medicine?  BUPROPION (byoo PROE pee on) is used to treat depression.  This medicine may be used for other purposes; ask your health care provider or pharmacist if you have questions.  COMMON BRAND NAME(S): Wellbutrin  What should I tell my health care provider before I take this medicine?  They need to know if you have any of these conditions:  -an eating disorder, such as anorexia or bulimia  -bipolar disorder or psychosis  -diabetes or high blood sugar, treated with medication  -glaucoma  -heart disease, previous heart attack, or irregular heart beat  -head injury or brain tumor  -high blood pressure  -kidney or liver disease  -seizures  -suicidal thoughts or a previous suicide attempt  -Tourette's syndrome  -weight loss  -an unusual or allergic reaction to bupropion, other medicines, foods, dyes, or preservatives  -breast-feeding  -pregnant or trying to become pregnant  How should I use this medicine?  Take this medicine by mouth with a glass of water. Follow the directions on the prescription label. You can take it with or without food. If it upsets your stomach, take it with food. Take your medicine at regular intervals. Do not take your medicine more often than directed. Do not stop taking this medicine suddenly except upon the advice of your doctor. Stopping this medicine too quickly may cause serious side effects or your condition may worsen.  A special MedGuide will be given to you by the pharmacist with each prescription and refill. Be sure to read this information carefully each time.  Talk to your pediatrician regarding the use of this medicine in children. Special care may be needed.  Overdosage: If you think you have taken too much of this medicine contact a poison control center or emergency room at once.  NOTE: This medicine is only for you. Do not share this medicine with others.  What if I miss a dose?  If you miss a dose, take it as soon  as you can. If it is less than four hours to your next dose, take only that dose and skip the missed dose. Do not take double or extra doses.  What may interact with this medicine?  Do not take this medicine with any of the following medications:  -linezolid  -MAOIs like Azilect, Carbex, Eldepryl, Marplan, Nardil, and Parnate  -methylene blue (injected into a vein)  -other medicines that contain bupropion like Zyban  This medicine may also interact with the following medications:  -alcohol  -certain medicines for anxiety or sleep  -certain medicines for blood pressure like metoprolol, propranolol  -certain medicines for depression or psychotic disturbances  -certain medicines for HIV or AIDS like efavirenz, lopinavir, nelfinavir, ritonavir  -certain medicines for irregular heart beat like propafenone, flecainide  -certain medicines for Parkinson's disease like amantadine, levodopa  -certain medicines for seizures like carbamazepine, phenytoin, phenobarbital  -cimetidine  -clopidogrel  -cyclophosphamide  -digoxin  -furazolidone  -isoniazid  -nicotine  -orphenadrine  -procarbazine  -steroid medicines like prednisone or cortisone  -stimulant medicines for attention disorders, weight loss, or to stay awake  -tamoxifen  -theophylline  -thiotepa  -ticlopidine  -tramadol  -warfarin  This list may not describe all possible interactions. Give your health care provider a list of all the medicines, herbs, non-prescription drugs, or dietary supplements you use. Also tell them if you smoke, drink alcohol, or use illegal drugs. Some items may interact with your medicine.  What should I watch for while using this medicine?  Tell your doctor if your symptoms do not get better or if they get worse. Visit your doctor or health care professional for regular checks on your progress. Because it may take several weeks to see the full effects of this medicine, it is important to continue your treatment as prescribed by your  doctor.  Patients and their families should watch out for new or worsening thoughts of suicide or depression. Also watch out for sudden changes in feelings such as feeling anxious, agitated, panicky, irritable, hostile, aggressive, impulsive, severely restless, overly excited and hyperactive, or not being able to sleep. If this happens, especially at the beginning of treatment or after a change in dose, call your health care professional.  Avoid alcoholic drinks while taking this medicine. Drinking excessive alcoholic beverages, using sleeping or anxiety medicines, or quickly stopping the use of these agents while taking this medicine may increase your risk for a seizure.  Do not drive or use heavy machinery until you know how this medicine affects you. This medicine can impair your ability to perform these tasks.  Do not take this medicine close to bedtime. It may prevent you from sleeping.  Your mouth may get dry. Chewing sugarless gum or sucking hard candy, and drinking plenty of water may help. Contact your doctor if the problem does not go away or is severe.  What side effects may I notice from receiving this medicine?  Side effects that you should report to your doctor or health care professional as soon as possible:  -allergic reactions like skin rash, itching or hives, swelling of the face, lips, or tongue  -breathing problems  -changes in vision  -confusion  -elevated mood, decreased need for sleep, racing thoughts, impulsive behavior  -fast or irregular heartbeat  -hallucinations, loss of contact with reality  -increased blood pressure  -redness, blistering, peeling or loosening of the skin, including inside the mouth  -seizures  -suicidal thoughts or other mood changes  -unusually weak or tired  -vomiting  Side effects that usually do not require medical attention (report to your doctor or health care professional if they continue or are bothersome):  -constipation  -headache  -loss of  appetite  -nausea  -tremors  -weight loss  This list may not describe all possible side effects. Call your doctor for medical advice about side effects. You may report side effects to FDA at 0-154-CDB-3393.  Where should I keep my medicine?  Keep out of the reach of children.  Store at room temperature between 20 and 25 degrees C (68 and 77 degrees F), away from direct sunlight and moisture. Keep tightly closed. Throw away any unused medicine after the expiration date.  NOTE: This sheet is a summary. It may not cover all possible information. If you have questions about this medicine, talk to your doctor, pharmacist, or health care provider.  © 2019 [employer]/Gold Standard (2017-06-09 13:44:21)

## 2019-09-06 NOTE — PROGRESS NOTES
CC:   Chief Complaint   Patient presents with   • Depression       History:  Anthony Meza is a 53 y.o. female who presents today for follow-up for evaluation of the above:    Has hx of depression and reports worsening in the past few weeks in the setting of increased social stress at home.  She reports feeling safe without suicidal ideation or hallucination, but she is reluctant to talk about her specific stressors at this time.  She had previously tried Prozac and Cymbalta, said they helped some.    PHQ-9 Depression Screening  Little interest or pleasure in doing things? 3   Feeling down, depressed, or hopeless? 3   Trouble falling or staying asleep, or sleeping too much? 3   Feeling tired or having little energy? 3   Poor appetite or overeating? 2   Feeling bad about yourself - or that you are a failure or have let yourself or your family down? 2   Trouble concentrating on things, such as reading the newspaper or watching television? 3   Moving or speaking so slowly that other people could have noticed? Or the opposite - being so fidgety or restless that you have been moving around a lot more than usual? 3   Thoughts that you would be better off dead, or of hurting yourself in some way? 0   PHQ-9 Total Score 22   If you checked off any problems, how difficult have these problems made it for you to do your work, take care of things at home, or get along with other people? Extremely dIfficult       ROS:  Review of Systems   Constitutional: Positive for appetite change and fatigue.   Psychiatric/Behavioral: Positive for behavioral problems, decreased concentration, dysphoric mood and sleep disturbance. Negative for suicidal ideas. The patient is nervous/anxious.        Ms. Meza  reports that she has never smoked. She has never used smokeless tobacco. She reports that she does not drink alcohol or use drugs.      Current Outpatient Medications:   •  acetaminophen (TYLENOL) 325 MG tablet, Take 650 mg by mouth Every 6  (Six) Hours As Needed for Mild Pain  or Headache., Disp: , Rfl:   •  Acetaminophen-Caffeine (TENSION HEADACHE RELIEF) 500-65 MG tablet, Take 1 tablet by mouth Every 6 (Six) Hours As Needed (migraine)., Disp: , Rfl:   •  apixaban (ELIQUIS) 5 MG tablet tablet, Take 1 tablet by mouth Every 12 (Twelve) Hours., Disp: 60 tablet, Rfl: 11  •  cholecalciferol (VITAMIN D3) 1000 units tablet, Take 1,000 Units by mouth Daily. 10/8/2018, Disp: , Rfl:   •  Cyanocobalamin (VITAMIN B-12) 1000 MCG sublingual tablet, Place 1 tablet under the tongue Daily. Hold 10/8/2018, Disp: , Rfl:   •  diltiaZEM CD (CARDIZEM CD) 120 MG 24 hr capsule, Take 1 capsule by mouth Daily., Disp: 30 capsule, Rfl: 1  •  diltiaZEM SR (CARDIZEM SR) 120 MG 12 hr capsule, Daily., Disp: , Rfl:   •  flecainide (TAMBOCOR) 50 MG tablet, Take 1 tablet by mouth 2 (Two) Times a Day., Disp: 60 tablet, Rfl: 11  •  loratadine (CLARITIN) 10 MG tablet, Take 1 tablet by mouth Daily., Disp: 30 tablet, Rfl: 2  •  metoprolol tartrate (LOPRESSOR) 25 MG tablet, Take 1 tablet by mouth 2 (Two) Times a Day., Disp: 180 tablet, Rfl: 3  •  oxybutynin XL (DITROPAN-XL) 5 MG 24 hr tablet, Take 1 tablet by mouth Daily., Disp: 30 tablet, Rfl: 5      OBJECTIVE:  LMP  (LMP Unknown)    Physical Exam   Constitutional: She is oriented to person, place, and time. No distress.   HENT:   Head: Normocephalic and atraumatic.   Nose: Nose normal.   Eyes: Conjunctivae are normal. Right eye exhibits no discharge. Left eye exhibits no discharge. No scleral icterus.   Neck: No tracheal deviation present.   Pulmonary/Chest: Effort normal.   Neurological: She is alert and oriented to person, place, and time.   Skin: Skin is warm and dry. She is not diaphoretic. No pallor.   Psychiatric: Her behavior is normal. Judgment and thought content normal.   Tearful with depressed affect   Nursing note and vitals reviewed.      Assessment/Plan     Diagnosis Plan   1. Severe episode of recurrent major depressive  disorder, without psychotic features (CMS/HCC)  buPROPion XL (WELLBUTRIN XL) 150 MG 24 hr tablet   -Wellbutrin trial of 150 mg daily, if well tolerated in 1 to 2 weeks can increase to 300 mg daily  -Offered counseling but patient declined at this time, she will let us know if she changes her mind      An After Visit Summary was printed and given to the patient at discharge.  Return in about 6 weeks (around 10/18/2019). Sooner if problems arise.         Jamar Saldana D.O.  Family Medicine  Osteopathic Neuromusculoskeletal Medicine

## 2019-09-17 ENCOUNTER — OFFICE VISIT (OUTPATIENT)
Dept: CARDIOLOGY | Facility: CLINIC | Age: 54
End: 2019-09-17

## 2019-09-17 ENCOUNTER — DOCUMENTATION (OUTPATIENT)
Dept: CARDIOLOGY | Facility: CLINIC | Age: 54
End: 2019-09-17

## 2019-09-17 VITALS
WEIGHT: 293 LBS | HEART RATE: 74 BPM | OXYGEN SATURATION: 96 % | SYSTOLIC BLOOD PRESSURE: 122 MMHG | DIASTOLIC BLOOD PRESSURE: 80 MMHG | HEIGHT: 66 IN | BODY MASS INDEX: 47.09 KG/M2

## 2019-09-17 DIAGNOSIS — D05.12 DUCTAL CARCINOMA IN SITU (DCIS) OF LEFT BREAST: ICD-10-CM

## 2019-09-17 DIAGNOSIS — I10 ESSENTIAL HYPERTENSION: ICD-10-CM

## 2019-09-17 DIAGNOSIS — G89.29 CHRONIC MIDLINE LOW BACK PAIN WITHOUT SCIATICA: Primary | ICD-10-CM

## 2019-09-17 DIAGNOSIS — Z82.49 FAMILY HISTORY OF EARLY CAD: ICD-10-CM

## 2019-09-17 DIAGNOSIS — M54.50 CHRONIC MIDLINE LOW BACK PAIN WITHOUT SCIATICA: Primary | ICD-10-CM

## 2019-09-17 DIAGNOSIS — R00.2 PALPITATIONS: ICD-10-CM

## 2019-09-17 DIAGNOSIS — I48.91 ATRIAL FIBRILLATION, UNSPECIFIED TYPE (HCC): ICD-10-CM

## 2019-09-17 PROCEDURE — 93000 ELECTROCARDIOGRAM COMPLETE: CPT | Performed by: INTERNAL MEDICINE

## 2019-09-17 PROCEDURE — 99214 OFFICE O/P EST MOD 30 MIN: CPT | Performed by: INTERNAL MEDICINE

## 2019-09-17 NOTE — PROGRESS NOTES
Anthony Meza  [id number]  1965  53 y.o.  female    Referring Provider: Jamar Saldana DO    Reason for  Visit:  Routine follow up.  Was admitted to hospital  Failed cardioversion  Still in atrial fibrillation   Due to see Dr Waldrop October first  On Flecainide x 3 weeks  Non stop Eliquis > 4 weeks    Subjective      No new events or complaints since last visit   Overall the patient feels no major change from baseline symptoms     Persistent exertional shortness of breath on exertion relieved with rest  No significant cough or wheezing  Going on for several months or longer    No palpitations  No associated chest pain  No significant pedal edema    No fever or chills  No significant expectoration    No hemoptysis  No presyncope or syncope     Generalized weakness   Feels tired       No gastrointestinal bleeding      History of present illness:  Anthony Meza is a 53 y.o. yo female with history of atrial fibrillation    who presents today for   Chief Complaint   Patient presents with   • Atrial Fibrillation     5 week follow up    .    History  Past Medical History:   Diagnosis Date   • A-fib (CMS/HCC)    • Abnormal ECG    • Anxiety    • Arthritis    • Atrial fibrillation (CMS/HCC)    • Back pain    • CFS (chronic fatigue syndrome)    • Chest pain    • Chronic pain disorder    • Depression    • Dizziness    • Ductal carcinoma in situ (DCIS) of left breast     BREAST LEFT   • Fibromyalgia    • Hx of radiation therapy    • Hypertension    • IBS (irritable bowel syndrome)    • Incontinence    • Migraines    • Mononucleosis    • Sleep apnea     uses a c-pap   • Wears glasses    ,   Past Surgical History:   Procedure Laterality Date   • ANKLE SURGERY      x2 left   • BREAST LUMPECTOMY Left 09/2018   • BREAST LUMPECTOMY WITH SENTINEL NODE BIOPSY Left 10/15/2018    Procedure: LEFT BREAST LUMPECTOMY WITH NEEDLE LOCALIZATION - MAMMOGRAM GUIDED AND SENTINEL LYMPH  NODE BIOPSY - RADIOLOGIST TO INJECT AND SCAN;  Surgeon:  Rafael Porter MD;  Location:  [facility] OR;  Service: General   • CARDIAC CATHETERIZATION Bilateral 1/29/2018    Procedure: Coronary angiography;  Surgeon: Joel Medina MD;  Location:  [facility] CATH INVASIVE LOCATION;  Service:    • CARDIAC CATHETERIZATION N/A 1/29/2018    Procedure: Left Heart Cath;  Surgeon: Joel Medina MD;  Location:  [facility] CATH INVASIVE LOCATION;  Service:    • CYST REMOVAL      from tailbone   • TUBAL ABDOMINAL LIGATION     • WISDOM TOOTH EXTRACTION     ,   Family History   Problem Relation Age of Onset   • Transient ischemic attack Mother    • Rheum arthritis Mother    • Fibromyalgia Mother    • Heart disease Father    • Asthma Father    • Hypertension Father    • Rheum arthritis Father    • Obesity Father    • Obesity Sister    • Obesity Maternal Grandmother    • Ovarian cancer Maternal Grandmother    • Obesity Paternal Grandfather    • Hypertension Paternal Grandfather    • Heart disease Paternal Grandfather    • Liver cancer Maternal Aunt    • Breast cancer Neg Hx    ,   Social History     Tobacco Use   • Smoking status: Never Smoker   • Smokeless tobacco: Never Used   Substance Use Topics   • Alcohol use: No   • Drug use: No   ,     Medications  Current Outpatient Medications   Medication Sig Dispense Refill   • acetaminophen (TYLENOL) 325 MG tablet Take 650 mg by mouth Every 6 (Six) Hours As Needed for Mild Pain  or Headache.     • Acetaminophen-Caffeine (TENSION HEADACHE RELIEF) 500-65 MG tablet Take 1 tablet by mouth Every 6 (Six) Hours As Needed (migraine).     • apixaban (ELIQUIS) 5 MG tablet tablet Take 1 tablet by mouth Every 12 (Twelve) Hours. 60 tablet 11   • buPROPion XL (WELLBUTRIN XL) 150 MG 24 hr tablet Take 1 tablet by mouth Every Morning. 30 tablet 1   • cholecalciferol (VITAMIN D3) 1000 units tablet Take 1,000 Units by mouth Daily. 10/8/2018     • Cyanocobalamin (VITAMIN B-12) 1000 MCG sublingual tablet Place 1 tablet under the tongue Daily. Hold 10/8/2018     • diltiaZEM CD  "(CARDIZEM CD) 120 MG 24 hr capsule Take 1 capsule by mouth Daily. 30 capsule 1   • flecainide (TAMBOCOR) 50 MG tablet Take 1 tablet by mouth 2 (Two) Times a Day. 60 tablet 11   • loratadine (CLARITIN) 10 MG tablet Take 1 tablet by mouth Daily. 30 tablet 2   • metoprolol tartrate (LOPRESSOR) 25 MG tablet Take 1 tablet by mouth 2 (Two) Times a Day. 180 tablet 3   • oxybutynin XL (DITROPAN-XL) 5 MG 24 hr tablet Take 1 tablet by mouth Daily. 30 tablet 5     No current facility-administered medications for this visit.        Allergies:  Penicillins    Review of Systems  Review of Systems   Constitution: Positive for weakness and malaise/fatigue.   HENT: Negative.    Eyes: Negative.    Cardiovascular: Positive for chest pain and dyspnea on exertion. Negative for claudication, cyanosis, irregular heartbeat, leg swelling, near-syncope, orthopnea, palpitations, paroxysmal nocturnal dyspnea and syncope.   Respiratory: Positive for cough.    Endocrine: Negative.    Hematologic/Lymphatic: Negative.    Skin: Negative.    Musculoskeletal: Positive for arthritis and back pain.   Gastrointestinal: Negative for anorexia.   Genitourinary: Negative.    Psychiatric/Behavioral: Negative.      Results for orders placed during the hospital encounter of 08/28/18   Adult Transthoracic Echo Complete W/ Cont if Necessary Per Protocol    Narrative · Left ventricular systolic function is normal. Estimated EF = 55%.  · Normal        Objective     Physical Exam:  /80   Pulse 74   Ht 167.6 cm (66\")   Wt (!) 158 kg (349 lb)   LMP  (LMP Unknown)   SpO2 96%   BMI 56.33 kg/m²   Physical Exam   Constitutional: She appears well-developed.   HENT:   Head: Normocephalic.   Neck: Normal carotid pulses and no JVD present. No tracheal tenderness present. Carotid bruit is not present. No tracheal deviation and no edema present.   Cardiovascular: Normal heart sounds and normal pulses. An irregularly irregular rhythm present.   Pulmonary/Chest: " Effort normal. No stridor.   Abdominal: Soft. She exhibits no distension. There is no hepatosplenomegaly. There is no tenderness.   Neurological: She is alert. She has normal strength. No cranial nerve deficit or sensory deficit.   Skin: Skin is warm.   Psychiatric: She has a normal mood and affect. Her speech is normal and behavior is normal.          Results Review: 1/29/18  Cardiac cath  Normal epicardial coronary arteries with dominant right coronary artery   Normal LVEF  LVEDP   21    mm Hg       ECG 12 Lead  Date/Time: 9/17/2019 1:12 PM  Performed by: Joel Medina MD  Authorized by: Joel Medina MD   Comparison: compared with previous ECG from 7/30/2019  Similar to previous ECG  Comparison to previous ECG: Ventricular rate decreased from 86  to  70  beats per minute    Rhythm: atrial fibrillation  Rate: normal  Conduction: conduction normal  Q waves: V1-V6    ST Segments: ST segments normal  QRS axis: normal  Other findings: low voltage    Clinical impression: abnormal EKG            Assessment/Plan   Patient Active Problem List   Diagnosis   • Fibromyalgia   • CFS (chronic fatigue syndrome)   • Essential hypertension   • Chronic midline low back pain without sciatica   • HARLAN on CPAP   • Atrial fibrillation (CMS/HCC)   • Atrial fibrillation, unspecified type (CMS/HCC)   • Family history of early CAD father in 50 s   • Body mass index (BMI) of 50-59.9 in adult (CMS/HCC)   • Lower extremity edema   • Vitamin D deficiency   • Palpitations   • Ductal carcinoma in situ (DCIS) of left breast   • History of radiation therapy   • S/P lumpectomy, left breast   • Non-smoker   • Encounter for screening mammogram for malignant neoplasm of breast    • Fever   • BRBPR (bright red blood per rectum)   • Supratherapeutic INR            Plan        DC cardioversion after adequate duration and therapeutic anticoagulation       Keep f/u Dr Waldrop     Orders Placed This Encounter   Procedures   • Cardioversion External in  Cardiology Department     Standing Status:   Future     Standing Expiration Date:   9/16/2020     Order Specific Question:   What type of sedation does the patient require?     Answer:   Moderate Sedation     Order Specific Question:   Reason for Exam:     Answer:   AF   • ECG 12 Lead     This order was created via procedure documentation        ____________________________________________________________________________________________________________________________________________  Health maintenance and recommendations    Similar recommendations as last visit       Offered to give patient  a copy      Questions were encouraged, asked and answered to the patient's  understanding and satisfaction. Questions if any regarding current medications and side effects, need for refills and importance of compliance to medications stressed.    Reviewed available prior notes, consults, prior visits, laboratory findings, radiology and cardiology relevant reports. Updated chart as applicable. I have reviewed the patient's medical history in detail and updated the computerized patient record as relevant.      Updated patient regarding any new or relevant abnormalities on review of records or any new findings on physical exam. Mentioned to patient about purpose of visit and desirable health short and long term goals and objectives.    Primary to monitor CBC CMP Lipid panel and TSH as applicable    ___________________________________________________________________________________________________________________________________________                Return in about 6 weeks (around 10/29/2019).

## 2019-09-17 NOTE — PROGRESS NOTES
I called [employer] to track the shipping of the patients monitor. They stated there was no tracking and to make sure the monitor had been mailed through postal service not [employer] pr [employer]. Patient stated her son was to drop that envelope. She was going to check with her son and if she needed to be repatched she would be back Friday 9/20/2019 to do so.

## 2019-09-24 ENCOUNTER — ANTICOAGULATION VISIT (OUTPATIENT)
Dept: CARDIOLOGY | Facility: CLINIC | Age: 54
End: 2019-09-24

## 2019-09-27 ENCOUNTER — HOSPITAL ENCOUNTER (OUTPATIENT)
Dept: MAMMOGRAPHY | Facility: HOSPITAL | Age: 54
Discharge: HOME | End: 2019-09-27
Admitting: RADIOLOGY

## 2019-09-27 DIAGNOSIS — D05.12 DUCTAL CARCINOMA IN SITU (DCIS) OF LEFT BREAST: ICD-10-CM

## 2019-09-27 DIAGNOSIS — Z78.9 NON-SMOKER: ICD-10-CM

## 2019-09-27 DIAGNOSIS — Z98.890 S/P LUMPECTOMY, LEFT BREAST: ICD-10-CM

## 2019-09-27 DIAGNOSIS — Z92.3 HISTORY OF RADIATION THERAPY: ICD-10-CM

## 2019-09-27 PROCEDURE — G0279 TOMOSYNTHESIS, MAMMO: HCPCS

## 2019-09-27 PROCEDURE — 77066 DX MAMMO INCL CAD BI: CPT

## 2019-10-01 RX ORDER — DILTIAZEM HYDROCHLORIDE 120 MG/1
120 CAPSULE, COATED, EXTENDED RELEASE ORAL
Qty: 30 CAPSULE | Refills: 1 | Status: SHIPPED | OUTPATIENT
Start: 2019-10-01 | End: 2019-11-11 | Stop reason: ALTCHOICE

## 2019-10-02 ENCOUNTER — TRANSCRIBE ORDERS (OUTPATIENT)
Dept: ADMINISTRATIVE | Facility: HOSPITAL | Age: 54
End: 2019-10-02

## 2019-10-02 DIAGNOSIS — I48.19 OTHER PERSISTENT ATRIAL FIBRILLATION (HCC): Primary | ICD-10-CM

## 2019-10-08 ENCOUNTER — ANESTHESIA (OUTPATIENT)
Dept: CARDIOLOGY | Facility: HOSPITAL | Age: 54
End: 2019-10-08

## 2019-10-08 ENCOUNTER — HOSPITAL ENCOUNTER (OUTPATIENT)
Dept: CARDIOLOGY | Facility: HOSPITAL | Age: 54
Setting detail: HOSPITAL OUTPATIENT SURGERY
Discharge: HOME | End: 2019-10-08
Admitting: INTERNAL MEDICINE

## 2019-10-08 ENCOUNTER — ANESTHESIA EVENT (OUTPATIENT)
Dept: CARDIOLOGY | Facility: HOSPITAL | Age: 54
End: 2019-10-08

## 2019-10-08 VITALS
SYSTOLIC BLOOD PRESSURE: 120 MMHG | RESPIRATION RATE: 22 BRPM | OXYGEN SATURATION: 82 % | HEART RATE: 65 BPM | DIASTOLIC BLOOD PRESSURE: 76 MMHG

## 2019-10-08 VITALS
DIASTOLIC BLOOD PRESSURE: 74 MMHG | BODY MASS INDEX: 47.09 KG/M2 | HEART RATE: 68 BPM | SYSTOLIC BLOOD PRESSURE: 113 MMHG | WEIGHT: 293 LBS | TEMPERATURE: 98 F | HEIGHT: 66 IN | RESPIRATION RATE: 15 BRPM | OXYGEN SATURATION: 98 %

## 2019-10-08 DIAGNOSIS — I48.91 ATRIAL FIBRILLATION, UNSPECIFIED TYPE (HCC): ICD-10-CM

## 2019-10-08 PROCEDURE — 25010000002 PROPOFOL 10 MG/ML EMULSION: Performed by: NURSE ANESTHETIST, CERTIFIED REGISTERED

## 2019-10-08 PROCEDURE — 92960 CARDIOVERSION ELECTRIC EXT: CPT

## 2019-10-08 PROCEDURE — 92960 CARDIOVERSION ELECTRIC EXT: CPT | Performed by: INTERNAL MEDICINE

## 2019-10-08 RX ORDER — SODIUM CHLORIDE 9 MG/ML
INJECTION, SOLUTION INTRAVENOUS CONTINUOUS PRN
Status: DISCONTINUED | OUTPATIENT
Start: 2019-10-08 | End: 2019-10-08 | Stop reason: SURG

## 2019-10-08 RX ORDER — PROPOFOL 10 MG/ML
VIAL (ML) INTRAVENOUS AS NEEDED
Status: DISCONTINUED | OUTPATIENT
Start: 2019-10-08 | End: 2019-10-08 | Stop reason: SURG

## 2019-10-08 RX ADMIN — PROPOFOL 60 MG: 10 INJECTION, EMULSION INTRAVENOUS at 13:43

## 2019-10-08 RX ADMIN — SODIUM CHLORIDE: 9 INJECTION, SOLUTION INTRAVENOUS at 13:36

## 2019-10-08 RX ADMIN — PROPOFOL 20 MG: 10 INJECTION, EMULSION INTRAVENOUS at 13:45

## 2019-10-08 NOTE — ANESTHESIA PROCEDURE NOTES
Peripheral IV    Patient location during procedure: pre-op  Start time: 10/8/2019 12:45 PM  End time: 10/8/2019 12:47 PM  Line placed for Difficult Access.  Performed By   Anesthesiologist: Tori Mackey MD  Preanesthetic Checklist  Completed: patient identified, site marked, surgical consent, pre-op evaluation, timeout performed, IV checked, risks and benefits discussed and monitors and equipment checked  Peripheral IV Prep   Patient position: sitting   Prep: ChloraPrep  Peripheral IV Procedure   Laterality:right  Location:  Antecubital  Catheter size: 18 G  Guidance: ultrasound guided         Post Assessment   Dressing Type: transparent and tape.    IV Dressing/Site: clean, dry and intact

## 2019-10-08 NOTE — ANESTHESIA PREPROCEDURE EVALUATION
Anesthesia Evaluation     Patient summary reviewed   no history of anesthetic complications:  NPO Solid Status: > 8 hours  NPO Liquid Status: > 8 hours           Airway   Mallampati: III  TM distance: >3 FB  Neck ROM: full  Possible difficult intubation  Dental      Pulmonary    (+) sleep apnea on CPAP,   Cardiovascular   Exercise tolerance: poor (<4 METS)    PT is on anticoagulation therapy    (+) hypertension, dysrhythmias Atrial Fib,     ROS comment: Normal heart cath 2018    Neuro/Psych  (+) TIA, headaches, dizziness/light headedness, psychiatric history Anxiety and Depression,     GI/Hepatic/Renal/Endo    (+) morbid obesity,      Musculoskeletal     (+) chronic pain (fibromyalgia),   Abdominal    Substance History      OB/GYN          Other      history of cancer (breast cancer) remission                      Anesthesia Plan    ASA 3     MAC     intravenous induction   Anesthetic plan, all risks, benefits, and alternatives have been provided, discussed and informed consent has been obtained with: patient.

## 2019-10-08 NOTE — ANESTHESIA POSTPROCEDURE EVALUATION
"Patient: Anthony Meza    Procedure Summary     Date:  10/08/19 Room / Location:  [facility] CATH LAB    Anesthesia Start:  1339 Anesthesia Stop:  1352    Procedure:  CARDIOVERSION EXTERNAL IN CARDIOLOGY DEPARTMENT Diagnosis:       Atrial fibrillation, unspecified type (CMS/HCC)      (AF)    Scheduled Providers:  Joel Medina MD Provider:  Steve Nolasco CRNA    Anesthesia Type:  MAC ASA Status:  3          Anesthesia Type: MAC  Last vitals  BP   150/86 (10/08/19 1340)   Temp   98 °F (36.7 °C) (10/08/19 1222)   Pulse   67 (10/08/19 1340)   Resp   14 (10/08/19 1340)     SpO2   100 % (10/08/19 1340)     Post Anesthesia Care and Evaluation    Patient location during evaluation: PACU  Patient participation: complete - patient participated  Level of consciousness: awake and alert  Pain management: adequate  Airway patency: patent  Anesthetic complications: No anesthetic complications    Cardiovascular status: acceptable  Respiratory status: acceptable  Hydration status: acceptable    Comments: Blood pressure 150/86, pulse 67, temperature 98 °F (36.7 °C), temperature source Tympanic, resp. rate 14, height 167.6 cm (66\"), weight (!) 152 kg (336 lb 3.2 oz), SpO2 100 %, not currently breastfeeding.    Pt discharged from PACU based on nestor score >8      "

## 2019-10-10 ENCOUNTER — HOSPITAL ENCOUNTER (OUTPATIENT)
Dept: RADIATION ONCOLOGY | Facility: HOSPITAL | Age: 54
Setting detail: RADIATION/ONCOLOGY SERIES
End: 2019-10-10

## 2019-10-21 ENCOUNTER — OFFICE VISIT (OUTPATIENT)
Dept: INTERNAL MEDICINE | Facility: CLINIC | Age: 54
End: 2019-10-21

## 2019-10-21 VITALS
DIASTOLIC BLOOD PRESSURE: 90 MMHG | BODY MASS INDEX: 47.09 KG/M2 | HEIGHT: 66 IN | SYSTOLIC BLOOD PRESSURE: 132 MMHG | OXYGEN SATURATION: 96 % | RESPIRATION RATE: 16 BRPM | HEART RATE: 72 BPM | TEMPERATURE: 97.8 F | WEIGHT: 293 LBS

## 2019-10-21 DIAGNOSIS — N32.81 OVERACTIVE BLADDER: Primary | ICD-10-CM

## 2019-10-21 DIAGNOSIS — F33.2 SEVERE EPISODE OF RECURRENT MAJOR DEPRESSIVE DISORDER, WITHOUT PSYCHOTIC FEATURES (HCC): ICD-10-CM

## 2019-10-21 DIAGNOSIS — Z23 FLU VACCINE NEED: ICD-10-CM

## 2019-10-21 DIAGNOSIS — I48.91 ATRIAL FIBRILLATION, UNSPECIFIED TYPE (HCC): ICD-10-CM

## 2019-10-21 PROCEDURE — 90674 CCIIV4 VAC NO PRSV 0.5 ML IM: CPT | Performed by: FAMILY MEDICINE

## 2019-10-21 PROCEDURE — G0008 ADMIN INFLUENZA VIRUS VAC: HCPCS | Performed by: FAMILY MEDICINE

## 2019-10-21 PROCEDURE — 99214 OFFICE O/P EST MOD 30 MIN: CPT | Performed by: FAMILY MEDICINE

## 2019-10-21 RX ORDER — BUPROPION HYDROCHLORIDE 150 MG/1
150 TABLET ORAL EVERY MORNING
Qty: 90 TABLET | Refills: 1 | Status: SHIPPED | OUTPATIENT
Start: 2019-10-21 | End: 2019-11-11 | Stop reason: DRUGHIGH

## 2019-10-21 RX ORDER — OXYBUTYNIN CHLORIDE 10 MG/1
10 TABLET, EXTENDED RELEASE ORAL DAILY
Qty: 90 TABLET | Refills: 1 | Status: SHIPPED | OUTPATIENT
Start: 2019-10-21 | End: 2019-11-11 | Stop reason: DRUGHIGH

## 2019-10-21 NOTE — PROGRESS NOTES
CC:   Chief Complaint   Patient presents with   • Medication Problem     requests oxybutynin dose increase       History:  Anthony Meza is a 53 y.o. female who presents today for follow-up for evaluation of the above:    Still having frequency and overactive bladder    Sees EP in a few weeks for another ablation for A. fib, has a little bit of dyspnea on exertion but if she takes breaks with prolonged walking she feels okay.    Mood is improved on Wellbutrin 150 mg    ROS:  Review of Systems   Constitutional: Positive for fatigue.   Respiratory: Positive for shortness of breath.    Cardiovascular: Positive for palpitations.   Psychiatric/Behavioral: Behavioral problem: better.       Ms. Meza  reports that she has never smoked. She has never used smokeless tobacco. She reports that she does not drink alcohol or use drugs.      Current Outpatient Medications:   •  acetaminophen (TYLENOL) 325 MG tablet, Take 650 mg by mouth Every 6 (Six) Hours As Needed for Mild Pain  or Headache., Disp: , Rfl:   •  Acetaminophen-Caffeine (TENSION HEADACHE RELIEF) 500-65 MG tablet, Take 1 tablet by mouth Every 6 (Six) Hours As Needed (migraine)., Disp: , Rfl:   •  apixaban (ELIQUIS) 5 MG tablet tablet, Take 1 tablet by mouth Every 12 (Twelve) Hours., Disp: 60 tablet, Rfl: 11  •  buPROPion XL (WELLBUTRIN XL) 150 MG 24 hr tablet, Take 1 tablet by mouth Every Morning., Disp: 90 tablet, Rfl: 1  •  cholecalciferol (VITAMIN D3) 1000 units tablet, Take 1,000 Units by mouth Daily. 10/8/2018, Disp: , Rfl:   •  Cyanocobalamin (VITAMIN B-12) 1000 MCG sublingual tablet, Place 1 tablet under the tongue Daily. Hold 10/8/2018, Disp: , Rfl:   •  dilTIAZem CD (CARDIZEM CD) 120 MG 24 hr capsule, Take 1 capsule by mouth Daily., Disp: 30 capsule, Rfl: 1  •  metoprolol tartrate (LOPRESSOR) 25 MG tablet, Take 1 tablet by mouth 2 (Two) Times a Day., Disp: 180 tablet, Rfl: 3  •  oxybutynin XL (DITROPAN-XL) 10 MG 24 hr tablet, Take 1 tablet by mouth Daily.,  "Disp: 90 tablet, Rfl: 1  •  loratadine (CLARITIN) 10 MG tablet, Take 1 tablet by mouth Daily., Disp: 30 tablet, Rfl: 2      OBJECTIVE:  /90 (BP Location: Right arm, Patient Position: Sitting)   Pulse 72   Temp 97.8 °F (36.6 °C) (Oral)   Resp 16   Ht 167.6 cm (66\")   Wt (!) 151 kg (333 lb)   LMP  (LMP Unknown)   SpO2 96%   BMI 53.75 kg/m²    Physical Exam   Constitutional: She is oriented to person, place, and time. No distress.   HENT:   Head: Normocephalic and atraumatic.   Nose: Nose normal.   Eyes: Conjunctivae are normal. Right eye exhibits no discharge. Left eye exhibits no discharge. No scleral icterus.   Neck: No tracheal deviation present.   Cardiovascular: Normal heart sounds. Exam reveals no gallop and no friction rub.   No murmur heard.  Irregularly irregular   Pulmonary/Chest: Effort normal and breath sounds normal. No respiratory distress. She has no wheezes. She has no rales.   Neurological: She is alert and oriented to person, place, and time.   Skin: Skin is warm and dry. She is not diaphoretic. No pallor.   Psychiatric: She has a normal mood and affect. Her behavior is normal. Judgment and thought content normal.   Nursing note and vitals reviewed.      Assessment/Plan     Diagnosis Plan   1. Overactive bladder  oxybutynin XL (DITROPAN-XL) 10 MG 24 hr tablet   2. Severe episode of recurrent major depressive disorder, without psychotic features (CMS/HCC)  buPROPion XL (WELLBUTRIN XL) 150 MG 24 hr tablet   3. Flu vaccine need  Flucelvax Quad=>4Years ([id number])   4. Atrial fibrillation, unspecified type (CMS/HCC)     5. Body mass index (BMI) of 50-59.9 in adult (CMS/HCC)     -Increase oxybutynin to 10 mg daily  -Continue Wellbutrin  -Continue with a EP follow-up for A. Fib  -Consider bariatric referral after A. fib improves    Patient's Body mass index is 53.75 kg/m². BMI is above normal parameters. Recommendations include: nutrition counseling.      An After Visit Summary was printed " and given to the patient at discharge.  Return in about 3 months (around 1/21/2020). Sooner if problems arise.         Jamar Saldana D.O.  Family Medicine  [facility]us[facility]

## 2019-11-01 ENCOUNTER — OFFICE VISIT (OUTPATIENT)
Dept: CARDIOLOGY | Facility: CLINIC | Age: 54
End: 2019-11-01

## 2019-11-01 VITALS
HEIGHT: 66 IN | BODY MASS INDEX: 47.09 KG/M2 | SYSTOLIC BLOOD PRESSURE: 140 MMHG | OXYGEN SATURATION: 98 % | DIASTOLIC BLOOD PRESSURE: 80 MMHG | HEART RATE: 65 BPM | WEIGHT: 293 LBS

## 2019-11-01 DIAGNOSIS — I48.91 ATRIAL FIBRILLATION, UNSPECIFIED TYPE (HCC): Primary | ICD-10-CM

## 2019-11-01 DIAGNOSIS — Z99.89 OSA ON CPAP: ICD-10-CM

## 2019-11-01 DIAGNOSIS — R00.2 PALPITATIONS: ICD-10-CM

## 2019-11-01 DIAGNOSIS — G89.29 CHRONIC MIDLINE LOW BACK PAIN WITHOUT SCIATICA: ICD-10-CM

## 2019-11-01 DIAGNOSIS — Z78.9 NON-SMOKER: ICD-10-CM

## 2019-11-01 DIAGNOSIS — M54.50 CHRONIC MIDLINE LOW BACK PAIN WITHOUT SCIATICA: ICD-10-CM

## 2019-11-01 DIAGNOSIS — G47.33 OSA ON CPAP: ICD-10-CM

## 2019-11-01 PROCEDURE — 99214 OFFICE O/P EST MOD 30 MIN: CPT | Performed by: INTERNAL MEDICINE

## 2019-11-01 NOTE — PROGRESS NOTES
Anthony Meza  [id number]  1965  53 y.o.  female    Referring Provider: Jamar Saldana DO    Reason for  Visit:  Routine follow up.  Was admitted to hospital  Failed cardioversion  Still in atrial fibrillation   Due to see Dr Waldrop October first  On Flecainide x 3 weeks  Non stop Eliquis > 4 weeks    Subjective    Feels same overall as during last visit  No new events or complaints since last visit   Overall the patient feels no major change from baseline symptoms   Similar symptoms as during last visit      No new events or complaints since last visit   Overall the patient feels no major change from baseline symptoms     Persistent exertional shortness of breath on exertion relieved with rest  No significant cough or wheezing    No palpitations  No associated chest pain  No significant pedal edema    No fever or chills  No significant expectoration    No hemoptysis  No presyncope or syncope     Generalized weakness   Feels tired     No gastrointestinal bleeding  Chronic left shoulder pain x 5 months       History of present illness:  Anthony Meza is a 53 y.o. yo female with history of atrial fibrillation    who presents today for   Chief Complaint   Patient presents with   • Atrial Fibrillation     6 week follow up s/p cardioversion    .    History  Past Medical History:   Diagnosis Date   • A-fib (CMS/HCC)    • Abnormal ECG    • Anxiety    • Arthritis    • Atrial fibrillation (CMS/HCC)    • Back pain    • Breast cancer (CMS/HCC) 09/2018   • CFS (chronic fatigue syndrome)    • Chest pain    • Chronic pain disorder    • Depression    • Dizziness    • Ductal carcinoma in situ (DCIS) of left breast     BREAST LEFT   • Fibromyalgia    • Hx of radiation therapy    • Hypertension    • IBS (irritable bowel syndrome)    • Incontinence    • Migraines    • Mononucleosis    • Sleep apnea     uses a c-pap   • Wears glasses    ,   Past Surgical History:   Procedure Laterality Date   • ANKLE SURGERY      x2 left   •  BREAST BIOPSY Left 2018    stereo   • BREAST EXCISIONAL BIOPSY Left 2018   • BREAST LUMPECTOMY Left 09/2018   • BREAST LUMPECTOMY WITH SENTINEL NODE BIOPSY Left 10/15/2018    Procedure: LEFT BREAST LUMPECTOMY WITH NEEDLE LOCALIZATION - MAMMOGRAM GUIDED AND SENTINEL LYMPH  NODE BIOPSY - RADIOLOGIST TO INJECT AND SCAN;  Surgeon: Rafael Porter MD;  Location:  [facility] OR;  Service: General   • CARDIAC CATHETERIZATION Bilateral 1/29/2018    Procedure: Coronary angiography;  Surgeon: Joel Medina MD;  Location:  [facility] CATH INVASIVE LOCATION;  Service:    • CARDIAC CATHETERIZATION N/A 1/29/2018    Procedure: Left Heart Cath;  Surgeon: Joel Medina MD;  Location:  [facility] CATH INVASIVE LOCATION;  Service:    • CYST REMOVAL      from tailbone   • TUBAL ABDOMINAL LIGATION     • WISDOM TOOTH EXTRACTION     ,   Family History   Problem Relation Age of Onset   • Transient ischemic attack Mother    • Rheum arthritis Mother    • Fibromyalgia Mother    • Heart disease Father    • Asthma Father    • Hypertension Father    • Rheum arthritis Father    • Obesity Father    • Obesity Sister    • Obesity Maternal Grandmother    • Ovarian cancer Maternal Grandmother    • Obesity Paternal Grandfather    • Hypertension Paternal Grandfather    • Heart disease Paternal Grandfather    • Liver cancer Maternal Aunt    • Breast cancer Neg Hx    ,   Social History     Tobacco Use   • Smoking status: Never Smoker   • Smokeless tobacco: Never Used   Substance Use Topics   • Alcohol use: No   • Drug use: No   ,     Medications  Current Outpatient Medications   Medication Sig Dispense Refill   • acetaminophen (TYLENOL) 325 MG tablet Take 650 mg by mouth Every 6 (Six) Hours As Needed for Mild Pain  or Headache.     • Acetaminophen-Caffeine (TENSION HEADACHE RELIEF) 500-65 MG tablet Take 1 tablet by mouth Every 6 (Six) Hours As Needed (migraine).     • apixaban (ELIQUIS) 5 MG tablet tablet Take 1 tablet by mouth Every 12 (Twelve) Hours. 60 tablet 11   •  "buPROPion XL (WELLBUTRIN XL) 150 MG 24 hr tablet Take 1 tablet by mouth Every Morning. 90 tablet 1   • cholecalciferol (VITAMIN D3) 1000 units tablet Take 1,000 Units by mouth Daily. 10/8/2018     • Cyanocobalamin (VITAMIN B-12) 1000 MCG sublingual tablet Place 1 tablet under the tongue Daily. Hold 10/8/2018     • dilTIAZem CD (CARDIZEM CD) 120 MG 24 hr capsule Take 1 capsule by mouth Daily. 30 capsule 1   • loratadine (CLARITIN) 10 MG tablet Take 1 tablet by mouth Daily. 30 tablet 2   • metoprolol tartrate (LOPRESSOR) 25 MG tablet Take 1 tablet by mouth 2 (Two) Times a Day. 180 tablet 3   • oxybutynin XL (DITROPAN-XL) 10 MG 24 hr tablet Take 1 tablet by mouth Daily. 90 tablet 1     No current facility-administered medications for this visit.        Allergies:  Penicillins    Review of Systems  Review of Systems   Constitution: Positive for weakness and malaise/fatigue.   HENT: Negative.    Eyes: Negative.    Cardiovascular: Positive for chest pain and dyspnea on exertion. Negative for claudication, cyanosis, irregular heartbeat, leg swelling, near-syncope, orthopnea, palpitations, paroxysmal nocturnal dyspnea and syncope.   Respiratory: Positive for cough.    Endocrine: Negative.    Hematologic/Lymphatic: Negative.    Skin: Negative.    Musculoskeletal: Positive for arthritis and back pain.   Gastrointestinal: Negative for anorexia.   Genitourinary: Negative.    Psychiatric/Behavioral: Negative.      Results for orders placed during the hospital encounter of 08/28/18   Adult Transthoracic Echo Complete W/ Cont if Necessary Per Protocol    Narrative · Left ventricular systolic function is normal. Estimated EF = 55%.  · Normal        Objective     Physical Exam:  /80 (BP Location: Left arm, Patient Position: Sitting, Cuff Size: Adult)   Pulse 65   Ht 167.6 cm (66\")   Wt (!) 156 kg (344 lb)   LMP  (LMP Unknown)   SpO2 98%   BMI 55.52 kg/m²   Physical Exam   Constitutional: She appears well-developed. "   HENT:   Head: Normocephalic.   Neck: Normal carotid pulses and no JVD present. No tracheal tenderness present. Carotid bruit is not present. No tracheal deviation and no edema present.   Cardiovascular: Normal heart sounds and normal pulses. An irregularly irregular rhythm present.   Pulmonary/Chest: Effort normal. No stridor.   Abdominal: Soft. She exhibits no distension. There is no hepatosplenomegaly. There is no tenderness.   Neurological: She is alert. She has normal strength. No cranial nerve deficit or sensory deficit.   Skin: Skin is warm.   Psychiatric: She has a normal mood and affect. Her speech is normal and behavior is normal.          Results Review: 1/29/18  Cardiac cath  Normal epicardial coronary arteries with dominant right coronary artery   Normal LVEF  LVEDP   21    mm Hg     Procedures    Assessment/Plan   Patient Active Problem List   Diagnosis   • Fibromyalgia   • CFS (chronic fatigue syndrome)   • Essential hypertension   • Chronic midline low back pain without sciatica   • HARLAN on CPAP   • Atrial fibrillation (CMS/HCC)   • Atrial fibrillation, unspecified type (CMS/HCC)   • Family history of early CAD father in 50 s   • Body mass index (BMI) of 50-59.9 in adult (CMS/HCC)   • Lower extremity edema   • Vitamin D deficiency   • Palpitations   • Ductal carcinoma in situ (DCIS) of left breast   • History of radiation therapy   • S/P lumpectomy, left breast   • Non-smoker   • Encounter for screening mammogram for malignant neoplasm of breast    • Fever   • BRBPR (bright red blood per rectum)   • Supratherapeutic INR            Plan        For atrial fibrillation ablation Dr Waldrop next week     The current medical regimen is effective;  continue present plan and medications.     Currently off Flecainide   Keep using CPAP      ____________________________________________________________________________________________________________________________________________  Health maintenance and  recommendations    Similar recommendations as last visit       Offered to give patient  a copy      Questions were encouraged, asked and answered to the patient's  understanding and satisfaction. Questions if any regarding current medications and side effects, need for refills and importance of compliance to medications stressed.    Reviewed available prior notes, consults, prior visits, laboratory findings, radiology and cardiology relevant reports. Updated chart as applicable. I have reviewed the patient's medical history in detail and updated the computerized patient record as relevant.      Updated patient regarding any new or relevant abnormalities on review of records or any new findings on physical exam. Mentioned to patient about purpose of visit and desirable health short and long term goals and objectives.    Primary to monitor CBC CMP Lipid panel and TSH as applicable    ___________________________________________________________________________________________________________________________________________         Return in about 6 months (around 5/1/2020).

## 2019-11-05 ENCOUNTER — APPOINTMENT (OUTPATIENT)
Dept: LAB | Facility: HOSPITAL | Age: 54
End: 2019-11-05

## 2019-11-05 PROCEDURE — 36415 COLL VENOUS BLD VENIPUNCTURE: CPT | Performed by: INTERNAL MEDICINE

## 2019-11-05 PROCEDURE — 85027 COMPLETE CBC AUTOMATED: CPT | Performed by: INTERNAL MEDICINE

## 2019-11-05 PROCEDURE — 80048 BASIC METABOLIC PNL TOTAL CA: CPT | Performed by: INTERNAL MEDICINE

## 2019-11-06 LAB
ANION GAP SERPL CALCULATED.3IONS-SCNC: 9.7 MMOL/L (ref 5–15)
BUN BLD-MCNC: 13 MG/DL (ref 6–20)
BUN/CREAT SERPL: 14.8 (ref 7–25)
CALCIUM SPEC-SCNC: 9.1 MG/DL (ref 8.6–10.5)
CHLORIDE SERPL-SCNC: 109 MMOL/L (ref 98–107)
CO2 SERPL-SCNC: 29.3 MMOL/L (ref 22–29)
CREAT BLD-MCNC: 0.88 MG/DL (ref 0.57–1)
DEPRECATED RDW RBC AUTO: 44.5 FL (ref 37–54)
ERYTHROCYTE [DISTWIDTH] IN BLOOD BY AUTOMATED COUNT: 12.9 % (ref 12.3–15.4)
GFR SERPL CREATININE-BSD FRML MDRD: 67 ML/MIN/1.73
GLUCOSE BLD-MCNC: 93 MG/DL (ref 65–99)
HCT VFR BLD AUTO: 42.9 % (ref 34–46.6)
HGB BLD-MCNC: 14.1 G/DL (ref 12–15.9)
MCH RBC QN AUTO: 30.9 PG (ref 26.6–33)
MCHC RBC AUTO-ENTMCNC: 32.9 G/DL (ref 31.5–35.7)
MCV RBC AUTO: 94.1 FL (ref 79–97)
PLATELET # BLD AUTO: 208 10*3/MM3 (ref 140–450)
PMV BLD AUTO: 12.2 FL (ref 6–12)
POTASSIUM BLD-SCNC: 4.7 MMOL/L (ref 3.5–5.2)
RBC # BLD AUTO: 4.56 10*6/MM3 (ref 3.77–5.28)
SODIUM BLD-SCNC: 148 MMOL/L (ref 136–145)
WBC NRBC COR # BLD: 6.22 10*3/MM3 (ref 3.4–10.8)

## 2019-11-11 ENCOUNTER — OFFICE VISIT (OUTPATIENT)
Dept: INTERNAL MEDICINE | Facility: CLINIC | Age: 54
End: 2019-11-11

## 2019-11-11 VITALS
DIASTOLIC BLOOD PRESSURE: 86 MMHG | RESPIRATION RATE: 12 BRPM | SYSTOLIC BLOOD PRESSURE: 134 MMHG | OXYGEN SATURATION: 98 % | WEIGHT: 293 LBS | TEMPERATURE: 99.2 F | BODY MASS INDEX: 47.09 KG/M2 | HEIGHT: 66 IN | HEART RATE: 64 BPM

## 2019-11-11 DIAGNOSIS — N32.81 OVERACTIVE BLADDER: ICD-10-CM

## 2019-11-11 DIAGNOSIS — J20.9 ACUTE BRONCHITIS, UNSPECIFIED ORGANISM: Primary | ICD-10-CM

## 2019-11-11 DIAGNOSIS — F33.41 RECURRENT MAJOR DEPRESSIVE DISORDER, IN PARTIAL REMISSION (HCC): ICD-10-CM

## 2019-11-11 PROCEDURE — 99214 OFFICE O/P EST MOD 30 MIN: CPT | Performed by: FAMILY MEDICINE

## 2019-11-11 RX ORDER — AMIODARONE HYDROCHLORIDE 200 MG/1
100 TABLET ORAL DAILY
Refills: 11 | COMMUNITY
Start: 2019-11-09 | End: 2021-05-14

## 2019-11-11 RX ORDER — BUPROPION HYDROCHLORIDE 300 MG/1
300 TABLET ORAL DAILY
Qty: 90 TABLET | Refills: 3 | Status: SHIPPED | OUTPATIENT
Start: 2019-11-11 | End: 2020-08-05 | Stop reason: SDUPTHER

## 2019-11-11 RX ORDER — AZITHROMYCIN 500 MG/1
500 TABLET, FILM COATED ORAL DAILY
Qty: 3 TABLET | Refills: 0 | Status: SHIPPED | OUTPATIENT
Start: 2019-11-11 | End: 2019-11-14

## 2019-11-11 RX ORDER — OXYBUTYNIN CHLORIDE 10 MG/1
20 TABLET, EXTENDED RELEASE ORAL DAILY
Qty: 180 TABLET | Refills: 3 | Status: SHIPPED | OUTPATIENT
Start: 2019-11-11 | End: 2021-03-30

## 2019-11-11 RX ORDER — PANTOPRAZOLE SODIUM 40 MG/1
TABLET, DELAYED RELEASE ORAL DAILY
Refills: 0 | COMMUNITY
Start: 2019-11-09 | End: 2020-08-28

## 2019-11-11 NOTE — PROGRESS NOTES
CC:   Chief Complaint   Patient presents with   • Cough     Patient reports Ablation on Friday, symptoms started on Saturday and progressing since then   • URI   • Sore Throat       History:  Anthony Meza is a 53 y.o. female who presents today for follow-up for evaluation of the above:    Cough, sore throat, fevers and chills starting Saturday with resolution of elevated temp today.  She does have shortness of breath with coughing fits only, with shortness of breath improved after recent cardiac ablation.    Needs refill of Wellbutrin which has improved her mood, as well as oxybutynin which has improved bladder symptoms    ROS:  Review of Systems   Constitutional: Negative for chills and fever.   HENT: Positive for congestion, postnasal drip, rhinorrhea and sore throat.    Respiratory: Positive for cough.    Cardiovascular: Negative.    Genitourinary: Negative.        Ms. Meza  reports that she has never smoked. She has never used smokeless tobacco. She reports that she does not drink alcohol or use drugs.      Current Outpatient Medications:   •  apixaban (ELIQUIS) 5 MG tablet tablet, Take 1 tablet by mouth Every 12 (Twelve) Hours., Disp: 60 tablet, Rfl: 11  •  cholecalciferol (VITAMIN D3) 1000 units tablet, Take 1,000 Units by mouth Daily. 10/8/2018, Disp: , Rfl:   •  Cyanocobalamin (VITAMIN B-12) 1000 MCG sublingual tablet, Place 1 tablet under the tongue Daily. Hold 10/8/2018, Disp: , Rfl:   •  metoprolol tartrate (LOPRESSOR) 25 MG tablet, Take 1 tablet by mouth 2 (Two) Times a Day., Disp: 180 tablet, Rfl: 3  •  oxybutynin XL (DITROPAN-XL) 10 MG 24 hr tablet, Take 2 tablets by mouth Daily., Disp: 180 tablet, Rfl: 3  •  acetaminophen (TYLENOL) 325 MG tablet, Take 650 mg by mouth Every 6 (Six) Hours As Needed for Mild Pain  or Headache., Disp: , Rfl:   •  Acetaminophen-Caffeine (TENSION HEADACHE RELIEF) 500-65 MG tablet, Take 1 tablet by mouth Every 6 (Six) Hours As Needed (migraine)., Disp: , Rfl:   •  amiodarone  "(PACERONE) 200 MG tablet, Take 400 mg by mouth 2 (Two) Times a Day. Increase to 400 mg daily for the next 21 days, and then 200 mg daily thereafter, Disp: , Rfl: 11  •  azithromycin (ZITHROMAX) 500 MG tablet, Take 1 tablet by mouth Daily for 3 days., Disp: 3 tablet, Rfl: 0  •  buPROPion XL (WELLBUTRIN XL) 300 MG 24 hr tablet, Take 1 tablet by mouth Daily., Disp: 90 tablet, Rfl: 3  •  loratadine (CLARITIN) 10 MG tablet, Take 1 tablet by mouth Daily., Disp: 30 tablet, Rfl: 2  •  pantoprazole (PROTONIX) 40 MG EC tablet, Take  by mouth Daily., Disp: , Rfl: 0      OBJECTIVE:  /86 (BP Location: Left arm, Patient Position: Sitting, Cuff Size: Adult)   Pulse 64   Temp 99.2 °F (37.3 °C) (Temporal)   Resp 12   Ht 167.6 cm (66\")   Wt (!) 158 kg (347 lb 6 oz)   LMP  (LMP Unknown)   SpO2 98%   BMI 56.07 kg/m²    Physical Exam   Constitutional: She is oriented to person, place, and time. No distress.   HENT:   Head: Normocephalic and atraumatic.   Nose: Nose normal.   Mouth/Throat: No oropharyngeal exudate.   Mild pale turbinate enlargement, no cervical lymphadenopathy, normal TMs   Eyes: Conjunctivae are normal. Right eye exhibits no discharge. Left eye exhibits no discharge. No scleral icterus.   Neck: No tracheal deviation present.   Cardiovascular: Normal rate, regular rhythm and normal heart sounds. Exam reveals no gallop and no friction rub.   No murmur heard.  Pulmonary/Chest: Effort normal and breath sounds normal. No respiratory distress. She has no wheezes. She has no rales.   Neurological: She is alert and oriented to person, place, and time.   Skin: Skin is warm and dry. She is not diaphoretic. No pallor.   Psychiatric: She has a normal mood and affect. Her behavior is normal. Judgment and thought content normal.   Nursing note and vitals reviewed.      Assessment/Plan     Diagnosis Plan   1. Acute bronchitis, unspecified organism  azithromycin (ZITHROMAX) 500 MG tablet   2. Overactive bladder  " oxybutynin XL (DITROPAN-XL) 10 MG 24 hr tablet   3. Recurrent major depressive disorder, in partial remission (CMS/HCC)  buPROPion XL (WELLBUTRIN XL) 300 MG 24 hr tablet   -Azithromycin for acute bronchitis, potential bacterial process as she had sick contacts prior to her illness that did improve with antibiotic use  -Refill oxybutynin  -Continue Wellbutrin    An After Visit Summary was printed and given to the patient at discharge.  Return if symptoms worsen or fail to improve. Sooner if problems arise.         Jamar Saldana D.O.  Family Medicine  Osteopathic Neuromusculoskeletal Medicine

## 2019-11-19 ENCOUNTER — TELEPHONE (OUTPATIENT)
Dept: INTERNAL MEDICINE | Facility: CLINIC | Age: 54
End: 2019-11-19

## 2019-11-19 DIAGNOSIS — R06.02 SOB (SHORTNESS OF BREATH): Primary | ICD-10-CM

## 2019-11-19 DIAGNOSIS — M79.89 LEG SWELLING: ICD-10-CM

## 2019-11-19 RX ORDER — FUROSEMIDE 20 MG/1
20 TABLET ORAL 2 TIMES DAILY
Qty: 60 TABLET | Refills: 0 | Status: SHIPPED | OUTPATIENT
Start: 2019-11-19 | End: 2020-04-28 | Stop reason: SDUPTHER

## 2019-11-19 NOTE — TELEPHONE ENCOUNTER
Patient called and LM that she is still not feeling well, she is very SOB and has increased swelling.  Do you want to make any changes, or should she make another appt?     MM

## 2019-11-19 NOTE — TELEPHONE ENCOUNTER
Spoke with patient and despite recent treatment with antibiotics she still having shortness of breath with exertion that does improve with rest, she is also noticed a little more leg swelling.  She denies any worsening palpitations despite her history of A. fib, and she does say that several months ago she was previously on Lasix.  No longer having fevers or chills.    She had no cough and did not seem short of breath while speaking to her on the phone, and her voice sounded normal.  Will order chest x-ray and twice daily Lasix for her to start tomorrow, did discuss ED precautions if she experiences worsening shortness of breath or shortness of breath at rest.  Patient understands, all questions answered

## 2019-11-20 ENCOUNTER — APPOINTMENT (OUTPATIENT)
Dept: CT IMAGING | Facility: HOSPITAL | Age: 54
End: 2019-11-20

## 2019-11-20 ENCOUNTER — TELEPHONE (OUTPATIENT)
Dept: INTERNAL MEDICINE | Facility: CLINIC | Age: 54
End: 2019-11-20

## 2019-11-20 ENCOUNTER — HOSPITAL ENCOUNTER (EMERGENCY)
Facility: HOSPITAL | Age: 54
Discharge: HOME | End: 2019-11-20
Admitting: EMERGENCY MEDICINE

## 2019-11-20 ENCOUNTER — HOSPITAL ENCOUNTER (OUTPATIENT)
Dept: GENERAL RADIOLOGY | Facility: HOSPITAL | Age: 54
Discharge: HOME | End: 2019-11-20
Admitting: FAMILY MEDICINE

## 2019-11-20 VITALS
OXYGEN SATURATION: 95 % | HEART RATE: 70 BPM | WEIGHT: 293 LBS | BODY MASS INDEX: 47.09 KG/M2 | RESPIRATION RATE: 24 BRPM | DIASTOLIC BLOOD PRESSURE: 91 MMHG | SYSTOLIC BLOOD PRESSURE: 153 MMHG | TEMPERATURE: 97.7 F | HEIGHT: 66 IN

## 2019-11-20 DIAGNOSIS — I50.9 ACUTE CONGESTIVE HEART FAILURE, UNSPECIFIED HEART FAILURE TYPE (HCC): Primary | ICD-10-CM

## 2019-11-20 DIAGNOSIS — R06.02 SOB (SHORTNESS OF BREATH): ICD-10-CM

## 2019-11-20 LAB
ALBUMIN SERPL-MCNC: 3.9 G/DL (ref 3.5–5.2)
ALBUMIN/GLOB SERPL: 1.5 G/DL
ALP SERPL-CCNC: 89 U/L (ref 39–117)
ALT SERPL W P-5'-P-CCNC: 24 U/L (ref 1–33)
ANION GAP SERPL CALCULATED.3IONS-SCNC: 8 MMOL/L (ref 5–15)
APTT PPP: 30.7 SECONDS (ref 24.1–35)
ARTERIAL PATENCY WRIST A: POSITIVE
AST SERPL-CCNC: 17 U/L (ref 1–32)
ATMOSPHERIC PRESS: 755 MMHG
BASE EXCESS BLDA CALC-SCNC: 5.1 MMOL/L (ref 0–2)
BASOPHILS # BLD AUTO: 0.02 10*3/MM3 (ref 0–0.2)
BASOPHILS NFR BLD AUTO: 0.3 % (ref 0–1.5)
BDY SITE: ABNORMAL
BILIRUB SERPL-MCNC: 0.4 MG/DL (ref 0.2–1.2)
BODY TEMPERATURE: 37 C
BUN BLD-MCNC: 10 MG/DL (ref 6–20)
BUN/CREAT SERPL: 10.8 (ref 7–25)
CALCIUM SPEC-SCNC: 8.9 MG/DL (ref 8.6–10.5)
CHLORIDE SERPL-SCNC: 107 MMOL/L (ref 98–107)
CO2 SERPL-SCNC: 32 MMOL/L (ref 22–29)
CREAT BLD-MCNC: 0.93 MG/DL (ref 0.57–1)
D DIMER PPP FEU-MCNC: 0.91 MG/L (FEU) (ref 0–0.5)
DEPRECATED RDW RBC AUTO: 46.7 FL (ref 37–54)
EOSINOPHIL # BLD AUTO: 0.17 10*3/MM3 (ref 0–0.4)
EOSINOPHIL NFR BLD AUTO: 2.7 % (ref 0.3–6.2)
ERYTHROCYTE [DISTWIDTH] IN BLOOD BY AUTOMATED COUNT: 13.7 % (ref 12.3–15.4)
GFR SERPL CREATININE-BSD FRML MDRD: 63 ML/MIN/1.73
GLOBULIN UR ELPH-MCNC: 2.6 GM/DL
GLUCOSE BLD-MCNC: 92 MG/DL (ref 65–99)
HCO3 BLDA-SCNC: 29.6 MMOL/L (ref 20–26)
HCT VFR BLD AUTO: 38.1 % (ref 34–46.6)
HGB BLD-MCNC: 12.2 G/DL (ref 12–15.9)
IMM GRANULOCYTES # BLD AUTO: 0.02 10*3/MM3 (ref 0–0.05)
IMM GRANULOCYTES NFR BLD AUTO: 0.3 % (ref 0–0.5)
INR PPP: 1.08 (ref 0.91–1.09)
LYMPHOCYTES # BLD AUTO: 1.02 10*3/MM3 (ref 0.7–3.1)
LYMPHOCYTES NFR BLD AUTO: 16.1 % (ref 19.6–45.3)
Lab: ABNORMAL
MCH RBC QN AUTO: 30.1 PG (ref 26.6–33)
MCHC RBC AUTO-ENTMCNC: 32 G/DL (ref 31.5–35.7)
MCV RBC AUTO: 94.1 FL (ref 79–97)
MODALITY: ABNORMAL
MONOCYTES # BLD AUTO: 0.4 10*3/MM3 (ref 0.1–0.9)
MONOCYTES NFR BLD AUTO: 6.3 % (ref 5–12)
NEUTROPHILS # BLD AUTO: 4.7 10*3/MM3 (ref 1.7–7)
NEUTROPHILS NFR BLD AUTO: 74.3 % (ref 42.7–76)
NRBC BLD AUTO-RTO: 0 /100 WBC (ref 0–0.2)
NT-PROBNP SERPL-MCNC: 751.5 PG/ML (ref 5–900)
PCO2 BLDA: 42.5 MM HG (ref 35–45)
PH BLDA: 7.45 PH UNITS (ref 7.35–7.45)
PLATELET # BLD AUTO: 194 10*3/MM3 (ref 140–450)
PMV BLD AUTO: 10.8 FL (ref 6–12)
PO2 BLDA: 61.3 MM HG (ref 83–108)
POTASSIUM BLD-SCNC: 3.8 MMOL/L (ref 3.5–5.2)
PROT SERPL-MCNC: 6.5 G/DL (ref 6–8.5)
PROTHROMBIN TIME: 14.3 SECONDS (ref 11.9–14.6)
RBC # BLD AUTO: 4.05 10*6/MM3 (ref 3.77–5.28)
SAO2 % BLDCOA: 92.8 % (ref 94–99)
SODIUM BLD-SCNC: 147 MMOL/L (ref 136–145)
TROPONIN T SERPL-MCNC: 0.01 NG/ML (ref 0–0.03)
VENTILATOR MODE: ABNORMAL
WBC NRBC COR # BLD: 6.33 10*3/MM3 (ref 3.4–10.8)

## 2019-11-20 PROCEDURE — 71046 X-RAY EXAM CHEST 2 VIEWS: CPT

## 2019-11-20 PROCEDURE — 93010 ELECTROCARDIOGRAM REPORT: CPT | Performed by: INTERNAL MEDICINE

## 2019-11-20 PROCEDURE — 84484 ASSAY OF TROPONIN QUANT: CPT | Performed by: NURSE PRACTITIONER

## 2019-11-20 PROCEDURE — 85379 FIBRIN DEGRADATION QUANT: CPT | Performed by: NURSE PRACTITIONER

## 2019-11-20 PROCEDURE — 99284 EMERGENCY DEPT VISIT MOD MDM: CPT

## 2019-11-20 PROCEDURE — 82803 BLOOD GASES ANY COMBINATION: CPT

## 2019-11-20 PROCEDURE — 85730 THROMBOPLASTIN TIME PARTIAL: CPT | Performed by: NURSE PRACTITIONER

## 2019-11-20 PROCEDURE — 85025 COMPLETE CBC W/AUTO DIFF WBC: CPT | Performed by: NURSE PRACTITIONER

## 2019-11-20 PROCEDURE — 93005 ELECTROCARDIOGRAM TRACING: CPT | Performed by: NURSE PRACTITIONER

## 2019-11-20 PROCEDURE — 83880 ASSAY OF NATRIURETIC PEPTIDE: CPT | Performed by: NURSE PRACTITIONER

## 2019-11-20 PROCEDURE — 36600 WITHDRAWAL OF ARTERIAL BLOOD: CPT

## 2019-11-20 PROCEDURE — 85610 PROTHROMBIN TIME: CPT | Performed by: NURSE PRACTITIONER

## 2019-11-20 PROCEDURE — 80053 COMPREHEN METABOLIC PANEL: CPT | Performed by: NURSE PRACTITIONER

## 2019-11-20 RX ORDER — SODIUM CHLORIDE 0.9 % (FLUSH) 0.9 %
10 SYRINGE (ML) INJECTION AS NEEDED
Status: DISCONTINUED | OUTPATIENT
Start: 2019-11-20 | End: 2019-11-20 | Stop reason: HOSPADM

## 2019-11-20 RX ORDER — FUROSEMIDE 40 MG/1
40 TABLET ORAL 2 TIMES DAILY
Qty: 6 TABLET | Refills: 0 | Status: SHIPPED | OUTPATIENT
Start: 2019-11-20 | End: 2021-05-14

## 2019-11-20 NOTE — TELEPHONE ENCOUNTER
Patient called to report that she will be going to the ED after she picks her son up from work. She said that they only have one vehicle and he will need a ride home before she can come to the hospital. She said that it would probably be around 3:45 or 4:00 p.m. before she gets here.

## 2019-11-21 NOTE — ED PROVIDER NOTES
"Subjective   Patient is a 53-year-old female that presents to the ER today with complaint of \"congestive heart failure \".  Patient reports that she has had shortness of breath for over 1 year.  She states that 2 weeks ago she had an ablation done at [facility] in [place] by Dr. Waldrop for atrial fibrillation.  She states that she has remained on her Eliquis throughout that time.  She states that last week she saw her primary care provider was given a prescription for Zithromax to treat bronchitis.  She states that she then again followed up with her primary care provider yesterday because she was continuing to have dyspnea specifically with exertion. She reports to me that this is unchanged from her baseline.  The patient reports that she had a chest x-ray done today.  She was called this afternoon and was advised that the chest x-ray showed congestive heart failure and that she needs to come to the ER for further evaluation.  The patient reports that she has had lower extremity swelling for over a year.  She states that this is also unchanged from her baseline.  She denies any previous history of a PE or DVT in the past.  She reports compliance with taking Eliquis.  The patient denies any previous history of COPD or emphysema, she denies any history of asthma.  The patient reports that she has had a nonproductive cough for the past 2 weeks.  The patient denies fever.  The patient states she has not had a history of CHF in the past.  She does report a previous history of breast cancer in the past.  She presents here today for further evaluation.        History provided by:  Patient   used: No    Shortness of Breath   Severity:  Moderate  Onset quality:  Sudden  Duration:  2 weeks  Timing:  Constant  Progression:  Worsening  Chronicity:  New  Context: activity    Context: not animal exposure, not emotional upset, not fumes, not known allergens, not occupational exposure, not pollens, not smoke " exposure, not strong odors, not URI and not weather changes    Relieved by:  Nothing  Worsened by:  Nothing  Ineffective treatments:  None tried  Associated symptoms: cough    Associated symptoms: no abdominal pain, no chest pain, no claudication, no diaphoresis, no ear pain, no fever, no headaches, no hemoptysis, no neck pain, no PND, no rash, no sore throat, no sputum production, no syncope, no swollen glands, no vomiting and no wheezing    Cough:     Cough characteristics:  Non-productive  Risk factors: obesity and recent surgery    Risk factors: no recent alcohol use, no family hx of DVT, no hx of cancer, no hx of PE/DVT, no oral contraceptive use, no prolonged immobilization and no tobacco use        Review of Systems   Constitutional: Negative for diaphoresis and fever.   HENT: Negative for ear pain and sore throat.    Respiratory: Positive for cough and shortness of breath. Negative for hemoptysis, sputum production and wheezing.    Cardiovascular: Negative for chest pain, claudication, syncope and PND.   Gastrointestinal: Negative for abdominal pain and vomiting.   Musculoskeletal: Negative for neck pain.   Skin: Negative for rash.   Neurological: Negative for headaches.   All other systems reviewed and are negative.      Past Medical History:   Diagnosis Date   • A-fib (CMS/HCC)    • Abnormal ECG    • Anxiety    • Arthritis    • Atrial fibrillation (CMS/HCC)    • Back pain    • Breast cancer (CMS/HCC) 09/2018   • CFS (chronic fatigue syndrome)    • Chest pain    • Chronic pain disorder    • Depression    • Dizziness    • Ductal carcinoma in situ (DCIS) of left breast     BREAST LEFT   • Fibromyalgia    • Hx of radiation therapy    • Hypertension    • IBS (irritable bowel syndrome)    • Incontinence    • Migraines    • Mononucleosis    • Sleep apnea     uses a c-pap   • Wears glasses        Allergies   Allergen Reactions   • Penicillins Rash       Past Surgical History:   Procedure Laterality Date   • ANKLE  SURGERY      x2 left   • BREAST BIOPSY Left 2018    stereo   • BREAST EXCISIONAL BIOPSY Left 2018   • BREAST LUMPECTOMY Left 09/2018   • BREAST LUMPECTOMY WITH SENTINEL NODE BIOPSY Left 10/15/2018    Procedure: LEFT BREAST LUMPECTOMY WITH NEEDLE LOCALIZATION - MAMMOGRAM GUIDED AND SENTINEL LYMPH  NODE BIOPSY - RADIOLOGIST TO INJECT AND SCAN;  Surgeon: Rafael Porter MD;  Location:  [facility] OR;  Service: General   • CARDIAC CATHETERIZATION Bilateral 1/29/2018    Procedure: Coronary angiography;  Surgeon: Joel Medina MD;  Location:  [facility] CATH INVASIVE LOCATION;  Service:    • CARDIAC CATHETERIZATION N/A 1/29/2018    Procedure: Left Heart Cath;  Surgeon: Joel Medina MD;  Location:  [facility] CATH INVASIVE LOCATION;  Service:    • CYST REMOVAL      from tailbone   • TUBAL ABDOMINAL LIGATION     • WISDOM TOOTH EXTRACTION         Family History   Problem Relation Age of Onset   • Transient ischemic attack Mother    • Rheum arthritis Mother    • Fibromyalgia Mother    • Heart disease Father    • Asthma Father    • Hypertension Father    • Rheum arthritis Father    • Obesity Father    • Obesity Sister    • Obesity Maternal Grandmother    • Ovarian cancer Maternal Grandmother    • Obesity Paternal Grandfather    • Hypertension Paternal Grandfather    • Heart disease Paternal Grandfather    • Liver cancer Maternal Aunt    • Breast cancer Neg Hx        Social History     Socioeconomic History   • Marital status:      Spouse name: Not on file   • Number of children: 2   • Years of education: Not on file   • Highest education level: Not on file   Tobacco Use   • Smoking status: Never Smoker   • Smokeless tobacco: Never Used   Substance and Sexual Activity   • Alcohol use: No   • Drug use: No   • Sexual activity: Defer     Birth control/protection: Surgical     Comment: LMP: Menopausal           Objective   Physical Exam   Constitutional: She is oriented to person, place, and time. She appears well-developed and  well-nourished.   HENT:   Head: Normocephalic and atraumatic.   Eyes: Conjunctivae are normal. Pupils are equal, round, and reactive to light.   Cardiovascular: Normal rate, regular rhythm and normal heart sounds.   Pulmonary/Chest: Effort normal and breath sounds normal.   Abdominal: Soft. Bowel sounds are normal.   Neurological: She is alert and oriented to person, place, and time.   Skin: Skin is warm and dry. Capillary refill takes less than 2 seconds.   Psychiatric: She has a normal mood and affect.   Nursing note and vitals reviewed.      Procedures           ED Course  ED Course as of Nov 20 2003 Wed Nov 20, 2019 1958 Patient is a 53-year-old female that presented the ER today with complaint of shortness of breath and congestive heart failure on a chest x-ray.  The patient's ABG showed a pH 7.45, PCO2 42, PO2 of 61, bicarb of 29.  O2 saturation has been between 93 and 97% on room air.  CMP showed normal renal function, normal liver enzymes.  Sodium slightly elevated at 147.  Patient CBC shows normal white blood cell count, stable hemoglobin hematocrit and platelet count.  [LF]   2000 EKG showed sinus rhythm no acute findings.  [LF]   2000 Patient had a history of breast cancer and reported shortness of breath.  Initially she had described that her shortness of breath seem to be worse however she tells me actually that this is her baseline shortness of breath.  I ordered a d-dimer which was elevated.  I did order a CT chest however the patient and her  did not want to have this performed.  She again tells me that this is actually been going on for over a year and that her shortness of breath is not new or worse than usual.  [LF]   2000 I discussed the case with Dr. Esquivel.  At this time the patient wants to hold off on having a CTA of the chest performed to rule out a PE.  She understands the risk of not having this done and is able to make a sound judgment regarding this.  At this time again  increase her Lasix to 40 mg twice a day for 3 days.  I have asked her to increase the potassium in her diet while taking this medication.  I advised the patient to follow-up Dr. Saldana on Friday.  Patient is agreeable with this plan of care.  She is advised to return immediately to the ER if any new or worsening symptoms.  She will be discharged home at this time in stable condition.  [LF]      ED Course User Index  [LF] Yasmeen Morales, APRN      No orders to display       Labs Reviewed   COMPREHENSIVE METABOLIC PANEL - Abnormal; Notable for the following components:       Result Value    Sodium 147 (*)     CO2 32.0 (*)     All other components within normal limits    Narrative:     GFR Normal >60  Chronic Kidney Disease <60  Kidney Failure <15   D-DIMER, QUANTITATIVE - Abnormal; Notable for the following components:    D-Dimer, Quantitative 0.91 (*)     All other components within normal limits    Narrative:     Reference Range is 0-0.50 mg/L FEU. However, results <0.50 mg/L FEU tends to rule out DVT or PE. Results >0.50 mg/L FEU are not useful in predicting absence or presence of DVT or PE.   CBC WITH AUTO DIFFERENTIAL - Abnormal; Notable for the following components:    Lymphocyte % 16.1 (*)     All other components within normal limits   BLOOD GAS, ARTERIAL - Abnormal; Notable for the following components:    pH, Arterial 7.452 (*)     pO2, Arterial 61.3 (*)     HCO3, Arterial 29.6 (*)     Base Excess, Arterial 5.1 (*)     O2 Saturation, Arterial 92.8 (*)     All other components within normal limits   PROTIME-INR - Normal   APTT - Normal   TROPONIN (IN-HOUSE) - Normal    Narrative:     Troponin T Reference Range:  <= 0.03 ng/mL-   Negative for AMI  >0.03 ng/mL-     Abnormal for myocardial necrosis.  Clinicians would have to utilize clinical acumen, EKG, Troponin and serial changes to determine if it is an Acute Myocardial Infarction or myocardial injury due to an underlying chronic condition.    BNP  (IN-HOUSE) - Normal    Narrative:     Among patients with dyspnea, NT-proBNP is highly sensitive for the detection of acute congestive heart failure. In addition NT-proBNP of <300 pg/ml effectively rules out acute congestive heart failure with 99% negative predictive value.   BLOOD GAS, ARTERIAL   CBC AND DIFFERENTIAL    Narrative:     The following orders were created for panel order CBC & Differential.  Procedure                               Abnormality         Status                     ---------                               -----------         ------                     CBC Auto Differential[[id number]]        Abnormal            Final result                 Please view results for these tests on the individual orders.                 MDM  Number of Diagnoses or Management Options  Acute congestive heart failure, unspecified heart failure type (CMS/HCC): new and requires workup     Amount and/or Complexity of Data Reviewed  Clinical lab tests: ordered and reviewed  Tests in the medicine section of CPT®: ordered and reviewed  Decide to obtain previous medical records or to obtain history from someone other than the patient: yes  Review and summarize past medical records: yes  Discuss the patient with other providers: yes        Final diagnoses:   Acute congestive heart failure, unspecified heart failure type (CMS/HCC)              Yasmeen Morales, APRN  11/20/19 2003

## 2019-11-22 ENCOUNTER — TELEPHONE (OUTPATIENT)
Dept: CARDIOLOGY | Facility: CLINIC | Age: 54
End: 2019-11-22

## 2019-11-22 ENCOUNTER — OFFICE VISIT (OUTPATIENT)
Dept: INTERNAL MEDICINE | Facility: CLINIC | Age: 54
End: 2019-11-22

## 2019-11-22 VITALS
SYSTOLIC BLOOD PRESSURE: 132 MMHG | OXYGEN SATURATION: 98 % | HEIGHT: 66 IN | DIASTOLIC BLOOD PRESSURE: 84 MMHG | WEIGHT: 293 LBS | HEART RATE: 56 BPM | BODY MASS INDEX: 47.09 KG/M2 | RESPIRATION RATE: 16 BRPM

## 2019-11-22 DIAGNOSIS — J81.0 ACUTE PULMONARY EDEMA (HCC): Primary | ICD-10-CM

## 2019-11-22 DIAGNOSIS — Z99.89 OSA ON CPAP: ICD-10-CM

## 2019-11-22 DIAGNOSIS — G47.33 OSA ON CPAP: ICD-10-CM

## 2019-11-22 PROCEDURE — 99214 OFFICE O/P EST MOD 30 MIN: CPT | Performed by: FAMILY MEDICINE

## 2019-11-22 NOTE — TELEPHONE ENCOUNTER
Patient cannot afford Eliquis.  She has been taking samples and will be out Wednesday.  Please advise.

## 2019-11-22 NOTE — PROGRESS NOTES
CC:   Chief Complaint   Patient presents with   • Follow-up       History:  Anthony Meza is a 53 y.o. female who presents today for follow-up for evaluation of the above:    Here for follow-up of shortness of breath and congestion, she was seen in the ED 2 days ago, she had an elevated d-dimer, did not want CTA performed, a mildly elevated sodium of 147 with a normal troponin and BNP.  EKG was normal sinus rhythm, had x-ray on 11/20/2019 that was concerning for congestive heart failure with mild interstitial edema.  The patient does feel a little better than initial.    A few weeks ago she is also had an ablation for chronic A. fib and was started on amiodarone at that time.    ROS:  Review of Systems   Constitutional: Positive for activity change.   Respiratory: Positive for shortness of breath.    Cardiovascular: Positive for leg swelling. Negative for chest pain and palpitations.       Ms. Meza  reports that she has never smoked. She has never used smokeless tobacco. She reports that she does not drink alcohol or use drugs.      Current Outpatient Medications:   •  acetaminophen (TYLENOL) 325 MG tablet, Take 650 mg by mouth Every 6 (Six) Hours As Needed for Mild Pain  or Headache., Disp: , Rfl:   •  amiodarone (PACERONE) 200 MG tablet, Take 200 mg by mouth Daily., Disp: , Rfl: 11  •  apixaban (ELIQUIS) 5 MG tablet tablet, Take 1 tablet by mouth Every 12 (Twelve) Hours., Disp: 60 tablet, Rfl: 11  •  buPROPion XL (WELLBUTRIN XL) 300 MG 24 hr tablet, Take 1 tablet by mouth Daily. (Patient taking differently: Take 150 mg by mouth Daily.), Disp: 90 tablet, Rfl: 3  •  cholecalciferol (VITAMIN D3) 1000 units tablet, Take 1,000 Units by mouth Daily. 10/8/2018, Disp: , Rfl:   •  Cyanocobalamin (VITAMIN B-12) 1000 MCG sublingual tablet, Place 1 tablet under the tongue Daily. Hold 10/8/2018, Disp: , Rfl:   •  furosemide (LASIX) 20 MG tablet, Take 1 tablet by mouth 2 (Two) Times a Day. (Patient taking differently: Take 40 mg  "by mouth 2 (Two) Times a Day.), Disp: 60 tablet, Rfl: 0  •  metoprolol tartrate (LOPRESSOR) 25 MG tablet, Take 1 tablet by mouth 2 (Two) Times a Day., Disp: 180 tablet, Rfl: 3  •  oxybutynin XL (DITROPAN-XL) 10 MG 24 hr tablet, Take 2 tablets by mouth Daily., Disp: 180 tablet, Rfl: 3  •  pantoprazole (PROTONIX) 40 MG EC tablet, Take  by mouth Daily., Disp: , Rfl: 0  •  Acetaminophen-Caffeine (TENSION HEADACHE RELIEF) 500-65 MG tablet, Take 1 tablet by mouth Every 6 (Six) Hours As Needed (migraine)., Disp: , Rfl:   •  loratadine (CLARITIN) 10 MG tablet, Take 1 tablet by mouth Daily., Disp: 30 tablet, Rfl: 2      OBJECTIVE:  /84 (BP Location: Left arm, Patient Position: Sitting, Cuff Size: Large Adult)   Pulse 56   Resp 16   Ht 167.6 cm (66\")   Wt (!) 148 kg (326 lb)   LMP  (LMP Unknown)   SpO2 98%   BMI 52.62 kg/m²    Physical Exam   Constitutional: She is oriented to person, place, and time. No distress.   HENT:   Head: Normocephalic and atraumatic.   Nose: Nose normal.   Eyes: Conjunctivae are normal. Right eye exhibits no discharge. Left eye exhibits no discharge. No scleral icterus.   Neck: No tracheal deviation present.   Cardiovascular: Normal rate, regular rhythm and normal heart sounds. Exam reveals no gallop and no friction rub.   No murmur heard.  Pulmonary/Chest: Effort normal and breath sounds normal. No respiratory distress. She has no wheezes. She has no rales.   Musculoskeletal: Edema: trace.   Neurological: She is alert and oriented to person, place, and time.   Skin: Skin is warm and dry. She is not diaphoretic. No pallor.   Psychiatric: She has a normal mood and affect. Her behavior is normal. Judgment and thought content normal.   Nursing note and vitals reviewed.    Reviewed notes, images, labs, cardiac studies    Assessment/Plan     Diagnosis Plan   1. Acute pulmonary edema (CMS/HCC)     2. HARLAN on CPAP  Miscellaneous DME   -Unclear etiology, clinically suspicious for heart failure " but BNP was negative during recent work-up.  Pulmonary toxicity from amiodarone considered, patient also has an old CPAP machine and has HARLAN and this could be secondary to poorly treated HARLAN.  -We will continue Lasix given current clinical improvement after starting earlier this week  -Patient will touch base with EP regarding amiodarone therapy    An After Visit Summary was printed and given to the patient at discharge.  Return if symptoms worsen or fail to improve. Sooner if problems arise.         Jamar Saldana D.O.  Family [facility]  Osteopathic Neuromusculoskeletal Medicine

## 2019-11-26 NOTE — TELEPHONE ENCOUNTER
I WAS NOTIFIED THAT PT WAS DENIED FOR ASSISTANCE - PER DR HOUGH, PT CAN TAKE COUMADIN INSTEAD. I AM TO NOTIFY PT & LET DR HOUGH KNOW IF SHE WANTS TO PROCEED.    PLACED A CALL TO PT INFORMING HER.  ELIQUIS SAMPLES WERE LEFT UP FRONT FOR PT TO  AND TO TAKE UNTIL COUMADIN CLINIC ORDER & RX WERE PLACED.    DR HOUGH,   PLEASE PLACE ORDER

## 2019-11-26 NOTE — TELEPHONE ENCOUNTER
I will place orders and set patient back up in [facility]in [facility].  She is historically non-compliant.  Do we have documentation showing she was denied patient assistance?

## 2019-11-26 NOTE — TELEPHONE ENCOUNTER
I spoke with the patient and she says she is coming to  enough samples for the next 30 days.  We discussed history of non-compliance with Coumadin.  Patient states she had a lot going on and would actually turn her phone off which is why we were unable to reach her for follow-up.  She verbalized understanding that she will be required to follow-up on labs as instructed and will need to make herself available to receive calls on days when she reports for her INR.  She does not currently have any prescription drug coverage and her cost for Eliquis is over $300 per month.  She states she was denied assistance based on household income but our documentation is not scanned in they system.  She is going to look into enrolling in prescription drug coverage this year and has agreed to resume Coumadin until that time.

## 2019-11-27 NOTE — TELEPHONE ENCOUNTER
Should we continue Eliquis?  Can she can stay on it and is possible for her to get Eliquis?  Let me know

## 2019-12-10 ENCOUNTER — TELEPHONE (OUTPATIENT)
Dept: INTERNAL MEDICINE | Facility: CLINIC | Age: 54
End: 2019-12-10

## 2019-12-11 ENCOUNTER — TELEPHONE (OUTPATIENT)
Dept: INTERNAL MEDICINE | Facility: CLINIC | Age: 54
End: 2019-12-11

## 2019-12-11 NOTE — TELEPHONE ENCOUNTER
Patient informed she can take either Miralax or an OTC stimulant, per Dr. Saldana.  Patient informed Miralax can be taken up to 4x daily.    Patient stated she finally had a bowel movement last night with use of stool softeners, but a few hours after the BM she began vomiting. Patient has vomited 3-4 times since then.  Last episode was at 4 am this morning.

## 2019-12-12 NOTE — TELEPHONE ENCOUNTER
Patient informed to call the office if her vomiting continues and Dr. Saldana will call her something in to pharmacy.

## 2020-02-24 ENCOUNTER — OFFICE VISIT (OUTPATIENT)
Dept: INTERNAL MEDICINE | Facility: CLINIC | Age: 55
End: 2020-02-24

## 2020-02-24 VITALS
HEART RATE: 117 BPM | SYSTOLIC BLOOD PRESSURE: 130 MMHG | DIASTOLIC BLOOD PRESSURE: 70 MMHG | HEIGHT: 66 IN | BODY MASS INDEX: 47.09 KG/M2 | WEIGHT: 293 LBS | OXYGEN SATURATION: 100 %

## 2020-02-24 DIAGNOSIS — M79.631 RIGHT FOREARM PAIN: ICD-10-CM

## 2020-02-24 DIAGNOSIS — Z99.89 OSA ON CPAP: Primary | ICD-10-CM

## 2020-02-24 DIAGNOSIS — G47.33 OSA ON CPAP: Primary | ICD-10-CM

## 2020-02-24 DIAGNOSIS — F33.41 RECURRENT MAJOR DEPRESSIVE DISORDER, IN PARTIAL REMISSION (HCC): ICD-10-CM

## 2020-02-24 DIAGNOSIS — I10 ESSENTIAL HYPERTENSION: ICD-10-CM

## 2020-02-24 PROCEDURE — 99214 OFFICE O/P EST MOD 30 MIN: CPT | Performed by: FAMILY MEDICINE

## 2020-02-24 NOTE — PROGRESS NOTES
CC:   Chief Complaint   Patient presents with   • Follow-up       History:  Anthony Meza is a 54 y.o. female who presents today for follow-up for evaluation of the above:    Blood pressure stable  Uses lasix PRN  CPAP machine is about 10 years old and requests new one, was 275 lbs at time of previous CPAP Rx  Mood is stable  Trying to work on walking for exercise with a friend    Fell 3 weeks ago off of porch with residual R forearm swelling and pain only with palpation of distal ulna without weakness or ROM decrease.     ROS:  Review of Systems   Constitutional: Negative.    Respiratory: Negative.    Cardiovascular: Negative.    Psychiatric/Behavioral: Negative for agitation and behavioral problems. Sleep disturbance: wakes up a couple of times per night.       Ms. Meza  reports that she has never smoked. She has never used smokeless tobacco. She reports that she does not drink alcohol or use drugs.      Current Outpatient Medications:   •  acetaminophen (TYLENOL) 325 MG tablet, Take 650 mg by mouth Every 6 (Six) Hours As Needed for Mild Pain  or Headache., Disp: , Rfl:   •  amiodarone (PACERONE) 200 MG tablet, Take 100 mg by mouth Daily., Disp: , Rfl: 11  •  apixaban (ELIQUIS) 5 MG tablet tablet, Take 1 tablet by mouth Every 12 (Twelve) Hours., Disp: 60 tablet, Rfl: 11  •  buPROPion XL (WELLBUTRIN XL) 300 MG 24 hr tablet, Take 1 tablet by mouth Daily. (Patient taking differently: Take 150 mg by mouth Daily.), Disp: 90 tablet, Rfl: 3  •  cholecalciferol (VITAMIN D3) 1000 units tablet, Take 1,000 Units by mouth Daily. 10/8/2018, Disp: , Rfl:   •  Cyanocobalamin (VITAMIN B-12) 1000 MCG sublingual tablet, Place 1 tablet under the tongue Daily. Hold 10/8/2018, Disp: , Rfl:   •  furosemide (LASIX) 20 MG tablet, Take 1 tablet by mouth 2 (Two) Times a Day. (Patient taking differently: Take 40 mg by mouth 2 (Two) Times a Day.), Disp: 60 tablet, Rfl: 0  •  metoprolol tartrate (LOPRESSOR) 25 MG tablet, Take 1 tablet by mouth  "2 (Two) Times a Day., Disp: 180 tablet, Rfl: 3  •  oxybutynin XL (DITROPAN-XL) 10 MG 24 hr tablet, Take 2 tablets by mouth Daily., Disp: 180 tablet, Rfl: 3  •  Acetaminophen-Caffeine (TENSION HEADACHE RELIEF) 500-65 MG tablet, Take 1 tablet by mouth Every 6 (Six) Hours As Needed (migraine)., Disp: , Rfl:   •  loratadine (CLARITIN) 10 MG tablet, Take 1 tablet by mouth Daily., Disp: 30 tablet, Rfl: 2  •  pantoprazole (PROTONIX) 40 MG EC tablet, Take  by mouth Daily., Disp: , Rfl: 0      OBJECTIVE:  /70 (BP Location: Right arm, Patient Position: Sitting, Cuff Size: Adult)   Pulse 117   Ht 167.6 cm (66\")   Wt (!) 162 kg (357 lb)   LMP  (LMP Unknown)   SpO2 100%   BMI 57.62 kg/m²    Physical Exam   Constitutional: She is oriented to person, place, and time. No distress.   HENT:   Head: Normocephalic and atraumatic.   Nose: Nose normal.   Eyes: Conjunctivae are normal. Right eye exhibits no discharge. Left eye exhibits no discharge. No scleral icterus.   Neck: No tracheal deviation present.   Cardiovascular: Normal rate, regular rhythm and normal heart sounds. Exam reveals no gallop and no friction rub.   No murmur heard.  Pulmonary/Chest: Effort normal and breath sounds normal. No respiratory distress. She has no wheezes. She has no rales.   Musculoskeletal:   Mild edema without erythema of R forearm, tenderness to palpation of head of ulna without ROM deficit   Neurological: She is alert and oriented to person, place, and time.   Skin: Skin is warm and dry. She is not diaphoretic. No pallor.   Psychiatric: She has a normal mood and affect. Her behavior is normal. Judgment and thought content normal.   Nursing note and vitals reviewed.      Assessment/Plan     Diagnosis Plan   1. HARLAN on CPAP  Polysomnography 4 or More Parameters With CPAP   2. Body mass index (BMI) of 50-59.9 in adult (CMS/HCC)     3. Essential hypertension     4. Right forearm pain     5. Recurrent major depressive disorder, in partial " remission (CMS/HCC)     CPAP study given weight gain  Continue BP regimen  Hold on xray given good forearm function  Continue wellbutrin    Patient's Body mass index is 57.62 kg/m². BMI is above normal parameters. Recommendations include: exercise counseling.      An After Visit Summary was printed and given to the patient at discharge.  Return in about 6 months (around 8/24/2020). Sooner if problems arise.         Jamar Saldana D.O.  Family Medicine  Osteopathic Neuromusculoskeletal Medicine

## 2020-03-03 DIAGNOSIS — G47.33 OSA ON CPAP: Primary | ICD-10-CM

## 2020-03-03 DIAGNOSIS — Z99.89 OSA ON CPAP: Primary | ICD-10-CM

## 2020-03-10 ENCOUNTER — HOSPITAL ENCOUNTER (OUTPATIENT)
Dept: SLEEP MEDICINE | Facility: HOSPITAL | Age: 55
Discharge: HOME | End: 2020-03-10
Admitting: FAMILY MEDICINE

## 2020-03-10 VITALS
RESPIRATION RATE: 16 BRPM | DIASTOLIC BLOOD PRESSURE: 76 MMHG | SYSTOLIC BLOOD PRESSURE: 118 MMHG | HEART RATE: 73 BPM | HEIGHT: 66 IN | WEIGHT: 293 LBS | OXYGEN SATURATION: 100 % | BODY MASS INDEX: 47.09 KG/M2

## 2020-03-10 DIAGNOSIS — Z99.89 OSA ON CPAP: ICD-10-CM

## 2020-03-10 DIAGNOSIS — G47.33 OSA ON CPAP: ICD-10-CM

## 2020-03-10 PROCEDURE — 95811 POLYSOM 6/>YRS CPAP 4/> PARM: CPT | Performed by: INTERNAL MEDICINE

## 2020-03-10 PROCEDURE — 95811 POLYSOM 6/>YRS CPAP 4/> PARM: CPT

## 2020-03-12 DIAGNOSIS — G47.33 OSA ON CPAP: Primary | ICD-10-CM

## 2020-03-12 DIAGNOSIS — Z99.89 OSA ON CPAP: Primary | ICD-10-CM

## 2020-04-28 ENCOUNTER — TELEPHONE (OUTPATIENT)
Dept: INTERNAL MEDICINE | Facility: CLINIC | Age: 55
End: 2020-04-28

## 2020-04-28 DIAGNOSIS — M79.89 LEG SWELLING: ICD-10-CM

## 2020-04-28 RX ORDER — FUROSEMIDE 20 MG/1
20 TABLET ORAL 2 TIMES DAILY
Qty: 60 TABLET | Refills: 5 | Status: SHIPPED | OUTPATIENT
Start: 2020-04-28 | End: 2020-05-01 | Stop reason: SDUPTHER

## 2020-05-01 DIAGNOSIS — M79.89 LEG SWELLING: ICD-10-CM

## 2020-05-01 RX ORDER — FUROSEMIDE 20 MG/1
20 TABLET ORAL 2 TIMES DAILY
Qty: 60 TABLET | Refills: 5 | Status: SHIPPED | OUTPATIENT
Start: 2020-05-01 | End: 2021-05-14 | Stop reason: SDUPTHER

## 2020-07-16 ENCOUNTER — TELEPHONE (OUTPATIENT)
Dept: INTERNAL MEDICINE | Facility: CLINIC | Age: 55
End: 2020-07-16

## 2020-07-16 DIAGNOSIS — G47.33 OSA ON CPAP: Primary | ICD-10-CM

## 2020-07-16 DIAGNOSIS — Z98.890 HISTORY OF LUMPECTOMY: ICD-10-CM

## 2020-07-16 DIAGNOSIS — Z99.89 OSA ON CPAP: Primary | ICD-10-CM

## 2020-07-16 DIAGNOSIS — J81.0 ACUTE PULMONARY EDEMA (HCC): ICD-10-CM

## 2020-07-16 NOTE — TELEPHONE ENCOUNTER
Patient requesting an order for new mattress for hospital bed.  Also, due to lumpectomy, patient states her insurance covers up to 6 bras per year with Rx.      Returned call to patient to discuss further, left message for patient to return call to office.

## 2020-08-05 DIAGNOSIS — F33.41 RECURRENT MAJOR DEPRESSIVE DISORDER, IN PARTIAL REMISSION (HCC): ICD-10-CM

## 2020-08-07 RX ORDER — BUPROPION HYDROCHLORIDE 300 MG/1
300 TABLET ORAL DAILY
Qty: 90 TABLET | Refills: 3 | Status: SHIPPED | OUTPATIENT
Start: 2020-08-07 | End: 2020-08-11 | Stop reason: SDUPTHER

## 2020-08-11 DIAGNOSIS — F33.41 RECURRENT MAJOR DEPRESSIVE DISORDER, IN PARTIAL REMISSION (HCC): ICD-10-CM

## 2020-08-11 NOTE — TELEPHONE ENCOUNTER
Caller: Derrick Cameroncally LOGAN    Relationship: Self    Best call back number: [phone]    Medication needed:   Requested Prescriptions     Pending Prescriptions Disp Refills   • buPROPion XL (Wellbutrin XL) 300 MG 24 hr tablet 90 tablet 3     Sig: Take 1 tablet by mouth Daily.       When do you need the refill by: 08/12/2020    What details did the patient provide when requesting the medication:      Does the patient have less than a 3 day supply:  [x] Yes  [] No    What is the patient's preferred pharmacy: [facility] [address] [place], [place] [address] [address] - [phone] [facility] [phone] [facility]

## 2020-08-12 RX ORDER — BUPROPION HYDROCHLORIDE 300 MG/1
300 TABLET ORAL DAILY
Qty: 90 TABLET | Refills: 3 | Status: SHIPPED | OUTPATIENT
Start: 2020-08-12 | End: 2021-06-08 | Stop reason: SDUPTHER

## 2020-08-25 DIAGNOSIS — I10 ESSENTIAL HYPERTENSION: Primary | ICD-10-CM

## 2020-08-25 NOTE — TELEPHONE ENCOUNTER
patient is completely out of metoprolol tartrate (LOPRESSOR) 25 MG tablet      Please send to [facility] - [place], [place] - [address]

## 2020-08-26 ENCOUNTER — OFFICE VISIT (OUTPATIENT)
Dept: NEUROLOGY | Facility: CLINIC | Age: 55
End: 2020-08-26

## 2020-08-26 VITALS
HEIGHT: 66 IN | SYSTOLIC BLOOD PRESSURE: 144 MMHG | HEART RATE: 67 BPM | DIASTOLIC BLOOD PRESSURE: 84 MMHG | WEIGHT: 293 LBS | BODY MASS INDEX: 47.09 KG/M2

## 2020-08-26 DIAGNOSIS — G47.33 OBSTRUCTIVE SLEEP APNEA: Primary | ICD-10-CM

## 2020-08-26 DIAGNOSIS — E66.2 OBESITY HYPOVENTILATION SYNDROME (HCC): ICD-10-CM

## 2020-08-26 DIAGNOSIS — I48.0 PAROXYSMAL ATRIAL FIBRILLATION (HCC): ICD-10-CM

## 2020-08-26 PROCEDURE — 99213 OFFICE O/P EST LOW 20 MIN: CPT | Performed by: PHYSICIAN ASSISTANT

## 2020-08-26 NOTE — TELEPHONE ENCOUNTER
PATIENT CALLED TO CHECK STATUS OF MEDICATION REQUEST AS PER REQUEST SHE IS OUT OF HER MEDICATION.     PLEASE CONTACT AND ADVISE

## 2020-08-26 NOTE — PROGRESS NOTES
Neurology Progress Note      Chief Complaint:    Obstructive sleep apnea  Daytime hypersomnolence    Subjective     Subjective:  This is a 24-year-old female routinely cared for by Dr. Jamar Saldana DO, who underwent polysomnography on 3/10/2020 demonstrating obstructive sleep apnea and obesity-hypoventilation syndrome.  The patient was placed on CPAP at a setting of 11 cm water.  Compliance download from 7/26/2020-8/24/2020 demonstrates 26 days greater than 87% usage of more than 40 hours and her percent compliance over this timeframe.  Average usage time is 5 hours and 26 minutes with a current AHI of 1.4 and 0.0 central events.  Current stopping uppercase of the patient's chart from today's encounter.  Patient is tolerating the therapy without any difficulty.  Her current DME company is [employer] in [place].    Of note, the patient was in atrial fibrillation during her sleep study.  She has a history of atrial fibrillation ablation.  She is anticoagulated currently with Eliquis 5 mg twice daily.  She also is on amiodarone 100 mg daily and Lopressor 25 mg twice daily.      Past Medical History:   Diagnosis Date   • A-fib (CMS/HCC)    • Abnormal ECG    • Anxiety    • Arthritis    • Atrial fibrillation (CMS/HCC)    • Back pain    • Breast cancer (CMS/HCC) 09/2018   • CFS (chronic fatigue syndrome)    • Chest pain    • Chronic pain disorder    • Depression    • Dizziness    • Ductal carcinoma in situ (DCIS) of left breast     BREAST LEFT   • Fibromyalgia    • Hx of radiation therapy    • Hypertension    • IBS (irritable bowel syndrome)    • Incontinence    • Migraines    • Mononucleosis    • Sleep apnea     uses a c-pap   • Wears glasses      Past Surgical History:   Procedure Laterality Date   • ANKLE SURGERY      x2 left   • BREAST BIOPSY Left 2018    stereo   • BREAST EXCISIONAL BIOPSY Left 2018   • BREAST LUMPECTOMY Left 09/2018   • BREAST LUMPECTOMY WITH SENTINEL NODE BIOPSY Left 10/15/2018    Procedure:  LEFT BREAST LUMPECTOMY WITH NEEDLE LOCALIZATION - MAMMOGRAM GUIDED AND SENTINEL LYMPH  NODE BIOPSY - RADIOLOGIST TO INJECT AND SCAN;  Surgeon: Rafael Porter MD;  Location:  [facility] OR;  Service: General   • CARDIAC CATHETERIZATION Bilateral 1/29/2018    Procedure: Coronary angiography;  Surgeon: Joel Medina MD;  Location:  [facility] CATH INVASIVE LOCATION;  Service:    • CARDIAC CATHETERIZATION N/A 1/29/2018    Procedure: Left Heart Cath;  Surgeon: Joel Medina MD;  Location:  [facility] CATH INVASIVE LOCATION;  Service:    • CYST REMOVAL      from tailbone   • TUBAL ABDOMINAL LIGATION     • WISDOM TOOTH EXTRACTION       Family History   Problem Relation Age of Onset   • Transient ischemic attack Mother    • Rheum arthritis Mother    • Fibromyalgia Mother    • Heart disease Father    • Asthma Father    • Hypertension Father    • Rheum arthritis Father    • Obesity Father    • Obesity Sister    • Obesity Maternal Grandmother    • Ovarian cancer Maternal Grandmother    • Obesity Paternal Grandfather    • Hypertension Paternal Grandfather    • Heart disease Paternal Grandfather    • Liver cancer Maternal Aunt    • Breast cancer Neg Hx      Social History     Tobacco Use   • Smoking status: Never Smoker   • Smokeless tobacco: Never Used   Substance Use Topics   • Alcohol use: No   • Drug use: No       Medications:  Current Outpatient Medications   Medication Sig Dispense Refill   • acetaminophen (TYLENOL) 325 MG tablet Take 650 mg by mouth Every 6 (Six) Hours As Needed for Mild Pain  or Headache.     • Acetaminophen-Caffeine (TENSION HEADACHE RELIEF) 500-65 MG tablet Take 1 tablet by mouth Every 6 (Six) Hours As Needed (migraine).     • amiodarone (PACERONE) 200 MG tablet Take 100 mg by mouth Daily.  11   • apixaban (ELIQUIS) 5 MG tablet tablet Take 1 tablet by mouth Every 12 (Twelve) Hours. 60 tablet 11   • buPROPion XL (Wellbutrin XL) 300 MG 24 hr tablet Take 1 tablet by mouth Daily. 90 tablet 3   • cholecalciferol (VITAMIN  D3) 1000 units tablet Take 1,000 Units by mouth Daily. 10/8/2018     • Cyanocobalamin (VITAMIN B-12) 1000 MCG sublingual tablet Place 1 tablet under the tongue Daily. Hold 10/8/2018     • furosemide (LASIX) 20 MG tablet Take 1 tablet by mouth 2 (Two) Times a Day. 60 tablet 5   • loratadine (CLARITIN) 10 MG tablet Take 1 tablet by mouth Daily. 30 tablet 2   • metoprolol tartrate (LOPRESSOR) 25 MG tablet Take 1 tablet by mouth 2 (Two) Times a Day. 180 tablet 3   • oxybutynin XL (DITROPAN-XL) 10 MG 24 hr tablet Take 2 tablets by mouth Daily. 180 tablet 3   • pantoprazole (PROTONIX) 40 MG EC tablet Take  by mouth Daily.  0     No current facility-administered medications for this visit.        Allergies:    Penicillins    Review of Systems:   -A 14 point review of systems is completed and is negative.      Objective      Vital Signs  Heart Rate:  [67] 67  BP: (144)/(84) 144/84    Physical Exam:    General Exam:  Head:  Normocephalic, atraumatic.  HEENT: PERRLA.  Full EOM.  Mallampati Class IV anatomy.  Neck:  No lymphadenopathy, thyromegaly or bruit.  Neck circumference is 18 inches.  Cardiac:  Regular rate and rhythm.  Normal S1, S2.  No murmur, rub or gallop.  Lungs:  Clear to auscultation bilaterally.  No wheeze, rales or rhonchi.  Abdomen:  Non-tender, Non-distended.  Bowel sounds normoactive.  Extremities: Full peripheral pulses.  No clubbing, cyanosis or edema.  Skin: No ulceration, breakdown or rash.       Results Review:        Assessment/Plan     Impression:  Obstructive sleep apnea  Obesity hypoventilation syndrome  BMI 58.43  Atrial fibrillation, paroxysmal  History of atrial fibrillation ablation      Plan:  I have reviewed the current compliance download and findings of the previous polysomnography with the patient in detail.  The patient voices understanding and recognizes the need for adherence to the prescribed therapy.  They understand that a certain level of compliance allows for continued insurance  coverage as well as adequate treatment of underlying apnea.  They understand the impact this has upon their overall health status and other medical comorbidities.  I have recommended instituting regular cardiovascular exercise in the form of walking, biking or swimming 30-40 minutes at a time at least 3-4 times per week.  Counseled on multimodal approach to treatment of sleep apnea to include but not limited to diet, exercise, sleep hygiene, compliance with pap therapy. Encouraged lateral sleeping position and to avoid sedatives or alcohol close to bedtime. Risks of untreated sleep apnea were discussed to include but not limited to HTN, heart disease, stroke, cardiac arrhythmia such as AFIB, and dementia.  Consideration of consultation of bariatric surgery to help with obesity-hypoventilation syndrome.  Contact Dr. Waldrop, her electrophysiologist, to let him know that she was in atrial fibrillation during her sleep study.  She has a history of ablation and she has been feeling as though she is going in and out of atrial fibrillation over last 1 months.  She is currently on Eliquis.      The patient will follow-up with us on an annual basis to evaluate her compliance and sleep review.  She is to call with any concerns or questions in the interim.        Bill Mata PA-C  08/26/20  10:58

## 2020-08-27 NOTE — TELEPHONE ENCOUNTER
Patient called in checking status of previus messages. She states she has been completely out of her metoprolol tartrate (LOPRESSOR) 25 MG tablet medication since yesterday afternoon and has already noticed a difference with her BP. Patient is scheduled to come in tomorrow morning at 11am but states she cannot go that long without her medication. Please advise. Preferred pharmacy is [facility] [address] [place] [address] AT  60 - [phone] [facility] [phone] FX     Please call patient back once complete, phone number is [phone]

## 2020-08-28 ENCOUNTER — OFFICE VISIT (OUTPATIENT)
Dept: FAMILY MEDICINE CLINIC | Facility: CLINIC | Age: 55
End: 2020-08-28

## 2020-08-28 ENCOUNTER — TELEPHONE (OUTPATIENT)
Dept: FAMILY MEDICINE CLINIC | Facility: CLINIC | Age: 55
End: 2020-08-28

## 2020-08-28 VITALS
TEMPERATURE: 98.4 F | SYSTOLIC BLOOD PRESSURE: 132 MMHG | DIASTOLIC BLOOD PRESSURE: 76 MMHG | RESPIRATION RATE: 16 BRPM | HEART RATE: 92 BPM | HEIGHT: 66 IN | OXYGEN SATURATION: 99 % | WEIGHT: 293 LBS | BODY MASS INDEX: 47.09 KG/M2

## 2020-08-28 DIAGNOSIS — G89.29 CHRONIC JOINT PAIN: Primary | ICD-10-CM

## 2020-08-28 DIAGNOSIS — I48.91 ATRIAL FIBRILLATION, UNSPECIFIED TYPE (HCC): ICD-10-CM

## 2020-08-28 DIAGNOSIS — M25.50 CHRONIC JOINT PAIN: Primary | ICD-10-CM

## 2020-08-28 DIAGNOSIS — J30.9 ALLERGIC RHINITIS, UNSPECIFIED SEASONALITY, UNSPECIFIED TRIGGER: ICD-10-CM

## 2020-08-28 PROCEDURE — 99214 OFFICE O/P EST MOD 30 MIN: CPT | Performed by: FAMILY MEDICINE

## 2020-08-28 RX ORDER — TRAMADOL HYDROCHLORIDE 50 MG/1
50 TABLET ORAL EVERY 8 HOURS PRN
Qty: 30 TABLET | Refills: 0 | Status: SHIPPED | OUTPATIENT
Start: 2020-08-28 | End: 2021-03-30

## 2020-08-28 RX ORDER — LORATADINE 10 MG/1
10 TABLET ORAL DAILY
Qty: 30 TABLET | Refills: 3 | Status: SHIPPED | OUTPATIENT
Start: 2020-08-28 | End: 2021-03-30

## 2020-08-28 NOTE — TELEPHONE ENCOUNTER
Spoke with the hub, patient wanting to discuss medications sent to [facility].  [facility] says they aren't receiving patient's medications.      Contacted patient, did not answer, unable to leave a voicemail at this time, voicemail is not set up at this time    Contacted [facility] pharmacy, all scripts have been received and confirmed by the pharmacy.

## 2020-08-28 NOTE — PROGRESS NOTES
CC:   Chief Complaint   Patient presents with   • Follow-up     would like to discuss sleep study from March 2020       History:  Anthony Meza is a 54 y.o. female who presents today for follow-up for evaluation of the above:    Here for discussion of findings of atrial fibrillation on most recent sleep study in the setting of known atrial fibrillation status post cardiac ablation.  She says that she feels that she can be in A. fib at times and has had more shortness of breath with exertion although she has not had any distress at rest.    She struggles with chronic bilateral hand and diffuse joint pain and muscle aches usually worse with weather that have not been controlled with Tylenol, and since she is on anticoagulation she is trying to avoid NSAIDs.    ROS:  Review of Systems   Constitutional: Positive for activity change.   Respiratory: Positive for shortness of breath.    Cardiovascular: Positive for palpitations.   Musculoskeletal: Positive for arthralgias, back pain and myalgias.       Ms. Meza  reports that she has never smoked. She has never used smokeless tobacco. She reports that she does not drink alcohol or use drugs.      Current Outpatient Medications:   •  acetaminophen (TYLENOL) 325 MG tablet, Take 650 mg by mouth Every 6 (Six) Hours As Needed for Mild Pain  or Headache., Disp: , Rfl:   •  Acetaminophen-Caffeine (TENSION HEADACHE RELIEF) 500-65 MG tablet, Take 1 tablet by mouth Every 6 (Six) Hours As Needed (migraine)., Disp: , Rfl:   •  amiodarone (PACERONE) 200 MG tablet, Take 100 mg by mouth Daily., Disp: , Rfl: 11  •  apixaban (ELIQUIS) 5 MG tablet tablet, Take 1 tablet by mouth Every 12 (Twelve) Hours., Disp: 60 tablet, Rfl: 11  •  buPROPion XL (Wellbutrin XL) 300 MG 24 hr tablet, Take 1 tablet by mouth Daily., Disp: 90 tablet, Rfl: 3  •  cholecalciferol (VITAMIN D3) 1000 units tablet, Take 1,000 Units by mouth Daily. 10/8/2018, Disp: , Rfl:   •  Cyanocobalamin (VITAMIN B-12) 1000 MCG  "sublingual tablet, Place 1 tablet under the tongue Daily. Hold 10/8/2018, Disp: , Rfl:   •  furosemide (LASIX) 20 MG tablet, Take 1 tablet by mouth 2 (Two) Times a Day., Disp: 60 tablet, Rfl: 5  •  metoprolol tartrate (LOPRESSOR) 25 MG tablet, Take 1 tablet by mouth 2 (Two) Times a Day., Disp: 180 tablet, Rfl: 3  •  oxybutynin XL (DITROPAN-XL) 10 MG 24 hr tablet, Take 2 tablets by mouth Daily., Disp: 180 tablet, Rfl: 3  •  loratadine (Claritin) 10 MG tablet, Take 1 tablet by mouth Daily., Disp: 30 tablet, Rfl: 3  •  traMADol (ULTRAM) 50 MG tablet, Take 1 tablet by mouth Every 8 (Eight) Hours As Needed for Moderate Pain ., Disp: 30 tablet, Rfl: 0      OBJECTIVE:  /76 (BP Location: Left arm, Patient Position: Sitting, Cuff Size: Adult)   Pulse 92   Temp 98.4 °F (36.9 °C) (Temporal)   Resp 16   Ht 167.6 cm (66\")   Wt (!) 164 kg (362 lb)   LMP  (LMP Unknown)   SpO2 99%   BMI 58.43 kg/m²    Physical Exam   Constitutional: She is oriented to person, place, and time. No distress.   HENT:   Head: Normocephalic and atraumatic.   Nose: Nose normal.   Eyes: Conjunctivae are normal. Right eye exhibits no discharge. Left eye exhibits no discharge. No scleral icterus.   Neck: No tracheal deviation present.   Cardiovascular: Normal rate and normal heart sounds. Exam reveals no gallop and no friction rub.   No murmur heard.  Occasional ectopy   Pulmonary/Chest: Effort normal and breath sounds normal. No respiratory distress. She has no wheezes. She has no rales.   Neurological: She is alert and oriented to person, place, and time.   Skin: Skin is warm and dry. She is not diaphoretic. No pallor.   Psychiatric: She has a normal mood and affect. Her behavior is normal. Judgment and thought content normal.   Nursing note and vitals reviewed.      Assessment/Plan     Diagnosis Plan   1. Chronic joint pain  traMADol (ULTRAM) 50 MG tablet   2. Allergic rhinitis, unspecified seasonality, unspecified trigger  loratadine " (Claritin) 10 MG tablet   3. Atrial fibrillation, unspecified type (CMS/HCC)     Tramadol trial  Needs refill of Claritin  Discussed a reasonable option would be to increase metoprolol, however since she has a cardiologist as well as EP specialist I had her contact them before we made medication adjustments.    An After Visit Summary was printed and given to the patient at discharge.  Return in about 3 months (around 11/28/2020). Sooner if problems arise.         Jamar Saldana D.O.  Family Medicine  Osteopathic Neuromusculoskeletal Medicine

## 2020-09-03 RX ORDER — APIXABAN 5 MG/1
TABLET, FILM COATED ORAL
Qty: 60 TABLET | Refills: 7 | Status: SHIPPED | OUTPATIENT
Start: 2020-09-03 | End: 2021-11-23 | Stop reason: SDUPTHER

## 2020-11-10 ENCOUNTER — TRANSCRIBE ORDERS (OUTPATIENT)
Dept: ADMINISTRATIVE | Facility: HOSPITAL | Age: 55
End: 2020-11-10

## 2020-11-10 DIAGNOSIS — Z12.31 ENCOUNTER FOR SCREENING MAMMOGRAM FOR MALIGNANT NEOPLASM OF BREAST: Primary | ICD-10-CM

## 2020-11-19 ENCOUNTER — HOSPITAL ENCOUNTER (OUTPATIENT)
Dept: MAMMOGRAPHY | Facility: HOSPITAL | Age: 55
Discharge: HOME | End: 2020-11-19
Admitting: FAMILY MEDICINE

## 2020-11-19 DIAGNOSIS — Z85.3 PERSONAL HISTORY OF BREAST CANCER: ICD-10-CM

## 2020-11-19 DIAGNOSIS — Z12.31 ENCOUNTER FOR SCREENING MAMMOGRAM FOR MALIGNANT NEOPLASM OF BREAST: ICD-10-CM

## 2020-11-19 PROCEDURE — 77066 DX MAMMO INCL CAD BI: CPT

## 2020-11-19 PROCEDURE — G0279 TOMOSYNTHESIS, MAMMO: HCPCS

## 2021-03-18 NOTE — ADDENDUM NOTE
Addended by: Wanda Leyva ED on: 12/12/2017 11:06 AM     Modules accepted: Orders EMERGENCY DEPARTMENT HISTORY AND PHYSICAL EXAM      Date: 3/17/2021  Patient Name: Doug Pack    History of Presenting Illness     Chief Complaint   Patient presents with   • Vaginal Discharge       History Provided By: Patient    HPI: Doug Pack, 26 y.o. female with no reported PMHx presents to the ED with cc of vaginal discharge, pruritis, and mild pelvic cramping x 2 days. Discharge is clumpy, brown, malodorous.  Patient states this is consistent with prior episodes of BV.  Denies possibility of pregnancy, LMP 3 to 4 weeks ago.  Patient denies dysuria, urinary urgency/frequency, hematuria, vaginal lesions, nausea, vomiting, diarrhea, blood in stool, constipation, abdominal pain, fever.  On chart review it appears patient tested positive for chlamydia in January of this year.  Patient was reportedly treated.  Offered repeat testing today, however patient declines.    There are no other complaints, changes, or physical findings at this time.    PCP: Avila, MD Chanel    No current facility-administered medications on file prior to encounter.      Current Outpatient Medications on File Prior to Encounter   Medication Sig Dispense Refill   • clotrimazole (MYCELEX) 1 % vaginal cream Insert 1 Applicator into vagina nightly. 20 g 0   • methocarbamoL (ROBAXIN) 750 mg tablet TAKE 1 2 TABLET BY MOUTH AT BEDTIME     • ibuprofen (MOTRIN) 600 mg tablet Take 1 Tab by mouth every six (6) hours as needed for Pain. 20 Tab 0       Past History     Past Medical History:  No past medical history on file.    Past Surgical History:  No past surgical history on file.    Family History:  No family history on file.    Social History:  Social History     Tobacco Use   • Smoking status: Never Smoker   • Smokeless tobacco: Never Used   Substance Use Topics   • Alcohol use: Yes     Comment: occaisionally   • Drug use: Yes     Types: Marijuana     Comment: Last use last month 7-20       Allergies:  No Known Allergies      Review  of Systems   Review of Systems   Constitutional: Negative for fever. Respiratory: Negative for shortness of breath. Cardiovascular: Negative for chest pain. Gastrointestinal: Negative for abdominal pain, diarrhea, nausea and vomiting. Endocrine: Negative for polyuria. Genitourinary: Positive for vaginal discharge. Negative for dysuria, genital sores and vaginal bleeding. Musculoskeletal: Negative for neck stiffness. Skin: Negative for rash. Allergic/Immunologic: Negative for immunocompromised state. Neurological: Negative for dizziness. Hematological: Does not bruise/bleed easily. Psychiatric/Behavioral: Negative for agitation. Physical Exam   Physical Exam  Vitals signs and nursing note reviewed. Constitutional:       General: She is not in acute distress. Appearance: Normal appearance. She is well-developed. HENT:      Head: Normocephalic and atraumatic. Mouth/Throat:      Mouth: Mucous membranes are moist.   Eyes:      General: Lids are normal.      Extraocular Movements: Extraocular movements intact. Conjunctiva/sclera: Conjunctivae normal.   Neck:      Musculoskeletal: Normal range of motion and neck supple. Cardiovascular:      Rate and Rhythm: Normal rate and regular rhythm. Pulmonary:      Effort: Pulmonary effort is normal.      Breath sounds: Normal breath sounds. Abdominal:      General: Bowel sounds are normal.      Palpations: Abdomen is soft. Tenderness: There is no abdominal tenderness. There is no right CVA tenderness, left CVA tenderness, guarding or rebound. Genitourinary:     Comments: Pt deferred  Musculoskeletal: Normal range of motion. Skin:     General: Skin is warm and dry. Neurological:      General: No focal deficit present. Mental Status: She is alert and oriented to person, place, and time. Psychiatric:         Mood and Affect: Mood normal.         Behavior: Behavior normal. Behavior is cooperative.          Diagnostic Study Results     Labs -     Recent Results (from the past 12 hour(s))   HCG URINE, QL. - POC    Collection Time: 03/17/21 11:05 PM   Result Value Ref Range    Pregnancy test,urine (POC) Negative NEG         Radiologic Studies -   No orders to display     CT Results  (Last 48 hours)    None        CXR Results  (Last 48 hours)    None            Medical Decision Making   I am the first provider for this patient. I reviewed the vital signs, available nursing notes, past medical history, past surgical history, family history and social history. Vital Signs-Reviewed the patient's vital signs. Patient Vitals for the past 12 hrs:   Temp Pulse Resp BP SpO2   03/17/21 2235 98.6 °F (37 °C) 82 16 (!) 106/56 100 %       Records Reviewed: Nursing Notes, Old Medical Records and Previous Laboratory Studies    Provider Notes (Medical Decision Making):       11:20 PM- Patient signed out to Dr. Yani Barclay. Wet prep and KOH are pending. ED Course:   Initial assessment performed. The patients presenting problems have been discussed, and they are in agreement with the care plan formulated and outlined with them. I have encouraged them to ask questions as they arise throughout their visit. Critical Care Time: None    Disposition:  D/c    PLAN:  1. Current Discharge Medication List        2. Follow-up Information     Follow up With Specialties Details Why Contact Info    Your OB/GYN  Call  For follow up         Return to ED if worse     Diagnosis     Clinical Impression:   1. Acute vaginitis          Please note that this dictation was completed with [email or web address], the computer voice recognition software. Quite often unanticipated grammatical, syntax, homophones, and other interpretive errors are inadvertently transcribed by the computer software. Please disregards these errors. Please excuse any errors that have escaped final proofreading.

## 2021-03-22 ENCOUNTER — TELEPHONE (OUTPATIENT)
Dept: NEUROLOGY | Facility: CLINIC | Age: 56
End: 2021-03-22

## 2021-03-22 DIAGNOSIS — G47.33 OBSTRUCTIVE SLEEP APNEA: Primary | ICD-10-CM

## 2021-03-22 NOTE — TELEPHONE ENCOUNTER
Provider: MEG CULP    Caller: TETO JESUS    Relationship to Patient: SELF    Phone Number: [phone]    Reason for Call: THE PT STATED LEGACY OXYGEN DOES NOT MAKE HER MASK ANYMORE AND SHE IS NEEDING A NEW MASK. SHE STATED THAT HER MASK HAD TOO MUCH PRESSURE AND DOES NOT ALLOW HER TO SLEEP. SHE ALSO WANTED TO LET ME[facility] CULP KNOW THAT HER APNEA IS 11 TO 1. PLEASE GIVE HER A CALL BACK TO DISCUSS THIS.

## 2021-03-25 DIAGNOSIS — G47.33 OBSTRUCTIVE SLEEP APNEA: Primary | ICD-10-CM

## 2021-03-25 NOTE — TELEPHONE ENCOUNTER
Anthony notified Bill wants to change to APAP, she said it has been better the last 2 nights and she has been able to use it.  She is to notify us if she feels like the pressure needs to be adjusted.

## 2021-03-29 DIAGNOSIS — G89.29 CHRONIC JOINT PAIN: ICD-10-CM

## 2021-03-29 DIAGNOSIS — J30.9 ALLERGIC RHINITIS, UNSPECIFIED SEASONALITY, UNSPECIFIED TRIGGER: ICD-10-CM

## 2021-03-29 DIAGNOSIS — M25.50 CHRONIC JOINT PAIN: ICD-10-CM

## 2021-03-29 DIAGNOSIS — N32.81 OVERACTIVE BLADDER: ICD-10-CM

## 2021-03-30 NOTE — TELEPHONE ENCOUNTER
Last office visit 8/28/20, patient was supposed to have 3 month follow up in November, no future appts scheduled

## 2021-03-31 RX ORDER — LORATADINE 10 MG/1
10 TABLET ORAL DAILY
Qty: 30 TABLET | Refills: 1 | Status: SHIPPED | OUTPATIENT
Start: 2021-03-31 | End: 2021-07-23

## 2021-03-31 RX ORDER — OXYBUTYNIN CHLORIDE 10 MG/1
20 TABLET, EXTENDED RELEASE ORAL DAILY
Qty: 60 TABLET | Refills: 1 | Status: ON HOLD | OUTPATIENT
Start: 2021-03-31 | End: 2021-08-09

## 2021-03-31 RX ORDER — TRAMADOL HYDROCHLORIDE 50 MG/1
50 TABLET ORAL EVERY 8 HOURS PRN
Qty: 30 TABLET | Refills: 0 | Status: SHIPPED | OUTPATIENT
Start: 2021-03-31 | End: 2023-02-16 | Stop reason: SDUPTHER

## 2021-05-05 ENCOUNTER — OFFICE VISIT (OUTPATIENT)
Dept: FAMILY MEDICINE CLINIC | Facility: CLINIC | Age: 56
End: 2021-05-05

## 2021-05-05 VITALS
WEIGHT: 293 LBS | TEMPERATURE: 99 F | DIASTOLIC BLOOD PRESSURE: 86 MMHG | HEART RATE: 76 BPM | HEIGHT: 66 IN | SYSTOLIC BLOOD PRESSURE: 130 MMHG | BODY MASS INDEX: 47.09 KG/M2 | OXYGEN SATURATION: 97 %

## 2021-05-05 DIAGNOSIS — Z12.4 ENCOUNTER FOR PAPANICOLAOU SMEAR FOR CERVICAL CANCER SCREENING: ICD-10-CM

## 2021-05-05 DIAGNOSIS — I10 ESSENTIAL HYPERTENSION: ICD-10-CM

## 2021-05-05 DIAGNOSIS — Z98.890 S/P LUMPECTOMY, LEFT BREAST: ICD-10-CM

## 2021-05-05 DIAGNOSIS — Z99.89 OSA ON CPAP: ICD-10-CM

## 2021-05-05 DIAGNOSIS — M79.7 FIBROMYALGIA: ICD-10-CM

## 2021-05-05 DIAGNOSIS — Z86.39 HISTORY OF THYROID DISORDER: ICD-10-CM

## 2021-05-05 DIAGNOSIS — E78.2 MIXED HYPERLIPIDEMIA: ICD-10-CM

## 2021-05-05 DIAGNOSIS — N32.81 OVERACTIVE BLADDER: ICD-10-CM

## 2021-05-05 DIAGNOSIS — Z12.39 ENCOUNTER FOR SCREENING BREAST EXAMINATION: ICD-10-CM

## 2021-05-05 DIAGNOSIS — G47.33 OSA ON CPAP: ICD-10-CM

## 2021-05-05 DIAGNOSIS — I48.91 ATRIAL FIBRILLATION, UNSPECIFIED TYPE (HCC): ICD-10-CM

## 2021-05-05 DIAGNOSIS — Z79.01 CHRONIC ANTICOAGULATION: ICD-10-CM

## 2021-05-05 DIAGNOSIS — Z79.899 HIGH RISK MEDICATION USE: ICD-10-CM

## 2021-05-05 DIAGNOSIS — R23.1 PALLOR: ICD-10-CM

## 2021-05-05 DIAGNOSIS — Z92.3 HISTORY OF RADIATION THERAPY: ICD-10-CM

## 2021-05-05 DIAGNOSIS — D05.12 DUCTAL CARCINOMA IN SITU (DCIS) OF LEFT BREAST: ICD-10-CM

## 2021-05-05 DIAGNOSIS — Z91.89 ENCOUNTER FOR GYNECOLOGIC EXAMINATION FOR HIGH-RISK PATIENT COVERED BY MEDICARE: Primary | ICD-10-CM

## 2021-05-05 DIAGNOSIS — E55.9 VITAMIN D DEFICIENCY: ICD-10-CM

## 2021-05-05 DIAGNOSIS — M79.89 LEG SWELLING: ICD-10-CM

## 2021-05-05 DIAGNOSIS — N95.0 POSTMENOPAUSAL BLEEDING: ICD-10-CM

## 2021-05-05 DIAGNOSIS — R06.02 SHORTNESS OF BREATH: ICD-10-CM

## 2021-05-05 PROCEDURE — 87624 HPV HI-RISK TYP POOLED RSLT: CPT | Performed by: FAMILY MEDICINE

## 2021-05-05 PROCEDURE — 99396 PREV VISIT EST AGE 40-64: CPT | Performed by: FAMILY MEDICINE

## 2021-05-05 PROCEDURE — G0123 SCREEN CERV/VAG THIN LAYER: HCPCS | Performed by: FAMILY MEDICINE

## 2021-05-05 RX ORDER — FUROSEMIDE 20 MG/1
20 TABLET ORAL 2 TIMES DAILY
Qty: 60 TABLET | Refills: 5 | Status: CANCELLED | OUTPATIENT
Start: 2021-05-05

## 2021-05-10 ENCOUNTER — HOSPITAL ENCOUNTER (OUTPATIENT)
Dept: ULTRASOUND IMAGING | Facility: HOSPITAL | Age: 56
Discharge: HOME | End: 2021-05-10
Admitting: FAMILY MEDICINE

## 2021-05-10 DIAGNOSIS — N95.0 POSTMENOPAUSAL BLEEDING: ICD-10-CM

## 2021-05-10 DIAGNOSIS — D05.12 DUCTAL CARCINOMA IN SITU (DCIS) OF LEFT BREAST: ICD-10-CM

## 2021-05-10 LAB
GEN CATEG CVX/VAG CYTO-IMP: NORMAL
HPV I/H RISK 4 DNA CVX QL PROBE+SIG AMP: NOT DETECTED
LAB AP CASE REPORT: NORMAL
LAB AP GYN ADDITIONAL INFORMATION: NORMAL
LAB AP GYN OTHER FINDINGS: NORMAL
PATH INTERP SPEC-IMP: NORMAL
STAT OF ADQ CVX/VAG CYTO-IMP: NORMAL

## 2021-05-10 PROCEDURE — 76856 US EXAM PELVIC COMPLETE: CPT

## 2021-05-12 ENCOUNTER — LAB (OUTPATIENT)
Dept: LAB | Facility: HOSPITAL | Age: 56
End: 2021-05-12

## 2021-05-12 DIAGNOSIS — Z99.89 OSA ON CPAP: ICD-10-CM

## 2021-05-12 DIAGNOSIS — N95.0 POSTMENOPAUSAL BLEEDING: ICD-10-CM

## 2021-05-12 DIAGNOSIS — Z79.01 CHRONIC ANTICOAGULATION: ICD-10-CM

## 2021-05-12 DIAGNOSIS — E78.2 MIXED HYPERLIPIDEMIA: ICD-10-CM

## 2021-05-12 DIAGNOSIS — R06.02 SHORTNESS OF BREATH: ICD-10-CM

## 2021-05-12 DIAGNOSIS — I10 ESSENTIAL HYPERTENSION: ICD-10-CM

## 2021-05-12 DIAGNOSIS — G47.33 OSA ON CPAP: ICD-10-CM

## 2021-05-12 DIAGNOSIS — N32.81 OVERACTIVE BLADDER: ICD-10-CM

## 2021-05-12 DIAGNOSIS — E55.9 VITAMIN D DEFICIENCY: ICD-10-CM

## 2021-05-12 DIAGNOSIS — R23.1 PALLOR: ICD-10-CM

## 2021-05-12 DIAGNOSIS — M79.7 FIBROMYALGIA: ICD-10-CM

## 2021-05-12 DIAGNOSIS — I48.91 ATRIAL FIBRILLATION, UNSPECIFIED TYPE (HCC): ICD-10-CM

## 2021-05-12 DIAGNOSIS — Z86.39 HISTORY OF THYROID DISORDER: ICD-10-CM

## 2021-05-12 DIAGNOSIS — M79.89 LEG SWELLING: ICD-10-CM

## 2021-05-12 DIAGNOSIS — Z79.899 HIGH RISK MEDICATION USE: ICD-10-CM

## 2021-05-12 LAB
25(OH)D3 SERPL-MCNC: 52.7 NG/ML
ALBUMIN SERPL-MCNC: 3.8 G/DL (ref 3.5–5.2)
ALBUMIN UR-MCNC: 10.4 MG/DL
ALBUMIN/GLOB SERPL: 1.4 G/DL
ALP SERPL-CCNC: 105 U/L (ref 39–117)
ALT SERPL W P-5'-P-CCNC: 19 U/L (ref 1–33)
ANION GAP SERPL CALCULATED.3IONS-SCNC: 6 MMOL/L (ref 5–15)
AST SERPL-CCNC: 15 U/L (ref 1–32)
BACTERIA UR QL AUTO: ABNORMAL /HPF
BILIRUB SERPL-MCNC: 0.5 MG/DL (ref 0–1.2)
BILIRUB UR QL STRIP: NEGATIVE
BUN SERPL-MCNC: 10 MG/DL (ref 6–20)
BUN/CREAT SERPL: 14.1 (ref 7–25)
CALCIUM SPEC-SCNC: 9.4 MG/DL (ref 8.6–10.5)
CHLORIDE SERPL-SCNC: 106 MMOL/L (ref 98–107)
CHOLEST SERPL-MCNC: 203 MG/DL (ref 0–200)
CLARITY UR: ABNORMAL
CO2 SERPL-SCNC: 31 MMOL/L (ref 22–29)
COLOR UR: YELLOW
CREAT SERPL-MCNC: 0.71 MG/DL (ref 0.57–1)
DEPRECATED RDW RBC AUTO: 46 FL (ref 37–54)
ERYTHROCYTE [DISTWIDTH] IN BLOOD BY AUTOMATED COUNT: 13.4 % (ref 12.3–15.4)
GFR SERPL CREATININE-BSD FRML MDRD: 85 ML/MIN/1.73
GLOBULIN UR ELPH-MCNC: 2.8 GM/DL
GLUCOSE SERPL-MCNC: 96 MG/DL (ref 65–99)
GLUCOSE UR STRIP-MCNC: NEGATIVE MG/DL
HCT VFR BLD AUTO: 42.2 % (ref 34–46.6)
HDLC SERPL-MCNC: 46 MG/DL (ref 40–60)
HGB BLD-MCNC: 13.6 G/DL (ref 12–15.9)
HGB UR QL STRIP.AUTO: ABNORMAL
HYALINE CASTS UR QL AUTO: ABNORMAL /LPF
INR PPP: 1.05 (ref 0.91–1.09)
IRON 24H UR-MRATE: 106 MCG/DL (ref 37–145)
IRON SATN MFR SERPL: 27 % (ref 20–50)
KETONES UR QL STRIP: NEGATIVE
LDLC SERPL CALC-MCNC: 132 MG/DL (ref 0–100)
LDLC/HDLC SERPL: 2.82 {RATIO}
LEUKOCYTE ESTERASE UR QL STRIP.AUTO: ABNORMAL
MCH RBC QN AUTO: 30.2 PG (ref 26.6–33)
MCHC RBC AUTO-ENTMCNC: 32.2 G/DL (ref 31.5–35.7)
MCV RBC AUTO: 93.6 FL (ref 79–97)
NITRITE UR QL STRIP: NEGATIVE
NT-PROBNP SERPL-MCNC: 85 PG/ML (ref 0–900)
PH UR STRIP.AUTO: 6 [PH] (ref 5–8)
PLATELET # BLD AUTO: 210 10*3/MM3 (ref 140–450)
PMV BLD AUTO: 10.7 FL (ref 6–12)
POTASSIUM SERPL-SCNC: 4.1 MMOL/L (ref 3.5–5.2)
PROT SERPL-MCNC: 6.6 G/DL (ref 6–8.5)
PROT UR QL STRIP: ABNORMAL
PROTHROMBIN TIME: 12.9 SECONDS (ref 11.5–13.4)
RBC # BLD AUTO: 4.51 10*6/MM3 (ref 3.77–5.28)
RBC # UR: ABNORMAL /HPF
REF LAB TEST METHOD: ABNORMAL
SODIUM SERPL-SCNC: 143 MMOL/L (ref 136–145)
SP GR UR STRIP: 1.02 (ref 1–1.03)
SQUAMOUS #/AREA URNS HPF: ABNORMAL /HPF
T4 FREE SERPL-MCNC: 1.29 NG/DL (ref 0.93–1.7)
TIBC SERPL-MCNC: 390 MCG/DL (ref 298–536)
TRANSFERRIN SERPL-MCNC: 262 MG/DL (ref 200–360)
TRIGL SERPL-MCNC: 137 MG/DL (ref 0–150)
TSH SERPL DL<=0.05 MIU/L-ACNC: 1.99 UIU/ML (ref 0.27–4.2)
UROBILINOGEN UR QL STRIP: ABNORMAL
VIT B12 BLD-MCNC: 533 PG/ML (ref 211–946)
VLDLC SERPL-MCNC: 25 MG/DL (ref 5–40)
WBC # BLD AUTO: 5.82 10*3/MM3 (ref 3.4–10.8)
WBC UR QL AUTO: ABNORMAL /HPF

## 2021-05-12 PROCEDURE — 84439 ASSAY OF FREE THYROXINE: CPT

## 2021-05-12 PROCEDURE — 81001 URINALYSIS AUTO W/SCOPE: CPT

## 2021-05-12 PROCEDURE — 82607 VITAMIN B-12: CPT

## 2021-05-12 PROCEDURE — 84466 ASSAY OF TRANSFERRIN: CPT

## 2021-05-12 PROCEDURE — 85610 PROTHROMBIN TIME: CPT | Performed by: FAMILY MEDICINE

## 2021-05-12 PROCEDURE — 82306 VITAMIN D 25 HYDROXY: CPT

## 2021-05-12 PROCEDURE — 82043 UR ALBUMIN QUANTITATIVE: CPT

## 2021-05-12 PROCEDURE — 83540 ASSAY OF IRON: CPT

## 2021-05-12 PROCEDURE — 84443 ASSAY THYROID STIM HORMONE: CPT

## 2021-05-12 PROCEDURE — 87086 URINE CULTURE/COLONY COUNT: CPT

## 2021-05-12 PROCEDURE — 80061 LIPID PANEL: CPT

## 2021-05-12 PROCEDURE — 36415 COLL VENOUS BLD VENIPUNCTURE: CPT | Performed by: FAMILY MEDICINE

## 2021-05-12 PROCEDURE — 83880 ASSAY OF NATRIURETIC PEPTIDE: CPT

## 2021-05-12 PROCEDURE — 80053 COMPREHEN METABOLIC PANEL: CPT

## 2021-05-12 PROCEDURE — 85027 COMPLETE CBC AUTOMATED: CPT

## 2021-05-13 LAB — BACTERIA SPEC AEROBE CULT: NORMAL

## 2021-05-14 ENCOUNTER — OFFICE VISIT (OUTPATIENT)
Dept: FAMILY MEDICINE CLINIC | Facility: CLINIC | Age: 56
End: 2021-05-14

## 2021-05-14 VITALS
HEART RATE: 68 BPM | OXYGEN SATURATION: 98 % | TEMPERATURE: 98.3 F | SYSTOLIC BLOOD PRESSURE: 143 MMHG | BODY MASS INDEX: 47.09 KG/M2 | DIASTOLIC BLOOD PRESSURE: 80 MMHG | WEIGHT: 293 LBS | HEIGHT: 66 IN

## 2021-05-14 DIAGNOSIS — M79.89 LEG SWELLING: ICD-10-CM

## 2021-05-14 DIAGNOSIS — E66.01 MORBID OBESITY (HCC): ICD-10-CM

## 2021-05-14 DIAGNOSIS — I10 ESSENTIAL HYPERTENSION: ICD-10-CM

## 2021-05-14 DIAGNOSIS — R87.629 ABNORMAL VAGINAL PAP SMEAR: ICD-10-CM

## 2021-05-14 DIAGNOSIS — F41.9 ANXIETY: ICD-10-CM

## 2021-05-14 DIAGNOSIS — N92.0 SPOTTING: Primary | ICD-10-CM

## 2021-05-14 PROCEDURE — 99214 OFFICE O/P EST MOD 30 MIN: CPT | Performed by: FAMILY MEDICINE

## 2021-05-14 RX ORDER — LOSARTAN POTASSIUM 50 MG/1
50 TABLET ORAL DAILY
Qty: 90 TABLET | Refills: 0 | Status: SHIPPED | OUTPATIENT
Start: 2021-05-14 | End: 2021-10-25

## 2021-05-14 RX ORDER — ALPRAZOLAM 0.5 MG/1
.25-.5 TABLET ORAL 2 TIMES DAILY PRN
Qty: 20 TABLET | Refills: 0 | Status: SHIPPED | OUTPATIENT
Start: 2021-05-14 | End: 2022-02-21

## 2021-05-14 RX ORDER — FUROSEMIDE 20 MG/1
20 TABLET ORAL 2 TIMES DAILY
Qty: 60 TABLET | Refills: 5 | Status: SHIPPED | OUTPATIENT
Start: 2021-05-14 | End: 2022-09-07 | Stop reason: SDUPTHER

## 2021-06-08 ENCOUNTER — OFFICE VISIT (OUTPATIENT)
Dept: OBSTETRICS AND GYNECOLOGY | Facility: CLINIC | Age: 56
End: 2021-06-08

## 2021-06-08 VITALS
DIASTOLIC BLOOD PRESSURE: 90 MMHG | HEIGHT: 66 IN | WEIGHT: 293 LBS | SYSTOLIC BLOOD PRESSURE: 134 MMHG | BODY MASS INDEX: 47.09 KG/M2

## 2021-06-08 DIAGNOSIS — N95.0 PMB (POSTMENOPAUSAL BLEEDING): Primary | ICD-10-CM

## 2021-06-08 PROCEDURE — G0123 SCREEN CERV/VAG THIN LAYER: HCPCS | Performed by: OBSTETRICS & GYNECOLOGY

## 2021-06-08 PROCEDURE — 58100 BIOPSY OF UTERUS LINING: CPT | Performed by: OBSTETRICS & GYNECOLOGY

## 2021-06-08 PROCEDURE — 99203 OFFICE O/P NEW LOW 30 MIN: CPT | Performed by: OBSTETRICS & GYNECOLOGY

## 2021-06-08 PROCEDURE — 87624 HPV HI-RISK TYP POOLED RSLT: CPT | Performed by: OBSTETRICS & GYNECOLOGY

## 2021-06-08 PROCEDURE — 88305 TISSUE EXAM BY PATHOLOGIST: CPT | Performed by: OBSTETRICS & GYNECOLOGY

## 2021-06-08 NOTE — PROGRESS NOTES
Subjective   Anthony Meza is a 55 y.o. female  YOB: 1965    Chief Complaint   Patient presents with   • postmenopausal bleeding     pt here today as new pt for PMB. pt voices that before this bleeding she has been in menopause for 12 years. pt voices that her bleeding started about 4 months ago. pt had ultrasound on 21. pt voices no other concerns.      55 year old postmenopausal female  presents with complaints of postmenopausal bleeding. She reports that it has been going on for about three to four months. She had her last menstrual cycles 12 years ago. She has a previous history breast cancer and was treated with a lumpectomy and radiation and she was diagnosed with 3 years ago. She has previous history of two vaginal deliveries. She is sexually active with her .       Allergies   Allergen Reactions   • Penicillins Rash       Past Medical History:   Diagnosis Date   • A-fib (CMS/HCC)    • Abnormal ECG    • Anxiety    • Arthritis    • Atrial fibrillation (CMS/HCC)    • Back pain    • Breast cancer (CMS/HCC) 2018   • CFS (chronic fatigue syndrome)    • Chest pain    • Chronic pain disorder    • Depression    • Dizziness    • Ductal carcinoma in situ (DCIS) of left breast     BREAST LEFT   • Fibromyalgia    • Hx of radiation therapy    • Hypertension    • IBS (irritable bowel syndrome)    • Incontinence    • Migraines    • Mononucleosis    • Sleep apnea     uses a c-pap   • Wears glasses        Family History   Problem Relation Age of Onset   • Transient ischemic attack Mother    • Rheum arthritis Mother    • Fibromyalgia Mother    • Heart disease Father    • Asthma Father    • Hypertension Father    • Rheum arthritis Father    • Obesity Father    • Obesity Sister    • Obesity Maternal Grandmother    • Ovarian cancer Maternal Grandmother    • Obesity Paternal Grandfather    • Hypertension Paternal Grandfather    • Heart disease Paternal Grandfather    • Liver cancer Maternal Aunt     • Breast cancer Neg Hx        Social History     Socioeconomic History   • Marital status:      Spouse name: Not on file   • Number of children: 2   • Years of education: Not on file   • Highest education level: Not on file   Tobacco Use   • Smoking status: Never Smoker   • Smokeless tobacco: Never Used   Vaping Use   • Vaping Use: Never used   Substance and Sexual Activity   • Alcohol use: No   • Drug use: No   • Sexual activity: Yes     Partners: Male     Birth control/protection: Surgical     Comment: LMP: Menopausal         Current Outpatient Medications:   •  acetaminophen (TYLENOL) 325 MG tablet, Take 650 mg by mouth Every 6 (Six) Hours As Needed for Mild Pain  or Headache., Disp: , Rfl:   •  ALPRAZolam (Xanax) 0.5 MG tablet, Take 0.5-1 tablets by mouth 2 (Two) Times a Day As Needed for Anxiety., Disp: 20 tablet, Rfl: 0  •  buPROPion XL (WELLBUTRIN XL) 300 MG 24 hr tablet, Take 1 tablet by mouth Every Morning., Disp: 90 tablet, Rfl: 0  •  cholecalciferol (VITAMIN D3) 1000 units tablet, Take 1,000 Units by mouth Daily. 10/8/2018, Disp: , Rfl:   •  Cyanocobalamin (VITAMIN B-12) 1000 MCG sublingual tablet, Place 1 tablet under the tongue Daily. Hold 10/8/2018, Disp: , Rfl:   •  ELIQUIS 5 MG tablet tablet, TAKE ONE TABLET BY MOUTH EVERY 12 HOURS, Disp: 60 tablet, Rfl: 7  •  furosemide (LASIX) 20 MG tablet, Take 1 tablet by mouth 2 (Two) Times a Day., Disp: 60 tablet, Rfl: 5  •  loratadine (CLARITIN) 10 MG tablet, Take 1 tablet by mouth Daily., Disp: 30 tablet, Rfl: 1  •  losartan (Cozaar) 50 MG tablet, Take 1 tablet by mouth Daily., Disp: 90 tablet, Rfl: 0  •  metoprolol tartrate (LOPRESSOR) 25 MG tablet, Take 1 tablet by mouth 2 (Two) Times a Day., Disp: 180 tablet, Rfl: 3  •  oxybutynin XL (DITROPAN-XL) 10 MG 24 hr tablet, Take 2 tablets by mouth Daily., Disp: 60 tablet, Rfl: 1  •  traMADol (ULTRAM) 50 MG tablet, Take 1 tablet by mouth Every 8 (Eight) Hours As Needed for Moderate Pain . for pain, Disp:  30 tablet, Rfl: 0  •  Acetaminophen-Caffeine (TENSION HEADACHE RELIEF) 500-65 MG tablet, Take 1 tablet by mouth Every 6 (Six) Hours As Needed (migraine)., Disp: , Rfl:     No LMP recorded (lmp unknown). Patient is postmenopausal.    Sexual History:         Could not be calculated    Past Surgical History:   Procedure Laterality Date   • ANKLE SURGERY      x2 left   • BREAST BIOPSY Left 2018    stereo   • BREAST EXCISIONAL BIOPSY Left 2018   • BREAST LUMPECTOMY Left 09/2018   • BREAST LUMPECTOMY WITH SENTINEL NODE BIOPSY Left 10/15/2018    Procedure: LEFT BREAST LUMPECTOMY WITH NEEDLE LOCALIZATION - MAMMOGRAM GUIDED AND SENTINEL LYMPH  NODE BIOPSY - RADIOLOGIST TO INJECT AND SCAN;  Surgeon: Rafael Porter MD;  Location:  [facility] OR;  Service: General   • CARDIAC ABLATION  11/2019   • CARDIAC CATHETERIZATION Bilateral 1/29/2018    Procedure: Coronary angiography;  Surgeon: Joel Medina MD;  Location:  [facility] CATH INVASIVE LOCATION;  Service:    • CARDIAC CATHETERIZATION N/A 1/29/2018    Procedure: Left Heart Cath;  Surgeon: Joel Medina MD;  Location:  [facility] CATH INVASIVE [facility];  Service:    • CYST REMOVAL      from tailbone   • TUBAL ABDOMINAL LIGATION     • WISDOM TOOTH EXTRACTION         Review of Systems   Genitourinary: Positive for menstrual problem.       Objective   Physical Exam  Vitals and nursing note reviewed. Exam conducted with a chaperone present.   Constitutional:       General: She is not in acute distress.     Appearance: She is well-developed.   HENT:      Head: Normocephalic and atraumatic.   Pulmonary:      Effort: Pulmonary effort is normal.   Abdominal:      Palpations: Abdomen is soft.      Tenderness: There is no abdominal tenderness.   Genitourinary:     Exam position: Supine.      Labia:         Right: No tenderness or lesion.         Left: No tenderness or lesion.       Vagina: Normal. No signs of injury. No vaginal discharge, tenderness or bleeding.      Cervix: No cervical motion  "tenderness, discharge or friability.      Uterus: Not enlarged and not tender.       Adnexa:         Right: No tenderness or fullness.          Left: No tenderness or fullness.        Comments: An endometrial biopsy was performed in the office today.  The cervix was visualized with a speculum and prepped with Betadine.  The anterior lip was grasped with a tenaculum and a standard endometrial sampling Pipelle was passed into the uterine cavity.  The patient experienced cramping and the uterus sounded to 8 cm.  A moderate amount of tissue was obtained with 2 passes.  The tenaculum was removed and the site was hemostatic.  She tolerated the procedure well.            Vitals:    06/08/21 0912   BP: 134/90   Weight: (!) 163 kg (359 lb)   Height: 167.6 cm (66\")       Diagnoses and all orders for this visit:    1. PMB (postmenopausal bleeding) (Primary)  -     Tissue Pathology Exam  -     Liquid-based Pap Smear, Screening  -     HPV DNA Probe, Direct - ThinPrep Vial, Cervix    Discussed with patient different results of postmenopausal bleeding.  Discussed with patient different management options and how to assess postmenopausal bleeding.  She previously had a transvaginal ultrasound which revealed a thickened endometrial lining.  She elected for it endometrial biopsy today.  Pap smear collected today with results to follow.  Also collected endometrial biopsy.  Discussed with patient  Patient return to office pending further follow-up.  All questions answered patient verbalized understanding of plan.    Jacqueline Johnson, DO       "

## 2021-06-11 LAB
CYTO UR: NORMAL
LAB AP CASE REPORT: NORMAL
LAB AP CLINICAL INFORMATION: NORMAL
LAB AP DIAGNOSIS COMMENT: NORMAL
PATH REPORT.FINAL DX SPEC: NORMAL
PATH REPORT.GROSS SPEC: NORMAL

## 2021-07-01 ENCOUNTER — TELEPHONE (OUTPATIENT)
Dept: FAMILY MEDICINE CLINIC | Facility: CLINIC | Age: 56
End: 2021-07-01

## 2021-07-01 DIAGNOSIS — J06.9 UPPER RESPIRATORY TRACT INFECTION, UNSPECIFIED TYPE: Primary | ICD-10-CM

## 2021-07-01 NOTE — TELEPHONE ENCOUNTER
Caller: Derrick Anthony DOREEN    Relationship: Self    Best call back number: [phone]     What medication are you requesting: ANTIBIOTIC, Z-PACK    What are your current symptoms: RUNNY NOSE, HEADACHE, SORE THROAT, COUGH, CHEST CONGESTION    How long have you been experiencing symptoms: SINCE Tuesday AFTERNOON 06/29/2021    Have you had these symptoms before:    [x] Yes  [] No    Have you been treated for these symptoms before:   [x] Yes  [] No    If a prescription is needed, what is your preferred pharmacy and phone number: [facility] [address] [place] [address] 60 - [phone]  - [phone]      Additional notes: PATIENT STATED THAT SHE BELIEVES SHE HAS A UPPER RESPIRATORY INFECTION SINCE SHE HAS A HISTORY OF GETTING THEM. PLEASE ADVISE.

## 2021-07-02 RX ORDER — AZITHROMYCIN 250 MG/1
TABLET, FILM COATED ORAL
Qty: 6 TABLET | Refills: 0 | Status: SHIPPED | OUTPATIENT
Start: 2021-07-02 | End: 2021-07-19

## 2021-07-16 ENCOUNTER — TELEPHONE (OUTPATIENT)
Dept: BARIATRICS/WEIGHT MGMT | Facility: CLINIC | Age: 56
End: 2021-07-16

## 2021-07-19 ENCOUNTER — OFFICE VISIT (OUTPATIENT)
Dept: BARIATRICS/WEIGHT MGMT | Facility: CLINIC | Age: 56
End: 2021-07-19

## 2021-07-19 VITALS
WEIGHT: 293 LBS | TEMPERATURE: 99 F | BODY MASS INDEX: 47.09 KG/M2 | HEIGHT: 66 IN | SYSTOLIC BLOOD PRESSURE: 119 MMHG | DIASTOLIC BLOOD PRESSURE: 69 MMHG | OXYGEN SATURATION: 96 % | HEART RATE: 60 BPM

## 2021-07-19 DIAGNOSIS — G47.33 OSA ON CPAP: ICD-10-CM

## 2021-07-19 DIAGNOSIS — E66.01 CLASS 3 SEVERE OBESITY DUE TO EXCESS CALORIES WITH SERIOUS COMORBIDITY AND BODY MASS INDEX (BMI) OF 50.0 TO 59.9 IN ADULT (HCC): Primary | ICD-10-CM

## 2021-07-19 DIAGNOSIS — Z99.89 OSA ON CPAP: ICD-10-CM

## 2021-07-19 DIAGNOSIS — I10 ESSENTIAL HYPERTENSION: ICD-10-CM

## 2021-07-19 PROBLEM — E66.813 OBESITY, CLASS III, BMI 40-49.9 (MORBID OBESITY): Status: ACTIVE | Noted: 2018-05-16

## 2021-07-19 PROBLEM — E66.813 CLASS 3 SEVERE OBESITY DUE TO EXCESS CALORIES WITH SERIOUS COMORBIDITY AND BODY MASS INDEX (BMI) OF 50.0 TO 59.9 IN ADULT: Status: ACTIVE | Noted: 2018-05-16

## 2021-07-19 PROCEDURE — 99213 OFFICE O/P EST LOW 20 MIN: CPT | Performed by: SURGERY

## 2021-07-19 NOTE — PROGRESS NOTES
Patient Care Team:  Jamar Saldana DO as PCP - General (Family Medicine)  Tyrell Rosas MD as Referring Physician (Family Medicine)  Jole Medina MD as Cardiologist (Cardiology)    Reason for Visit:  Surgical Weight loss    Subjective      Anthony Meza is a pleasant 55 y.o. female and presents with morbid obesity with her Body mass index is 56.2 kg/m²..    She is here for discussion of weight loss options.  She stated she has been with the disease of obesity for year(s).  She stated she suffers from hypertension, obstructive sleep apnea and morbid obesity due to her weight gain.  She stated that weight loss helps alleviate these symptoms.   She stated that she has tried low carbohydrate diets to help with weight loss.  She stated that she has attempted these conservative methods for weight loss without maintaining long term success.  Today she would like to discuss surgical weight loss options such as the Laparoscopic Sleeve Gastrectomy or the Laparoscopic R - Y Gastric Bypass.      Review of Systems  General ROS: positive for  - fatigue, sleep disturbance and weight gain  Psychological ROS: positive for - depression  Hematological and Lymphatic ROS: positive for - bruising and swollen lymph nodes  Endocrine ROS: positive for - temperature intolerance  Breast ROS: History of breast cancer  Respiratory ROS: positive for - cough  Cardiovascular ROS: no chest pain or dyspnea on exertion  positive for - edema and irregular heartbeat  Gastrointestinal ROS: positive for - change in bowel habits  Genito-Urinary ROS: no dysuria, trouble voiding, or hematuria  positive for - urinary frequency/urgency  Musculoskeletal ROS: positive for - joint pain    History  Past Medical History:   Diagnosis Date   • A-fib (CMS/[facility])    • Abnormal ECG    • Anxiety    • Arthritis    • Atrial fibrillation (CMS/HCC)    • Back pain    • Breast cancer (CMS/[facility]) 09/2018   • CFS (chronic fatigue syndrome)    • Chest pain    • Chronic  pain disorder    • Depression    • Dizziness    • Ductal carcinoma in situ (DCIS) of left breast     BREAST LEFT   • Fibromyalgia    • Hx of radiation therapy    • Hypertension    • IBS (irritable bowel syndrome)    • Incontinence    • Migraines    • Mononucleosis    • Sleep apnea     uses a c-pap   • Wears glasses      Past Surgical History:   Procedure Laterality Date   • ANKLE SURGERY      x2 left   • BREAST BIOPSY Left 2018    stereo   • BREAST EXCISIONAL BIOPSY Left 2018   • BREAST LUMPECTOMY Left 09/2018   • BREAST LUMPECTOMY WITH SENTINEL NODE BIOPSY Left 10/15/2018    Procedure: LEFT BREAST LUMPECTOMY WITH NEEDLE LOCALIZATION - MAMMOGRAM GUIDED AND SENTINEL LYMPH  NODE BIOPSY - RADIOLOGIST TO INJECT AND SCAN;  Surgeon: Rafael Porter MD;  Location:  [facility] OR;  Service: General   • CARDIAC ABLATION  11/2019   • CARDIAC CATHETERIZATION Bilateral 1/29/2018    Procedure: Coronary angiography;  Surgeon: Joel Medina MD;  Location:  [facility] CATH INVASIVE LOC[facility];  Service:    • CARDIAC CATHETERIZATION N/A 1/29/2018    Procedure: Left Heart Cath;  Surgeon: Joel Medina MD;  Location:  [facility] CATH INV[facility];  Service:    • CYST REMOVAL      from tailbone   • TUBAL ABDOMINAL LIGATION     • WISDOM TOOTH EXTRACTION       Family History   Problem Relation Age of Onset   • Transient ischemic attack Mother    • Rheum arthritis Mother    • Fibromyalgia Mother    • Heart disease Father    • Asthma Father    • Hypertension Father    • Rheum arthritis Father    • Obesity Father    • Obesity Sister    • Obesity Maternal Grandmother    • Ovarian cancer Maternal Grandmother    • Obesity Paternal Grandfather    • Hypertension Paternal Grandfather    • Heart disease Paternal Grandfather    • Liver cancer Maternal Aunt    • Breast cancer Neg Hx      Social History     Tobacco Use   • Smoking status: Never Smoker   • Smokeless tobacco: Never Used   Vaping Use   • Vaping Use: Never used   Substance Use Topics   • Alcohol  use: No   • Drug use: No     E-cigarette/Vaping   • E-cigarette/Vaping Use Never User      E-cigarette/Vaping Substances     (Not in a hospital admission)    Allergies:  Penicillins      Current Outpatient Medications:   •  acetaminophen (TYLENOL) 325 MG tablet, Take 650 mg by mouth Every 6 (Six) Hours As Needed for Mild Pain  or Headache., Disp: , Rfl:   •  Acetaminophen-Caffeine (TENSION HEADACHE RELIEF) 500-65 MG tablet, Take 1 tablet by mouth Every 6 (Six) Hours As Needed (migraine)., Disp: , Rfl:   •  ALPRAZolam (Xanax) 0.5 MG tablet, Take 0.5-1 tablets by mouth 2 (Two) Times a Day As Needed for Anxiety., Disp: 20 tablet, Rfl: 0  •  buPROPion XL (WELLBUTRIN XL) 300 MG 24 hr tablet, Take 1 tablet by mouth Every Morning., Disp: 90 tablet, Rfl: 0  •  cholecalciferol (VITAMIN D3) 1000 units tablet, Take 1,000 Units by mouth Daily. 10/8/2018, Disp: , Rfl:   •  Cyanocobalamin (VITAMIN B-12) 1000 MCG sublingual tablet, Place 1 tablet under the tongue Daily. Hold 10/8/2018, Disp: , Rfl:   •  ELIQUIS 5 MG tablet tablet, TAKE ONE TABLET BY MOUTH EVERY 12 HOURS, Disp: 60 tablet, Rfl: 7  •  furosemide (LASIX) 20 MG tablet, Take 1 tablet by mouth 2 (Two) Times a Day., Disp: 60 tablet, Rfl: 5  •  loratadine (CLARITIN) 10 MG tablet, Take 1 tablet by mouth Daily., Disp: 30 tablet, Rfl: 1  •  losartan (Cozaar) 50 MG tablet, Take 1 tablet by mouth Daily., Disp: 90 tablet, Rfl: 0  •  metoprolol tartrate (LOPRESSOR) 25 MG tablet, Take 1 tablet by mouth 2 (Two) Times a Day., Disp: 180 tablet, Rfl: 3  •  oxybutynin XL (DITROPAN-XL) 10 MG 24 hr tablet, Take 2 tablets by mouth Daily., Disp: 60 tablet, Rfl: 1  •  traMADol (ULTRAM) 50 MG tablet, Take 1 tablet by mouth Every 8 (Eight) Hours As Needed for Moderate Pain . for pain, Disp: 30 tablet, Rfl: 0    Objective     Vital Signs  Temp:  [99 °F (37.2 °C)] 99 °F (37.2 °C)  Heart Rate:  [60] 60  BP: (119)/(69) 119/69  Body mass index is 56.2 kg/m².      07/19/21  1038   Weight: (!) 158  "kg (348 lb 3.2 oz)       General Appearance:  awake, alert, oriented, in no acute distress  Lungs:  Normal expansion.  Clear to auscultation.  No rales, rhonchi, or wheezing.  Heart:  Heart regular rate and rhythm  Abdomen:  Soft, non-tender, normal bowel sounds; no bruits, organomegaly or masses.  Abnormal shape: obese      Results Review:   None        Assessment/Plan   Encounter Diagnoses   Name Primary?   • Class 3 severe obesity due to excess calories with serious comorbidity and body mass index (BMI) of 50.0 to 59.9 in adult (CMS/HCC) Yes   • HARLAN on CPAP    • Essential hypertension        She has been provided a structured dietary regimen based off of her behavior.  I discussed with the patient the etiology of the disease of obesity and the potential comorbid conditions associated with this disease.  She was instructed to follow the dietary regimen and follow-up with our program in 1 month's time with any additional questions as they may arise during this time.  We emphasized on focusing on proteins and meals high in fiber as well as adequate hydration that exceed 64 ounces of water daily.  I recommended the patient record daily a food journal that would incorporate what she is eating and how many times she is eating during the day.  My recommendation included at least 21 consecutive days of recording of these meals.  I explained that I anticipate the patient to lose weight prior to her next monthly visit.  I have also explained that they need to record or document when they are going to have the \"cheat day\" as well as create a food journal to help monitor their behavior and food choices.    I discussed the patient's findings and my recommendations with patient. The patient was made aware that we are primarily a surgical program.  We reviewed different weight loss surgical procedures including the laparoscopic sleeve gastrectomy, gastric band and Melvi-en-Y gastric bypass.  I explained to the patient that " "medical treatment alone is ineffective for long-term results and for the reversal of morbid obesity. She and I discussed the etiology of the disease of morbid obesity.  I emphasized that weight loss alone statistically will not reverse this disease of obesity.  Our program is not a \"weight loss program\" but focuses on the overall issue of morbid obesity and the patient has been educated on what steps will be necessary to be successful in reversing this disease of morbid obesity at our facility.  I have explained the 3 pearls of our program for the patient to follow to optimize success:1.  Putting their health first, 2.  Not trying to treat the disease on their own, 3.  Attempting to make their scheduled appointments.  The patient was in agreement to following these recommendations.  The patient was also notified that our dietitian will be contacting them soon for follow-up on how they are doing with the new prescription.    I have also recommended that she obtain completion of a medically supervised weight loss program, cardiac and psychological preoperative risk assessments prior to surgery consideration.      Dr. Hawk Luevano MD [facility]    07/19/21  12:22 CDT  Patient Care Team:  Jamar Saldana DO as PCP - General (Family Medicine)  Tyrell Rosas MD as Referring Physician (Family Medicine)  Joel Medina MD as Cardiologist (Cardiology)    "

## 2021-07-21 ENCOUNTER — TELEPHONE (OUTPATIENT)
Dept: BARIATRICS/WEIGHT MGMT | Facility: CLINIC | Age: 56
End: 2021-07-21

## 2021-07-21 NOTE — PROGRESS NOTES
Nutrition Services    Patient Name:  Anthony Meza  YOB: 1965  MRN: [id number]  Admit Date:  (Not on file)    Pt had her initial visit in the office earlier this week.  Pt had called with food questions regarding the 4 meal per day diet plan.  Pt states that she is having trouble consuming so much food.  Alternate meal plan discussed and instructions given how to modify the meal plan.  Pt also had a few other general questions regarding the 4 meal per day diet rx.  Encouraged pt to notify the office in the event of further questions/struggles prior to her next office visit.    Electronically signed by:  Susy Lenz  07/21/21 10:42 CDT

## 2021-07-23 DIAGNOSIS — J30.9 ALLERGIC RHINITIS, UNSPECIFIED SEASONALITY, UNSPECIFIED TRIGGER: ICD-10-CM

## 2021-07-23 RX ORDER — LORATADINE 10 MG/1
TABLET ORAL
Qty: 30 TABLET | Refills: 0 | Status: SHIPPED | OUTPATIENT
Start: 2021-07-23 | End: 2021-09-13

## 2021-07-30 ENCOUNTER — OFFICE VISIT (OUTPATIENT)
Dept: OBSTETRICS AND GYNECOLOGY | Facility: CLINIC | Age: 56
End: 2021-07-30

## 2021-07-30 VITALS
SYSTOLIC BLOOD PRESSURE: 142 MMHG | BODY MASS INDEX: 47.09 KG/M2 | HEIGHT: 66 IN | WEIGHT: 293 LBS | DIASTOLIC BLOOD PRESSURE: 78 MMHG

## 2021-07-30 DIAGNOSIS — N95.0 PMB (POSTMENOPAUSAL BLEEDING): Primary | ICD-10-CM

## 2021-07-30 PROCEDURE — 99214 OFFICE O/P EST MOD 30 MIN: CPT | Performed by: OBSTETRICS & GYNECOLOGY

## 2021-07-30 NOTE — H&P
Subjective   Anthony Meza is a 55 y.o. female  YOB: 1965    Chief Complaint   Patient presents with   • Consult     pt here to discuss have D&C scheduled. pt states that she had some bleeding yesterday but none today.       55 year old postmenopausal female  presents to discuss management options for her postmenopausal bleeding. She reports that it has been going on for about three to four months. She had her last menstrual cycles 12 years ago. She has a previous history breast cancer and was treated with a lumpectomy and radiation and she was diagnosed with 3 years ago. She has previous history of two vaginal deliveries. She is sexually active with her .  She has a previous TVUS that revealed a 13 mm endometrial lining. Her previous endometrial biopsy revealed scant inactive endometrium.       Allergies   Allergen Reactions   • Penicillins Rash       Past Medical History:   Diagnosis Date   • A-fib (CMS/HCC)    • Abnormal ECG    • Anxiety    • Arthritis    • Atrial fibrillation (CMS/HCC)    • Back pain    • Breast cancer (CMS/[facility]) 2018   • CFS (chronic fatigue syndrome)    • Chest pain    • Chronic pain disorder    • Depression    • Dizziness    • Ductal carcinoma in situ (DCIS) of left breast     BREAST LEFT   • Fibromyalgia    • Hx of radiation therapy    • Hypertension    • IBS (irritable bowel syndrome)    • Incontinence    • Migraines    • Mononucleosis    • Sleep apnea     uses a c-pap   • Wears glasses        Family History   Problem Relation Age of Onset   • Transient ischemic attack Mother    • Rheum arthritis Mother    • Fibromyalgia Mother    • Heart disease Father    • Asthma Father    • Hypertension Father    • Rheum arthritis Father    • Obesity Father    • Obesity Sister    • Obesity Maternal Grandmother    • Ovarian cancer Maternal Grandmother    • Obesity Paternal Grandfather    • Hypertension Paternal Grandfather    • Heart disease Paternal Grandfather    • Liver  cancer Maternal Aunt    • Breast cancer Neg Hx        Social History     Socioeconomic History   • Marital status:      Spouse name: Not on file   • Number of children: 2   • Years of education: Not on file   • Highest education level: Not on file   Tobacco Use   • Smoking status: Never Smoker   • Smokeless tobacco: Never Used   Vaping Use   • Vaping Use: Never used   Substance and Sexual Activity   • Alcohol use: No   • Drug use: No   • Sexual activity: Yes     Partners: Male     Birth control/protection: Surgical     Comment: LMP: Menopausal         Current Outpatient Medications:   •  acetaminophen (TYLENOL) 325 MG tablet, Take 650 mg by mouth Every 6 (Six) Hours As Needed for Mild Pain  or Headache., Disp: , Rfl:   •  Acetaminophen-Caffeine (TENSION HEADACHE RELIEF) 500-65 MG tablet, Take 1 tablet by mouth Every 6 (Six) Hours As Needed (migraine)., Disp: , Rfl:   •  ALPRAZolam (Xanax) 0.5 MG tablet, Take 0.5-1 tablets by mouth 2 (Two) Times a Day As Needed for Anxiety., Disp: 20 tablet, Rfl: 0  •  buPROPion XL (WELLBUTRIN XL) 300 MG 24 hr tablet, Take 1 tablet by mouth Every Morning., Disp: 90 tablet, Rfl: 0  •  cholecalciferol (VITAMIN D3) 1000 units tablet, Take 1,000 Units by mouth Daily. 10/8/2018, Disp: , Rfl:   •  Cyanocobalamin (VITAMIN B-12) 1000 MCG sublingual tablet, Place 1 tablet under the tongue Daily. Hold 10/8/2018, Disp: , Rfl:   •  ELIQUIS 5 MG tablet tablet, TAKE ONE TABLET BY MOUTH EVERY 12 HOURS, Disp: 60 tablet, Rfl: 7  •  furosemide (LASIX) 20 MG tablet, Take 1 tablet by mouth 2 (Two) Times a Day., Disp: 60 tablet, Rfl: 5  •  loratadine (CLARITIN) 10 MG tablet, TAKE ONE TABLET BY MOUTH DAILY, Disp: 30 tablet, Rfl: 0  •  losartan (Cozaar) 50 MG tablet, Take 1 tablet by mouth Daily., Disp: 90 tablet, Rfl: 0  •  metoprolol tartrate (LOPRESSOR) 25 MG tablet, Take 1 tablet by mouth 2 (Two) Times a Day., Disp: 180 tablet, Rfl: 3  •  oxybutynin XL (DITROPAN-XL) 10 MG 24 hr tablet, Take 2  tablets by mouth Daily., Disp: 60 tablet, Rfl: 1  •  traMADol (ULTRAM) 50 MG tablet, Take 1 tablet by mouth Every 8 (Eight) Hours As Needed for Moderate Pain . for pain, Disp: 30 tablet, Rfl: 0    No LMP recorded (lmp unknown). Patient is postmenopausal.    Sexual History:         Could not be calculated    Past Surgical History:   Procedure Laterality Date   • ANKLE SURGERY      x2 left   • BREAST BIOPSY Left 2018    stereo   • BREAST EXCISIONAL BIOPSY Left 2018   • BREAST LUMPECTOMY Left 09/2018   • BREAST LUMPECTOMY WITH SENTINEL NODE BIOPSY Left 10/15/2018    Procedure: LEFT BREAST LUMPECTOMY WITH NEEDLE LOCALIZATION - MAMMOGRAM GUIDED AND SENTINEL LYMPH  NODE BIOPSY - RADIOLOGIST TO INJECT AND SCAN;  Surgeon: Rafael Porter MD;  Location:  [facility] OR;  Service: General   • CARDIAC ABLATION  11/2019   • CARDIAC CATHETERIZATION Bilateral 1/29/2018    Procedure: Coronary angiography;  Surgeon: Joel Medina MD;  Location:  [facility] CATH INVASIVE LOCATION;  Service:    • CARDIAC CATHETERIZATION N/A 1/29/2018    Procedure: Left Heart Cath;  Surgeon: Joel Medina MD;  Location:  [facility] CATH INVASIVE LOCATION;  Service:    • CYST REMOVAL      from tailbone   • TUBAL ABDOMINAL LIGATION     • WISDOM TOOTH EXTRACTION         Review of Systems   Genitourinary: Positive for vaginal bleeding.       Objective   Physical Exam  Vitals and nursing note reviewed. Exam conducted with a chaperone present.   Constitutional:       General: She is not in acute distress.     Appearance: She is well-developed.   HENT:      Head: Normocephalic and atraumatic.   Eyes:      General:         Right eye: No discharge.         Left eye: No discharge.      Conjunctiva/sclera: Conjunctivae normal.   Neck:      Thyroid: No thyromegaly.   Cardiovascular:      Rate and Rhythm: Normal rate and regular rhythm.   Pulmonary:      Effort: Pulmonary effort is normal.      Breath sounds: Normal breath sounds. No wheezing.   Musculoskeletal:         General:  "Normal range of motion.      Cervical back: Normal range of motion and neck supple.   Skin:     General: Skin is warm and dry.   Neurological:      Mental Status: She is alert and oriented to person, place, and time.   Psychiatric:         Behavior: Behavior normal.         Judgment: Judgment normal.           Vitals:    07/30/21 0859   BP: 142/78   Weight: (!) 158 kg (348 lb)   Height: 167.6 cm (66\")       Diagnoses and all orders for this visit:    1. PMB (postmenopausal bleeding) (Primary)  -     Case Request  -     CBC and Differential; Future  -     ECG 12 Lead; Future    Other orders  -     Follow Anesthesia Guidelines / Standing Orders; Future  -     Chlorhexidine Skin Prep; Future  -     Follow Anesthesia Guidelines / Standing Orders; Standing  -     Place sequential compression device; Standing  -     Verify / Perform Chlorhexidine Skin Prep; Standing  -     Type & Screen; Standing  -     Obtain informed consent; Standing    Risks, benefits, and alternatives of a D&C with hysteroscopy were discussed with the patient in detail. Intraoperative risks of bleeding and damage to surrounding organs, including but not limited to intestine, bladder and ureter, were explained. Management of these were also explained. Postoperative complications such as infection, pneumonia, DVT, and bleeding were explained. The importance of compliance with postoperative restrictions was discussed. The diagnostic nature of the procedure was explained.    Uterine perforation was discussed with the patient at length.     All of the patient's questions were answered to her satisfaction. She was encouraged to return for an additional appointment if she had further questions. She verbalized understanding of the above and wished to proceed with the outlined plan.      Jacqueline Johnson, DO           "

## 2021-07-30 NOTE — PROGRESS NOTES
Subjective   Anthony Meza is a 55 y.o. female  YOB: 1965    Chief Complaint   Patient presents with   • Consult     pt here to discuss have D&C scheduled. pt states that she had some bleeding yesterday but none today.       55 year old postmenopausal female  presents to discuss management options for her postmenopausal bleeding. She reports that it has been going on for about three to four months. She had her last menstrual cycles 12 years ago. She has a previous history breast cancer and was treated with a lumpectomy and radiation and she was diagnosed with 3 years ago. She has previous history of two vaginal deliveries. She is sexually active with her .  She has a previous TVUS that revealed a 13 mm endometrial lining. Her previous endometrial biopsy revealed scant inactive endometrium.       Allergies   Allergen Reactions   • Penicillins Rash       Past Medical History:   Diagnosis Date   • A-fib (CMS/HCC)    • Abnormal ECG    • Anxiety    • Arthritis    • Atrial fibrillation (CMS/HCC)    • Back pain    • Breast cancer (CMS/[facility]) 2018   • CFS (chronic fatigue syndrome)    • Chest pain    • Chronic pain disorder    • Depression    • Dizziness    • Ductal carcinoma in situ (DCIS) of left breast     BREAST LEFT   • Fibromyalgia    • Hx of radiation therapy    • Hypertension    • IBS (irritable bowel syndrome)    • Incontinence    • Migraines    • Mononucleosis    • Sleep apnea     uses a c-pap   • Wears glasses        Family History   Problem Relation Age of Onset   • Transient ischemic attack Mother    • Rheum arthritis Mother    • Fibromyalgia Mother    • Heart disease Father    • Asthma Father    • Hypertension Father    • Rheum arthritis Father    • Obesity Father    • Obesity Sister    • Obesity Maternal Grandmother    • Ovarian cancer Maternal Grandmother    • Obesity Paternal Grandfather    • Hypertension Paternal Grandfather    • Heart disease Paternal Grandfather    • Liver  cancer Maternal Aunt    • Breast cancer Neg Hx        Social History     Socioeconomic History   • Marital status:      Spouse name: Not on file   • Number of children: 2   • Years of education: Not on file   • Highest education level: Not on file   Tobacco Use   • Smoking status: Never Smoker   • Smokeless tobacco: Never Used   Vaping Use   • Vaping Use: Never used   Substance and Sexual Activity   • Alcohol use: No   • Drug use: No   • Sexual activity: Yes     Partners: Male     Birth control/protection: Surgical     Comment: LMP: Menopausal         Current Outpatient Medications:   •  acetaminophen (TYLENOL) 325 MG tablet, Take 650 mg by mouth Every 6 (Six) Hours As Needed for Mild Pain  or Headache., Disp: , Rfl:   •  Acetaminophen-Caffeine (TENSION HEADACHE RELIEF) 500-65 MG tablet, Take 1 tablet by mouth Every 6 (Six) Hours As Needed (migraine)., Disp: , Rfl:   •  ALPRAZolam (Xanax) 0.5 MG tablet, Take 0.5-1 tablets by mouth 2 (Two) Times a Day As Needed for Anxiety., Disp: 20 tablet, Rfl: 0  •  buPROPion XL (WELLBUTRIN XL) 300 MG 24 hr tablet, Take 1 tablet by mouth Every Morning., Disp: 90 tablet, Rfl: 0  •  cholecalciferol (VITAMIN D3) 1000 units tablet, Take 1,000 Units by mouth Daily. 10/8/2018, Disp: , Rfl:   •  Cyanocobalamin (VITAMIN B-12) 1000 MCG sublingual tablet, Place 1 tablet under the tongue Daily. Hold 10/8/2018, Disp: , Rfl:   •  ELIQUIS 5 MG tablet tablet, TAKE ONE TABLET BY MOUTH EVERY 12 HOURS, Disp: 60 tablet, Rfl: 7  •  furosemide (LASIX) 20 MG tablet, Take 1 tablet by mouth 2 (Two) Times a Day., Disp: 60 tablet, Rfl: 5  •  loratadine (CLARITIN) 10 MG tablet, TAKE ONE TABLET BY MOUTH DAILY, Disp: 30 tablet, Rfl: 0  •  losartan (Cozaar) 50 MG tablet, Take 1 tablet by mouth Daily., Disp: 90 tablet, Rfl: 0  •  metoprolol tartrate (LOPRESSOR) 25 MG tablet, Take 1 tablet by mouth 2 (Two) Times a Day., Disp: 180 tablet, Rfl: 3  •  oxybutynin XL (DITROPAN-XL) 10 MG 24 hr tablet, Take 2  tablets by mouth Daily., Disp: 60 tablet, Rfl: 1  •  traMADol (ULTRAM) 50 MG tablet, Take 1 tablet by mouth Every 8 (Eight) Hours As Needed for Moderate Pain . for pain, Disp: 30 tablet, Rfl: 0    No LMP recorded (lmp unknown). Patient is postmenopausal.    Sexual History:         Could not be calculated    Past Surgical History:   Procedure Laterality Date   • ANKLE SURGERY      x2 left   • BREAST BIOPSY Left 2018    stereo   • BREAST EXCISIONAL BIOPSY Left 2018   • BREAST LUMPECTOMY Left 09/2018   • BREAST LUMPECTOMY WITH SENTINEL NODE BIOPSY Left 10/15/2018    Procedure: LEFT BREAST LUMPECTOMY WITH NEEDLE LOCALIZATION - MAMMOGRAM GUIDED AND SENTINEL LYMPH  NODE BIOPSY - RADIOLOGIST TO INJECT AND SCAN;  Surgeon: Rafael Porter MD;  Location:  [facility] OR;  Service: General   • CARDIAC ABLATION  11/2019   • CARDIAC CATHETERIZATION Bilateral 1/29/2018    Procedure: Coronary angiography;  Surgeon: Joel Medina MD;  Location:  [facility] CATH INVASIVE LOCATION;  Service:    • CARDIAC CATHETERIZATION N/A 1/29/2018    Procedure: Left Heart Cath;  Surgeon: Joel Medina MD;  Location:  [facility] CATH INVASIVE LOC[facility];  Service:    • CYST REMOVAL      from tailbone   • TUBAL ABDOMINAL LIGATION     • WISDOM TOOTH EXTRACTION         Review of Systems   Genitourinary: Positive for vaginal bleeding.       Objective   Physical Exam  Vitals and nursing note reviewed. Exam conducted with a chaperone present.   Constitutional:       General: She is not in acute distress.     Appearance: She is well-developed.   HENT:      Head: Normocephalic and atraumatic.   Eyes:      General:         Right eye: No discharge.         Left eye: No discharge.      Conjunctiva/sclera: Conjunctivae normal.   Neck:      Thyroid: No thyromegaly.   Cardiovascular:      Rate and Rhythm: Normal rate and regular rhythm.   Pulmonary:      Effort: Pulmonary effort is normal.      Breath sounds: Normal breath sounds. No wheezing.   Musculoskeletal:         General:  "Normal range of motion.      Cervical back: Normal range of motion and neck supple.   Skin:     General: Skin is warm and dry.   Neurological:      Mental Status: She is alert and oriented to person, place, and time.   Psychiatric:         Behavior: Behavior normal.         Judgment: Judgment normal.           Vitals:    07/30/21 0859   BP: 142/78   Weight: (!) 158 kg (348 lb)   Height: 167.6 cm (66\")       Diagnoses and all orders for this visit:    1. PMB (postmenopausal bleeding) (Primary)  -     Case Request  -     CBC and Differential; Future  -     ECG 12 Lead; Future    Other orders  -     Follow Anesthesia Guidelines / Standing Orders; Future  -     Chlorhexidine Skin Prep; Future  -     Follow Anesthesia Guidelines / Standing Orders; Standing  -     Place sequential compression device; Standing  -     Verify / Perform Chlorhexidine Skin Prep; Standing  -     Type & Screen; Standing  -     Obtain informed consent; Standing    Risks, benefits, and alternatives of a D&C with hysteroscopy were discussed with the patient in detail. Intraoperative risks of bleeding and damage to surrounding organs, including but not limited to intestine, bladder and ureter, were explained. Management of these were also explained. Postoperative complications such as infection, pneumonia, DVT, and bleeding were explained. The importance of compliance with postoperative restrictions was discussed. The diagnostic nature of the procedure was explained.    Uterine perforation was discussed with the patient at length.     All of the patient's questions were answered to her satisfaction. She was encouraged to return for an additional appointment if she had further questions. She verbalized understanding of the above and wished to proceed with the outlined plan.      Jacqueline Johnson, DO       "

## 2021-08-02 ENCOUNTER — TRANSCRIBE ORDERS (OUTPATIENT)
Dept: LAB | Facility: HOSPITAL | Age: 56
End: 2021-08-02

## 2021-08-02 DIAGNOSIS — Z11.59 SCREENING FOR VIRAL DISEASE: Primary | ICD-10-CM

## 2021-08-04 ENCOUNTER — PRE-ADMISSION TESTING (OUTPATIENT)
Dept: PREADMISSION TESTING | Facility: HOSPITAL | Age: 56
End: 2021-08-04

## 2021-08-04 ENCOUNTER — TELEPHONE (OUTPATIENT)
Dept: OBSTETRICS AND GYNECOLOGY | Facility: CLINIC | Age: 56
End: 2021-08-04

## 2021-08-04 VITALS
OXYGEN SATURATION: 98 % | HEART RATE: 73 BPM | SYSTOLIC BLOOD PRESSURE: 148 MMHG | WEIGHT: 293 LBS | RESPIRATION RATE: 16 BRPM | BODY MASS INDEX: 48.82 KG/M2 | DIASTOLIC BLOOD PRESSURE: 75 MMHG | HEIGHT: 65 IN

## 2021-08-04 DIAGNOSIS — N95.0 PMB (POSTMENOPAUSAL BLEEDING): ICD-10-CM

## 2021-08-04 LAB
ANION GAP SERPL CALCULATED.3IONS-SCNC: 7 MMOL/L (ref 5–15)
BUN SERPL-MCNC: 12 MG/DL (ref 6–20)
BUN/CREAT SERPL: 15 (ref 7–25)
CALCIUM SPEC-SCNC: 8.9 MG/DL (ref 8.6–10.5)
CHLORIDE SERPL-SCNC: 108 MMOL/L (ref 98–107)
CO2 SERPL-SCNC: 29 MMOL/L (ref 22–29)
CREAT SERPL-MCNC: 0.8 MG/DL (ref 0.57–1)
GFR SERPL CREATININE-BSD FRML MDRD: 74 ML/MIN/1.73
GLUCOSE SERPL-MCNC: 98 MG/DL (ref 65–99)
POTASSIUM SERPL-SCNC: 3.7 MMOL/L (ref 3.5–5.2)
SODIUM SERPL-SCNC: 144 MMOL/L (ref 136–145)

## 2021-08-04 PROCEDURE — 93005 ELECTROCARDIOGRAM TRACING: CPT

## 2021-08-04 PROCEDURE — 80048 BASIC METABOLIC PNL TOTAL CA: CPT

## 2021-08-04 PROCEDURE — 85025 COMPLETE CBC W/AUTO DIFF WBC: CPT | Performed by: OBSTETRICS & GYNECOLOGY

## 2021-08-04 PROCEDURE — 93010 ELECTROCARDIOGRAM REPORT: CPT | Performed by: INTERNAL MEDICINE

## 2021-08-04 PROCEDURE — 36415 COLL VENOUS BLD VENIPUNCTURE: CPT

## 2021-08-04 NOTE — DISCHARGE INSTRUCTIONS
DAY OF SURGERY INSTRUCTIONS        YOUR SURGEON: Dr. Jacqueline Johnson     PROCEDURE: Dilatation and Curettage Hysteroscopy     DATE OF SURGERY: 08/09/2021    ARRIVAL TIME: AS DIRECTED BY OFFICE    YOU MAY TAKE THE FOLLOWING MEDICATION(S) THE MORNING OF SURGERY WITH A SIP OF WATER: Ok to take metoprolol morning of surgery with a sip of water; Hold losartan 24 hour prior to procedure; OK to take xanax morning of surgery if needed.     ALL OTHER HOME MEDICATIONS CHECK WITH YOUR DOCTOR-  Christyangi     DO NOT TAKE ANY ERECTILE DYSFUNCTION MEDICATIONS (EX:  CIALIS, VIAGRA) 24 HOURS PRIOR TO SURGERY              MANAGING PAIN AFTER SURGERY    We know you are probably wondering what your pain will be like after surgery.  Following surgery it is unrealistic to expect you will not have pain.   Pain is how our bodies let us know that something is wrong or cautions us to be careful.  That said, our goal is to make your pain tolerable.    Methods we may use to treat your pain include (oral or IV medications, PCAs, epidurals, nerve blocks, etc.)   While some procedures require IV pain medications for a short time after surgery, transitioning to pain medications by mouth allows for better management of pain.   Your nurse will encourage you to take oral pain medications whenever possible.  IV medications work almost immediately, but only last a short while.  Taking medications by mouth allows for a more constant level of medication in your blood stream for a longer period of time.      Once your pain is out of control it is harder to get back under control.  It is important you are aware when your next dose of pain medication is due.  If you are admitted, your nurse may write the time of your next dose on the white board in your room to help you remember.      We are interested in your pain and encourage you to inform us about aggravating factors during your visit.   Many times a simple repositioning every few hours can make a  big difference.    If your physician says it is okay, do not let your pain prevent you from getting out of bed. Be sure to call your nurse for assistance prior to getting up so you do not fall.      Before surgery, please decide your tolerable pain goal.  These faces help describe the pain ratings we use on a 0-10 scale.   Be prepared to tell us your goal and whether or not you take pain or anxiety medications at home.      BEFORE YOU COME TO THE HOSPITAL  (Pre-op instructions)  • Do not eat, drink, smoke or chew gum after midnight the night before surgery.  This also includes no mints.  • Morning of surgery take only the medicines you have been instructed with a sip of water unless otherwise instructed  by your physician.  • Do not shave, wear makeup or dark nail polish.  • Remove all jewelry including rings.  • Leave anything you consider valuable at home.  • Leave your suitcase in the car until after your surgery.  • Bring the following with you if applicable:  o Picture ID and insurance, Medicare or Medicaid cards  o Co-pay/deductible required by insurance (cash, check, credit card)  o Copy of advance directive, living will or power-of- documents if not brought to PAT  o CPAP or BIPAP mask and tubing  o Relaxation aids ( book, magazine), etc.  o Hearing aids                                 ON THE DAY OF SURGERY  · On the day of surgery check in at registration located at the main entrance of the hospital.   ? You will be registered and given a beeper with instructions where to wait in the main lobby.  ? When your beeper lights up and vibrates a member of the Outpatient Surgery staff will meet you at the double doors under the stair steps and escort you to your preoperative room.   · You may have cloth compression devices placed on your legs. These help to prevent blood clots and reduce swelling in your legs.  · An IV may be inserted into one of your veins.  · In the operating room, you may be given one  "or more of the following:  ? A medicine to help you relax (sedative).  ? A medicine to numb the area (local anesthetic).  ? A medicine to make you fall asleep (general anesthetic).  ? A medicine that is injected into an area of your body to numb everything below the injection site (regional anesthetic).  · Your surgical site will be marked or identified.  · You may be given an antibiotic through your IV to help prevent infection.  Contact a health care provider if you:  · Develop a fever of more than 100.4°F (38°C) or other feelings of illness during the 48 hours before your surgery.  · Have symptoms that get worse.  Have questions or concerns about your surgery    General Anesthesia/Surgery, Adult  General anesthesia is the use of medicines to make a person \"go to sleep\" (unconscious) for a medical procedure. General anesthesia must be used for certain procedures, and is often recommended for procedures that:  · Last a long time.  · Require you to be still or in an unusual position.  · Are major and can cause blood loss.  The medicines used for general anesthesia are called general anesthetics. As well as making you unconscious for a certain amount of time, these medicines:  · Prevent pain.  · Control your blood pressure.  · Relax your muscles.  Tell a health care provider about:  · Any allergies you have.  · All medicines you are taking, including vitamins, herbs, eye drops, creams, and over-the-counter medicines.  · Any problems you or family members have had with anesthetic medicines.  · Types of anesthetics you have had in the past.  · Any blood disorders you have.  · Any surgeries you have had.  · Any medical conditions you have.  · Any recent upper respiratory, chest, or ear infections.  · Any history of:  ? Heart or lung conditions, such as heart failure, sleep apnea, asthma, or chronic obstructive pulmonary disease (COPD).  ?  service.  ? Depression or anxiety.  · Any tobacco or drug use, including " marijuana or alcohol use.  · Whether you are pregnant or may be pregnant.  What are the risks?  Generally, this is a safe procedure. However, problems may occur, including:  · Allergic reaction.  · Lung and heart problems.  · Inhaling food or liquid from the stomach into the lungs (aspiration).  · Nerve injury.  · Air in the bloodstream, which can lead to stroke.  · Extreme agitation or confusion (delirium) when you wake up from the anesthetic.  · Waking up during your procedure and being unable to move. This is rare.  These problems are more likely to develop if you are having a major surgery or if you have an advanced or serious medical condition. You can prevent some of these complications by answering all of your health care provider's questions thoroughly and by following all instructions before your procedure.  General anesthesia can cause side effects, including:  · Nausea or vomiting.  · A sore throat from the breathing tube.  · Hoarseness.  · Wheezing or coughing.  · Shaking chills.  · Tiredness.  · Body aches.  · Anxiety.  · Sleepiness or drowsiness.  · Confusion or agitation.  RISKS AND COMPLICATIONS OF SURGERY  Your health care provider will discuss possible risks and complications with you before surgery. Common risks and complications include:    · Problems due to the use of anesthetics.  · Blood loss and replacement (does not apply to minor surgical procedures).  · Temporary increase in pain due to surgery.  · Uncorrected pain or problems that the surgery was meant to correct.  · Infection.  · New damage.    What happens before the procedure?    Medicines  Ask your health care provider about:  · Changing or stopping your regular medicines. This is especially important if you are taking diabetes medicines or blood thinners.  · Taking medicines such as aspirin and ibuprofen. These medicines can thin your blood. Do not take these medicines unless your health care provider tells you to take  them.  · Taking over-the-counter medicines, vitamins, herbs, and supplements. Do not take these during the week before your procedure unless your health care provider approves them.  General instructions  · Starting 3-6 weeks before the procedure, do not use any products that contain nicotine or tobacco, such as cigarettes and e-cigarettes. If you need help quitting, ask your health care provider.  · If you brush your teeth on the morning of the procedure, make sure to spit out all of the toothpaste.  · Tell your health care provider if you become ill or develop a cold, cough, or fever.  · If instructed by your health care provider, bring your sleep apnea device with you on the day of your surgery (if applicable).  · Ask your health care provider if you will be going home the same day, the following day, or after a longer hospital stay.  ? Plan to have someone take you home from the hospital or clinic.  ? Plan to have a responsible adult care for you for at least 24 hours after you leave the hospital or clinic. This is important.  What happens during the procedure?  · You will be given anesthetics through both of the following:  ? A mask placed over your nose and mouth.  ? An IV in one of your veins.  · You may receive a medicine to help you relax (sedative).  · After you are unconscious, a breathing tube may be inserted down your throat to help you breathe. This will be removed before you wake up.  · An anesthesia specialist will stay with you throughout your procedure. He or she will:  ? Keep you comfortable and safe by continuing to give you medicines and adjusting the amount of medicine that you get.  ? Monitor your blood pressure, pulse, and oxygen levels to make sure that the anesthetics do not cause any problems.  The procedure may vary among health care providers and hospitals.  What happens after the procedure?  · Your blood pressure, temperature, heart rate, breathing rate, and blood oxygen level will be  monitored until the medicines you were given have worn off.  · You will wake up in a recovery area. You may wake up slowly.  · If you feel anxious or agitated, you may be given medicine to help you calm down.  · If you will be going home the same day, your health care provider may check to make sure you can walk, drink, and urinate.  · Your health care provider will treat any pain or side effects you have before you go home.  · Do not drive for 24 hours if you were given a sedative.  Summary  · General anesthesia is used to keep you still and prevent pain during a procedure.  · It is important to tell your healthcare provider about your medical history and any surgeries you have had, and previous experience with anesthesia.  · Follow your healthcare provider’s instructions about when to stop eating, drinking, or taking certain medicines before your procedure.  · Plan to have someone take you home from the hospital or clinic.  This information is not intended to replace advice given to you by your health care provider. Make sure you discuss any questions you have with your health care provider.  Document Released: 03/26/2009 Document Revised: 08/03/2018 Document Reviewed: 08/03/2018  [employer] Patient Education © 2019 [employer]      Fall Prevention in Hospitals, Adult  As a hospital patient, your condition and the treatments you receive can increase your risk for falls. Some additional risk factors for falls in a hospital include:  · Being in an unfamiliar environment.  · Being on bed rest.  · Your surgery.  · Taking certain medicines.  · Your tubing requirements, such as intravenous (IV) therapy or catheters.  It is important that you learn how to decrease fall risks while at the hospital. Below are important tips that can help prevent falls.  SAFETY TIPS FOR PREVENTING FALLS  Talk about your risk of falling.  · Ask your health care provider why you are at risk for falling. Is it your medicine,  illness, tubing placement, or something else?  · Make a plan with your health care provider to keep you safe from falls.  · Ask your health care provider or pharmacist about side effects of your medicines. Some medicines can make you dizzy or affect your coordination.  Ask for help.  · Ask for help before getting out of bed. You may need to press your call button.  · Ask for assistance in getting safely to the toilet.  · Ask for a walker or cane to be put at your bedside. Ask that most of the side rails on your bed be placed up before your health care provider leaves the room.  · Ask family or friends to sit with you.  · Ask for things that are out of your reach, such as your glasses, hearing aids, telephone, bedside table, or call button.  Follow these tips to avoid falling:  · Stay lying or seated, rather than standing, while waiting for help.  · Wear rubber-soled slippers or shoes whenever you walk in the hospital.  · Avoid quick, sudden movements.  ¨ Change positions slowly.  ¨ Sit on the side of your bed before standing.  ¨ Stand up slowly and wait before you start to walk.  · Let your health care provider know if there is a spill on the floor.  · Pay careful attention to the medical equipment, electrical cords, and tubes around you.  · When you need help, use your call button by your bed or in the bathroom. Wait for one of your health care providers to help you.  · If you feel dizzy or unsure of your footing, return to bed and wait for assistance.  · Avoid being distracted by the TV, telephone, or another person in your room.  · Do not lean or support yourself on rolling objects, such as IV poles or bedside tables.     This information is not intended to replace advice given to you by your health care provider. Make sure you discuss any questions you have with your health care provider.     Document Released: 12/15/2001 Document Revised: 01/08/2016 Document Reviewed: 08/25/2013  [employer] Patient  Education ©2016 [employer]            PATIENT/FAMILY/RESPONSIBLE PARTY VERBALIZES UNDERSTANDING OF ABOVE EDUCATION.  COPY OF PAIN SCALE GIVEN AND REVIEWED WITH VERBALIZED UNDERSTANDING.

## 2021-08-05 DIAGNOSIS — N32.81 OVERACTIVE BLADDER: ICD-10-CM

## 2021-08-06 ENCOUNTER — LAB (OUTPATIENT)
Dept: LAB | Facility: HOSPITAL | Age: 56
End: 2021-08-06

## 2021-08-06 LAB
QT INTERVAL: 416 MS
QTC INTERVAL: 432 MS
SARS-COV-2 ORF1AB RESP QL NAA+PROBE: NOT DETECTED

## 2021-08-06 PROCEDURE — U0004 COV-19 TEST NON-CDC HGH THRU: HCPCS | Performed by: OBSTETRICS & GYNECOLOGY

## 2021-08-06 PROCEDURE — C9803 HOPD COVID-19 SPEC COLLECT: HCPCS | Performed by: OBSTETRICS & GYNECOLOGY

## 2021-08-07 ENCOUNTER — APPOINTMENT (OUTPATIENT)
Dept: LAB | Facility: HOSPITAL | Age: 56
End: 2021-08-07

## 2021-08-09 ENCOUNTER — ANESTHESIA EVENT (OUTPATIENT)
Dept: PERIOP | Facility: HOSPITAL | Age: 56
End: 2021-08-09

## 2021-08-09 ENCOUNTER — HOSPITAL ENCOUNTER (OUTPATIENT)
Facility: HOSPITAL | Age: 56
Setting detail: HOSPITAL OUTPATIENT SURGERY
Discharge: HOME | End: 2021-08-09
Attending: OBSTETRICS & GYNECOLOGY | Admitting: OBSTETRICS & GYNECOLOGY

## 2021-08-09 ENCOUNTER — ANESTHESIA (OUTPATIENT)
Dept: PERIOP | Facility: HOSPITAL | Age: 56
End: 2021-08-09

## 2021-08-09 VITALS
RESPIRATION RATE: 20 BRPM | HEART RATE: 63 BPM | OXYGEN SATURATION: 92 % | DIASTOLIC BLOOD PRESSURE: 71 MMHG | SYSTOLIC BLOOD PRESSURE: 133 MMHG | TEMPERATURE: 98 F

## 2021-08-09 DIAGNOSIS — N95.0 PMB (POSTMENOPAUSAL BLEEDING): ICD-10-CM

## 2021-08-09 LAB
ABO GROUP BLD: NORMAL
BLD GP AB SCN SERPL QL: NEGATIVE
RH BLD: POSITIVE
T&S EXPIRATION DATE: NORMAL

## 2021-08-09 PROCEDURE — 86900 BLOOD TYPING SEROLOGIC ABO: CPT | Performed by: OBSTETRICS & GYNECOLOGY

## 2021-08-09 PROCEDURE — 25010000002 DEXAMETHASONE PER 1 MG: Performed by: NURSE ANESTHETIST, CERTIFIED REGISTERED

## 2021-08-09 PROCEDURE — 25010000002 ONDANSETRON PER 1 MG: Performed by: NURSE ANESTHETIST, CERTIFIED REGISTERED

## 2021-08-09 PROCEDURE — 25010000002 PROPOFOL 10 MG/ML EMULSION: Performed by: NURSE ANESTHETIST, CERTIFIED REGISTERED

## 2021-08-09 PROCEDURE — 25010000002 FENTANYL CITRATE (PF) 100 MCG/2ML SOLUTION: Performed by: NURSE ANESTHETIST, CERTIFIED REGISTERED

## 2021-08-09 PROCEDURE — 25010000002 DEXAMETHASONE PER 1 MG: Performed by: ANESTHESIOLOGY

## 2021-08-09 PROCEDURE — 86850 RBC ANTIBODY SCREEN: CPT | Performed by: OBSTETRICS & GYNECOLOGY

## 2021-08-09 PROCEDURE — 25010000002 ONDANSETRON PER 1 MG: Performed by: ANESTHESIOLOGY

## 2021-08-09 PROCEDURE — 58558 HYSTEROSCOPY BIOPSY: CPT | Performed by: OBSTETRICS & GYNECOLOGY

## 2021-08-09 PROCEDURE — 88305 TISSUE EXAM BY PATHOLOGIST: CPT | Performed by: OBSTETRICS & GYNECOLOGY

## 2021-08-09 PROCEDURE — 86901 BLOOD TYPING SEROLOGIC RH(D): CPT | Performed by: OBSTETRICS & GYNECOLOGY

## 2021-08-09 PROCEDURE — 25010000002 FENTANYL CITRATE (PF) 50 MCG/ML SOLUTION: Performed by: ANESTHESIOLOGY

## 2021-08-09 RX ORDER — DEXAMETHASONE SODIUM PHOSPHATE 4 MG/ML
INJECTION, SOLUTION INTRA-ARTICULAR; INTRALESIONAL; INTRAMUSCULAR; INTRAVENOUS; SOFT TISSUE AS NEEDED
Status: DISCONTINUED | OUTPATIENT
Start: 2021-08-09 | End: 2021-08-09 | Stop reason: SURG

## 2021-08-09 RX ORDER — FLUMAZENIL 0.1 MG/ML
0.2 INJECTION INTRAVENOUS AS NEEDED
Status: DISCONTINUED | OUTPATIENT
Start: 2021-08-09 | End: 2021-08-09 | Stop reason: HOSPADM

## 2021-08-09 RX ORDER — ONDANSETRON 2 MG/ML
INJECTION INTRAMUSCULAR; INTRAVENOUS AS NEEDED
Status: DISCONTINUED | OUTPATIENT
Start: 2021-08-09 | End: 2021-08-09 | Stop reason: SURG

## 2021-08-09 RX ORDER — FENTANYL CITRATE 50 UG/ML
25 INJECTION, SOLUTION INTRAMUSCULAR; INTRAVENOUS
Status: DISCONTINUED | OUTPATIENT
Start: 2021-08-09 | End: 2021-08-09 | Stop reason: HOSPADM

## 2021-08-09 RX ORDER — SODIUM CHLORIDE 0.9 % (FLUSH) 0.9 %
10 SYRINGE (ML) INJECTION EVERY 12 HOURS SCHEDULED
Status: DISCONTINUED | OUTPATIENT
Start: 2021-08-09 | End: 2021-08-09 | Stop reason: HOSPADM

## 2021-08-09 RX ORDER — IBUPROFEN 600 MG/1
600 TABLET ORAL ONCE AS NEEDED
Status: DISCONTINUED | OUTPATIENT
Start: 2021-08-09 | End: 2021-08-09 | Stop reason: HOSPADM

## 2021-08-09 RX ORDER — SODIUM CHLORIDE 0.9 % (FLUSH) 0.9 %
3 SYRINGE (ML) INJECTION AS NEEDED
Status: DISCONTINUED | OUTPATIENT
Start: 2021-08-09 | End: 2021-08-09 | Stop reason: HOSPADM

## 2021-08-09 RX ORDER — DEXTROSE MONOHYDRATE 25 G/50ML
12.5 INJECTION, SOLUTION INTRAVENOUS AS NEEDED
Status: DISCONTINUED | OUTPATIENT
Start: 2021-08-09 | End: 2021-08-09 | Stop reason: HOSPADM

## 2021-08-09 RX ORDER — PROPOFOL 10 MG/ML
VIAL (ML) INTRAVENOUS AS NEEDED
Status: DISCONTINUED | OUTPATIENT
Start: 2021-08-09 | End: 2021-08-09 | Stop reason: SURG

## 2021-08-09 RX ORDER — SODIUM CHLORIDE 9 MG/ML
INJECTION, SOLUTION INTRAVENOUS AS NEEDED
Status: DISCONTINUED | OUTPATIENT
Start: 2021-08-09 | End: 2021-08-09 | Stop reason: HOSPADM

## 2021-08-09 RX ORDER — OXYCODONE AND ACETAMINOPHEN 7.5; 325 MG/1; MG/1
2 TABLET ORAL EVERY 4 HOURS PRN
Status: DISCONTINUED | OUTPATIENT
Start: 2021-08-09 | End: 2021-08-09 | Stop reason: HOSPADM

## 2021-08-09 RX ORDER — NALOXONE HCL 0.4 MG/ML
0.4 VIAL (ML) INJECTION AS NEEDED
Status: DISCONTINUED | OUTPATIENT
Start: 2021-08-09 | End: 2021-08-09 | Stop reason: HOSPADM

## 2021-08-09 RX ORDER — LIDOCAINE HYDROCHLORIDE 10 MG/ML
0.5 INJECTION, SOLUTION EPIDURAL; INFILTRATION; INTRACAUDAL; PERINEURAL ONCE AS NEEDED
Status: DISCONTINUED | OUTPATIENT
Start: 2021-08-09 | End: 2021-08-09 | Stop reason: HOSPADM

## 2021-08-09 RX ORDER — OXYCODONE AND ACETAMINOPHEN 10; 325 MG/1; MG/1
1 TABLET ORAL ONCE AS NEEDED
Status: DISCONTINUED | OUTPATIENT
Start: 2021-08-09 | End: 2021-08-09 | Stop reason: HOSPADM

## 2021-08-09 RX ORDER — LIDOCAINE HYDROCHLORIDE 40 MG/ML
SOLUTION TOPICAL AS NEEDED
Status: DISCONTINUED | OUTPATIENT
Start: 2021-08-09 | End: 2021-08-09 | Stop reason: SURG

## 2021-08-09 RX ORDER — ROCURONIUM BROMIDE 10 MG/ML
INJECTION, SOLUTION INTRAVENOUS AS NEEDED
Status: DISCONTINUED | OUTPATIENT
Start: 2021-08-09 | End: 2021-08-09 | Stop reason: SURG

## 2021-08-09 RX ORDER — SODIUM CHLORIDE, SODIUM LACTATE, POTASSIUM CHLORIDE, CALCIUM CHLORIDE 600; 310; 30; 20 MG/100ML; MG/100ML; MG/100ML; MG/100ML
9 INJECTION, SOLUTION INTRAVENOUS CONTINUOUS
Status: DISCONTINUED | OUTPATIENT
Start: 2021-08-09 | End: 2021-08-09 | Stop reason: HOSPADM

## 2021-08-09 RX ORDER — SODIUM CHLORIDE 0.9 % (FLUSH) 0.9 %
10 SYRINGE (ML) INJECTION AS NEEDED
Status: DISCONTINUED | OUTPATIENT
Start: 2021-08-09 | End: 2021-08-09 | Stop reason: HOSPADM

## 2021-08-09 RX ORDER — DEXAMETHASONE SODIUM PHOSPHATE 4 MG/ML
4 INJECTION, SOLUTION INTRA-ARTICULAR; INTRALESIONAL; INTRAMUSCULAR; INTRAVENOUS; SOFT TISSUE ONCE AS NEEDED
Status: COMPLETED | OUTPATIENT
Start: 2021-08-09 | End: 2021-08-09

## 2021-08-09 RX ORDER — IBUPROFEN 600 MG/1
600 TABLET ORAL EVERY 6 HOURS PRN
Status: DISCONTINUED | OUTPATIENT
Start: 2021-08-09 | End: 2021-08-09 | Stop reason: HOSPADM

## 2021-08-09 RX ORDER — OXYCODONE HYDROCHLORIDE AND ACETAMINOPHEN 5; 325 MG/1; MG/1
1 TABLET ORAL EVERY 4 HOURS PRN
Qty: 4 TABLET | Refills: 0 | Status: SHIPPED | OUTPATIENT
Start: 2021-08-09 | End: 2021-08-25

## 2021-08-09 RX ORDER — SODIUM CHLORIDE, SODIUM LACTATE, POTASSIUM CHLORIDE, CALCIUM CHLORIDE 600; 310; 30; 20 MG/100ML; MG/100ML; MG/100ML; MG/100ML
1000 INJECTION, SOLUTION INTRAVENOUS CONTINUOUS
Status: DISCONTINUED | OUTPATIENT
Start: 2021-08-09 | End: 2021-08-09 | Stop reason: HOSPADM

## 2021-08-09 RX ORDER — PROMETHAZINE HYDROCHLORIDE 25 MG/1
12.5 TABLET ORAL ONCE AS NEEDED
Status: DISCONTINUED | OUTPATIENT
Start: 2021-08-09 | End: 2021-08-09 | Stop reason: HOSPADM

## 2021-08-09 RX ORDER — SCOLOPAMINE TRANSDERMAL SYSTEM 1 MG/1
1 PATCH, EXTENDED RELEASE TRANSDERMAL CONTINUOUS
Status: DISCONTINUED | OUTPATIENT
Start: 2021-08-09 | End: 2021-08-09 | Stop reason: HOSPADM

## 2021-08-09 RX ORDER — LABETALOL HYDROCHLORIDE 5 MG/ML
5 INJECTION, SOLUTION INTRAVENOUS
Status: DISCONTINUED | OUTPATIENT
Start: 2021-08-09 | End: 2021-08-09 | Stop reason: HOSPADM

## 2021-08-09 RX ORDER — LIDOCAINE HYDROCHLORIDE 20 MG/ML
INJECTION, SOLUTION EPIDURAL; INFILTRATION; INTRACAUDAL; PERINEURAL AS NEEDED
Status: DISCONTINUED | OUTPATIENT
Start: 2021-08-09 | End: 2021-08-09 | Stop reason: SURG

## 2021-08-09 RX ORDER — FENTANYL CITRATE 50 UG/ML
INJECTION, SOLUTION INTRAMUSCULAR; INTRAVENOUS AS NEEDED
Status: DISCONTINUED | OUTPATIENT
Start: 2021-08-09 | End: 2021-08-09 | Stop reason: SURG

## 2021-08-09 RX ORDER — SUCCINYLCHOLINE/SOD CL,ISO/PF 200MG/10ML
SYRINGE (ML) INTRAVENOUS AS NEEDED
Status: DISCONTINUED | OUTPATIENT
Start: 2021-08-09 | End: 2021-08-09 | Stop reason: SURG

## 2021-08-09 RX ORDER — ONDANSETRON 2 MG/ML
4 INJECTION INTRAMUSCULAR; INTRAVENOUS ONCE AS NEEDED
Status: COMPLETED | OUTPATIENT
Start: 2021-08-09 | End: 2021-08-09

## 2021-08-09 RX ORDER — OXYBUTYNIN CHLORIDE 10 MG/1
TABLET, EXTENDED RELEASE ORAL
Qty: 180 TABLET | Refills: 1 | Status: SHIPPED | OUTPATIENT
Start: 2021-08-09 | End: 2022-07-13

## 2021-08-09 RX ADMIN — FENTANYL CITRATE 50 MCG: 50 INJECTION, SOLUTION INTRAMUSCULAR; INTRAVENOUS at 10:36

## 2021-08-09 RX ADMIN — GLYCOPYRROLATE 0.2 MG: 0.2 INJECTION, SOLUTION INTRAMUSCULAR; INTRAVENOUS at 10:36

## 2021-08-09 RX ADMIN — DEXAMETHASONE SODIUM PHOSPHATE 4 MG: 4 INJECTION, SOLUTION INTRA-ARTICULAR; INTRALESIONAL; INTRAMUSCULAR; INTRAVENOUS; SOFT TISSUE at 09:51

## 2021-08-09 RX ADMIN — SODIUM CHLORIDE, POTASSIUM CHLORIDE, SODIUM LACTATE AND CALCIUM CHLORIDE 1000 ML: 600; 310; 30; 20 INJECTION, SOLUTION INTRAVENOUS at 09:20

## 2021-08-09 RX ADMIN — FENTANYL CITRATE 50 MCG: 50 INJECTION, SOLUTION INTRAMUSCULAR; INTRAVENOUS at 10:42

## 2021-08-09 RX ADMIN — SCOLOPAMINE TRANSDERMAL SYSTEM 1 PATCH: 1 PATCH, EXTENDED RELEASE TRANSDERMAL at 09:51

## 2021-08-09 RX ADMIN — ONDANSETRON 4 MG: 2 INJECTION INTRAMUSCULAR; INTRAVENOUS at 10:48

## 2021-08-09 RX ADMIN — LIDOCAINE HYDROCHLORIDE 1 EACH: 40 SOLUTION TOPICAL at 10:42

## 2021-08-09 RX ADMIN — LIDOCAINE HYDROCHLORIDE 40 MG: 20 INJECTION, SOLUTION EPIDURAL; INFILTRATION; INTRACAUDAL; PERINEURAL at 10:36

## 2021-08-09 RX ADMIN — OXYCODONE HYDROCHLORIDE AND ACETAMINOPHEN 2 TABLET: 7.5; 325 TABLET ORAL at 11:56

## 2021-08-09 RX ADMIN — ONDANSETRON 4 MG: 2 INJECTION INTRAMUSCULAR; INTRAVENOUS at 11:56

## 2021-08-09 RX ADMIN — FENTANYL CITRATE 25 MCG: 50 INJECTION, SOLUTION INTRAMUSCULAR; INTRAVENOUS at 12:01

## 2021-08-09 RX ADMIN — DEXAMETHASONE SODIUM PHOSPHATE 4 MG: 4 INJECTION, SOLUTION INTRA-ARTICULAR; INTRALESIONAL; INTRAMUSCULAR; INTRAVENOUS; SOFT TISSUE at 10:48

## 2021-08-09 RX ADMIN — PROPOFOL 200 MG: 10 INJECTION, EMULSION INTRAVENOUS at 10:42

## 2021-08-09 RX ADMIN — LIDOCAINE HYDROCHLORIDE 100 MG: 20 INJECTION, SOLUTION EPIDURAL; INFILTRATION; INTRACAUDAL; PERINEURAL at 11:25

## 2021-08-09 RX ADMIN — SODIUM CHLORIDE, POTASSIUM CHLORIDE, SODIUM LACTATE AND CALCIUM CHLORIDE: 600; 310; 30; 20 INJECTION, SOLUTION INTRAVENOUS at 11:29

## 2021-08-09 RX ADMIN — FENTANYL CITRATE 25 MCG: 50 INJECTION, SOLUTION INTRAMUSCULAR; INTRAVENOUS at 12:06

## 2021-08-09 RX ADMIN — PROPOFOL 100 MG: 10 INJECTION, EMULSION INTRAVENOUS at 11:14

## 2021-08-09 RX ADMIN — Medication 120 MG: at 10:42

## 2021-08-09 RX ADMIN — ROCURONIUM BROMIDE 5 MG: 50 INJECTION INTRAVENOUS at 10:42

## 2021-08-09 NOTE — DISCHARGE INSTRUCTIONS

## 2021-08-09 NOTE — ANESTHESIA POSTPROCEDURE EVALUATION
Patient: Anthony Meza    Procedure Summary     Date: 08/09/21 Room / Location:  [facility] OR 08 /  [facility]    Anesthesia Start: 1036 Anesthesia Stop: 1135    Procedure: DILATATION AND CURETTAGE HYSTEROSCOPY (N/A Uterus) Diagnosis:       PMB (postmenopausal bleeding)      (PMB (postmenopausal bleeding) [N95.0])    Surgeons: Jacqueline Johnson DO Provider: Jamar Lawson CRNA    Anesthesia Type: general ASA Status: 3          Anesthesia Type: general    Vitals  Vitals Value Taken Time   /80 08/09/21 1232   Temp 98 °F (36.7 °C) 08/09/21 1221   Pulse 71 08/09/21 1233   Resp 14 08/09/21 1231   SpO2 94 % 08/09/21 1232   Vitals shown include unvalidated device data.        Post Anesthesia Care and Evaluation    Patient location during evaluation: PACU  Patient participation: complete - patient participated  Level of consciousness: awake and alert  Pain management: adequate  Airway patency: patent  Anesthetic complications: No anesthetic complications    Cardiovascular status: acceptable  Respiratory status: acceptable  Hydration status: acceptable    Comments: Blood pressure 138/80, pulse 65, temperature 98 °F (36.7 °C), resp. rate 20, SpO2 92 %, not currently breastfeeding.    Pt discharged from PACU based on nestor score >8  No anesthesia care post op

## 2021-08-09 NOTE — OP NOTE
[facility]  Anthony Meza  : 1965  MRN: [id number]  CSN: [id number]  Date: 2021    Operative Note    DILATATION AND CURETTAGE HYSTEROSCOPY      Pre-op Diagnosis:  PMB (postmenopausal bleeding) [N95.0]   Post-op Diagnosis:  Post-Op Diagnosis Codes:     * PMB (postmenopausal bleeding) [N95.0]   Procedure: Procedure(s):  DILATATION AND CURETTAGE HYSTEROSCOPY   Surgeon: Surgeon(s):  Jacqueline Johnson DO       Anesthesia: General by ZAHRAA Lawson CRNA     Estimated Blood Loss: 50   mls   Fluids: 1000   mls   UOP: 100   mls   Drains: none   ABx: none     Specimens:  Endometrial curettage   Findings: Normal appearing external female genitalia, fibroid appearing tissue on anterior surface of endometrial cavity   Complications: None       INDICATION: Anthony Meza is a 55 y.o. female  with postmenopausal bleeding.  She had an endometrial biopsy which revealed minimal tissue. She had a past medical history of breast cancer. Her previous TVUS showed a markedly thickened enodmetrial lining.     PROCEDURE IN DETAIL:     After informed consent was obtained, the patient was taken to the operating room, where general anesthesia was administered. She was placed in the lithotomy position in [facility], and her perineum and vagina were prepped and draped in normal sterile fashion. A weighted speculum was placed in the vagina and her bladder drained with a red rubber catheter. The anterior lip of the cervix was visualized and grasped with a single-tooth tenaculum.  The uterus sounded to 7 cm.  The cervix was dilated with Hegar dilators to accept a diagnostic hysteroscope.  Normal saline was used for distention and the cavity was inspected showing no polyp but what appeared to be a fibroid on the anterior surface of the fibroid.  The tubal ostia were both identified.  The hysteroscope was removed.  A medium size sharp curette was then placed in the uterus and a gritty texture was noted in all 4 quadrants.  The  curettings were collected on a Telfa pad.  The cervix was then observed and no active bleeding was noted.  The tenaculum was removed and the cervix was hemostatic.  All instruments were then removed from the vagina.     The patient was then awakened and taken to the recovery room in stable condition.  She tolerated the procedure well and without complications.  All sponge needle and instrument counts were correct times 3 per the OR staff.    Jacqueline Johnson DO   8/9/2021  11:20 CDT

## 2021-08-09 NOTE — ANESTHESIA PROCEDURE NOTES
Airway  Urgency: elective    Date/Time: 8/9/2021 10:42 AM  Airway not difficult    General Information and Staff    Patient location during procedure: OR    Indications and Patient Condition  Indications for airway management: airway protection    Preoxygenated: yes  Mask difficulty assessment: 2 - vent by mask + OA or adjuvant +/- NMBA    Final Airway Details  Final airway type: endotracheal airway      Successful airway: ETT  Cuffed: yes   Successful intubation technique: video laryngoscopy  Facilitating devices/methods: intubating stylet  Endotracheal tube insertion site: oral  Blade: Stein  Blade size: 3  ETT size (mm): 7.5  Cormack-Lehane Classification: grade IIa - partial view of glottis  Placement verified by: chest auscultation and capnometry   Measured from: lips  ETT/EBT  to lips (cm): 22  Number of attempts at approach: 1  Assessment: lips, teeth, and gum same as pre-op and atraumatic intubation

## 2021-08-09 NOTE — ANESTHESIA PREPROCEDURE EVALUATION
Anesthesia Evaluation     Patient summary reviewed   NPO Solid Status: > 8 hours             Airway   Mallampati: III  TM distance: >3 FB  Neck ROM: full  Dental      Pulmonary    (+) sleep apnea on CPAP,   (-) COPD, asthma, not a smoker  Cardiovascular   Exercise tolerance: excellent (>7 METS)    (+) hypertension, dysrhythmias Atrial Fib,   (-) pacemaker, past MI, angina, cardiac stents      Neuro/Psych  (+) TIA,     (-) seizures, CVA  GI/Hepatic/Renal/Endo    (+) morbid obesity,    (-) GERD, liver disease, no renal disease, diabetes    Musculoskeletal     Abdominal    Substance History      OB/GYN          Other                        Anesthesia Plan    ASA 3     general     intravenous induction     Anesthetic plan, all risks, benefits, and alternatives have been provided, discussed and informed consent has been obtained with: patient.

## 2021-08-10 ENCOUNTER — TELEPHONE (OUTPATIENT)
Dept: OBSTETRICS AND GYNECOLOGY | Facility: CLINIC | Age: 56
End: 2021-08-10

## 2021-08-10 LAB
CYTO UR: NORMAL
LAB AP CASE REPORT: NORMAL
PATH REPORT.FINAL DX SPEC: NORMAL
PATH REPORT.GROSS SPEC: NORMAL

## 2021-08-10 NOTE — TELEPHONE ENCOUNTER
Pt called and stated she d/c her Eliquis prior to sx and wanted to know if she needed to start it again. I informed her to start taking it today, pt voiced understanding.

## 2021-08-16 ENCOUNTER — OFFICE VISIT (OUTPATIENT)
Dept: FAMILY MEDICINE CLINIC | Facility: CLINIC | Age: 56
End: 2021-08-16

## 2021-08-16 VITALS
DIASTOLIC BLOOD PRESSURE: 80 MMHG | TEMPERATURE: 98.4 F | WEIGHT: 293 LBS | HEART RATE: 67 BPM | BODY MASS INDEX: 45.99 KG/M2 | HEIGHT: 67 IN | OXYGEN SATURATION: 97 % | SYSTOLIC BLOOD PRESSURE: 130 MMHG

## 2021-08-16 DIAGNOSIS — E66.01 MORBID OBESITY (HCC): ICD-10-CM

## 2021-08-16 DIAGNOSIS — I10 ESSENTIAL HYPERTENSION: ICD-10-CM

## 2021-08-16 DIAGNOSIS — E55.9 VITAMIN D DEFICIENCY: ICD-10-CM

## 2021-08-16 DIAGNOSIS — G47.33 OSA ON CPAP: ICD-10-CM

## 2021-08-16 DIAGNOSIS — J06.9 UPPER RESPIRATORY TRACT INFECTION, UNSPECIFIED TYPE: Primary | ICD-10-CM

## 2021-08-16 DIAGNOSIS — J30.9 ALLERGIC RHINITIS, UNSPECIFIED SEASONALITY, UNSPECIFIED TRIGGER: ICD-10-CM

## 2021-08-16 DIAGNOSIS — Z99.89 OSA ON CPAP: ICD-10-CM

## 2021-08-16 PROCEDURE — 99214 OFFICE O/P EST MOD 30 MIN: CPT | Performed by: FAMILY MEDICINE

## 2021-08-16 RX ORDER — AZITHROMYCIN 250 MG/1
TABLET, FILM COATED ORAL
Qty: 6 TABLET | Refills: 0 | Status: SHIPPED | OUTPATIENT
Start: 2021-08-16 | End: 2021-08-24

## 2021-08-24 ENCOUNTER — OFFICE VISIT (OUTPATIENT)
Dept: BARIATRICS/WEIGHT MGMT | Facility: HOSPITAL | Age: 56
End: 2021-08-24

## 2021-08-24 ENCOUNTER — OFFICE VISIT (OUTPATIENT)
Dept: BARIATRICS/WEIGHT MGMT | Facility: CLINIC | Age: 56
End: 2021-08-24

## 2021-08-24 VITALS
BODY MASS INDEX: 47.09 KG/M2 | HEIGHT: 66 IN | SYSTOLIC BLOOD PRESSURE: 125 MMHG | TEMPERATURE: 99.1 F | WEIGHT: 293 LBS | HEART RATE: 65 BPM | DIASTOLIC BLOOD PRESSURE: 78 MMHG | OXYGEN SATURATION: 94 %

## 2021-08-24 DIAGNOSIS — I10 ESSENTIAL HYPERTENSION: ICD-10-CM

## 2021-08-24 DIAGNOSIS — E66.01 CLASS 3 SEVERE OBESITY DUE TO EXCESS CALORIES WITH SERIOUS COMORBIDITY AND BODY MASS INDEX (BMI) OF 50.0 TO 59.9 IN ADULT (HCC): Primary | ICD-10-CM

## 2021-08-24 DIAGNOSIS — G47.33 OSA ON CPAP: ICD-10-CM

## 2021-08-24 DIAGNOSIS — Z99.89 OSA ON CPAP: ICD-10-CM

## 2021-08-24 PROCEDURE — 99213 OFFICE O/P EST LOW 20 MIN: CPT | Performed by: SURGERY

## 2021-08-24 NOTE — PROGRESS NOTES
"Nutrition Services    Patient Name:  Anthony Meza  YOB: 1965  MRN: [id number]  Admit Date:  (Not on file)    NUTRITION BARIATRIC/MWL NOTE     Visit 2  Initial Assessment   BA#   MWL    Anthropometrics   Height: 66 in  Weight: 341 lbs 12.8 oz  BMI: 55.17    Nutrition Recall  Eating ___4___ meals daily   Meeting protein daily goal  Snacking  Making healthier choices  Limited sweet intake  Monitoring portions  Drinking carbonated beverages-not as bad as it was  Drinking greater to or equal to 64 fluid ounces    Education    Review of 4 meal per day diet plan  4 meal per day sample menu  \"Scaling back: How to manage Your Portion Sizes and Reading Food Labels\"  Reinforce Nutritional Needs for Surgery  Reinforce Nutritional Needs for MWL    Nutrition Goals   Continue diet changes  Eat __4___ meals per day with protein  Protein goal: 65gms   discussed protein guidelines for shakes and bar  Eliminate snacks  Healthier food choices  Portion control / Use smaller plate or measuring cup   Eliminate soda  Continue with at least 64 oz of fluid per day      Electronically signed by:  Susy Lenz  08/24/21 16:05 CDT   "

## 2021-08-24 NOTE — PROGRESS NOTES
Patient Care Team:  Jamar Saldana DO as PCP - General (Family Medicine)  Tyrell Rosas MD as Referring Physician (Family Medicine)  Joel Medina MD as Cardiologist (Cardiology)    Reason for Visit:  Surgical Weight loss    Subjective   Anthony Meza is a 55 y.o. female.     Anthony is here for follow-up and continued medical management of her morbid obesity.  She is currently on a prescription diet.  Anthony previously was to apply dietary changes such as following the meal plan as directed.  She admits to attempting to follow the dietary prescription.  As a result she lost weight since her last visit.    Review Of Systems:  General ROS: positive for  - fatigue and sleep disturbance  Respiratory ROS: positive for - cough and snoring  Cardiovascular ROS: no chest pain or dyspnea on exertion  positive for - edema  Gastrointestinal ROS: no abdominal pain, change in bowel habits, or black or bloody stools    The following portions of the patient's history were reviewed and updated as appropriate: allergies, current medications, past family history, past medical history, past social history, past surgical history and problem list.    Objective   LMP  (LMP Unknown)   There were no vitals filed for this visit.    General Appearance:  awake, alert, oriented, in no acute distress    Assessment/Plan     Encounter Diagnoses   Name Primary?   • Class 3 severe obesity due to excess calories with serious comorbidity and body mass index (BMI) of 50.0 to 59.9 in adult (CMS/HCC) Yes   • Essential hypertension    • HARLAN on CPAP        Anthony Meza was seen today for follow-up, obesity, nutrition counseling and weight loss.  She has lost weight since her last visit.  Today we discussed healthy changes in lifestyle, diet, and exercise. Dietician consultation obtained.  Anthony Meza had received handouts to her explaining the recommendation on portion sizes/appetite control/reading nutrition labels.   Intensive behavioral  therapy for obesity was done today as well.   Goals for this month are: The patient is to follow the dietary prescription as directed and more specifically provide us with a food journal of 20 consecutive days for her next visit.  This is to help facilitate understanding of what she is consuming and what she believes is the appropriate food choices during this process.    Follow up in one month for a weight recheck.

## 2021-08-25 ENCOUNTER — OFFICE VISIT (OUTPATIENT)
Dept: OBSTETRICS AND GYNECOLOGY | Facility: CLINIC | Age: 56
End: 2021-08-25

## 2021-08-25 VITALS
HEIGHT: 66 IN | SYSTOLIC BLOOD PRESSURE: 124 MMHG | DIASTOLIC BLOOD PRESSURE: 76 MMHG | BODY MASS INDEX: 47.09 KG/M2 | WEIGHT: 293 LBS

## 2021-08-25 DIAGNOSIS — Z98.890 POSTOPERATIVE STATE: Primary | ICD-10-CM

## 2021-08-25 PROCEDURE — 99212 OFFICE O/P EST SF 10 MIN: CPT | Performed by: OBSTETRICS & GYNECOLOGY

## 2021-08-25 NOTE — PROGRESS NOTES
"Subjective   Anthony Meza is a 55 y.o. female.     Chief Complaint   Patient presents with   • Post-op     Patient here 2 weeks post op D&C HSC done . Patient is doing well, having less pain, no longer having bleeding.        55-year-old postmenopausal female  2 para 2 presents for follow-up from dilatation and curettage with hysteroscopy on 2021.  Patient reports she did have several days of bleeding and cramping.  She reports that it stopped sometime last week.  She voices no new concerns at this time.  She does have a past medical history of breast cancer that was treated with radiation.  She voices no new concerns at this time.       Review of Systems   Genitourinary: Positive for pelvic pain and vaginal bleeding.       Objective   /76   Ht 167.6 cm (66\")   Wt (!) 153 kg (338 lb)   LMP  (LMP Unknown)   Breastfeeding No   BMI 54.55 kg/m²   No LMP recorded (lmp unknown). Patient is postmenopausal.  Physical Exam  Vitals and nursing note reviewed.   Constitutional:       General: She is not in acute distress.     Appearance: She is well-developed.   HENT:      Head: Normocephalic and atraumatic.   Eyes:      General:         Right eye: No discharge.         Left eye: No discharge.      Conjunctiva/sclera: Conjunctivae normal.   Pulmonary:      Effort: Pulmonary effort is normal.   Musculoskeletal:         General: Normal range of motion.      Cervical back: Normal range of motion and neck supple.   Skin:     General: Skin is warm and dry.   Neurological:      Mental Status: She is alert and oriented to person, place, and time.   Psychiatric:         Behavior: Behavior normal.         Judgment: Judgment normal.       Assessment/Plan   Problems Addressed this Visit     None      Visit Diagnoses     Postoperative state    -  Primary      Diagnoses       Codes Comments    Postoperative state    -  Primary ICD-10-CM: Z98.890  ICD-9-CM: V45.89       Discussed with patient her pathology " revealed proliferative phase endometrium without evidence of atypia or hyperplasia.  Discussed patient's history with Dr. Ernesto Miles who made the following recommendations: Would recommend endometrial sampling if patient continues to have postmenopausal bleeding with yearly endometrial biopsies.  Hysterectomy considered a reasonable option if she continues to have vaginal bleeding.  Due to her previous medical history of breast cancer would not recommend treating with Provera.  Discussed with patient also if her bleeding were to become heavier or she became more concerned about bleeding.   Offered to refer patient to GYN ONC   Patient agreeable to expectant management at this time with monitoring symptoms   Will have patient return to clinic in 1 year or sooner if bleeding returns  All questions answered and patient verbalized understanding of plan.        Jacqueline Johnson, DO

## 2021-08-27 ENCOUNTER — OFFICE VISIT (OUTPATIENT)
Dept: NEUROLOGY | Facility: CLINIC | Age: 56
End: 2021-08-27

## 2021-08-27 VITALS
WEIGHT: 293 LBS | HEIGHT: 66 IN | OXYGEN SATURATION: 95 % | DIASTOLIC BLOOD PRESSURE: 80 MMHG | BODY MASS INDEX: 47.09 KG/M2 | HEART RATE: 73 BPM | SYSTOLIC BLOOD PRESSURE: 124 MMHG

## 2021-08-27 DIAGNOSIS — G47.33 OBSTRUCTIVE SLEEP APNEA: Primary | ICD-10-CM

## 2021-08-27 PROCEDURE — 99213 OFFICE O/P EST LOW 20 MIN: CPT | Performed by: PHYSICIAN ASSISTANT

## 2021-08-27 NOTE — PROGRESS NOTES
Neurology Progress Note      Chief Complaint:    Obstructive sleep apnea  Daytime hypersomnolence    Subjective     Subjective:  Patient returns to clinic today for follow-up evaluation and annual sleep compliance review.  Compliance download from 7/27/2021-8/25/2021 demonstrates 100% compliance with an average usage time of 7 hours and 1 minute as setting of CPAP at 11 cm water.  AHI during this timeframe is only 1.4 with 0.1 central events and no significant leaking.  Patient is doing well without any complaints.      Past Medical History:   Diagnosis Date   • A-fib (CMS/HCC)    • Abnormal ECG    • Anxiety    • Arthritis    • Atrial fibrillation (CMS/HCC)    • Back pain    • Breast cancer (CMS/HCC) 09/2018   • CFS (chronic fatigue syndrome)    • Chest pain    • Chronic pain disorder    • Depression    • Dizziness    • Ductal carcinoma in situ (DCIS) of left breast     BREAST LEFT   • Fibromyalgia    • Hx of radiation therapy    • Hypertension    • IBS (irritable bowel syndrome)    • Incontinence    • Migraines    • Mononucleosis    • Sleep apnea     uses a c-pap   • Wears glasses      Past Surgical History:   Procedure Laterality Date   • ANKLE SURGERY      x2 left   • BREAST BIOPSY Left 2018    stereo   • BREAST EXCISIONAL BIOPSY Left 2018   • BREAST LUMPECTOMY Left 09/2018   • BREAST LUMPECTOMY WITH SENTINEL NODE BIOPSY Left 10/15/2018    Procedure: LEFT BREAST LUMPECTOMY WITH NEEDLE LOCALIZATION - MAMMOGRAM GUIDED AND SENTINEL LYMPH  NODE BIOPSY - RADIOLOGIST TO INJECT AND SCAN;  Surgeon: Rafael Porter MD;  Location: [facility] OR;  Service: General   • CARDIAC ABLATION  11/2019   • CARDIAC CATHETERIZATION Bilateral 1/29/2018    Procedure: Coronary angiography;  Surgeon: Joel Medina MD;  Location:  [facility] CATH INVASIVE LOCATION;  Service:    • CARDIAC CATHETERIZATION N/A 1/29/2018    Procedure: Left Heart Cath;  Surgeon: Joel Medina MD;  Location: [facility] CATH INVASIVE LOCATION;  Service:    • CYST REMOVAL       from [facility]   • D & C HYSTEROSCOPY N/A 8/9/2021    Procedure: DILATATION AND CURETTAGE HYSTEROSCOPY;  Surgeon: Jacqueline Johnson DO;  Location: [facility];  Service: Obstetrics/Gynecology;  Laterality: N/A;   • TUBAL ABDOMINAL LIGATION     • WISDOM TOOTH EXTRACTION       Family History   Problem Relation Age of Onset   • Transient ischemic attack Mother    • Rheum arthritis Mother    • Fibromyalgia Mother    • Heart disease Father    • Asthma Father    • Hypertension Father    • Rheum arthritis Father    • Obesity Father    • Obesity Sister    • Obesity Maternal Grandmother    • Ovarian cancer Maternal Grandmother    • Obesity Paternal Grandfather    • Hypertension Paternal Grandfather    • Heart disease Paternal Grandfather    • Liver cancer Maternal Aunt    • Breast cancer Neg Hx      Social History     Tobacco Use   • Smoking status: Never Smoker   • Smokeless tobacco: Never Used   Vaping Use   • Vaping Use: Never used   Substance Use Topics   • Alcohol use: No   • Drug use: No       Medications:  Current Outpatient Medications   Medication Sig Dispense Refill   • acetaminophen (TYLENOL) 325 MG tablet Take 650 mg by mouth Every 6 (Six) Hours As Needed for Mild Pain  or Headache.     • Acetaminophen-Caffeine (TENSION HEADACHE RELIEF) 500-65 MG tablet Take 1 tablet by mouth Every 6 (Six) Hours As Needed (migraine).     • ALPRAZolam (Xanax) 0.5 MG tablet Take 0.5-1 tablets by mouth 2 (Two) Times a Day As Needed for Anxiety. 20 tablet 0   • buPROPion XL (WELLBUTRIN XL) 300 MG 24 hr tablet Take 1 tablet by mouth Every Morning. 90 tablet 0   • cholecalciferol (VITAMIN D3) 1000 units tablet Take 1,000 Units by mouth Daily. 10/8/2018     • Cyanocobalamin (VITAMIN B-12) 1000 MCG sublingual tablet Place 1 tablet under the tongue Daily. Hold 10/8/2018     • ELIQUIS 5 MG tablet tablet TAKE ONE TABLET BY MOUTH EVERY 12 HOURS 60 tablet 7   • furosemide (LASIX) 20 MG tablet Take 1 tablet by mouth 2 (Two) Times a Day.  60 tablet 5   • loratadine (CLARITIN) 10 MG tablet TAKE ONE TABLET BY MOUTH DAILY 30 tablet 0   • losartan (Cozaar) 50 MG tablet Take 1 tablet by mouth Daily. 90 tablet 0   • metoprolol tartrate (LOPRESSOR) 25 MG tablet Take 1 tablet by mouth 2 (Two) Times a Day. 180 tablet 3   • oxybutynin XL (DITROPAN-XL) 10 MG 24 hr tablet TAKE TWO TABLETS BY MOUTH DAILY 180 tablet 1   • traMADol (ULTRAM) 50 MG tablet Take 1 tablet by mouth Every 8 (Eight) Hours As Needed for Moderate Pain . for pain 30 tablet 0     No current facility-administered medications for this visit.       Allergies:    Penicillins    Review of Systems:   -A 14 point review of systems is completed and is negative.      Objective      Vital Signs  Heart Rate:  [73] 73  BP: (124)/(80) 124/80    Physical Exam:    General Exam:  Head:  Normocephalic, atraumatic.  HEENT: PERRLA.  Full EOM.  Mallampati Class III anatomy.  Neck:  No lymphadenopathy, thyromegaly or bruit.  Neck circumference is 71.2 inches.  Cardiac:  Regular rate and rhythm.  Normal S1, S2.  No murmur, rub or gallop.  Lungs:  Clear to auscultation bilaterally.  No wheeze, rales or rhonchi.  Abdomen:  Non-tender, Non-distended.  Bowel sounds normoactive.  Extremities: Full peripheral pulses.  No clubbing, cyanosis or edema.  Skin: No ulceration, breakdown or rash.       Results Review:        Assessment/Plan     Impression:  1.  Obstructive sleep apnea      Plan:  1. I have reviewed the current compliance download and findings of the previous polysomnography with the patient in detail.  The patient voices understanding and recognizes the need for adherence to the prescribed therapy.  They understand that a certain level of compliance allows for continued insurance coverage as well as adequate treatment of underlying apnea.  They understand the impact this has upon their overall health status and other medical comorbidities.  2. I have recommended instituting regular cardiovascular exercise in  the form of walking, biking or swimming 30-40 minutes at a time at least 3-4 times per week.  3. Counseled on multimodal approach to treatment of sleep apnea to include but not limited to diet, exercise, sleep hygiene, compliance with pap therapy. Encouraged lateral sleeping position and to avoid sedatives or alcohol close to bedtime. Risks of untreated sleep apnea were discussed to include but not limited to HTN, heart disease, stroke, cardiac arrhythmia such as AFIB, and dementia.  4. Patient will follow up in 1 year for annual 6 months review.  She will call for any concerns or questions in the interim.        Bill Mata PA-C  08/27/21  10:42 CDT

## 2021-09-08 ENCOUNTER — TELEMEDICINE (OUTPATIENT)
Dept: FAMILY MEDICINE CLINIC | Facility: CLINIC | Age: 56
End: 2021-09-08

## 2021-09-08 ENCOUNTER — LAB (OUTPATIENT)
Dept: LAB | Facility: HOSPITAL | Age: 56
End: 2021-09-08

## 2021-09-08 DIAGNOSIS — Z20.822 CLOSE EXPOSURE TO COVID-19 VIRUS: Primary | ICD-10-CM

## 2021-09-08 DIAGNOSIS — Z20.822 CLOSE EXPOSURE TO COVID-19 VIRUS: ICD-10-CM

## 2021-09-08 LAB — SARS-COV-2 RNA PNL SPEC NAA+PROBE: DETECTED

## 2021-09-08 PROCEDURE — 99213 OFFICE O/P EST LOW 20 MIN: CPT | Performed by: FAMILY MEDICINE

## 2021-09-08 PROCEDURE — C9803 HOPD COVID-19 SPEC COLLECT: HCPCS

## 2021-09-08 PROCEDURE — 87635 SARS-COV-2 COVID-19 AMP PRB: CPT

## 2021-09-08 NOTE — PROGRESS NOTES
Chief Complaint  COVID exposure    Subjective          Anthony Meza presents to [facility]  History of Present Illness  Son tested positive for COVID, and he was at her home over the weekend, she reports dry cough mainly at night, fatigue, terrible headache. Some chills last night.     Current Outpatient Medications   Medication Instructions   • acetaminophen (TYLENOL) 650 mg, Oral, Every 6 Hours PRN   • Acetaminophen-Caffeine (TENSION HEADACHE RELIEF) 500-65 MG tablet 1 tablet, Oral, Every 6 Hours PRN   • ALPRAZolam (XANAX) 0.25-0.5 mg, Oral, 2 Times Daily PRN   • buPROPion XL (WELLBUTRIN XL) 300 mg, Oral, Every Morning   • cholecalciferol (VITAMIN D3) 1,000 Units, Oral, Daily, 10/8/2018   • Cyanocobalamin (VITAMIN B-12) 1000 MCG sublingual tablet 1 tablet, Sublingual, Daily, Hold 10/8/2018   • ELIQUIS 5 MG tablet tablet TAKE ONE TABLET BY MOUTH EVERY 12 HOURS   • furosemide (LASIX) 20 mg, Oral, 2 Times Daily   • loratadine (CLARITIN) 10 MG tablet TAKE ONE TABLET BY MOUTH DAILY   • losartan (COZAAR) 50 mg, Oral, Daily   • metoprolol tartrate (LOPRESSOR) 25 mg, Oral, 2 Times Daily   • oxybutynin XL (DITROPAN-XL) 10 MG 24 hr tablet TAKE TWO TABLETS BY MOUTH DAILY   • traMADol (ULTRAM) 50 mg, Oral, Every 8 Hours PRN, for pain     Objective   Vital Signs:   There were no vitals taken for this visit.    Physical Exam  Vitals and nursing note reviewed.   Constitutional:       General: She is not in acute distress.     Appearance: She is not diaphoretic.      Comments: Mild ill appearance   HENT:      Head: Normocephalic and atraumatic.      Nose: Nose normal.   Eyes:      General: No scleral icterus.        Right eye: No discharge.         Left eye: No discharge.      Conjunctiva/sclera: Conjunctivae normal.   Neck:      Trachea: No tracheal deviation.   Pulmonary:      Effort: Pulmonary effort is normal.   Skin:     Coloration: Skin is not pale.   Neurological:      Mental Status: She  is alert and oriented to person, place, and time.   Psychiatric:         Behavior: Behavior normal.         Thought Content: Thought content normal.         Judgment: Judgment normal.        Result Review :                 Assessment and Plan    Diagnoses and all orders for this visit:    1. Close exposure to COVID-19 virus (Primary)  -     COVID PRE-OP / PRE-PROCEDURE SCREENING ORDER (NO ISOLATION) - Swab, Nasopharynx; Future    Follow-up based on Covid result

## 2021-09-10 ENCOUNTER — HOSPITAL ENCOUNTER (OUTPATIENT)
Dept: INFUSION THERAPY | Facility: HOSPITAL | Age: 56
Discharge: HOME | End: 2021-09-10
Admitting: FAMILY MEDICINE

## 2021-09-10 VITALS
OXYGEN SATURATION: 100 % | TEMPERATURE: 98.9 F | SYSTOLIC BLOOD PRESSURE: 118 MMHG | HEART RATE: 62 BPM | DIASTOLIC BLOOD PRESSURE: 66 MMHG | RESPIRATION RATE: 16 BRPM

## 2021-09-10 DIAGNOSIS — Z12.31 ENCOUNTER FOR SCREENING MAMMOGRAM FOR MALIGNANT NEOPLASM OF BREAST: ICD-10-CM

## 2021-09-10 PROCEDURE — M0243 CASIRIVI AND IMDEVI INFUSION: HCPCS | Performed by: FAMILY MEDICINE

## 2021-09-10 PROCEDURE — 25010000006 INJECTION, CASIRIVIMAB AND IMDEVIMAB, 1200 MG: Performed by: FAMILY MEDICINE

## 2021-09-10 PROCEDURE — 96413 CHEMO IV INFUSION 1 HR: CPT

## 2021-09-10 RX ORDER — EPINEPHRINE 0.3 MG/.3ML
0.3 INJECTION SUBCUTANEOUS ONCE AS NEEDED
Status: DISCONTINUED | OUTPATIENT
Start: 2021-09-10 | End: 2021-09-12 | Stop reason: HOSPADM

## 2021-09-10 RX ORDER — SODIUM CHLORIDE 9 MG/ML
30 INJECTION, SOLUTION INTRAVENOUS ONCE
Status: COMPLETED | OUTPATIENT
Start: 2021-09-10 | End: 2021-09-10

## 2021-09-10 RX ORDER — METHYLPREDNISOLONE SODIUM SUCCINATE 125 MG/2ML
125 INJECTION, POWDER, LYOPHILIZED, FOR SOLUTION INTRAMUSCULAR; INTRAVENOUS ONCE AS NEEDED
Status: DISCONTINUED | OUTPATIENT
Start: 2021-09-10 | End: 2021-09-12 | Stop reason: HOSPADM

## 2021-09-10 RX ORDER — DIPHENHYDRAMINE HYDROCHLORIDE 50 MG/ML
50 INJECTION INTRAMUSCULAR; INTRAVENOUS ONCE AS NEEDED
Status: DISCONTINUED | OUTPATIENT
Start: 2021-09-10 | End: 2021-09-12 | Stop reason: HOSPADM

## 2021-09-10 RX ADMIN — CASIRIVIMAB AND IMDEVIMAB: 600; 600 INJECTION, SOLUTION, CONCENTRATE INTRAVENOUS at 14:34

## 2021-09-10 RX ADMIN — SODIUM CHLORIDE 30 ML: 9 INJECTION, SOLUTION INTRAVENOUS at 14:33

## 2021-09-10 NOTE — PATIENT INSTRUCTIONS
Patient tolerated complete COVID infusion.  No signs/symptoms of medication reaction.      Patient given education on COVID and medication.  Patient verbalized understanding.    Patient ambulatory per self post infusion.

## 2021-09-12 DIAGNOSIS — J30.9 ALLERGIC RHINITIS, UNSPECIFIED SEASONALITY, UNSPECIFIED TRIGGER: ICD-10-CM

## 2021-09-13 RX ORDER — LORATADINE 10 MG/1
TABLET ORAL
Qty: 30 TABLET | Refills: 2 | Status: SHIPPED | OUTPATIENT
Start: 2021-09-13 | End: 2022-06-06

## 2021-09-21 ENCOUNTER — HOSPITAL ENCOUNTER (OUTPATIENT)
Dept: CARDIOLOGY | Facility: HOSPITAL | Age: 56
Discharge: HOME | End: 2021-09-21
Admitting: NURSE PRACTITIONER

## 2021-09-21 ENCOUNTER — OFFICE VISIT (OUTPATIENT)
Dept: BARIATRICS/WEIGHT MGMT | Facility: CLINIC | Age: 56
End: 2021-09-21

## 2021-09-21 VITALS
BODY MASS INDEX: 47.09 KG/M2 | HEART RATE: 74 BPM | SYSTOLIC BLOOD PRESSURE: 124 MMHG | HEIGHT: 66 IN | WEIGHT: 293 LBS | TEMPERATURE: 99 F | DIASTOLIC BLOOD PRESSURE: 81 MMHG | OXYGEN SATURATION: 94 %

## 2021-09-21 DIAGNOSIS — I10 ESSENTIAL HYPERTENSION: ICD-10-CM

## 2021-09-21 DIAGNOSIS — R10.13 DYSPEPSIA: ICD-10-CM

## 2021-09-21 DIAGNOSIS — E66.01 CLASS 3 SEVERE OBESITY DUE TO EXCESS CALORIES WITH SERIOUS COMORBIDITY AND BODY MASS INDEX (BMI) OF 50.0 TO 59.9 IN ADULT (HCC): Primary | ICD-10-CM

## 2021-09-21 DIAGNOSIS — E66.01 CLASS 3 SEVERE OBESITY DUE TO EXCESS CALORIES WITH SERIOUS COMORBIDITY AND BODY MASS INDEX (BMI) OF 50.0 TO 59.9 IN ADULT (HCC): ICD-10-CM

## 2021-09-21 PROCEDURE — 99214 OFFICE O/P EST MOD 30 MIN: CPT | Performed by: NURSE PRACTITIONER

## 2021-09-21 PROCEDURE — 93010 ELECTROCARDIOGRAM REPORT: CPT | Performed by: INTERNAL MEDICINE

## 2021-09-21 PROCEDURE — 93005 ELECTROCARDIOGRAM TRACING: CPT | Performed by: NURSE PRACTITIONER

## 2021-09-21 NOTE — PROGRESS NOTES
"Patient Care Team:  Jamar Saldana DO as PCP - General (Family Medicine)  Tyrell Rosas MD as Referring Physician (Family Medicine)  Joel Medina MD as Cardiologist (Cardiology)    Reason for Visit:  Surgical Weight loss    Subjective   Anthony Meza is a 55 y.o. female.     Anthony is here for follow-up and continued medical management of her morbid obesity.  She is currently on a prescription diet.  Anthony previously was to apply dietary changes such as following the meal plan as directed.  She admits to eating around 4 meals per day.  As a result she lost weight since her last visit.  Patient recently had COVID and was unable to follow the meal plan due to vomiting and diarrhea.       Review Of Systems:  Review of Systems   Constitutional: Negative.    Respiratory: Negative.    Cardiovascular: Negative.    Gastrointestinal: Negative.    Endocrine: Negative.    Musculoskeletal: Positive for arthralgias.   Psychiatric/Behavioral: Positive for sleep disturbance.         The following portions of the patient's history were reviewed and updated as appropriate: allergies, current medications, past family history, past medical history, past social history, past surgical history, and problem list.    Objective   /81 (BP Location: Right arm, Patient Position: Sitting, Cuff Size: Thigh Adult)   Pulse 74   Temp 99 °F (37.2 °C)   Ht 167.6 cm (66\")   Wt (!) 153 kg (338 lb 6.4 oz)   LMP  (LMP Unknown)   SpO2 94%   BMI 54.62 kg/m²       09/21/21  1102   Weight: (!) 153 kg (338 lb 6.4 oz)       Physical Exam  Vitals reviewed.   Constitutional:       Appearance: She is obese.   Cardiovascular:      Rate and Rhythm: Normal rate and regular rhythm.   Pulmonary:      Effort: Pulmonary effort is normal.   Musculoskeletal:         General: Normal range of motion.   Skin:     General: Skin is warm and dry.   Neurological:      Mental Status: She is alert and oriented to person, place, and time.   Psychiatric:    "      Mood and Affect: Mood normal.         Behavior: Behavior normal.         Patient's Body mass index is 54.62 kg/m². indicating that she is morbidly obese (BMI > 40 or > 35 with obesity - related health condition). Obesity-related health conditions include the following: hypertension. Obesity is improving with treatment. BMI is is above average; BMI management plan is completed. We discussed portion control and increasing exercise..     Assessment/Plan   Diagnoses and all orders for this visit:    1. Class 3 severe obesity due to excess calories with serious comorbidity and body mass index (BMI) of 50.0 to 59.9 in adult (CMS/[facility]) (Primary)  -     Case Request; Standing  -     Follow Anesthesia Guidelines / Protocol; Standing  -     TSH; Standing  -     Case Request  -     ECG 12 Lead; Future    2. Essential hypertension  Comments:  Continue current regimen, nutrition counseling provided.   Orders:  -     Case Request; Standing  -     Follow Anesthesia Guidelines / Protocol; Standing  -     TSH; Standing  -     Case Request  -     ECG 12 Lead; Future    3. Dyspepsia  Comments:  EGD ordered   Orders:  -     Case Request; Standing  -     Follow Anesthesia Guidelines / Protocol; Standing  -     TSH; Standing  -     Case Request         Anthony Meza was seen today for follow-up, obesity, nutrition counseling and weight loss.  She has lost weight since her last visit.  Today we discussed healthy changes in lifestyle, diet, and exercise. Dietician consultation obtained.  Anthony Meza had received handouts to her explaining the recommendation on portion sizes/appetite control/reading nutrition labels.   Intensive behavioral therapy for obesity was done today as well.   I believe this patient will be a good candidate for weight loss surgery.  I have discussed the Melvi - Y Gastric Bypass, laparoscopic sleeve gastrectomy and the Laparoscopic Gastric Band procedures.  We discussed the benefits of the surgeries including the  benefit of weight loss and the possible reversal of co-morbid conditions associated with morbid obesity. I explained to the patient that prior to making a definitive decision on the type of surgery she will require an esophagogastroduodenoscopy with biopsies to assess for any contraindications for surgical weight loss.  The alternatives  include not doing anything, or pursuing an UGI series which only offers a diagnosis with potential less accuracy compared to EGD. The benefits of the EGD such as identifying the pathology and anatomy of the upper GI system and the complications and risks of the procedure.  The risk of the endoscopy were discussed in detail. We discussed the risk of perforation (one out of 0919-1690, riskier with dilation), bleeding (one out of 500), and the rare risks of infection, adverse reaction to anesthesia, respiratory failure, cardiac failure including MI and adverse reaction to medications, etc. We discussed consequences that could occur if a risk were to develop such as the need for hospitalization, blood transfusion, surgical intervention, medications, pain, disability and death. The patient verbalizes understanding and agrees to proceed. such as bleeding, perforation, swallowing difficulties and gas bloat can occur after this procedure.  Upon completion of our discussion and addressing and answering her questions to her satisfation, informed consent was obtained.  She will be scheduled accordingly for the esophagogastroduodenoscopy procedure.    Goals for this month are:   1.  Continue to focus on prescription meal plan.  2.  Patient states that she has papers to fill out for psychiatry and will have her schedule.  3.  EGD risk and benefits discussed with patient today.  EGD scheduled.  4.  EKG ordered today.    Follow up in one month for a weight recheck.

## 2021-09-23 LAB
QT INTERVAL: 416 MS
QTC INTERVAL: 439 MS

## 2021-10-19 ENCOUNTER — TELEPHONE (OUTPATIENT)
Dept: BARIATRICS/WEIGHT MGMT | Facility: CLINIC | Age: 56
End: 2021-10-19

## 2021-10-19 NOTE — TELEPHONE ENCOUNTER
I tried to call the patient regarding a no show appt I was unable to leave a message due to mailbox being full.

## 2021-10-24 DIAGNOSIS — I10 ESSENTIAL HYPERTENSION: ICD-10-CM

## 2021-10-25 RX ORDER — LOSARTAN POTASSIUM 50 MG/1
TABLET ORAL
Qty: 90 TABLET | Refills: 0 | Status: SHIPPED | OUTPATIENT
Start: 2021-10-25 | End: 2022-03-29

## 2021-10-25 RX ORDER — APIXABAN 5 MG/1
TABLET, FILM COATED ORAL
Qty: 60 TABLET | Refills: 7 | OUTPATIENT
Start: 2021-10-25

## 2021-11-23 ENCOUNTER — OFFICE VISIT (OUTPATIENT)
Dept: FAMILY MEDICINE CLINIC | Facility: CLINIC | Age: 56
End: 2021-11-23

## 2021-11-23 VITALS
WEIGHT: 293 LBS | TEMPERATURE: 98 F | SYSTOLIC BLOOD PRESSURE: 132 MMHG | OXYGEN SATURATION: 97 % | DIASTOLIC BLOOD PRESSURE: 88 MMHG | HEIGHT: 66 IN | HEART RATE: 77 BPM | BODY MASS INDEX: 47.09 KG/M2

## 2021-11-23 DIAGNOSIS — Z00.00 MEDICARE ANNUAL WELLNESS VISIT, SUBSEQUENT: Primary | ICD-10-CM

## 2021-11-23 DIAGNOSIS — Z12.11 SCREEN FOR COLON CANCER: ICD-10-CM

## 2021-11-23 DIAGNOSIS — I48.0 PAROXYSMAL ATRIAL FIBRILLATION (HCC): ICD-10-CM

## 2021-11-23 DIAGNOSIS — Z12.31 ENCOUNTER FOR SCREENING MAMMOGRAM FOR MALIGNANT NEOPLASM OF BREAST: ICD-10-CM

## 2021-11-23 PROCEDURE — 1160F RVW MEDS BY RX/DR IN RCRD: CPT | Performed by: FAMILY MEDICINE

## 2021-11-23 PROCEDURE — 1126F AMNT PAIN NOTED NONE PRSNT: CPT | Performed by: FAMILY MEDICINE

## 2021-11-23 PROCEDURE — 1170F FXNL STATUS ASSESSED: CPT | Performed by: FAMILY MEDICINE

## 2021-11-23 PROCEDURE — G0439 PPPS, SUBSEQ VISIT: HCPCS | Performed by: FAMILY MEDICINE

## 2021-12-15 ENCOUNTER — OFFICE VISIT (OUTPATIENT)
Dept: FAMILY MEDICINE CLINIC | Facility: CLINIC | Age: 56
End: 2021-12-15

## 2021-12-15 VITALS
OXYGEN SATURATION: 99 % | BODY MASS INDEX: 47.09 KG/M2 | SYSTOLIC BLOOD PRESSURE: 110 MMHG | DIASTOLIC BLOOD PRESSURE: 73 MMHG | HEIGHT: 66 IN | WEIGHT: 293 LBS | TEMPERATURE: 97.7 F | HEART RATE: 72 BPM

## 2021-12-15 DIAGNOSIS — Z86.73 HISTORY OF TRANSIENT ISCHEMIC ATTACK (TIA): ICD-10-CM

## 2021-12-15 DIAGNOSIS — R68.84 MAXILLA PAIN: ICD-10-CM

## 2021-12-15 DIAGNOSIS — J34.89 TENDERNESS OVER MAXILLARY SINUS: ICD-10-CM

## 2021-12-15 DIAGNOSIS — R42 DIZZINESS: ICD-10-CM

## 2021-12-15 DIAGNOSIS — J01.00 ACUTE NON-RECURRENT MAXILLARY SINUSITIS: Primary | ICD-10-CM

## 2021-12-15 DIAGNOSIS — H53.8 BLURRY VISION, LEFT EYE: ICD-10-CM

## 2021-12-15 DIAGNOSIS — Z82.3 FAMILY HISTORY OF CEREBROVASCULAR ACCIDENT (CVA): ICD-10-CM

## 2021-12-15 DIAGNOSIS — R51.9 LEFT-SIDED HEADACHE: ICD-10-CM

## 2021-12-15 PROCEDURE — 96372 THER/PROPH/DIAG INJ SC/IM: CPT | Performed by: FAMILY MEDICINE

## 2021-12-15 PROCEDURE — 99215 OFFICE O/P EST HI 40 MIN: CPT | Performed by: FAMILY MEDICINE

## 2021-12-15 RX ORDER — METHYLPREDNISOLONE ACETATE 40 MG/ML
40 INJECTION, SUSPENSION INTRA-ARTICULAR; INTRALESIONAL; INTRAMUSCULAR; SOFT TISSUE ONCE
Status: COMPLETED | OUTPATIENT
Start: 2021-12-15 | End: 2021-12-15

## 2021-12-15 RX ORDER — CLINDAMYCIN HYDROCHLORIDE 300 MG/1
300 CAPSULE ORAL 3 TIMES DAILY
Qty: 30 CAPSULE | Refills: 0 | Status: SHIPPED | OUTPATIENT
Start: 2021-12-15 | End: 2022-06-21

## 2021-12-15 RX ADMIN — METHYLPREDNISOLONE ACETATE 40 MG: 40 INJECTION, SUSPENSION INTRA-ARTICULAR; INTRALESIONAL; INTRAMUSCULAR; SOFT TISSUE at 13:36

## 2021-12-15 NOTE — PATIENT INSTRUCTIONS
- Go to [facility] or [employer] or [facility] optometrist for a check of the retina and the blurry vision on the left side.  Remind them you have had a TIA in the past.
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
## 2021-12-17 ENCOUNTER — APPOINTMENT (OUTPATIENT)
Dept: MRI IMAGING | Facility: HOSPITAL | Age: 56
End: 2021-12-17

## 2021-12-17 ENCOUNTER — HOSPITAL ENCOUNTER (EMERGENCY)
Facility: HOSPITAL | Age: 56
Discharge: HOME | End: 2021-12-17
Admitting: EMERGENCY MEDICINE

## 2021-12-17 ENCOUNTER — APPOINTMENT (OUTPATIENT)
Dept: CT IMAGING | Facility: HOSPITAL | Age: 56
End: 2021-12-17

## 2021-12-17 ENCOUNTER — TELEPHONE (OUTPATIENT)
Dept: FAMILY MEDICINE CLINIC | Facility: CLINIC | Age: 56
End: 2021-12-17

## 2021-12-17 VITALS
WEIGHT: 293 LBS | HEIGHT: 66 IN | RESPIRATION RATE: 20 BRPM | TEMPERATURE: 98.3 F | OXYGEN SATURATION: 95 % | SYSTOLIC BLOOD PRESSURE: 97 MMHG | DIASTOLIC BLOOD PRESSURE: 58 MMHG | BODY MASS INDEX: 47.09 KG/M2 | HEART RATE: 78 BPM

## 2021-12-17 DIAGNOSIS — H53.9 VISION CHANGES: Primary | ICD-10-CM

## 2021-12-17 DIAGNOSIS — R51.9 ACUTE NONINTRACTABLE HEADACHE, UNSPECIFIED HEADACHE TYPE: ICD-10-CM

## 2021-12-17 LAB
ALBUMIN SERPL-MCNC: 3.7 G/DL (ref 3.5–5.2)
ALBUMIN/GLOB SERPL: 1.3 G/DL
ALP SERPL-CCNC: 113 U/L (ref 39–117)
ALT SERPL W P-5'-P-CCNC: 14 U/L (ref 1–33)
ANION GAP SERPL CALCULATED.3IONS-SCNC: 5 MMOL/L (ref 5–15)
AST SERPL-CCNC: 12 U/L (ref 1–32)
BASOPHILS # BLD AUTO: 0.03 10*3/MM3 (ref 0–0.2)
BASOPHILS NFR BLD AUTO: 0.5 % (ref 0–1.5)
BILIRUB SERPL-MCNC: 0.3 MG/DL (ref 0–1.2)
BUN SERPL-MCNC: 12 MG/DL (ref 6–20)
BUN/CREAT SERPL: 15.2 (ref 7–25)
CALCIUM SPEC-SCNC: 8.6 MG/DL (ref 8.6–10.5)
CHLORIDE SERPL-SCNC: 106 MMOL/L (ref 98–107)
CO2 SERPL-SCNC: 30 MMOL/L (ref 22–29)
CREAT SERPL-MCNC: 0.79 MG/DL (ref 0.57–1)
CRP SERPL-MCNC: 0.72 MG/DL (ref 0–0.5)
D-LACTATE SERPL-SCNC: 1.1 MMOL/L (ref 0.5–2)
DEPRECATED RDW RBC AUTO: 46.9 FL (ref 37–54)
EOSINOPHIL # BLD AUTO: 0.17 10*3/MM3 (ref 0–0.4)
EOSINOPHIL NFR BLD AUTO: 2.6 % (ref 0.3–6.2)
ERYTHROCYTE [DISTWIDTH] IN BLOOD BY AUTOMATED COUNT: 13.8 % (ref 12.3–15.4)
ERYTHROCYTE [SEDIMENTATION RATE] IN BLOOD: 15 MM/HR (ref 0–30)
GFR SERPL CREATININE-BSD FRML MDRD: 75 ML/MIN/1.73
GLOBULIN UR ELPH-MCNC: 2.8 GM/DL
GLUCOSE SERPL-MCNC: 120 MG/DL (ref 65–99)
HCT VFR BLD AUTO: 39.7 % (ref 34–46.6)
HGB BLD-MCNC: 12.7 G/DL (ref 12–15.9)
IMM GRANULOCYTES # BLD AUTO: 0.02 10*3/MM3 (ref 0–0.05)
IMM GRANULOCYTES NFR BLD AUTO: 0.3 % (ref 0–0.5)
LYMPHOCYTES # BLD AUTO: 1.66 10*3/MM3 (ref 0.7–3.1)
LYMPHOCYTES NFR BLD AUTO: 25.2 % (ref 19.6–45.3)
MCH RBC QN AUTO: 30 PG (ref 26.6–33)
MCHC RBC AUTO-ENTMCNC: 32 G/DL (ref 31.5–35.7)
MCV RBC AUTO: 93.6 FL (ref 79–97)
MONOCYTES # BLD AUTO: 0.4 10*3/MM3 (ref 0.1–0.9)
MONOCYTES NFR BLD AUTO: 6.1 % (ref 5–12)
NEUTROPHILS NFR BLD AUTO: 4.32 10*3/MM3 (ref 1.7–7)
NEUTROPHILS NFR BLD AUTO: 65.3 % (ref 42.7–76)
NRBC BLD AUTO-RTO: 0 /100 WBC (ref 0–0.2)
PLATELET # BLD AUTO: 202 10*3/MM3 (ref 140–450)
PMV BLD AUTO: 10.1 FL (ref 6–12)
POTASSIUM SERPL-SCNC: 4.1 MMOL/L (ref 3.5–5.2)
PROCALCITONIN SERPL-MCNC: 0.05 NG/ML (ref 0–0.25)
PROT SERPL-MCNC: 6.5 G/DL (ref 6–8.5)
RBC # BLD AUTO: 4.24 10*6/MM3 (ref 3.77–5.28)
SODIUM SERPL-SCNC: 141 MMOL/L (ref 136–145)
WBC NRBC COR # BLD: 6.6 10*3/MM3 (ref 3.4–10.8)

## 2021-12-17 PROCEDURE — 85652 RBC SED RATE AUTOMATED: CPT | Performed by: PHYSICIAN ASSISTANT

## 2021-12-17 PROCEDURE — 85025 COMPLETE CBC W/AUTO DIFF WBC: CPT | Performed by: PHYSICIAN ASSISTANT

## 2021-12-17 PROCEDURE — 84145 PROCALCITONIN (PCT): CPT | Performed by: PHYSICIAN ASSISTANT

## 2021-12-17 PROCEDURE — 70450 CT HEAD/BRAIN W/O DYE: CPT

## 2021-12-17 PROCEDURE — 80053 COMPREHEN METABOLIC PANEL: CPT | Performed by: PHYSICIAN ASSISTANT

## 2021-12-17 PROCEDURE — 99283 EMERGENCY DEPT VISIT LOW MDM: CPT

## 2021-12-17 PROCEDURE — 83605 ASSAY OF LACTIC ACID: CPT | Performed by: PHYSICIAN ASSISTANT

## 2021-12-17 PROCEDURE — 86140 C-REACTIVE PROTEIN: CPT | Performed by: PHYSICIAN ASSISTANT

## 2021-12-17 PROCEDURE — 70544 MR ANGIOGRAPHY HEAD W/O DYE: CPT

## 2021-12-17 RX ORDER — ACETAMINOPHEN 500 MG
1000 TABLET ORAL ONCE
Status: COMPLETED | OUTPATIENT
Start: 2021-12-17 | End: 2021-12-17

## 2021-12-17 RX ADMIN — ACETAMINOPHEN 1000 MG: 500 TABLET, FILM COATED ORAL at 13:08

## 2021-12-17 NOTE — DISCHARGE INSTRUCTIONS
Please continue to follow-up with your primary care provider and ophthalmologist for continued evaluation of your vision changes and headaches.  Please complete all of the clindamycin antibiotic which you were previously given.  Should you develop any new or worsening symptoms please return to the ER for further evaluation.

## 2021-12-17 NOTE — TELEPHONE ENCOUNTER
Patient called stating she is starting to lose clear vision in her left eye. She stated she is seeing outlines/ shadows and some things that are lighter colors are rainbow colored. She said she went to the eye doctor yesterday and they did not see any pressure behind the eye and did not do anything to help. Per nurse advised patient to go to ED due to losing vision.

## 2021-12-17 NOTE — ED PROVIDER NOTES
"Subjective   History of Present Illness    Patient is a 56-year-old female presenting to ED with left-sided visual changes and headache.  PMH significant for fibromyalgia, chronic pain disorder, history of migraines, chronic fatigue syndrome, history A. fib, IBS, hypertension, arthritis.  Patient states on Sunday, 12/5/2020 when she began developing a headache and pressure in her left maxillary and frontal sinuses.  Patient reports at that time she felt like there was swelling in the area and try to treat herself symptomatically with over-the-counter Mucinex and Tylenol.  Patient reports that her symptoms continue to persist and she developed blurry vision in the left eye for which she was seen at her primary care provider 2 days ago.  Patient states at that time she was given a steroid shot due to the swelling and advised \"it would help with my optic nerve swelling.\"  Patient states she was also started on clindamycin.  Patient reports yesterday she followed up with her eye doctor as well who advised she might have early glaucoma but denied any reason or findings for the loss of vision.  Patient states yesterday she also began developing \"dark vision in my left eye like I am wearing very very dark sunglasses.\"  Patient reports that this morning when she woke up if she covers her right eye she feels like \"there is a prism surrounding all of the shapes in my eye.\"  Patient denies any right-sided visual changes, diplopia.  Patient denies fevers, chills, diaphoresis, neck pain, neck stiffness, nausea, vomiting, or any hearing changes.  Patient states she has a history of migraines but is never had a migraine similar to this describing she will intermittently lose only peripheral vision but it is bilateral.  Patient reports the new symptom this morning at the present visit and concerned her for which she called her PCP and was advised come to the ER for further evaluation.  Patient also noted that her last dose of " Tylenol was last night and her headache pain had gone down to a 2/10 however this morning when it returned to a 5/10 patient she became concerned.    Records reviewed show patient is seen in PCP on 12/15/2021 for maxillary sinusitis acute, left eye blurry vision, dizziness, left-sided headache, tenderness over maxillary sinus, maxilla pain, history of TIA.  Patient was given an injection of Depo-Medrol and sent home with a prescription for clindamycin.    Patient called her primary care provider today with worsening symptoms which time she was advised go to the ER for further evaluation.    Review of Systems   Constitutional: Negative.  Negative for diaphoresis, fatigue and fever.   HENT: Positive for sinus pressure (Left maxillary and frontal). Negative for facial swelling, hearing loss, sinus pain and tinnitus.    Eyes: Positive for visual disturbance (Blurry vision and dark vision to left eye). Negative for photophobia, pain, discharge, redness and itching.        Denies right-sided visual changes   Respiratory: Negative.    Cardiovascular: Negative.    Gastrointestinal: Negative.  Negative for nausea and vomiting.   Genitourinary: Negative.    Musculoskeletal: Negative.  Negative for arthralgias, myalgias, neck pain and neck stiffness.   Skin: Negative.    Neurological: Positive for headaches (Left sinus). Negative for dizziness.        Denies head injury   Psychiatric/Behavioral: Negative.    All other systems reviewed and are negative.      Past Medical History:   Diagnosis Date   • A-fib (HCC)    • Abnormal ECG    • Anxiety    • Arthritis    • Atrial fibrillation (HCC)    • Back pain    • Breast cancer ([facility]) 09/2018   • CFS (chronic fatigue syndrome)    • Chest pain    • Chronic pain disorder    • Depression    • Dizziness    • Ductal carcinoma in situ (DCIS) of left breast     BREAST LEFT   • Fibromyalgia    • Hx of radiation therapy    • Hypertension    • IBS (irritable bowel syndrome)    • Incontinence     • Migraines    • Mononucleosis    • Sleep apnea     uses a c-pap   • Wears glasses        Allergies   Allergen Reactions   • Penicillins Rash       Past Surgical History:   Procedure Laterality Date   • ANKLE SURGERY      x2 left   • BREAST BIOPSY Left 2018    stereo   • BREAST EXCISIONAL BIOPSY Left 2018   • BREAST LUMPECTOMY Left 09/2018   • BREAST LUMPECTOMY WITH SENTINEL NODE BIOPSY Left 10/15/2018    Procedure: LEFT BREAST LUMPECTOMY WITH NEEDLE LOCALIZATION - MAMMOGRAM GUIDED AND SENTINEL LYMPH  NODE BIOPSY - RADIOLOGIST TO INJECT AND SCAN;  Surgeon: Rafael Porter MD;  Location:  [facility] OR;  Service: General   • CARDIAC ABLATION  11/2019   • CARDIAC CATHETERIZATION Bilateral 1/29/2018    Procedure: Coronary angiography;  Surgeon: Joel Medina MD;  Location:  [facility] CATH INVASIVE LOCATION;  Service:    • CARDIAC CATHETERIZATION N/A 1/29/2018    Procedure: Left Heart Cath;  Surgeon: Joel Medina MD;  Location:  [facility] CATH INVASIVE LOCATION;  Service:    • CYST REMOVAL      from tailbone   • D & C HYSTEROSCOPY N/A 8/9/2021    Procedure: DILATATION AND CURETTAGE HYSTEROSCOPY;  Surgeon: Jacqueline Johnson DO;  Location:  [facility] OR;  Service: Obstetrics/Gynecology;  Laterality: N/A;   • TUBAL ABDOMINAL LIGATION     • WISDOM TOOTH EXTRACTION         Family History   Problem Relation Age of Onset   • Transient ischemic attack Mother    • Rheum arthritis Mother    • Fibromyalgia Mother    • Heart disease Father    • Asthma Father    • Hypertension Father    • Rheum arthritis Father    • Obesity Father    • Obesity Sister    • Obesity Maternal Grandmother    • Ovarian cancer Maternal Grandmother    • Obesity Paternal Grandfather    • Hypertension Paternal Grandfather    • Heart disease Paternal Grandfather    • Liver cancer Maternal Aunt    • Breast cancer Neg Hx        Social History     Socioeconomic History   • Marital status:    • Number of children: 2   Tobacco Use   • Smoking status: Never Smoker   •  Smokeless tobacco: Never Used   Vaping Use   • Vaping Use: Never used   Substance and Sexual Activity   • Alcohol use: No   • Drug use: No   • Sexual activity: Yes     Partners: Male     Birth control/protection: Surgical     Comment: LMP: Menopausal           Objective   Physical Exam  Vitals and nursing note reviewed.   Constitutional:       General: She is not in acute distress.     Appearance: Normal appearance. She is well-developed and well-groomed. She is obese. She is not toxic-appearing or diaphoretic.   HENT:      Head: Normocephalic and atraumatic.      Right Ear: Ear canal and external ear normal.      Left Ear: Ear canal and external ear normal.      Nose: No congestion or rhinorrhea.      Right Sinus: No maxillary sinus tenderness or frontal sinus tenderness.      Left Sinus: Maxillary sinus tenderness and frontal sinus tenderness present.   Eyes:      General: Lids are normal.      Extraocular Movements:      Right eye: No nystagmus.      Left eye: No nystagmus.      Conjunctiva/sclera: Conjunctivae normal.      Comments: No pain with movement of EOM  Normal inspection of bilateral periorbital regions with no erythema, bruising, signs of infection, or swelling   Cardiovascular:      Rate and Rhythm: Normal rate.   Pulmonary:      Effort: Pulmonary effort is normal. No respiratory distress.      Breath sounds: Normal breath sounds.   Abdominal:      General: Bowel sounds are normal.      Palpations: Abdomen is soft.      Tenderness: There is no abdominal tenderness.   Musculoskeletal:         General: Normal range of motion.      Cervical back: Normal range of motion and neck supple. No rigidity or tenderness.      Right lower leg: No edema.      Left lower leg: No edema.   Skin:     General: Skin is warm and dry.   Neurological:      General: No focal deficit present.      Mental Status: She is alert and oriented to person, place, and time. Mental status is at baseline.      Cranial Nerves: Cranial  nerves are intact.      Sensory: Sensation is intact.      Coordination: Coordination is intact.      Gait: Gait normal.   Psychiatric:         Attention and Perception: Attention normal.         Mood and Affect: Mood and affect normal.         Speech: Speech normal.         Behavior: Behavior normal. Behavior is cooperative.         Procedures           ED Course                                                 MDM  Number of Diagnoses or Management Options     Amount and/or Complexity of Data Reviewed  Clinical lab tests: ordered and reviewed  Tests in the radiology section of CPT®: reviewed and ordered  Tests in the medicine section of CPT®: reviewed and ordered  Decide to obtain previous medical records or to obtain history from someone other than the patient: yes  Review and summarize past medical records: yes  Discuss the patient with other providers: yes (Dr. Fermin Palafox (attending))      Patient is a 56-year-old female presenting to ED with left-sided visual changes and headache.  PMH significant for fibromyalgia, chronic pain disorder, history of migraines, chronic fatigue syndrome, history A. fib, IBS, hypertension, arthritis.  CBC unremarkable including normal blood cell count 6.6.  CMP unremarkable.  Sed rate, lactic, procalcitonin WNL.  CRP slightly elevated at 0.72. Head CT without contrast showed: No acute intracranial abnormality.  MRI angiogram venogram showed: No convincing acute sinus venous thrombosis, hypoplastic right transverse and sigmoid sinus.  Initially patient declined any medications for her headache however she did ask for dose of Tylenol which somewhat alleviated it.  Discussed with patient need for continued outpatient follow-up with her ophthalmologist as well as primary care provider.  Patient made afebrile while in ED.  Advised of strict return precautions any for immediate return to ED should she develop any new or worsening symptoms.  Patient is tolerating p.o. fluids,  ambulating at baseline without difficulty.  Patient is no further questions concerns or needs at this time and is stable for discharge.  Case discussed with Dr. Fermin Palafox who is in agreement with no further recommendations.     Final diagnoses:   Vision changes   Acute nonintractable headache, unspecified headache type       ED Disposition  ED Disposition     ED Disposition Condition Comment    Discharge Stable           Jamar Saldana, DO  [address] [id number] [phone]    Schedule an appointment as soon as possible for a visit in 2 days      [facility] Emergency Department  [address] [place] [id number] [phone]    As needed         Medication List      No changes were made to your prescriptions during this visit.          Floyd Gastelum PA-C  12/18/21 0202

## 2021-12-17 NOTE — ED TRIAGE NOTES
Patient states she had a sinus infection x2 weeks and then started on Monday she started having blurry vision and then complete loss of vision in the left eye. Patient states she went to her PCP, given a steroid shot and antibiotics to go home with. Patient has had no relief. She went to her eye doc yesterday and states she has a small cataract on her left eye but nothing that should be cause this issue. Patient states she called her eye dr this morning and stated the pain hasnt gotten any better so they said to come to the ER.

## 2021-12-22 ENCOUNTER — TELEPHONE (OUTPATIENT)
Dept: FAMILY MEDICINE CLINIC | Facility: CLINIC | Age: 56
End: 2021-12-22

## 2021-12-22 DIAGNOSIS — H54.7 VISION LOSS: Primary | ICD-10-CM

## 2021-12-22 NOTE — TELEPHONE ENCOUNTER
Patient called and stated that she is still having issues with blindness in left eye and headache, patient would like to be advised of next steps. Please reach out.

## 2021-12-22 NOTE — TELEPHONE ENCOUNTER
STAT ophthalmology referral per provider. Patient states she seen doctor at [email or web address] the day before she went to emergency room. Was informed no pressure on eyes, but slight glaucoma.

## 2021-12-26 ENCOUNTER — APPOINTMENT (OUTPATIENT)
Dept: MRI IMAGING | Facility: HOSPITAL | Age: 56
End: 2021-12-26

## 2021-12-26 ENCOUNTER — HOSPITAL ENCOUNTER (EMERGENCY)
Facility: HOSPITAL | Age: 56
Discharge: HOME | End: 2021-12-26
Attending: EMERGENCY MEDICINE | Admitting: EMERGENCY MEDICINE

## 2021-12-26 VITALS
HEIGHT: 66 IN | RESPIRATION RATE: 16 BRPM | TEMPERATURE: 98 F | DIASTOLIC BLOOD PRESSURE: 52 MMHG | HEART RATE: 66 BPM | BODY MASS INDEX: 47.09 KG/M2 | WEIGHT: 293 LBS | SYSTOLIC BLOOD PRESSURE: 104 MMHG | OXYGEN SATURATION: 99 %

## 2021-12-26 DIAGNOSIS — H53.9 VISION CHANGES: Primary | ICD-10-CM

## 2021-12-26 LAB
ALBUMIN SERPL-MCNC: 3.9 G/DL (ref 3.5–5.2)
ALBUMIN/GLOB SERPL: 1.3 G/DL
ALP SERPL-CCNC: 115 U/L (ref 39–117)
ALT SERPL W P-5'-P-CCNC: 17 U/L (ref 1–33)
ANION GAP SERPL CALCULATED.3IONS-SCNC: 7 MMOL/L (ref 5–15)
AST SERPL-CCNC: 12 U/L (ref 1–32)
BASOPHILS # BLD AUTO: 0.03 10*3/MM3 (ref 0–0.2)
BASOPHILS NFR BLD AUTO: 0.3 % (ref 0–1.5)
BILIRUB SERPL-MCNC: 0.4 MG/DL (ref 0–1.2)
BUN SERPL-MCNC: 14 MG/DL (ref 6–20)
BUN/CREAT SERPL: 17.3 (ref 7–25)
CALCIUM SPEC-SCNC: 9.1 MG/DL (ref 8.6–10.5)
CHLORIDE SERPL-SCNC: 109 MMOL/L (ref 98–107)
CO2 SERPL-SCNC: 30 MMOL/L (ref 22–29)
CREAT SERPL-MCNC: 0.81 MG/DL (ref 0.57–1)
CRP SERPL-MCNC: 0.54 MG/DL (ref 0–0.5)
DEPRECATED RDW RBC AUTO: 48.7 FL (ref 37–54)
EOSINOPHIL # BLD AUTO: 0.09 10*3/MM3 (ref 0–0.4)
EOSINOPHIL NFR BLD AUTO: 1 % (ref 0.3–6.2)
ERYTHROCYTE [DISTWIDTH] IN BLOOD BY AUTOMATED COUNT: 14.1 % (ref 12.3–15.4)
ERYTHROCYTE [SEDIMENTATION RATE] IN BLOOD: 14 MM/HR (ref 0–30)
GFR SERPL CREATININE-BSD FRML MDRD: 73 ML/MIN/1.73
GLOBULIN UR ELPH-MCNC: 3 GM/DL
GLUCOSE SERPL-MCNC: 105 MG/DL (ref 65–99)
HCT VFR BLD AUTO: 40.8 % (ref 34–46.6)
HGB BLD-MCNC: 13 G/DL (ref 12–15.9)
IMM GRANULOCYTES # BLD AUTO: 0.03 10*3/MM3 (ref 0–0.05)
IMM GRANULOCYTES NFR BLD AUTO: 0.3 % (ref 0–0.5)
LYMPHOCYTES # BLD AUTO: 1.8 10*3/MM3 (ref 0.7–3.1)
LYMPHOCYTES NFR BLD AUTO: 19.7 % (ref 19.6–45.3)
MCH RBC QN AUTO: 30 PG (ref 26.6–33)
MCHC RBC AUTO-ENTMCNC: 31.9 G/DL (ref 31.5–35.7)
MCV RBC AUTO: 94.2 FL (ref 79–97)
MONOCYTES # BLD AUTO: 0.54 10*3/MM3 (ref 0.1–0.9)
MONOCYTES NFR BLD AUTO: 5.9 % (ref 5–12)
NEUTROPHILS NFR BLD AUTO: 6.67 10*3/MM3 (ref 1.7–7)
NEUTROPHILS NFR BLD AUTO: 72.8 % (ref 42.7–76)
NRBC BLD AUTO-RTO: 0 /100 WBC (ref 0–0.2)
PLATELET # BLD AUTO: 224 10*3/MM3 (ref 140–450)
PMV BLD AUTO: 9.8 FL (ref 6–12)
POTASSIUM SERPL-SCNC: 3.8 MMOL/L (ref 3.5–5.2)
PROT SERPL-MCNC: 6.9 G/DL (ref 6–8.5)
RBC # BLD AUTO: 4.33 10*6/MM3 (ref 3.77–5.28)
SODIUM SERPL-SCNC: 146 MMOL/L (ref 136–145)
WBC NRBC COR # BLD: 9.16 10*3/MM3 (ref 3.4–10.8)

## 2021-12-26 PROCEDURE — 70553 MRI BRAIN STEM W/O & W/DYE: CPT

## 2021-12-26 PROCEDURE — 85652 RBC SED RATE AUTOMATED: CPT | Performed by: EMERGENCY MEDICINE

## 2021-12-26 PROCEDURE — 85025 COMPLETE CBC W/AUTO DIFF WBC: CPT | Performed by: EMERGENCY MEDICINE

## 2021-12-26 PROCEDURE — A9577 INJ MULTIHANCE: HCPCS | Performed by: EMERGENCY MEDICINE

## 2021-12-26 PROCEDURE — 86140 C-REACTIVE PROTEIN: CPT | Performed by: EMERGENCY MEDICINE

## 2021-12-26 PROCEDURE — 0 GADOBENATE DIMEGLUMINE 529 MG/ML SOLUTION: Performed by: EMERGENCY MEDICINE

## 2021-12-26 PROCEDURE — 80053 COMPREHEN METABOLIC PANEL: CPT | Performed by: EMERGENCY MEDICINE

## 2021-12-26 PROCEDURE — 99283 EMERGENCY DEPT VISIT LOW MDM: CPT

## 2021-12-26 RX ORDER — SODIUM CHLORIDE 0.9 % (FLUSH) 0.9 %
10 SYRINGE (ML) INJECTION AS NEEDED
Status: DISCONTINUED | OUTPATIENT
Start: 2021-12-26 | End: 2021-12-26 | Stop reason: HOSPADM

## 2021-12-26 RX ADMIN — GADOBENATE DIMEGLUMINE 20 ML: 529 INJECTION, SOLUTION INTRAVENOUS at 18:09

## 2021-12-26 NOTE — ED PROVIDER NOTES
Subjective   Patient presents with a complaint of loss of vision in her left eye, and was beginning to lose some vision in her right eye.  She says that about 3 weeks ago she had what was felt to be a sinus infection and got put on some antibiotics.  The next day she began having some blurred left vision and thought was just glasses that medicine but it gradually progressed to worsen little darkness like the room was in a dark without light though she can still see at that time.  She saw her primary care physician and was told is probably still a sinus infection but on some antibiotics but then when they continue to worsen she was referred to the emergency room.  She came here and had some lab work and an MRV that were both okay.  She was then referred to [facility]'s where she saw an eye doctor and everything there was okay except for her visual acuity.  Things have progressed and so today she went back to see a different eye doctor at [facility] that was supposedly more advanced.  He did an exam and according to the patient spoke with Dr. Wise of ophthalmology group who she was post to see tomorrow but that she was told to come to the ER to get an MRI with contrast of her brain because they are worried about the possibly of stroke in her eye.  Also there was some lab work requested.  She said that she has lost vision in her left eye completely and now is having some vision difficulty in her right eye so she is very concerned.      History provided by:  Patient   used: No    Loss of Vision  Location:  Left eye   Quality:  Can't see out of left eye  Severity:  Severe  Onset quality:  Gradual  Duration:  3 weeks  Timing:  Constant  Progression:  Worsening  Chronicity:  New  Associated symptoms: congestion    Associated symptoms: no abdominal pain, no chest pain, no cough, no diarrhea, no ear pain, no fatigue, no fever, no headaches, no loss of consciousness, no myalgias, no nausea, no  rash, no rhinorrhea, no shortness of breath, no sore throat, no vomiting and no wheezing        Review of Systems   Constitutional: Negative.  Negative for fatigue and fever.   HENT: Positive for congestion. Negative for ear pain, rhinorrhea and sore throat.    Respiratory: Negative.  Negative for cough, shortness of breath and wheezing.    Cardiovascular: Negative for chest pain.   Gastrointestinal: Negative.  Negative for abdominal pain, diarrhea, nausea and vomiting.   Genitourinary: Negative.    Musculoskeletal: Negative.  Negative for myalgias.   Skin: Negative.  Negative for rash.   Neurological: Negative.  Negative for loss of consciousness and headaches.   Psychiatric/Behavioral: Negative.    All other systems reviewed and are negative.      Past Medical History:   Diagnosis Date   • A-fib (HCC)    • Abnormal ECG    • Anxiety    • Arthritis    • Atrial fibrillation (HCC)    • Back pain    • Breast cancer (HCC) 09/2018   • CFS (chronic fatigue syndrome)    • Chest pain    • Chronic pain disorder    • Depression    • Dizziness    • Ductal carcinoma in situ (DCIS) of left breast     BREAST LEFT   • Fibromyalgia    • Hx of radiation therapy    • Hypertension    • IBS (irritable bowel syndrome)    • Incontinence    • Migraines    • Mononucleosis    • Sleep apnea     uses a c-pap   • Wears glasses        Allergies   Allergen Reactions   • Penicillins Rash       Past Surgical History:   Procedure Laterality Date   • ANKLE SURGERY      x2 left   • BREAST BIOPSY Left 2018    stereo   • BREAST EXCISIONAL BIOPSY Left 2018   • BREAST LUMPECTOMY Left 09/2018   • BREAST LUMPECTOMY WITH SENTINEL NODE BIOPSY Left 10/15/2018    Procedure: LEFT BREAST LUMPECTOMY WITH NEEDLE LOCALIZATION - MAMMOGRAM GUIDED AND SENTINEL LYMPH  NODE BIOPSY - RADIOLOGIST TO INJECT AND SCAN;  Surgeon: Rafael Porter MD;  Location: [facility] OR;  Service: General   • CARDIAC ABLATION  11/2019   • CARDIAC CATHETERIZATION Bilateral 1/29/2018     Procedure: Coronary angiography;  Surgeon: Joel Medina MD;  Location:  [facility] CATH INVASIVE LOCATION;  Service:    • CARDIAC CATHETERIZATION N/A 1/29/2018    Procedure: Left Heart Cath;  Surgeon: Joel Medina MD;  Location:  [facility] CATH INVASIVE LOCATION;  Service:    • CYST REMOVAL      from tailbone   • D & C HYSTEROSCOPY N/A 8/9/2021    Procedure: DILATATION AND CURETTAGE HYSTEROSCOPY;  Surgeon: Jacqueline Johnson DO;  Location:  [facility] OR;  Service: Obstetrics/Gynecology;  Laterality: N/A;   • TUBAL ABDOMINAL LIGATION     • WISDOM TOOTH EXTRACTION         Family History   Problem Relation Age of Onset   • Transient ischemic attack Mother    • Rheum arthritis Mother    • Fibromyalgia Mother    • Heart disease Father    • Asthma Father    • Hypertension Father    • Rheum arthritis Father    • Obesity Father    • Obesity Sister    • Obesity Maternal Grandmother    • Ovarian cancer Maternal Grandmother    • Obesity Paternal Grandfather    • Hypertension Paternal Grandfather    • Heart disease Paternal Grandfather    • Liver cancer Maternal Aunt    • Breast cancer Neg Hx        Social History     Socioeconomic History   • Marital status:    • Number of children: 2   Tobacco Use   • Smoking status: Never Smoker   • Smokeless tobacco: Never Used   Vaping Use   • Vaping Use: Never used   Substance and Sexual Activity   • Alcohol use: No   • Drug use: No   • Sexual activity: Yes     Partners: Male     Birth control/protection: Surgical     Comment: LMP: Menopausal       Prior to Admission medications    Medication Sig Start Date End Date Taking? Authorizing Provider   acetaminophen (TYLENOL) 325 MG tablet Take 650 mg by mouth Every 6 (Six) Hours As Needed for Mild Pain  or Headache.    ProviderSyeda MD   Acetaminophen-Caffeine (TENSION HEADACHE RELIEF) 500-65 MG tablet Take 1 tablet by mouth Every 6 (Six) Hours As Needed (migraine).    ProviderSyeda MD   ALPRAZolam (Xanax) 0.5 MG tablet Take  0.5-1 tablets by mouth 2 (Two) Times a Day As Needed for Anxiety. 5/14/21   Jamar Saldana DO   apixaban (Eliquis) 5 MG tablet tablet Take 1 tablet by mouth Every 12 (Twelve) Hours. 11/23/21   Jamar Saldana DO   buPROPion XL (WELLBUTRIN XL) 300 MG 24 hr tablet Take 1 tablet by mouth Every Morning. 5/27/21   Jamar Saldana DO   cholecalciferol (VITAMIN D3) 1000 units tablet Take 1,000 Units by mouth Daily. 10/8/2018    ProviderSyeda MD   clindamycin (Cleocin) 300 MG capsule Take 1 capsule by mouth 3 (Three) Times a Day. For sinus infection. Take with food 12/15/21   Catherine Cortes MD   Cyanocobalamin (VITAMIN B-12) 1000 MCG sublingual tablet Place 1 tablet under the tongue Daily. Hold 10/8/2018    Syeda Estevez MD   furosemide (LASIX) 20 MG tablet Take 1 tablet by mouth 2 (Two) Times a Day. 5/14/21   Jamar Saldana DO   loratadine (CLARITIN) 10 MG tablet TAKE ONE TABLET BY MOUTH DAILY 9/13/21   Jamar Saldana DO   losartan (COZAAR) 50 MG tablet TAKE ONE TABLET BY MOUTH DAILY 10/25/21   Jamar Saldana DO   metoprolol tartrate (LOPRESSOR) 25 MG tablet Take 1 tablet by mouth 2 (Two) Times a Day. 8/27/20   Jamar Saldana DO   oxybutynin XL (DITROPAN-XL) 10 MG 24 hr tablet TAKE TWO TABLETS BY MOUTH DAILY 8/9/21   Jamar Saldana DO   traMADol (ULTRAM) 50 MG tablet Take 1 tablet by mouth Every 8 (Eight) Hours As Needed for Moderate Pain . for pain 3/31/21   Jamar Saldana DO       Medications   sodium chloride 0.9 % flush 10 mL (has no administration in time range)   gadobenate dimeglumine (MULTIHANCE) injection 20 mL (20 mL Intravenous Given 12/26/21 1809)       Vitals:    12/26/21 1901   BP: 121/55   Pulse: 76   Resp:    Temp:    SpO2: 94%         Objective   Physical Exam  Vitals and nursing note reviewed.   Constitutional:       Appearance: Normal appearance.   Eyes:      Extraocular Movements: Extraocular movements intact.   Musculoskeletal:         General:  Normal range of motion.      Cervical back: Normal range of motion and neck supple.   Neurological:      General: No focal deficit present.      Mental Status: She is alert and oriented to person, place, and time.   Psychiatric:         Mood and Affect: Mood normal.         Behavior: Behavior normal.         Procedures         Lab Results (last 24 hours)     Procedure Component Value Units Date/Time    CBC & Differential [[id number]]  (Normal) Collected: 12/26/21 1701    Specimen: Blood Updated: 12/26/21 1711    Narrative:      The following orders were created for panel order CBC & Differential.  Procedure                               Abnormality         Status                     ---------                               -----------         ------                     CBC Auto Differential[[id number]]        Normal              Final result                 Please view results for these tests on the individual orders.    Comprehensive Metabolic Panel [[id number]]  (Abnormal) Collected: 12/26/21 1701    Specimen: Blood Updated: 12/26/21 1734     Glucose 105 mg/dL      BUN 14 mg/dL      Creatinine 0.81 mg/dL      Sodium 146 mmol/L      Potassium 3.8 mmol/L      Chloride 109 mmol/L      CO2 30.0 mmol/L      Calcium 9.1 mg/dL      Total Protein 6.9 g/dL      Albumin 3.90 g/dL      ALT (SGPT) 17 U/L      AST (SGOT) 12 U/L      Alkaline Phosphatase 115 U/L      Total Bilirubin 0.4 mg/dL      eGFR Non African Amer 73 mL/min/1.73      Globulin 3.0 gm/dL      A/G Ratio 1.3 g/dL      BUN/Creatinine Ratio 17.3     Anion Gap 7.0 mmol/L     Narrative:      GFR Normal >60  Chronic Kidney Disease <60  Kidney Failure <15      C-reactive Protein [[id number]]  (Abnormal) Collected: 12/26/21 1701    Specimen: Blood Updated: 12/26/21 1734     C-Reactive Protein 0.54 mg/dL     Sedimentation Rate [[id number]]  (Normal) Collected: 12/26/21 1701    Specimen: Blood Updated: 12/26/21 1717     Sed Rate 14 mm/hr     CBC Auto Differential [[id number]]   (Normal) Collected: 12/26/21 1701    Specimen: Blood Updated: 12/26/21 1711     WBC 9.16 10*3/mm3      RBC 4.33 10*6/mm3      Hemoglobin 13.0 g/dL      Hematocrit 40.8 %      MCV 94.2 fL      MCH 30.0 pg      MCHC 31.9 g/dL      RDW 14.1 %      RDW-SD 48.7 fl      MPV 9.8 fL      Platelets 224 10*3/mm3      Neutrophil % 72.8 %      Lymphocyte % 19.7 %      Monocyte % 5.9 %      Eosinophil % 1.0 %      Basophil % 0.3 %      Immature Grans % 0.3 %      Neutrophils, Absolute 6.67 10*3/mm3      Lymphocytes, Absolute 1.80 10*3/mm3      Monocytes, Absolute 0.54 10*3/mm3      Eosinophils, Absolute 0.09 10*3/mm3      Basophils, Absolute 0.03 10*3/mm3      Immature Grans, Absolute 0.03 10*3/mm3      nRBC 0.0 /100 WBC           MRI Brain With & Without Contrast   Final Result   1. There is asymmetric enhancement of the intraorbital component of the   left optic nerve with very slight enlargement of this segment of the   left optic nerve. There is no evidence of a discrete associated mass.   There is some subtle associated diffusion restriction. Optic neuritis I   feel would be the primary consideration. Optic nerve glioma, orbital   lymphoma or pseudotumor also in the differential but considered less   likely. No additional sites of abnormal enhancement are present.   2. No additional sites of abnormal T2 signal are noted within the   periventricular or higher white matter tracts. Dilated perivascular   spaces are noted within the basal ganglia bilaterally.            This report was finalized on 12/26/2021 18:38 by Dr. Alek Avery MD.          ED Course  ED Course as of 12/26/21 2037   Sun Dec 26, 2021   1731 Assumed care of patient at this time from Dr. Bry Esquivel. Pending results of labs as well as MRI imaging will reevaluate and disposition accordingly. Please see Dr. Esquivel's note above for HPI, ROS, physical examination findings. [JS]   1853 MRI of the brain with and without contrast shows: Asymmetric  enhancement of intraorbital component of left optic nerve was very slight enlargement of this segment of the left optic nerve, no evidence of discrete associated mass.  Some subtle associated diffusion restriction.  Optic neuritis would be primary consideration.  Optic nerve glioma, orbital lymphoma, pseudotumor also in the differential but considered less likely, no additional sites of abnormal enhancement present.  No additional sites of abnormal T2 signal noted within the periventricular or higher white matter tracts, dilated perivascular spaces noted within the basal ganglia bilaterally. [JS]   1905 Upon reevaluation  [JS]   1913 Iv steroid [JS]   1918 Case discussed with Mr. Jamar Mata, neurology.  Reports that at this time patient should follow up with Dr. Bryan check tomorrow at 8 AM for determination of further treatment.  States that should she be given steroids today it may obscure the opthamological examination tomorrow and inhibit visualization of the retina/macula as well as ability to rule out lymphoma.  States that it is okay to reassure patient that should she have optic neuritis there will be no harm in waiting for initiation of steroid treatment for several days as it will only expedite the recovery but will not impact the outcome.  No further recommendations at this time. [JS]   1940 Upon reevaluation patient resting comfortably in bed.  Discussed with patient lab results, CRP and sed rate, MRI results, as well as neurology consult.  Discussed significant portance of follow-up tomorrow with ophthalmology for further eye examination, discussion of steroid treatment, as well as possible further testing and evaluation.  Discussed return precautions any for immediate return to ED should she develop any new or worsening symptoms.  Patient appreciative with no further questions, concerns, needs at this time and is stable for discharge. [JS]      ED Course User Index  [JS] Floyd Gastelum PA-C           [facility]    Final diagnoses:   Vision changes          Floyd Gastelum PA-C  12/26/21 2037

## 2021-12-27 NOTE — DISCHARGE INSTRUCTIONS
Please follow-up with the ophthalmologist tomorrow morning at 8 AM as previously scheduled.  Please review with him your MRI results which are listed below.  In the meantime should you develop any new or worsening symptoms please return to the ER for further evaluation.    FINDINGS:   Diffusion: No restriction of diffusion to suggest acute ischemia.     Midline structures: Nondisplaced.     Ventricles: Normal in configuration and symmetric in size.     Masses: No masses or mass effect.     Basilar cisterns: Maintained.     Extra axial space: No abnormal extra-axial fluid.     Gray-white matter signal: Normal signal and differentiation. Dilated  perivascular spaces are noted within the basal ganglia bilaterally.     Cerebellum: Normal.     Brainstem: Normal.     Enhancement: There is asymmetric enhancement of the left optic nerve in comparison with the right with very slight enlargement of the left optic  nerve. FINDINGS would suggest optic neuritis. I do not see evidence of focal mass. There is no evidence of involvement of the optic chiasm or  right optic nerve.     Other: Proximal cervical spinal cord is normal. Asymmetric enhancement of the intraorbital aspect of the left optic nerve with very slight enlargement of left optic nerve in comparison with the right. I do not see evidence of a discrete mass with optic neuritis the primary consideration. This does not involve the optic chiasm. I do not see evidence of involvement of the right optic nerve. There is subtle asymmetric increased signal within the left optic nerve on diffusion weighted imaging suggesting associated diffusion restriction. Normal cerebrovascular flow voids noted. No abnormal signal in the mastoid air cells or paranasal sinuses.     IMPRESSION:  1. There is asymmetric enhancement of the intraorbital component of the left optic nerve with very slight enlargement of this segment of the  left optic nerve. There is no evidence of a discrete  associated mass. There is some subtle associated diffusion restriction. Optic neuritis I  feel would be the primary consideration. Optic nerve glioma, orbital lymphoma or pseudotumor also in the differential but considered less  likely. No additional sites of abnormal enhancement are present. 2. No additional sites of abnormal T2 signal are noted within the  periventricular or higher white matter tracts. Dilated perivascular spaces are noted within the basal ganglia bilaterally.

## 2022-02-21 DIAGNOSIS — F41.9 ANXIETY: ICD-10-CM

## 2022-02-21 DIAGNOSIS — I10 ESSENTIAL HYPERTENSION: ICD-10-CM

## 2022-02-21 RX ORDER — ALPRAZOLAM 0.5 MG/1
TABLET ORAL
Qty: 20 TABLET | Refills: 0 | Status: SHIPPED | OUTPATIENT
Start: 2022-02-21 | End: 2022-08-02 | Stop reason: SDUPTHER

## 2022-02-21 NOTE — TELEPHONE ENCOUNTER
Rx Refill Note  Requested Prescriptions     Pending Prescriptions Disp Refills   • ALPRAZolam (XANAX) 0.5 MG tablet [Pharmacy Med Name: ALPRAZolam 0.5 MG TABLET] 20 tablet      Sig: TAKE 1/2 TO 1 TABLET BY MOUTH TWO TIMES A DAY AS NEEDED FOR ANXIETY   • metoprolol tartrate (LOPRESSOR) 25 MG tablet [Pharmacy Med Name: METOPROLOL TARTRATE 25 MG TAB] 180 tablet 3     Sig: TAKE ONE TABLET BY MOUTH TWICE A DAY      Last office visit with prescribing clinician: 11/23/2021      Next office visit with prescribing clinician: 5/23/2022            Rosio Shanks MA  02/21/22, 13:42 CST

## 2022-03-29 DIAGNOSIS — I10 ESSENTIAL HYPERTENSION: ICD-10-CM

## 2022-03-29 RX ORDER — LOSARTAN POTASSIUM 50 MG/1
TABLET ORAL
Qty: 90 TABLET | Refills: 0 | Status: SHIPPED | OUTPATIENT
Start: 2022-03-29 | End: 2022-04-12 | Stop reason: SDUPTHER

## 2022-04-11 DIAGNOSIS — F33.2 SEVERE EPISODE OF RECURRENT MAJOR DEPRESSIVE DISORDER, WITHOUT PSYCHOTIC FEATURES: ICD-10-CM

## 2022-04-12 DIAGNOSIS — I10 ESSENTIAL HYPERTENSION: ICD-10-CM

## 2022-04-12 RX ORDER — BUPROPION HYDROCHLORIDE 300 MG/1
TABLET ORAL
Qty: 90 TABLET | Refills: 0 | Status: SHIPPED | OUTPATIENT
Start: 2022-04-12 | End: 2022-06-21

## 2022-04-12 RX ORDER — LOSARTAN POTASSIUM 50 MG/1
50 TABLET ORAL DAILY
Qty: 90 TABLET | Refills: 3 | Status: SHIPPED | OUTPATIENT
Start: 2022-04-12 | End: 2022-06-21 | Stop reason: SDUPTHER

## 2022-06-05 DIAGNOSIS — J30.9 ALLERGIC RHINITIS, UNSPECIFIED SEASONALITY, UNSPECIFIED TRIGGER: ICD-10-CM

## 2022-06-06 RX ORDER — LORATADINE 10 MG/1
TABLET ORAL
Qty: 30 TABLET | Refills: 2 | Status: SHIPPED | OUTPATIENT
Start: 2022-06-06 | End: 2022-09-07 | Stop reason: SDUPTHER

## 2022-06-21 ENCOUNTER — OFFICE VISIT (OUTPATIENT)
Dept: FAMILY MEDICINE CLINIC | Facility: CLINIC | Age: 57
End: 2022-06-21

## 2022-06-21 VITALS
TEMPERATURE: 98 F | BODY MASS INDEX: 47.09 KG/M2 | WEIGHT: 293 LBS | HEIGHT: 66 IN | HEART RATE: 84 BPM | DIASTOLIC BLOOD PRESSURE: 63 MMHG | OXYGEN SATURATION: 97 % | SYSTOLIC BLOOD PRESSURE: 147 MMHG

## 2022-06-21 DIAGNOSIS — Z99.89 OSA ON CPAP: ICD-10-CM

## 2022-06-21 DIAGNOSIS — E66.01 MORBID OBESITY: ICD-10-CM

## 2022-06-21 DIAGNOSIS — G47.33 OSA ON CPAP: ICD-10-CM

## 2022-06-21 DIAGNOSIS — F33.1 MODERATE EPISODE OF RECURRENT MAJOR DEPRESSIVE DISORDER: ICD-10-CM

## 2022-06-21 DIAGNOSIS — I10 ESSENTIAL HYPERTENSION: ICD-10-CM

## 2022-06-21 DIAGNOSIS — F41.9 ANXIETY: Primary | ICD-10-CM

## 2022-06-21 PROCEDURE — 99214 OFFICE O/P EST MOD 30 MIN: CPT | Performed by: FAMILY MEDICINE

## 2022-06-21 RX ORDER — BUPROPION HYDROCHLORIDE 450 MG/1
450 TABLET, FILM COATED, EXTENDED RELEASE ORAL DAILY
Qty: 30 TABLET | Refills: 1 | Status: SHIPPED | OUTPATIENT
Start: 2022-06-21 | End: 2022-08-02 | Stop reason: SDUPTHER

## 2022-06-21 RX ORDER — LOSARTAN POTASSIUM 100 MG/1
100 TABLET ORAL DAILY
Qty: 90 TABLET | Refills: 0 | Status: SHIPPED | OUTPATIENT
Start: 2022-06-21 | End: 2022-09-07 | Stop reason: SDUPTHER

## 2022-07-13 DIAGNOSIS — N32.81 OVERACTIVE BLADDER: ICD-10-CM

## 2022-07-13 RX ORDER — OXYBUTYNIN CHLORIDE 10 MG/1
TABLET, EXTENDED RELEASE ORAL
Qty: 180 TABLET | Refills: 1 | Status: SHIPPED | OUTPATIENT
Start: 2022-07-13 | End: 2023-01-10

## 2022-07-13 RX ORDER — BUPROPION HYDROCHLORIDE 300 MG/1
TABLET ORAL
Qty: 90 TABLET | Refills: 1 | Status: SHIPPED | OUTPATIENT
Start: 2022-07-13 | End: 2022-08-02

## 2022-07-18 NOTE — TELEPHONE ENCOUNTER
Did she indicated that she has talked to the patient? [Difficulty Walking] : difficulty walking [Negative] : Allergic/Immunologic [FreeTextEntry9] : Decreased ROM right hip and associated pain [de-identified] : Trouble walking 2/2 hip pain

## 2022-07-20 ENCOUNTER — APPOINTMENT (OUTPATIENT)
Dept: GENERAL RADIOLOGY | Facility: HOSPITAL | Age: 57
End: 2022-07-20

## 2022-07-20 ENCOUNTER — APPOINTMENT (OUTPATIENT)
Dept: CT IMAGING | Facility: HOSPITAL | Age: 57
End: 2022-07-20

## 2022-07-20 ENCOUNTER — HOSPITAL ENCOUNTER (EMERGENCY)
Facility: HOSPITAL | Age: 57
Discharge: HOME | End: 2022-07-20
Admitting: EMERGENCY MEDICINE

## 2022-07-20 VITALS
SYSTOLIC BLOOD PRESSURE: 128 MMHG | WEIGHT: 293 LBS | RESPIRATION RATE: 16 BRPM | TEMPERATURE: 98.4 F | HEIGHT: 66 IN | BODY MASS INDEX: 47.09 KG/M2 | HEART RATE: 79 BPM | OXYGEN SATURATION: 97 % | DIASTOLIC BLOOD PRESSURE: 79 MMHG

## 2022-07-20 DIAGNOSIS — S09.90XA INJURY OF HEAD, INITIAL ENCOUNTER: Primary | ICD-10-CM

## 2022-07-20 DIAGNOSIS — S01.81XA FACIAL LACERATION, INITIAL ENCOUNTER: ICD-10-CM

## 2022-07-20 LAB
ALBUMIN SERPL-MCNC: 4 G/DL (ref 3.5–5.2)
ALBUMIN/GLOB SERPL: 1.3 G/DL
ALP SERPL-CCNC: 113 U/L (ref 39–117)
ALT SERPL W P-5'-P-CCNC: 16 U/L (ref 1–33)
ANION GAP SERPL CALCULATED.3IONS-SCNC: 12 MMOL/L (ref 5–15)
AST SERPL-CCNC: 17 U/L (ref 1–32)
BASOPHILS # BLD AUTO: 0.04 10*3/MM3 (ref 0–0.2)
BASOPHILS NFR BLD AUTO: 0.4 % (ref 0–1.5)
BILIRUB SERPL-MCNC: 0.6 MG/DL (ref 0–1.2)
BUN SERPL-MCNC: 10 MG/DL (ref 6–20)
BUN/CREAT SERPL: 12.5 (ref 7–25)
CALCIUM SPEC-SCNC: 9.2 MG/DL (ref 8.6–10.5)
CHLORIDE SERPL-SCNC: 106 MMOL/L (ref 98–107)
CO2 SERPL-SCNC: 24 MMOL/L (ref 22–29)
CREAT SERPL-MCNC: 0.8 MG/DL (ref 0.57–1)
DEPRECATED RDW RBC AUTO: 51.3 FL (ref 37–54)
EGFRCR SERPLBLD CKD-EPI 2021: 86.1 ML/MIN/1.73
EOSINOPHIL # BLD AUTO: 0.1 10*3/MM3 (ref 0–0.4)
EOSINOPHIL NFR BLD AUTO: 1 % (ref 0.3–6.2)
ERYTHROCYTE [DISTWIDTH] IN BLOOD BY AUTOMATED COUNT: 14.7 % (ref 12.3–15.4)
GLOBULIN UR ELPH-MCNC: 3 GM/DL
GLUCOSE SERPL-MCNC: 105 MG/DL (ref 65–99)
HCT VFR BLD AUTO: 41.6 % (ref 34–46.6)
HGB BLD-MCNC: 13.5 G/DL (ref 12–15.9)
IMM GRANULOCYTES # BLD AUTO: 0.03 10*3/MM3 (ref 0–0.05)
IMM GRANULOCYTES NFR BLD AUTO: 0.3 % (ref 0–0.5)
INR PPP: 1.08 (ref 0.91–1.09)
LYMPHOCYTES # BLD AUTO: 1.31 10*3/MM3 (ref 0.7–3.1)
LYMPHOCYTES NFR BLD AUTO: 13.3 % (ref 19.6–45.3)
MCH RBC QN AUTO: 30.5 PG (ref 26.6–33)
MCHC RBC AUTO-ENTMCNC: 32.5 G/DL (ref 31.5–35.7)
MCV RBC AUTO: 94.1 FL (ref 79–97)
MONOCYTES # BLD AUTO: 0.54 10*3/MM3 (ref 0.1–0.9)
MONOCYTES NFR BLD AUTO: 5.5 % (ref 5–12)
NEUTROPHILS NFR BLD AUTO: 7.84 10*3/MM3 (ref 1.7–7)
NEUTROPHILS NFR BLD AUTO: 79.5 % (ref 42.7–76)
NRBC BLD AUTO-RTO: 0 /100 WBC (ref 0–0.2)
PLATELET # BLD AUTO: 204 10*3/MM3 (ref 140–450)
PMV BLD AUTO: 10.7 FL (ref 6–12)
POTASSIUM SERPL-SCNC: 4.1 MMOL/L (ref 3.5–5.2)
PROT SERPL-MCNC: 7 G/DL (ref 6–8.5)
PROTHROMBIN TIME: 13.6 SECONDS (ref 11.9–14.6)
RBC # BLD AUTO: 4.42 10*6/MM3 (ref 3.77–5.28)
SARS-COV-2 RNA PNL SPEC NAA+PROBE: NOT DETECTED
SODIUM SERPL-SCNC: 142 MMOL/L (ref 136–145)
WBC NRBC COR # BLD: 9.86 10*3/MM3 (ref 3.4–10.8)

## 2022-07-20 PROCEDURE — 36415 COLL VENOUS BLD VENIPUNCTURE: CPT | Performed by: NURSE PRACTITIONER

## 2022-07-20 PROCEDURE — 87635 SARS-COV-2 COVID-19 AMP PRB: CPT | Performed by: NURSE PRACTITIONER

## 2022-07-20 PROCEDURE — 70486 CT MAXILLOFACIAL W/O DYE: CPT

## 2022-07-20 PROCEDURE — 85025 COMPLETE CBC W/AUTO DIFF WBC: CPT | Performed by: NURSE PRACTITIONER

## 2022-07-20 PROCEDURE — 85610 PROTHROMBIN TIME: CPT | Performed by: NURSE PRACTITIONER

## 2022-07-20 PROCEDURE — 73562 X-RAY EXAM OF KNEE 3: CPT

## 2022-07-20 PROCEDURE — 72125 CT NECK SPINE W/O DYE: CPT

## 2022-07-20 PROCEDURE — 99283 EMERGENCY DEPT VISIT LOW MDM: CPT

## 2022-07-20 PROCEDURE — 80053 COMPREHEN METABOLIC PANEL: CPT | Performed by: NURSE PRACTITIONER

## 2022-07-20 PROCEDURE — 70450 CT HEAD/BRAIN W/O DYE: CPT

## 2022-07-20 RX ORDER — LIDOCAINE HYDROCHLORIDE AND EPINEPHRINE BITARTRATE 20; .01 MG/ML; MG/ML
10 INJECTION, SOLUTION SUBCUTANEOUS ONCE
Status: COMPLETED | OUTPATIENT
Start: 2022-07-20 | End: 2022-07-20

## 2022-07-20 RX ADMIN — LIDOCAINE HYDROCHLORIDE AND EPINEPHRINE 10 ML: 20; 10 INJECTION, SOLUTION INFILTRATION; PERINEURAL at 15:33

## 2022-07-20 NOTE — ED PROVIDER NOTES
Subjective   56 yof with PMH of arthritis, migraines, chronic fatigue, atrial fibrillation, sleep apnea, IBS and left breast cancer presents after a fall.  She reports today she was home when her grandchild abruptly stopped in front of her.  She states she fell off the porch onto her face trying to avoid her.  She states she hit her face on the gravel.  She has an abrasion to her forehead as well as a laceration to the face (forehead into the nose).  She is c/o headache.  She denies neck or back pain.  No saddle anesthesia or bowel/bladder incontinence.  No weakness or dizziness. She is also having R knee pain but it is improving.  She denies LOC.  She is prescribed eliquis for a fib which she states she has been taking as prescribed.            Review of Systems   Constitutional: Negative for activity change, appetite change, fatigue and fever.   HENT: Negative for congestion, ear pain, facial swelling and sore throat.    Eyes: Negative for discharge and visual disturbance.   Respiratory: Negative for apnea, chest tightness, shortness of breath, wheezing and stridor.    Cardiovascular: Negative for chest pain and palpitations.   Gastrointestinal: Negative for abdominal distention, abdominal pain, diarrhea, nausea and vomiting.   Genitourinary: Negative for difficulty urinating and dysuria.   Musculoskeletal: Negative for arthralgias and myalgias.   Skin: Positive for wound. Negative for rash.   Neurological: Positive for headaches. Negative for dizziness and seizures.   Psychiatric/Behavioral: Negative for agitation and confusion.       Past Medical History:   Diagnosis Date   • A-fib ([facility])    • Abnormal ECG    • Anxiety    • Arthritis    • Atrial fibrillation ([facility])    • Back pain    • Breast cancer ([facility]) 09/2018   • CFS (chronic fatigue syndrome)    • Chest pain    • Chronic pain disorder    • Depression    • Dizziness    • Ductal carcinoma in situ (DCIS) of left breast     BREAST LEFT   • Fibromyalgia    • Hx of  radiation therapy    • Hypertension    • IBS (irritable bowel syndrome)    • Incontinence    • Migraines    • Mononucleosis    • Sleep apnea     uses a c-pap   • Wears glasses        Allergies   Allergen Reactions   • Penicillins Rash       Past Surgical History:   Procedure Laterality Date   • ANKLE SURGERY      x2 left   • BREAST BIOPSY Left 2018    stereo   • BREAST EXCISIONAL BIOPSY Left 2018   • BREAST LUMPECTOMY Left 09/2018   • BREAST LUMPECTOMY WITH SENTINEL NODE BIOPSY Left 10/15/2018    Procedure: LEFT BREAST LUMPECTOMY WITH NEEDLE LOCALIZATION - MAMMOGRAM GUIDED AND SENTINEL LYMPH  NODE BIOPSY - RADIOLOGIST TO INJECT AND SCAN;  Surgeon: Rafael Porter MD;  Location:  [facility] OR;  Service: General   • CARDIAC ABLATION  11/2019   • CARDIAC CATHETERIZATION Bilateral 1/29/2018    Procedure: Coronary angiography;  Surgeon: Joel Medina MD;  Location:  [facility] CATH INVASIVE LOCATION;  Service:    • CARDIAC CATHETERIZATION N/A 1/29/2018    Procedure: Left Heart Cath;  Surgeon: Joel Medina MD;  Location:  [facility] CATH INVASIVE LOCATION;  Service:    • CYST REMOVAL      from tailbone   • D & C HYSTEROSCOPY N/A 8/9/2021    Procedure: DILATATION AND CURETTAGE HYSTEROSCOPY;  Surgeon: Jacqueline Johnson DO;  Location:  [facility] OR;  Service: Obstetrics/Gynecology;  Laterality: N/A;   • TUBAL ABDOMINAL LIGATION     • WISDOM TOOTH EXTRACTION         Family History   Problem Relation Age of Onset   • Transient ischemic attack Mother    • Rheum arthritis Mother    • Fibromyalgia Mother    • Heart disease Father    • Asthma Father    • Hypertension Father    • Rheum arthritis Father    • Obesity Father    • Obesity Sister    • Obesity Maternal Grandmother    • Ovarian cancer Maternal Grandmother    • Obesity Paternal Grandfather    • Hypertension Paternal Grandfather    • Heart disease Paternal Grandfather    • Liver cancer Maternal Aunt    • Breast cancer Neg Hx        Social History     Socioeconomic History   • Marital  "status:    • Number of children: 2   Tobacco Use   • Smoking status: Never Smoker   • Smokeless tobacco: Never Used   Vaping Use   • Vaping Use: Never used   Substance and Sexual Activity   • Alcohol use: No   • Drug use: No   • Sexual activity: Yes     Partners: Male     Birth control/protection: Surgical     Comment: LMP: Menopausal           Objective   Physical Exam  Constitutional:       Appearance: She is well-developed.   HENT:      Head: Normocephalic. Abrasion and laceration present. No raccoon eyes, Villasenor's sign, right periorbital erythema or left periorbital erythema.      Jaw: No trismus.        Comments: Approximate 1\" laceration present on the face that extends from the forehead down into the nose. There is a small amount of blood oozing from the area. There is no deformity to the nose.  She is breathing without difficulty.  She also has an abrasion to the forehead  Eyes:      Pupils: Pupils are equal, round, and reactive to light.   Cardiovascular:      Rate and Rhythm: Normal rate and regular rhythm.      Heart sounds: No murmur heard.  Pulmonary:      Effort: Pulmonary effort is normal.      Breath sounds: Normal breath sounds.   Abdominal:      General: Bowel sounds are normal.      Palpations: Abdomen is soft.   Musculoskeletal:         General: Normal range of motion.      Cervical back: Normal range of motion and neck supple. No swelling, deformity, spasms or tenderness. Normal range of motion.      Thoracic back: No swelling, deformity, spasms or tenderness. Normal range of motion.      Lumbar back: No swelling, deformity, spasms or tenderness. Normal range of motion.      Right knee: No swelling. Normal range of motion. No tenderness. Normal pulse.      Comments: No tenderness or deformity present to the cervical, thoracic or lumbar spine.  There is no swelling or deformity to the R knee.  The RLE is pink, warm and dry with +PMS   Skin:     General: Skin is warm and dry. "   Neurological:      Mental Status: She is alert and oriented to person, place, and time.      GCS: GCS eye subscore is 4. GCS verbal subscore is 5. GCS motor subscore is 6.      Sensory: Sensation is intact.      Motor: Weakness present.      Comments: She is neurologically intact         Procedures           ED Course  ED Course as of 07/21/22 0936   Wed Jul 20, 2022   1329 CT of the head - IMPRESSION: 1. No acute intracranial abnormality. 2. No skull fracture.  CT of cervical spine - IMPRESSION: 1. No acute fracture or malalignment. 2. Cervical spondylosis and reversal of cervical lordosis. 3. The multilevel prominent disc osteophyte complexes and facet arthropathy and resultant neural foraminal stenosis at several levels. No evidence of spinal stenosis.  We have been scolded by radiology recently on number of CT scans and that facial bones can be adequately assessed on CT scans of the head.  I did not order a specific facial bone CT scan for this reason.  She has a laceration to her nose as well as some swelling.  I spoke with radiology to ask if they could specifically see her facial bones.  Dr Montiel could not so CT scan of the facial bones ordered. [KS]   1400 I apologized to the patient for having to add a CT of the facial bones. [KS]   1430 The patient has an irregular laceration on the face between the eyes into the nose.  She is on a blood thinner.  She is also 53 yo.  I spoke with Dr Packer, plastic surgeon, on call who kindly agreed to repair the area.  The patient is aware of procedure. [KS]   1525 CT of the facial bones - IMPRESSION: Swelling and laceration over the nasal bridge region, with a small sub-5 mm radiodense foreign body within the laceration on the right. No definite acute fracture is identified. Slight deformity of the right nasal bone appears to be chronic. The sinuses are clear. There is mild deviation of the nasal septum to the left.   Xray R knee - IMPRESSION: 1. Degenerative arthrosis  is noted in the medial and patellofemoral joint compartments. No acute osseous injury is identified..   Dr Packer is at BS [KS]   1630 Sutures have been placed by Dr Packer.  I discussed testing results with her. She voiced understanding of results and instructions. [KS]      ED Course User Index  [KS] David Diop, APRN                                           MDM  Number of Diagnoses or Management Options  Facial laceration, initial encounter: minor  Injury of head, initial encounter: minor     Amount and/or Complexity of Data Reviewed  Clinical lab tests: ordered and reviewed  Tests in the radiology section of CPT®: ordered and reviewed  Discuss the patient with other providers: yes    Risk of Complications, Morbidity, and/or Mortality  Presenting problems: minimal  Diagnostic procedures: minimal  Management options: minimal    Patient Progress  Patient progress: stable      Final diagnoses:   Injury of head, initial encounter   Facial laceration, initial encounter       ED Disposition  ED Disposition     ED Disposition   Discharge    Condition   Stable    Comment   --             Lionel Packer MD  [address]  [place] [address]  [phone]    Go in 1 week  Call for appointment    Jamar Saldana DO  [address]  [address]  [place] [address]  [phone]    Call in 1 day  Routine ED follow up         Medication List      No changes were made to your prescriptions during this visit.          David Diop, JAYLYN  07/21/22 0542

## 2022-07-20 NOTE — CONSULTS
Referring Provider: Provider, No Known  Reason for Consultation: Facial laceration        Chief complaint facial laceration    Subjective .     History of present illness: The patient is a 56-year-old white female who stumbled and fell on a gravel road sustaining abrasions and lacerations of the nose.  She denied loss of consciousness.    Review of Systems  Pertinent items are noted in HPI    History  Past Medical History:   Diagnosis Date   • A-fib (HCC)    • Abnormal ECG    • Anxiety    • Arthritis    • Atrial fibrillation (HCC)    • Back pain    • Breast cancer (HCC) 09/2018   • CFS (chronic fatigue syndrome)    • Chest pain    • Chronic pain disorder    • Depression    • Dizziness    • Ductal carcinoma in situ (DCIS) of left breast     BREAST LEFT   • Fibromyalgia    • Hx of radiation therapy    • Hypertension    • IBS (irritable bowel syndrome)    • Incontinence    • Migraines    • Mononucleosis    • Sleep apnea     uses a c-pap   • Wears glasses    ,   Past Surgical History:   Procedure Laterality Date   • ANKLE SURGERY      x2 left   • BREAST BIOPSY Left 2018    stereo   • BREAST EXCISIONAL BIOPSY Left 2018   • BREAST LUMPECTOMY Left 09/2018   • BREAST LUMPECTOMY WITH SENTINEL NODE BIOPSY Left 10/15/2018    Procedure: LEFT BREAST LUMPECTOMY WITH NEEDLE LOCALIZATION - MAMMOGRAM GUIDED AND SENTINEL LYMPH  NODE BIOPSY - RADIOLOGIST TO INJECT AND SCAN;  Surgeon: Rafael Porter MD;  Location: [facility] OR;  Service: General   • CARDIAC ABLATION  11/2019   • CARDIAC CATHETERIZATION Bilateral 1/29/2018    Procedure: Coronary angiography;  Surgeon: Joel Medina MD;  Location:  [facility] CATH INVASIVE LOCATION;  Service:    • CARDIAC CATHETERIZATION N/A 1/29/2018    Procedure: Left Heart Cath;  Surgeon: Joel Medina MD;  Location:  [facility]ATH [facility]ATION;  Service:    • CYST REMOVAL      from [facility]   • D & C HYSTEROSCOPY N/A 8/9/2021    Procedure: DILATATION AND CURETTAGE HYSTEROSCOPY;  Surgeon: Elizabeth  DO Jacqueline;  Location: [facility] OR;  Service: Obstetrics/Gynecology;  Laterality: N/A;   • TUBAL ABDOMINAL LIGATION     • WISDOM TOOTH EXTRACTION     ,   Family History   Problem Relation Age of Onset   • Transient ischemic attack Mother    • Rheum arthritis Mother    • Fibromyalgia Mother    • Heart disease Father    • Asthma Father    • Hypertension Father    • Rheum arthritis Father    • Obesity Father    • Obesity Sister    • Obesity Maternal Grandmother    • Ovarian cancer Maternal Grandmother    • Obesity Paternal Grandfather    • Hypertension Paternal Grandfather    • Heart disease Paternal Grandfather    • Liver cancer Maternal Aunt    • Breast cancer Neg Hx    ,   Social History     Tobacco Use   • Smoking status: Never Smoker   • Smokeless tobacco: Never Used   Vaping Use   • Vaping Use: Never used   Substance Use Topics   • Alcohol use: No   • Drug use: No   , (Not in a hospital admission)  , Scheduled Meds:  , Continuous Infusions:  No current facility-administered medications for this encounter.  , PRN Meds:   and Allergies:  Penicillins    Objective     Vital Signs   Temp:  [98.4 °F (36.9 °C)] 98.4 °F (36.9 °C)  Heart Rate:  [74] 74  Resp:  [20] 20  BP: (125)/(60) 125/60    Physical Exam:     General Appearance:    Alert, cooperative, in no acute distress   Head:   There is a vertical jagged laceration of the nasal root 2 cm in length extending through the full-thickness of the skin and subcutaneous tissue with exposed nasal bone.  The wound is heavily contaminated with small bits of gravel and small pieces of metal.   Eyes:            Lids and lashes normal, conjunctivae and sclerae normal, no   icterus, no pallor, corneas clear, PERRLA   Ears:    Ears appear intact with no abnormalities noted   Throat:   No oral lesions, no thrush, oral mucosa moist   Neck:   No adenopathy, supple, trachea midline, no thyromegaly, no   carotid bruit, no JVD   Back:     No kyphosis present, no scoliosis present, no  skin lesions,      erythema or scars, no tenderness to percussion or                   palpation,   range of motion normal   Lungs:     Clear to auscultation,respirations regular, even and                  unlabored    Heart:    Regular rhythm and normal rate, normal S1 and S2, no            murmur, no gallop, no rub, no click   Chest Wall:    No abnormalities observed   Abdomen:     Normal bowel sounds, no masses, no organomegaly, soft        non-tender, non-distended, no guarding, no rebound                tenderness   Rectal:     Deferred   Extremities:   Moves all extremities well, no edema, no cyanosis, no             redness   Pulses:   Pulses palpable and equal bilaterally   Skin:   No bleeding, bruising or rash   Lymph nodes:   No palpable adenopathy   Neurologic:   Cranial nerves 2 - 12 grossly intact, sensation intact, DTR       present and equal bilaterally       Results Review:     Lab Results (last 24 hours)     Procedure Component Value Units Date/Time    Protime-INR [[id number]]  (Normal) Collected: 07/20/22 1304    Specimen: Blood Updated: 07/20/22 1334     Protime 13.6 Seconds      INR 1.08    CBC & Differential [[id number]]  (Abnormal) Collected: 07/20/22 1304    Specimen: Blood Updated: 07/20/22 1319    Narrative:      The following orders were created for panel order CBC & Differential.  Procedure                               Abnormality         Status                     ---------                               -----------         ------                     CBC Auto Differential[[id number]]        Abnormal            Final result                 Please view results for these tests on the individual orders.    CBC Auto Differential [[id number]]  (Abnormal) Collected: 07/20/22 1304    Specimen: Blood Updated: 07/20/22 1319     WBC 9.86 10*3/mm3      RBC 4.42 10*6/mm3      Hemoglobin 13.5 g/dL      Hematocrit 41.6 %      MCV 94.1 fL      MCH 30.5 pg      MCHC 32.5 g/dL      RDW 14.7 %      RDW-SD 51.3  fl      MPV 10.7 fL      Platelets 204 10*3/mm3      Neutrophil % 79.5 %      Lymphocyte % 13.3 %      Monocyte % 5.5 %      Eosinophil % 1.0 %      Basophil % 0.4 %      Immature Grans % 0.3 %      Neutrophils, Absolute 7.84 10*3/mm3      Lymphocytes, Absolute 1.31 10*3/mm3      Monocytes, Absolute 0.54 10*3/mm3      Eosinophils, Absolute 0.10 10*3/mm3      Basophils, Absolute 0.04 10*3/mm3      Immature Grans, Absolute 0.03 10*3/mm3      nRBC 0.0 /100 WBC     COVID PRE-OP / PRE-PROCEDURE SCREENING ORDER (NO ISOLATION) - Swab, Nasal Cavity [[id number]]  (Normal) Collected: 07/20/22 1126    Specimen: Swab from Nasal Cavity Updated: 07/20/22 1227    Narrative:      The following orders were created for panel order COVID PRE-OP / PRE-PROCEDURE SCREENING ORDER (NO ISOLATION) - Swab, Nasal Cavity.  Procedure                               Abnormality         Status                     ---------                               -----------         ------                     COVID-19,[facility] [employer] IN-QUENTIN...[[id number]]  Normal              Final result                 Please view results for these tests on the individual orders.    COVID-19,[facility] [employer] IN-HOUSE,Nasal Swab No Transport Media 3-4 HR TAT - Swab, Nasal Cavity [[id number]]  (Normal) Collected: 07/20/22 1126    Specimen: Swab from Nasal Cavity Updated: 07/20/22 1227     COVID19 Not Detected    Narrative:      Fact sheet for providers: https://www.fda.gov/media/851667/download     Fact sheet for patients: https://www.fda.gov/media/639339/download    Test performed by PCR.    Consider negative results in combination with clinical observations, patient history, and epidemiological information.    Comprehensive Metabolic Panel [[id number]]  (Abnormal) Collected: 07/20/22 1126    Specimen: Blood Updated: 07/20/22 1157     Glucose 105 mg/dL      BUN 10 mg/dL      Creatinine 0.80 mg/dL      Sodium 142 mmol/L      Potassium 4.1 mmol/L      Comment: Slight hemolysis detected by  analyzer. Results may be affected.        Chloride 106 mmol/L      CO2 24.0 mmol/L      Calcium 9.2 mg/dL      Total Protein 7.0 g/dL      Albumin 4.00 g/dL      ALT (SGPT) 16 U/L      AST (SGOT) 17 U/L      Alkaline Phosphatase 113 U/L      Total Bilirubin 0.6 mg/dL      Globulin 3.0 gm/dL      A/G Ratio 1.3 g/dL      BUN/Creatinine Ratio 12.5     Anion Gap 12.0 mmol/L      eGFR 86.1 mL/min/1.73      Comment: National Kidney Foundation and American Society of Nephrology (ASN) Task Force recommended calculation based on the Chronic Kidney Disease Epidemiology Collaboration (CKD-EPI) equation refit without adjustment for race.       Narrative:      GFR Normal >60  Chronic Kidney Disease <60  Kidney Failure <15            Imaging:   Imaging Results (Last 72 Hours)     Procedure Component Value Units Date/Time    CT Facial Bones Without Contrast [[id number]] Collected: 07/20/22 1446     Updated: 07/20/22 1455    Narrative:      EXAMINATION: CT FACIAL BONES WO CONTRAST- 7/20/2022 2:46 PM CDT     HISTORY: Facial trauma     DOSE: 462 mGycm (Automatic exposure control technique was implemented in  an effort to keep the radiation dose as low as possible without  compromising image quality)     REPORT: Spiral CT of the facial bones was performed without contrast,  reconstructed coronal and sagittal images are also reviewed.     COMPARISON: There are no correlative imaging studies for comparison.     Review of bone windows demonstrates mild motion artifact, there is also  streak artifact emanating from the oropharynx. No acute fractures  identified, slight deformity of right nasal bone appears to be chronic.  There is swelling and laceration over the nasal bridge, with a small  dense curvilinear foreign body measures less than 3 mm. There is normal  aeration of the paranasal sinuses and mastoid air cells. The orbits and  contents are within normal limits. The mandible and TMJs appear  unremarkable. There is mild deviation of  the nasal septum to the left.  There is moderate spondylosis in the visualized cervical spine, this  appears greatest at C5-6.       Impression:      Swelling and laceration over the nasal bridge region, with a  small sub-5 mm radiodense foreign body within the laceration on the  right. No definite acute fracture is identified. Slight deformity of the  right nasal bone appears to be chronic. The sinuses are clear. There is  mild deviation of the nasal septum to the left.        This report was finalized on 07/20/2022 14:52 by Dr. Yassine Mendoza MD.    XR Knee 3 View Right [[id number]] Collected: 07/20/22 1307     Updated: 07/20/22 1313    Narrative:      EXAMINATION:   XR KNEE 3 VW RIGHT-  7/20/2022 1:07 PM CDT     HISTORY: XR KNEE 3 VW RIGHT- 7/20/2022 11:36 AM CDT     HISTORY: pain, trauma     COMPARISON: None      FINDINGS:   Frontal, lateral and oblique views of the right knee were obtained.      There is no fracture or dislocation. Mild narrowing of the medial joint  are. Minimal hypertrophic changes present medial tibial plateau medial  femoral condyle. Narrowing of the patellofemoral joint is noted with  mild spurring of patella posteriorly. Enthesophyte formation is present  quadriceps tendon insertion in the patella.. There is no significant  joint effusion.       No gross soft tissue abnormality is visualized.        Impression:      1. Degenerative arthrosis is noted in the medial and patellofemoral  joint compartments. No acute osseous injury is identified..        This report was finalized on 07/20/2022 13:09 by Dr. Hussein Schwartz MD.    CT Cervical Spine Without Contrast [[id number]] Collected: 07/20/22 1226     Updated: 07/20/22 1237    Narrative:      EXAMINATION: CT CERVICAL SPINE WO CONTRAST-      7/20/2022 11:31 AM CDT     HISTORY: Neck trauma, uncomplicated (NEXUS/CCR neg) (Age 16-64y)     In order to have a CT radiation dose as low as reasonably achievable  Automated Exposure Control was utilized  for adjustment of the mA and/or  KV according to patient size.     DLP in mGycm= 553     The CT scan of the cervical spine is performed without intravenous or  intrathecal contrast enhancement.     Images are acquired in axial plane and subsequent reconstruction in  coronal and sagittal planes.     There is no previous study for comparison.     The study is of limited diagnostic value due to patient body habitus and  equipment limitation. The mid and lower lumbar spine is suboptimally  visualized and evaluated.     There is no evidence of a fracture or compression.     There is no acute displacement or malalignment.     There is a mild retrolisthesis of C7 in relation to C6 and T1.     There is reversal of cervical lordosis centered at C5-6.     Visualized cervical vertebral body heights are normal.     There are chronic degenerative changes in the form of osteophytes,  uncinate spurs, loss of disc height and representing degenerative disc  disease and facet arthropathy throughout the cervical spine, most  pronounced in the lower cervical spine. There is a resultant left neural  foraminal stenosis at C3-4, right pleural foraminal stenosis at C4-5,  bilateral neural foraminal stenosis at C5-6. No significant spinal  stenosis at any level.     The prevertebral soft tissues are unremarkable. The thyroid gland is  suboptimally evaluated. There is a probable low-density nodule located  posteriorly in the right lobe. The lung apices included in the study  show extensive scarring, right more than the left.       Impression:      1. No acute fracture or malalignment.  2. Cervical spondylosis and reversal of cervical lordosis.  3. The multilevel prominent disc osteophyte complexes and facet  arthropathy and resultant neural foraminal stenosis at several levels.  No evidence of spinal stenosis.                                   This report was finalized on 07/20/2022 12:34 by Dr. Jennifer Montiel MD.    CT Head Without  Contrast [[id number]] Collected: 07/20/22 1216     Updated: 07/20/22 1224    Narrative:      EXAMINATION: CT HEAD WO CONTRAST-      7/20/2022 11:31 AM CDT     HISTORY: Facial trauma, blunt     In order to have a CT radiation dose as low as reasonably achievable  Automated Exposure Control was utilized for adjustment of the mA and/or  KV according to patient size.     DLP in mGycm= 750     The CT scan of the head is performed without intravenous contrast  enhancement.     Images are acquired in axial plane and subsequent reconstruction in  coronal and sagittal planes.     Comparison is made with the previous study dated 12/17/2021.     There is no evidence of a mass. There is no midline shift.     There are prominent frontal cortical sulci suggesting atrophy. This is  similar to the previous study.     A focus of decreased attenuation is seen in the left insular/mesial  temporal lobe which may represent an area of chronic infarction. This is  stable.     The ventricles and basal cisterns and the remaining cortical sulci are  normal.     The gray-white matter differentiation is maintained.     There is no evidence of intracranial hemorrhage or hematoma.     Images reviewed in bone window show no evidence of a skull fracture. The  limited visualized paranasal sinuses and mastoid air cells are clear.  Facial bones are not included or evaluated in this study.       Impression:      1. No acute intracranial abnormality.  2. No skull fracture.                             This report was finalized on 07/20/2022 12:21 by Dr. Jennifer Montiel MD.            Procedure: 2% lidocaine with epinephrine was used for local infiltration anesthesia.  The wound was cleansed with Betadine and normal saline and mechanically debrided of foreign bodies.  The skin edges were then trimmed sharply.  The deep soft tissue and deep dermis was then repaired using interrupted 5-0 Vicryl suture and the skin was reapproximated using running 6-0 nylon  suture Steri-Strips were applied.      Assessment & Plan           Full-thickness soft tissue laceration of the nose) 2 cm).  The patient is to keep the Steri-Strips in place and follow-up with me in 1 week    I discussed the patients findings and my recommendations with patient    Lionel Packer MD  07/20/22  16:06 CDT

## 2022-07-20 NOTE — DISCHARGE INSTRUCTIONS
Drink plenty of fluid.  Follow closed head injury instruction sheet.  Tylenol as needed for pain. Follow up with Dr Packer in one week - call for appointment.  Follow up with PCP - call tomorrow for appointment. Return to ED if condition does not improve or worsens

## 2022-08-02 ENCOUNTER — OFFICE VISIT (OUTPATIENT)
Dept: FAMILY MEDICINE CLINIC | Facility: CLINIC | Age: 57
End: 2022-08-02

## 2022-08-02 VITALS
OXYGEN SATURATION: 96 % | BODY MASS INDEX: 47.09 KG/M2 | WEIGHT: 293 LBS | SYSTOLIC BLOOD PRESSURE: 131 MMHG | TEMPERATURE: 98 F | HEIGHT: 66 IN | HEART RATE: 65 BPM | DIASTOLIC BLOOD PRESSURE: 81 MMHG

## 2022-08-02 DIAGNOSIS — F41.9 ANXIETY: Primary | ICD-10-CM

## 2022-08-02 PROCEDURE — 99214 OFFICE O/P EST MOD 30 MIN: CPT | Performed by: FAMILY MEDICINE

## 2022-08-02 RX ORDER — ALPRAZOLAM 0.5 MG/1
.25-.5 TABLET ORAL NIGHTLY PRN
Qty: 30 TABLET | Refills: 1 | Status: SHIPPED | OUTPATIENT
Start: 2022-08-02

## 2022-08-02 RX ORDER — BUPROPION HYDROCHLORIDE 150 MG/1
450 TABLET ORAL EVERY MORNING
Qty: 270 TABLET | Refills: 0 | Status: SHIPPED | OUTPATIENT
Start: 2022-08-02 | End: 2022-10-28

## 2022-08-02 NOTE — PROGRESS NOTES
"Chief Complaint  Anxiety (Follow up)    Subjective        Anthony Meza presents to [facility]  History of Present Illness  Anxiety is overall improved on current regimen, but she is still utilizing Xanax to help her sleep.  Otherwise feels well, blood pressure within normal limits.    Objective   Vital Signs:  /81 (BP Location: Right arm, Patient Position: Sitting, Cuff Size: Adult)   Pulse 65   Temp 98 °F (36.7 °C) (Temporal)   Ht 167.6 cm (66\")   Wt (!) 166 kg (365 lb 3.2 oz)   SpO2 96%   BMI 58.94 kg/m²   Estimated body mass index is 58.94 kg/m² as calculated from the following:    Height as of this encounter: 167.6 cm (66\").    Weight as of this encounter: 166 kg (365 lb 3.2 oz).    \plain      Physical Exam  Vitals and nursing note reviewed.   Constitutional:       General: She is not in acute distress.     Appearance: She is not diaphoretic.   HENT:      Head: Normocephalic and atraumatic.      Nose: Nose normal.   Eyes:      General: No scleral icterus.        Right eye: No discharge.         Left eye: No discharge.      Conjunctiva/sclera: Conjunctivae normal.   Neck:      Trachea: No tracheal deviation.   Pulmonary:      Effort: Pulmonary effort is normal.   Skin:     General: Skin is warm and dry.      Coloration: Skin is not pale.   Neurological:      Mental Status: She is alert and oriented to person, place, and time.   Psychiatric:         Behavior: Behavior normal.         Thought Content: Thought content normal.         Judgment: Judgment normal.        Result Review :                Assessment and Plan   Diagnoses and all orders for this visit:    1. Anxiety (Primary)  -     buPROPion XL (Wellbutrin XL) 150 MG 24 hr tablet; Take 3 tablets by mouth Every Morning.  Dispense: 270 tablet; Refill: 0  -     ALPRAZolam (XANAX) 0.5 MG tablet; Take 0.5-1 tablets by mouth At Night As Needed for Anxiety.  Dispense: 30 tablet; Refill: 1    Stable, refills above, " follow-up 3 months         Follow Up   No follow-ups on file.  Patient was given instructions and counseling regarding her condition or for health maintenance advice. Please see specific information pulled into the AVS if appropriate.

## 2022-08-26 ENCOUNTER — OFFICE VISIT (OUTPATIENT)
Dept: NEUROLOGY | Facility: CLINIC | Age: 57
End: 2022-08-26

## 2022-08-26 ENCOUNTER — OFFICE VISIT (OUTPATIENT)
Dept: OBSTETRICS AND GYNECOLOGY | Facility: CLINIC | Age: 57
End: 2022-08-26

## 2022-08-26 VITALS
HEIGHT: 66 IN | WEIGHT: 293 LBS | OXYGEN SATURATION: 98 % | BODY MASS INDEX: 47.09 KG/M2 | SYSTOLIC BLOOD PRESSURE: 116 MMHG | DIASTOLIC BLOOD PRESSURE: 70 MMHG | RESPIRATION RATE: 17 BRPM | HEART RATE: 64 BPM

## 2022-08-26 VITALS
HEIGHT: 66 IN | BODY MASS INDEX: 47.09 KG/M2 | SYSTOLIC BLOOD PRESSURE: 118 MMHG | DIASTOLIC BLOOD PRESSURE: 76 MMHG | WEIGHT: 293 LBS

## 2022-08-26 DIAGNOSIS — N95.0 PMB (POSTMENOPAUSAL BLEEDING): Primary | ICD-10-CM

## 2022-08-26 DIAGNOSIS — G47.33 OBSTRUCTIVE SLEEP APNEA: Primary | ICD-10-CM

## 2022-08-26 LAB
B-HCG UR QL: NEGATIVE
EXPIRATION DATE: NORMAL
INTERNAL NEGATIVE CONTROL: NORMAL
INTERNAL POSITIVE CONTROL: NORMAL
Lab: NORMAL

## 2022-08-26 PROCEDURE — 58100 BIOPSY OF UTERUS LINING: CPT | Performed by: OBSTETRICS & GYNECOLOGY

## 2022-08-26 PROCEDURE — 99213 OFFICE O/P EST LOW 20 MIN: CPT | Performed by: NURSE PRACTITIONER

## 2022-08-26 PROCEDURE — 88305 TISSUE EXAM BY PATHOLOGIST: CPT | Performed by: OBSTETRICS & GYNECOLOGY

## 2022-08-26 PROCEDURE — 81025 URINE PREGNANCY TEST: CPT | Performed by: OBSTETRICS & GYNECOLOGY

## 2022-08-26 NOTE — PROGRESS NOTES
Neurology Progress Note      Chief Complaint:    Obstructive Sleep Apnea    Subjective   History of Present Illness:  Anthony Meza is a 56 y.o. female who presents today for obstructive sleep apnea.  She is routinely followed by Jamar Saldana DO for primary care.     Obstructive sleep apnea  She reports that she was doing very well with the use of her CPAP until she fell and had a laceration on her nose.  She states that since that time she has been unable to use her CPAP due to physical irritation to her nose.  She states that when she is able to use her CPAP she does very well with the use and feels that it is extremely beneficial to her sleep.  Overall she feels that she is doing very well and really needs to restart use of her CPAP but feels that she is unable to at this very moment secondary to her nose.    Allergies:    Penicillins    Medications:  Current Outpatient Medications   Medication Sig Dispense Refill   • acetaminophen (TYLENOL) 325 MG tablet Take 650 mg by mouth Every 6 (Six) Hours As Needed for Mild Pain  or Headache.     • Acetaminophen-Caffeine 500-65 MG tablet Take 1 tablet by mouth Every 6 (Six) Hours As Needed (migraine).     • ALPRAZolam (XANAX) 0.5 MG tablet Take 0.5-1 tablets by mouth At Night As Needed for Anxiety. 30 tablet 1   • apixaban (Eliquis) 5 MG tablet tablet Take 1 tablet by mouth Every 12 (Twelve) Hours. 60 tablet 7   • buPROPion XL (Wellbutrin XL) 150 MG 24 hr tablet Take 3 tablets by mouth Every Morning. 270 tablet 0   • cholecalciferol (VITAMIN D3) 1000 units tablet Take 1,000 Units by mouth Daily. 10/8/2018     • Cyanocobalamin (VITAMIN B-12) 1000 MCG sublingual tablet Place 1 tablet under the tongue Daily. Hold 10/8/2018     • furosemide (LASIX) 20 MG tablet Take 1 tablet by mouth 2 (Two) Times a Day. 60 tablet 5   • loratadine (CLARITIN) 10 MG tablet TAKE ONE TABLET BY MOUTH DAILY 30 tablet 2   • losartan (COZAAR) 100 MG tablet Take 1 tablet by mouth Daily. 90  tablet 0   • metoprolol tartrate (LOPRESSOR) 25 MG tablet TAKE ONE TABLET BY MOUTH TWICE A  tablet 3   • oxybutynin XL (DITROPAN-XL) 10 MG 24 hr tablet TAKE TWO TABLETS BY MOUTH DAILY 180 tablet 1   • traMADol (ULTRAM) 50 MG tablet Take 1 tablet by mouth Every 8 (Eight) Hours As Needed for Moderate Pain . for pain 30 tablet 0   • clindamycin (CLEOCIN) 300 MG capsule Take 1 capsule by mouth 3 (Three) Times a Day for 10 days. 30 capsule 0   • predniSONE (DELTASONE) 20 MG tablet Take 1 tablet by mouth 2 (Two) Times a Day for 5 days. 10 tablet 0     No current facility-administered medications for this visit.     Current outpatient and discharge medications have been reconciled for the patient.  Reviewed by: JAYLYN Leggett    Past Medical History:  Past Medical History:   Diagnosis Date   • A-fib (HCC)    • Abnormal ECG    • Anxiety    • Arthritis    • Atrial fibrillation (HCC)    • Back pain    • Breast cancer (HCC) 09/2018   • CFS (chronic fatigue syndrome)    • Chest pain    • Chronic pain disorder    • Depression    • Dizziness    • Ductal carcinoma in situ (DCIS) of left breast     BREAST LEFT   • Fibromyalgia    • Hx of radiation therapy    • Hypertension    • IBS (irritable bowel syndrome)    • Incontinence    • Migraines    • Mononucleosis    • Sleep apnea     uses a c-pap   • Wears glasses      Past Surgical History:   Procedure Laterality Date   • ANKLE SURGERY      x2 left   • BREAST BIOPSY Left 2018    stereo   • BREAST EXCISIONAL BIOPSY Left 2018   • BREAST LUMPECTOMY Left 09/2018   • BREAST LUMPECTOMY WITH SENTINEL NODE BIOPSY Left 10/15/2018    Procedure: LEFT BREAST LUMPECTOMY WITH NEEDLE LOCALIZATION - MAMMOGRAM GUIDED AND SENTINEL LYMPH  NODE BIOPSY - RADIOLOGIST TO INJECT AND SCAN;  Surgeon: Rafael Porter MD;  Location: [facility] OR;  Service: General   • CARDIAC ABLATION  11/2019   • CARDIAC CATHETERIZATION Bilateral 1/29/2018    Procedure: Coronary angiography;  Surgeon: Joel Medina  "MD;  Location:  [facility] CATH INVASIVE LOCATION;  Service:    • CARDIAC CATHETERIZATION N/A 1/29/2018    Procedure: Left Heart Cath;  Surgeon: Joel Medina MD;  Location:  [facility] CATH INVASIVE LOCATION;  Service:    • CYST REMOVAL      from tailbone   • D & C HYSTEROSCOPY N/A 8/9/2021    Procedure: DILATATION AND CURETTAGE HYSTEROSCOPY;  Surgeon: Jacqueline Johnson DO;  Location:  [facility] OR;  Service: Obstetrics/Gynecology;  Laterality: N/A;   • TUBAL ABDOMINAL LIGATION     • WISDOM TOOTH EXTRACTION       Family History   Problem Relation Age of Onset   • Transient ischemic attack Mother    • Rheum arthritis Mother    • Fibromyalgia Mother    • Heart disease Father    • Asthma Father    • Hypertension Father    • Rheum arthritis Father    • Obesity Father    • Obesity Sister    • Obesity Maternal Grandmother    • Ovarian cancer Maternal Grandmother    • Obesity Paternal Grandfather    • Hypertension Paternal Grandfather    • Heart disease Paternal Grandfather    • Liver cancer Maternal Aunt    • Breast cancer Neg Hx      Social History     Tobacco Use   • Smoking status: Never Smoker   • Smokeless tobacco: Never Used   Vaping Use   • Vaping Use: Never used   Substance Use Topics   • Alcohol use: No   • Drug use: No     Review of Systems   Psychiatric/Behavioral: Positive for sleep disturbance.   All other systems reviewed and are negative.        Objective   Vital Signs:  Temp:  [99.5 °F (37.5 °C)] 99.5 °F (37.5 °C)  Heart Rate:  [64-74] 74  Resp:  [17-18] 18  BP: (116-132)/(64-76) 132/64      08/26/22  1040   Weight: (!) 166 kg (365 lb)     167.6 cm (66\")  Body mass index is 58.91 kg/m².    Physical Exam  Vitals reviewed.   Constitutional:       Appearance: Normal appearance.   HENT:      Head: Normocephalic.   Eyes:      Extraocular Movements: Extraocular movements intact.      Pupils: Pupils are equal, round, and reactive to light.   Cardiovascular:      Rate and Rhythm: Normal rate and regular rhythm.      " Pulses: Normal pulses.   Pulmonary:      Effort: Pulmonary effort is normal.   Musculoskeletal:         General: Normal range of motion.      Cervical back: Normal range of motion and neck supple.   Skin:     General: Skin is warm and dry.      Capillary Refill: Capillary refill takes less than 2 seconds.   Neurological:      Gait: Gait is intact.   Psychiatric:         Mood and Affect: Mood normal.       Neurologic Exam:  Mental Status:    -Awake. Alert. Oriented to person, place & time.  -No word finding difficulties.  -No aphasia.  -No dysarthria.  -Follows simple commands.     CN II:  Full visual fields with confrontation.  Pupils equally reactive to light.  CN III, IV, VI:  Extraocular muscles function intact with no nystagmus.  CN V:  Facial sensory is symmetric.  CN VII:  Facial motor symmetric.  CN VIII:  Gross hearing intact bilaterally.  CN IX/X:  Palate elevates symmetrically.  CN XI:  Shoulder shrug symmetric.  CN XII:  Tongue is midline on protrusion.     Motor: (strength out of 5:  1= minimal movement, 2 = movement in plane of gravity, 3 = movement against gravity, 4 = movement against some resistance, 5 = full strength)     -5/5 in bilateral biceps, triceps, brachioradialis, wrist extensors and intrinsic muscles of the hand.    -5/5 in bilateral hip flexors, quadriceps, hamstrings, gastrocsoleus complex, anterior tibialis and extensor hallucis longus.       Deep Tendon Reflexes:  -Right              Biceps: 2+         Triceps: 2+      Brachioradialis: 2+              Patella: 2+       Ankle: 2+         Babinski:  negative  -Left              Biceps: 2+         Triceps: 2+      Brachioradialis: 2+              Patella: 2+       Ankle: 2+         Babinski:  negative    Tone (Modified [place] Scale):  No appreciable increase in tone or rigidity noted.     Sensory:  -Intact to light touch, pinprick BUE (C5-T1) and BLE (L2-S1).     Coordination:  -Finger to nose intact BUEs  -Heel to shin intact BLEs  -No  ataxia     Gait  -No signs of ataxia  -ambulates unassisted     Mallampati score: 3    [place] Sleepiness Scale 6    STOP-BANG: Intermediate risk HARLAN.    Compliance report:  This report is for the dates of 7/14/2022-8/12/2022.  Patient used the device for 9/30 days for 30% compliance.  Of those days patient used the device for greater than 4 hours for 7 days for 23% compliance.  Patient is currently set at 11 cm H2O CPAP pressure.  Current AHI is 1.2.    Results Review:    Lab Results   Component Value Date    GLUCOSE 105 (H) 07/20/2022    BUN 10 07/20/2022    CREATININE 0.80 07/20/2022    EGFRIFNONA 73 12/26/2021    BCR 12.5 07/20/2022    K 4.1 07/20/2022    CO2 24.0 07/20/2022    CALCIUM 9.2 07/20/2022    ALBUMIN 4.00 07/20/2022    AST 17 07/20/2022    ALT 16 07/20/2022     Lab Results   Component Value Date    WBC 9.86 07/20/2022    HGB 13.5 07/20/2022    HCT 41.6 07/20/2022    MCV 94.1 07/20/2022     07/20/2022     Lab Results   Component Value Date    CHOL 203 (H) 05/12/2021    TRIG 137 05/12/2021    HDL 46 05/12/2021     (H) 05/12/2021     Lab Results   Component Value Date    TSH 1.990 05/12/2021     No results found for: HGBA1C  Lab Results   Component Value Date    FOLATE 9.65 03/13/2018     Lab Results   Component Value Date    RNQGMESJ11 533 05/12/2021       Chart Review:  ED with Dennis White MD (07/20/2022)       Plan .  Assessment:  Anthony Meza is a 56 y.o. female who presents for evaluation of obstructive sleep apnea.  She reports that she is doing very well with use for CPAP prior to falling and injuring her nose.  She states that where she had to have sutures is exactly location where her CPAP mask lies on her nose.  She states that due to pain and irritation she has been unable to use her CPAP machine for a few weeks.  This is reflected in her compliance report.  Otherwise, she reports compliance.  I have attached a 90-day compliance to show compliance prior to her fall.      Plan:  • Continue CPAP.  Encouraged continued compliance.  • I have advised she increase use of CPAP as tolerated secondary to irritation to her nose  • Regular sleep schedule and appropriate sleep hygiene  • Regular physical activity and balanced diet  • Call me with any questions or concerns.    The patient and I have discussed the plan of care and she is in full agreement at this time.     Follow-Up:  Return in about 1 year (around 8/26/2023) for Obstructive sleep apnea, Annual compliance review.       JAYLYN Leggett  08/26/22  16:32 CDT

## 2022-08-26 NOTE — PROGRESS NOTES
Subjective   Anthony Meza is a 56 y.o. female  YOB: 1965    Chief Complaint   Patient presents with   • Follow-up     Patient originally scheduled for yearly follow up from [facility]. Patient states she did well until last Saturday when she started having vaginal bleeding again. Patient states she is still having bleeding today.        56 year old female postmenopausal female presents with complaints of postmenopausal bleeding. Patient reports that she started to have approximately one week ago. She reports it has slowed down.  Current medical and surgical history reviewed and up-to-date.  She did previously have a dilatation and curettage with hysteroscopy in 2021 that revealed proliferative endometrium.      Allergies   Allergen Reactions   • Penicillins Rash       Past Medical History:   Diagnosis Date   • A-fib (HCC)    • Abnormal ECG    • Anxiety    • Arthritis    • Atrial fibrillation (HCC)    • Back pain    • Breast cancer (HCC) 09/2018   • CFS (chronic fatigue syndrome)    • Chest pain    • Chronic pain disorder    • Depression    • Dizziness    • Ductal carcinoma in situ (DCIS) of left breast     BREAST LEFT   • Fibromyalgia    • Hx of radiation therapy    • Hypertension    • IBS (irritable bowel syndrome)    • Incontinence    • Migraines    • Mononucleosis    • Sleep apnea     uses a c-pap   • Wears glasses        Family History   Problem Relation Age of Onset   • Transient ischemic attack Mother    • Rheum arthritis Mother    • Fibromyalgia Mother    • Heart disease Father    • Asthma Father    • Hypertension Father    • Rheum arthritis Father    • Obesity Father    • Obesity Sister    • Obesity Maternal Grandmother    • Ovarian cancer Maternal Grandmother    • Obesity Paternal Grandfather    • Hypertension Paternal Grandfather    • Heart disease Paternal Grandfather    • Liver cancer Maternal Aunt    • Breast cancer Neg Hx        Social History     Socioeconomic History   • Marital status:     • Number of children: 2   Tobacco Use   • Smoking status: Never Smoker   • Smokeless tobacco: Never Used   Vaping Use   • Vaping Use: Never used   Substance and Sexual Activity   • Alcohol use: No   • Drug use: No   • Sexual activity: Yes     Partners: Male     Birth control/protection: Surgical     Comment: LMP: Menopausal         Current Outpatient Medications:   •  acetaminophen (TYLENOL) 325 MG tablet, Take 650 mg by mouth Every 6 (Six) Hours As Needed for Mild Pain  or Headache., Disp: , Rfl:   •  Acetaminophen-Caffeine 500-65 MG tablet, Take 1 tablet by mouth Every 6 (Six) Hours As Needed (migraine)., Disp: , Rfl:   •  ALPRAZolam (XANAX) 0.5 MG tablet, Take 0.5-1 tablets by mouth At Night As Needed for Anxiety., Disp: 30 tablet, Rfl: 1  •  apixaban (Eliquis) 5 MG tablet tablet, Take 1 tablet by mouth Every 12 (Twelve) Hours., Disp: 60 tablet, Rfl: 7  •  buPROPion XL (Wellbutrin XL) 150 MG 24 hr tablet, Take 3 tablets by mouth Every Morning., Disp: 270 tablet, Rfl: 0  •  cholecalciferol (VITAMIN D3) 1000 units tablet, Take 1,000 Units by mouth Daily. 10/8/2018, Disp: , Rfl:   •  Cyanocobalamin (VITAMIN B-12) 1000 MCG sublingual tablet, Place 1 tablet under the tongue Daily. Hold 10/8/2018, Disp: , Rfl:   •  furosemide (LASIX) 20 MG tablet, Take 1 tablet by mouth 2 (Two) Times a Day., Disp: 60 tablet, Rfl: 5  •  loratadine (CLARITIN) 10 MG tablet, TAKE ONE TABLET BY MOUTH DAILY, Disp: 30 tablet, Rfl: 2  •  losartan (COZAAR) 100 MG tablet, Take 1 tablet by mouth Daily., Disp: 90 tablet, Rfl: 0  •  metoprolol tartrate (LOPRESSOR) 25 MG tablet, TAKE ONE TABLET BY MOUTH TWICE A DAY, Disp: 180 tablet, Rfl: 3  •  oxybutynin XL (DITROPAN-XL) 10 MG 24 hr tablet, TAKE TWO TABLETS BY MOUTH DAILY, Disp: 180 tablet, Rfl: 1  •  traMADol (ULTRAM) 50 MG tablet, Take 1 tablet by mouth Every 8 (Eight) Hours As Needed for Moderate Pain . for pain, Disp: 30 tablet, Rfl: 0    No LMP recorded (lmp unknown). Patient is  postmenopausal.    Sexual History:         Could not be calculated    Past Surgical History:   Procedure Laterality Date   • ANKLE SURGERY      x2 left   • BREAST BIOPSY Left 2018    stereo   • BREAST EXCISIONAL BIOPSY Left 2018   • BREAST LUMPECTOMY Left 09/2018   • BREAST LUMPECTOMY WITH SENTINEL NODE BIOPSY Left 10/15/2018    Procedure: LEFT BREAST LUMPECTOMY WITH NEEDLE LOCALIZATION - MAMMOGRAM GUIDED AND SENTINEL LYMPH  NODE BIOPSY - RADIOLOGIST TO INJECT AND SCAN;  Surgeon: Rafael Porter MD;  Location: [facility] OR;  Service: General   • CARDIAC ABLATION  11/2019   • CARDIAC CATHETERIZATION Bilateral 1/29/2018    Procedure: Coronary angiography;  Surgeon: Joel Medina MD;  Location:  [facility] CATH INVASIVE LOCATION;  Service:    • CARDIAC CATHETERIZATION N/A 1/29/2018    Procedure: Left Heart Cath;  Surgeon: Joel Medina MD;  Location:  [facility] CATH INVAS[facility];  Service:    • CYST REMOVAL      from tailbone   • D & C HYSTEROSCOPY N/A 8/9/2021    Procedure: DILATATION AND CURETTAGE HYSTEROSCOPY;  Surgeon: Jacqueline Johnson DO;  Location: [facility] OR;  Service: Obstetrics/Gynecology;  Laterality: N/A;   • TUBAL ABDOMINAL LIGATION     • WISDOM TOOTH EXTRACTION         Review of Systems    Objective   Physical Exam  Vitals and nursing note reviewed. Exam conducted with a chaperone present.   Constitutional:       General: She is not in acute distress.     Appearance: She is well-developed.   HENT:      Head: Normocephalic and atraumatic.   Pulmonary:      Effort: Pulmonary effort is normal.   Abdominal:      Palpations: Abdomen is soft.      Tenderness: There is no abdominal tenderness.   Genitourinary:     Exam position: Supine.      Labia:         Right: No tenderness or lesion.         Left: No tenderness or lesion.       Vagina: Normal. No signs of injury. No vaginal discharge, tenderness or bleeding.      Cervix: No cervical motion tenderness, discharge or friability.      Comments: Urine pregnancy test  "was negative. An endometrial biopsy was performed in the office today.  The cervix was visualized with a speculum and prepped with Betadine.  A standard endometrial sampling Pipelle was passed into the uterine cavity.  The patient experienced moderate cramping and the uterus sounded to 7 cm.  A moderate amount of tissue was obtained with 1 pass.   She tolerated the procedure well.    Musculoskeletal:      Cervical back: Neck supple.           Vitals:    08/26/22 1128   BP: 118/76   Weight: (!) 165 kg (363 lb)   Height: 167.6 cm (66\")       Diagnoses and all orders for this visit:    1. PMB (postmenopausal bleeding) (Primary)  -     POC Pregnancy, Urine  -     Tissue Pathology Exam    Vaginal ultrasound today revealed an endometrial lining of 13 mm.  Discussed with patient need for endometrial sampling.  Discussed with patient endometrial biopsy versus dilatation and curettage with hysteroscopy.  Patient elected for endometrial biopsy.  Discussed with patient risk benefits risk including bleeding, infection discussed as well as uterine perforation.  Will recommend follow up pending path results.    Jacqueline Johnson, DO       "

## 2022-08-29 LAB
CYTO UR: NORMAL
LAB AP CASE REPORT: NORMAL
LAB AP CLINICAL INFORMATION: NORMAL
LAB AP DIAGNOSIS COMMENT: NORMAL
Lab: NORMAL
PATH REPORT.FINAL DX SPEC: NORMAL
PATH REPORT.GROSS SPEC: NORMAL

## 2022-09-07 ENCOUNTER — OFFICE VISIT (OUTPATIENT)
Dept: FAMILY MEDICINE CLINIC | Facility: CLINIC | Age: 57
End: 2022-09-07

## 2022-09-07 ENCOUNTER — OFFICE VISIT (OUTPATIENT)
Dept: OBSTETRICS AND GYNECOLOGY | Facility: CLINIC | Age: 57
End: 2022-09-07

## 2022-09-07 VITALS
WEIGHT: 293 LBS | DIASTOLIC BLOOD PRESSURE: 87 MMHG | BODY MASS INDEX: 47.09 KG/M2 | SYSTOLIC BLOOD PRESSURE: 150 MMHG | HEART RATE: 89 BPM | HEIGHT: 66 IN | OXYGEN SATURATION: 98 % | TEMPERATURE: 98.3 F

## 2022-09-07 VITALS
WEIGHT: 293 LBS | HEIGHT: 66 IN | DIASTOLIC BLOOD PRESSURE: 88 MMHG | SYSTOLIC BLOOD PRESSURE: 132 MMHG | BODY MASS INDEX: 47.09 KG/M2

## 2022-09-07 DIAGNOSIS — J30.9 ALLERGIC RHINITIS, UNSPECIFIED SEASONALITY, UNSPECIFIED TRIGGER: ICD-10-CM

## 2022-09-07 DIAGNOSIS — M79.89 LEG SWELLING: ICD-10-CM

## 2022-09-07 DIAGNOSIS — J98.01 COUGH DUE TO BRONCHOSPASM: ICD-10-CM

## 2022-09-07 DIAGNOSIS — R60.9 EDEMA, UNSPECIFIED TYPE: ICD-10-CM

## 2022-09-07 DIAGNOSIS — L03.116 CELLULITIS OF LEFT LOWER EXTREMITY: Primary | ICD-10-CM

## 2022-09-07 DIAGNOSIS — N95.0 PMB (POSTMENOPAUSAL BLEEDING): Primary | ICD-10-CM

## 2022-09-07 DIAGNOSIS — I10 ESSENTIAL HYPERTENSION: ICD-10-CM

## 2022-09-07 PROCEDURE — 99213 OFFICE O/P EST LOW 20 MIN: CPT | Performed by: OBSTETRICS & GYNECOLOGY

## 2022-09-07 PROCEDURE — 99214 OFFICE O/P EST MOD 30 MIN: CPT | Performed by: FAMILY MEDICINE

## 2022-09-07 RX ORDER — SULFAMETHOXAZOLE AND TRIMETHOPRIM 800; 160 MG/1; MG/1
1 TABLET ORAL 2 TIMES DAILY
Qty: 14 TABLET | Refills: 0 | Status: SHIPPED | OUTPATIENT
Start: 2022-09-07 | End: 2022-09-10

## 2022-09-07 RX ORDER — LOSARTAN POTASSIUM 100 MG/1
100 TABLET ORAL DAILY
Qty: 90 TABLET | Refills: 0 | Status: SHIPPED | OUTPATIENT
Start: 2022-09-07 | End: 2023-01-17

## 2022-09-07 RX ORDER — FUROSEMIDE 20 MG/1
20 TABLET ORAL 2 TIMES DAILY
Qty: 60 TABLET | Refills: 0 | Status: SHIPPED | OUTPATIENT
Start: 2022-09-07 | End: 2022-10-04

## 2022-09-07 RX ORDER — LORATADINE 10 MG/1
10 TABLET ORAL DAILY
Qty: 30 TABLET | Refills: 2 | Status: SHIPPED | OUTPATIENT
Start: 2022-09-07

## 2022-09-07 RX ORDER — MONTELUKAST SODIUM 10 MG/1
10 TABLET ORAL NIGHTLY
Qty: 30 TABLET | Refills: 1 | Status: SHIPPED | OUTPATIENT
Start: 2022-09-07 | End: 2022-11-03

## 2022-09-07 NOTE — PROGRESS NOTES
Subjective   Anthony Meza is a 56 y.o. female  YOB: 1965        Chief Complaint   Patient presents with   • Follow-up     Patient here today to follow up post endo BX.       56-year-old postmenopausal female  2 para 2 presents for follow-up from endometrial biopsy.  Patient reports she has continued to have bleeding since then.  Her endometrial biopsy revealed disordered proliferative endometrium with necrosis and eosinophilic metaplasia.  She is here today to discuss further management options.  Of note her previous transvaginal ultrasound revealed a endometrial thickness of 1.3 cm.  She had a previous episode of postmenopausal bleeding in October of last year.  She is interested in definitive management.      Allergies   Allergen Reactions   • Penicillins Rash       Past Medical History:   Diagnosis Date   • A-fib (HCC)    • Abnormal ECG    • Anxiety    • Arthritis    • Atrial fibrillation (HCC)    • Back pain    • Breast cancer (HCC) 2018   • CFS (chronic fatigue syndrome)    • Chest pain    • Chronic pain disorder    • Depression    • Dizziness    • Ductal carcinoma in situ (DCIS) of left breast     BREAST LEFT   • Fibromyalgia    • Hx of radiation therapy    • Hypertension    • IBS (irritable bowel syndrome)    • Incontinence    • Migraines    • Mononucleosis    • Sleep apnea     uses a c-pap   • Wears glasses        Family History   Problem Relation Age of Onset   • Transient ischemic attack Mother    • Rheum arthritis Mother    • Fibromyalgia Mother    • Heart disease Father    • Asthma Father    • Hypertension Father    • Rheum arthritis Father    • Obesity Father    • Obesity Sister    • Obesity Maternal Grandmother    • Ovarian cancer Maternal Grandmother    • Obesity Paternal Grandfather    • Hypertension Paternal Grandfather    • Heart disease Paternal Grandfather    • Liver cancer Maternal Aunt    • Breast cancer Neg Hx        Social History     Socioeconomic History   •  Marital status:    • Number of children: 2   Tobacco Use   • Smoking status: Never Smoker   • Smokeless tobacco: Never Used   Vaping Use   • Vaping Use: Never used   Substance and Sexual Activity   • Alcohol use: No   • Drug use: No   • Sexual activity: Yes     Partners: Male     Birth control/protection: Surgical     Comment: LMP: Menopausal         Current Outpatient Medications:   •  acetaminophen (TYLENOL) 325 MG tablet, Take 650 mg by mouth Every 6 (Six) Hours As Needed for Mild Pain  or Headache., Disp: , Rfl:   •  Acetaminophen-Caffeine 500-65 MG tablet, Take 1 tablet by mouth Every 6 (Six) Hours As Needed (migraine)., Disp: , Rfl:   •  ALPRAZolam (XANAX) 0.5 MG tablet, Take 0.5-1 tablets by mouth At Night As Needed for Anxiety., Disp: 30 tablet, Rfl: 1  •  apixaban (Eliquis) 5 MG tablet tablet, Take 1 tablet by mouth Every 12 (Twelve) Hours., Disp: 60 tablet, Rfl: 7  •  buPROPion XL (Wellbutrin XL) 150 MG 24 hr tablet, Take 3 tablets by mouth Every Morning., Disp: 270 tablet, Rfl: 0  •  cholecalciferol (VITAMIN D3) 1000 units tablet, Take 1,000 Units by mouth Daily. 10/8/2018, Disp: , Rfl:   •  Cyanocobalamin (VITAMIN B-12) 1000 MCG sublingual tablet, Place 1 tablet under the tongue Daily. Hold 10/8/2018, Disp: , Rfl:   •  furosemide (LASIX) 20 MG tablet, Take 1 tablet by mouth 2 (Two) Times a Day., Disp: 60 tablet, Rfl: 5  •  loratadine (CLARITIN) 10 MG tablet, TAKE ONE TABLET BY MOUTH DAILY, Disp: 30 tablet, Rfl: 2  •  losartan (COZAAR) 100 MG tablet, Take 1 tablet by mouth Daily., Disp: 90 tablet, Rfl: 0  •  metoprolol tartrate (LOPRESSOR) 25 MG tablet, TAKE ONE TABLET BY MOUTH TWICE A DAY, Disp: 180 tablet, Rfl: 3  •  oxybutynin XL (DITROPAN-XL) 10 MG 24 hr tablet, TAKE TWO TABLETS BY MOUTH DAILY, Disp: 180 tablet, Rfl: 1  •  traMADol (ULTRAM) 50 MG tablet, Take 1 tablet by mouth Every 8 (Eight) Hours As Needed for Moderate Pain . for pain, Disp: 30 tablet, Rfl: 0    No LMP recorded (lmp unknown).  Patient is postmenopausal.    Sexual History:         Could not be calculated    Past Surgical History:   Procedure Laterality Date   • ANKLE SURGERY      x2 left   • BREAST BIOPSY Left 2018    stereo   • BREAST EXCISIONAL BIOPSY Left 2018   • BREAST LUMPECTOMY Left 09/2018   • BREAST LUMPECTOMY WITH SENTINEL NODE BIOPSY Left 10/15/2018    Procedure: LEFT BREAST LUMPECTOMY WITH NEEDLE LOCALIZATION - MAMMOGRAM GUIDED AND SENTINEL LYMPH  NODE BIOPSY - RADIOLOGIST TO INJECT AND SCAN;  Surgeon: Rafael Porter MD;  Location: [facility] OR;  Service: General   • CARDIAC ABLATION  11/2019   • CARDIAC CATHETERIZATION Bilateral 1/29/2018    Procedure: Coronary angiography;  Surgeon: Joel Medina MD;  Location:  [facility] CATH INVASIVE LOCATION;  Service:    • CARDIAC CATHETERIZATION N/A 1/29/2018    Procedure: Left Heart Cath;  Surgeon: Joel Medina MD;  Location:  [facility] CATH INVASIVE LOCATION;  Service:    • CYST REMOVAL      from tailbone   • D & C HYSTEROSCOPY N/A 8/9/2021    Procedure: DILATATION AND CURETTAGE HYSTEROSCOPY;  Surgeon: Jacqueline Johnson DO;  Location: [facility] OR;  Service: Obstetrics/Gynecology;  Laterality: N/A;   • TUBAL ABDOMINAL LIGATION     • WISDOM TOOTH EXTRACTION         Review of Systems   Genitourinary: Positive for vaginal bleeding.       Objective   Physical Exam  Vitals and nursing note reviewed.   Constitutional:       General: She is not in acute distress.     Appearance: She is well-developed. She is morbidly obese.   HENT:      Head: Normocephalic and atraumatic.   Eyes:      Conjunctiva/sclera: Conjunctivae normal.   Neck:      Thyroid: No thyromegaly.   Pulmonary:      Effort: Pulmonary effort is normal.   Musculoskeletal:         General: Normal range of motion.      Cervical back: Normal range of motion and neck supple.   Skin:     General: Skin is warm and dry.   Neurological:      Mental Status: She is alert and oriented to person, place, and time.   Psychiatric:         Behavior:  "Behavior normal.         Judgment: Judgment normal.           Vitals:    09/07/22 0901   BP: 132/88   Weight: (!) 161 kg (354 lb)   Height: 167.6 cm (65.98\")       Diagnoses and all orders for this visit:    1. PMB (postmenopausal bleeding) (Primary)  -     Cancel: Ambulatory Referral to Gynecologic Oncology  -     Ambulatory Referral to Gynecologic Oncology    Discussed with patient different management options at this time including our recommendation for repeat dilatation and curettage with hysteroscopy versus referral to gynecologic oncology.  She is interested in definitive management with hysterectomy.  Discussed with patient that with her pathology report the way that it is that for me to do the hysterectomy I would require dilatation and curettage with hysteroscopy prior.  She is interested in being referred to gynecologic oncology for further evaluation.  Referral placed.  Return to clinic as needed or sooner symptoms worsen.  All questions answered patient verbalized understanding of plan.     Jacqeuline Johnson, DO       "

## 2022-09-09 ENCOUNTER — TELEPHONE (OUTPATIENT)
Dept: FAMILY MEDICINE CLINIC | Facility: CLINIC | Age: 57
End: 2022-09-09

## 2022-09-09 DIAGNOSIS — L03.116 CELLULITIS OF LEFT LOWER EXTREMITY: Primary | ICD-10-CM

## 2022-09-09 NOTE — TELEPHONE ENCOUNTER
Notes: PATIENT CALLED TO REPORT THAT MEDICINE THAT WAS GIVEN TO HER IS WORKING    sulfamethoxazole-trimethoprim (Bactrim DS) 800-160 MG per tablet    Thank you,  Kaushal Prather, PCT

## 2022-09-09 NOTE — TELEPHONE ENCOUNTER
Last OV on 9/7/22 with Dr. Cortes, Bactrim sent.  Patient states medication is not working.  Please advise

## 2022-09-10 RX ORDER — DOXYCYCLINE HYCLATE 100 MG/1
100 CAPSULE ORAL 2 TIMES DAILY
Qty: 10 CAPSULE | Refills: 0 | Status: SHIPPED | OUTPATIENT
Start: 2022-09-10 | End: 2022-09-15

## 2022-10-04 DIAGNOSIS — M79.89 LEG SWELLING: ICD-10-CM

## 2022-10-04 RX ORDER — FUROSEMIDE 20 MG/1
20 TABLET ORAL 2 TIMES DAILY
Qty: 60 TABLET | Refills: 5 | Status: SHIPPED | OUTPATIENT
Start: 2022-10-04

## 2022-10-04 NOTE — TELEPHONE ENCOUNTER
Rx Refill Note  Requested Prescriptions     Pending Prescriptions Disp Refills   • furosemide (LASIX) 20 MG tablet [Pharmacy Med Name: FUROSEMIDE 20 MG TABLET] 60 tablet 0     Sig: TAKE ONE TABLET BY MOUTH TWICE A DAY      Last office visit with prescribing clinician: 9/7/2022      Next office visit with prescribing clinician: Visit date not found            MEKA Mcdonald  10/04/22, 13:45 CDT

## 2022-10-23 DIAGNOSIS — Z12.31 SCREENING MAMMOGRAM FOR BREAST CANCER: ICD-10-CM

## 2022-10-23 DIAGNOSIS — Z92.3 HISTORY OF RADIATION THERAPY: ICD-10-CM

## 2022-10-23 DIAGNOSIS — D05.12 DUCTAL CARCINOMA IN SITU (DCIS) OF LEFT BREAST: Primary | ICD-10-CM

## 2022-10-23 DIAGNOSIS — Z98.890 S/P LUMPECTOMY, LEFT BREAST: ICD-10-CM

## 2022-10-26 NOTE — PROGRESS NOTES
Attempted to reach patient, unable to leave a message at this time.  Patient has appt with Dr. Saldana on 11/2/22

## 2022-10-28 DIAGNOSIS — F41.9 ANXIETY: ICD-10-CM

## 2022-10-28 RX ORDER — BUPROPION HYDROCHLORIDE 150 MG/1
TABLET ORAL
Qty: 270 TABLET | Refills: 0 | Status: SHIPPED | OUTPATIENT
Start: 2022-10-28 | End: 2023-03-08 | Stop reason: SDUPTHER

## 2022-10-28 NOTE — TELEPHONE ENCOUNTER
Rx Refill Note  Requested Prescriptions     Pending Prescriptions Disp Refills   • buPROPion XL (WELLBUTRIN XL) 150 MG 24 hr tablet [Pharmacy Med Name: buPROPion HCL  MG TABLET] 270 tablet 0     Sig: TAKE THREE TABLETS BY MOUTH EVERY MORNING      Last office visit with prescribing clinician: 8/2/2022      Next office visit with prescribing clinician: 11/2/2022            MEKA Mcdonald  10/28/22, 11:34 CDT

## 2022-11-03 DIAGNOSIS — J98.01 COUGH DUE TO BRONCHOSPASM: ICD-10-CM

## 2022-11-03 DIAGNOSIS — J30.9 ALLERGIC RHINITIS, UNSPECIFIED SEASONALITY, UNSPECIFIED TRIGGER: ICD-10-CM

## 2022-11-03 RX ORDER — MONTELUKAST SODIUM 10 MG/1
TABLET ORAL
Qty: 30 TABLET | Refills: 1 | Status: SHIPPED | OUTPATIENT
Start: 2022-11-03

## 2022-12-29 ENCOUNTER — OFFICE VISIT (OUTPATIENT)
Dept: FAMILY MEDICINE CLINIC | Facility: CLINIC | Age: 57
End: 2022-12-29
Payer: MEDICARE

## 2022-12-29 VITALS
OXYGEN SATURATION: 98 % | BODY MASS INDEX: 47.09 KG/M2 | DIASTOLIC BLOOD PRESSURE: 70 MMHG | SYSTOLIC BLOOD PRESSURE: 124 MMHG | HEIGHT: 66 IN | TEMPERATURE: 97.6 F | HEART RATE: 82 BPM | WEIGHT: 293 LBS

## 2022-12-29 DIAGNOSIS — Z12.11 SCREEN FOR COLON CANCER: ICD-10-CM

## 2022-12-29 DIAGNOSIS — Z00.00 MEDICARE ANNUAL WELLNESS VISIT, SUBSEQUENT: Primary | ICD-10-CM

## 2022-12-29 DIAGNOSIS — Z12.31 ENCOUNTER FOR SCREENING MAMMOGRAM FOR MALIGNANT NEOPLASM OF BREAST: ICD-10-CM

## 2022-12-29 DIAGNOSIS — R79.9 ABNORMAL FINDING OF BLOOD CHEMISTRY, UNSPECIFIED: ICD-10-CM

## 2022-12-29 DIAGNOSIS — Z23 ENCOUNTER FOR IMMUNIZATION: ICD-10-CM

## 2022-12-29 DIAGNOSIS — Z23 NEED FOR IMMUNIZATION AGAINST INFLUENZA: ICD-10-CM

## 2022-12-29 DIAGNOSIS — E66.01 CLASS 3 SEVERE OBESITY DUE TO EXCESS CALORIES WITH SERIOUS COMORBIDITY AND BODY MASS INDEX (BMI) OF 50.0 TO 59.9 IN ADULT: ICD-10-CM

## 2022-12-29 LAB
EXPIRATION DATE: NORMAL
HBA1C MFR BLD: 5.5 %
Lab: NORMAL

## 2022-12-29 PROCEDURE — G0439 PPPS, SUBSEQ VISIT: HCPCS | Performed by: FAMILY MEDICINE

## 2022-12-29 PROCEDURE — 1126F AMNT PAIN NOTED NONE PRSNT: CPT | Performed by: FAMILY MEDICINE

## 2022-12-29 PROCEDURE — 90686 IIV4 VACC NO PRSV 0.5 ML IM: CPT | Performed by: FAMILY MEDICINE

## 2022-12-29 PROCEDURE — 83036 HEMOGLOBIN GLYCOSYLATED A1C: CPT | Performed by: FAMILY MEDICINE

## 2022-12-29 PROCEDURE — 1160F RVW MEDS BY RX/DR IN RCRD: CPT | Performed by: FAMILY MEDICINE

## 2022-12-29 PROCEDURE — 1170F FXNL STATUS ASSESSED: CPT | Performed by: FAMILY MEDICINE

## 2022-12-29 PROCEDURE — 3044F HG A1C LEVEL LT 7.0%: CPT | Performed by: FAMILY MEDICINE

## 2022-12-29 PROCEDURE — G0008 ADMIN INFLUENZA VIRUS VAC: HCPCS | Performed by: FAMILY MEDICINE

## 2022-12-29 PROCEDURE — 1159F MED LIST DOCD IN RCRD: CPT | Performed by: FAMILY MEDICINE

## 2022-12-29 NOTE — PROGRESS NOTES
The ABCs of the Annual Wellness Visit  Subsequent Medicare Wellness Visit    Subjective      Anthony Meza is a 57 y.o. female who presents for a Subsequent Medicare Wellness Visit.    The following portions of the patient's history were reviewed and   updated as appropriate: allergies, current medications, past family history, past medical history, past social history, past surgical history and problem list.    Compared to one year ago, the patient feels her physical   health is better.    Compared to one year ago, the patient feels her mental   health is better.    Recent Hospitalizations:  She was not admitted to the hospital during the last year.       Current Medical Providers:  Patient Care Team:  Jamar Saldana DO as PCP - General (Family Medicine)  Tyrell Rosas MD as Referring Physician (Family Medicine)  Joel Medina MD as Cardiologist (Cardiology)    Outpatient Medications Prior to Visit   Medication Sig Dispense Refill   • acetaminophen (TYLENOL) 325 MG tablet Take 650 mg by mouth Every 6 (Six) Hours As Needed for Mild Pain  or Headache.     • ALPRAZolam (XANAX) 0.5 MG tablet Take 0.5-1 tablets by mouth At Night As Needed for Anxiety. 30 tablet 1   • apixaban (Eliquis) 5 MG tablet tablet Take 1 tablet by mouth Every 12 (Twelve) Hours. 60 tablet 7   • buPROPion XL (WELLBUTRIN XL) 150 MG 24 hr tablet TAKE THREE TABLETS BY MOUTH EVERY MORNING 270 tablet 0   • cholecalciferol (VITAMIN D3) 1000 units tablet Take 1,000 Units by mouth Daily. 10/8/2018     • Cyanocobalamin (VITAMIN B-12) 1000 MCG sublingual tablet Place 1 tablet under the tongue Daily. Hold 10/8/2018     • furosemide (LASIX) 20 MG tablet Take 1 tablet by mouth 2 (Two) Times a Day. 60 tablet 5   • loratadine (CLARITIN) 10 MG tablet Take 1 tablet by mouth Daily. As antihistamine for allergies 30 tablet 2   • losartan (COZAAR) 100 MG tablet Take 1 tablet by mouth Daily. For blood pressure 90 tablet 0   • metoprolol tartrate (LOPRESSOR) 25  MG tablet TAKE ONE TABLET BY MOUTH TWICE A  tablet 3   • montelukast (SINGULAIR) 10 MG tablet TAKE ONE TABLET BY MOUTH DAILY AT NIGHT FOR CONTROL OF ALLRGIES AND COUGH 30 tablet 1   • oxybutynin XL (DITROPAN-XL) 10 MG 24 hr tablet TAKE TWO TABLETS BY MOUTH DAILY 180 tablet 1   • traMADol (ULTRAM) 50 MG tablet Take 1 tablet by mouth Every 8 (Eight) Hours As Needed for Moderate Pain . for pain 30 tablet 0   • Acetaminophen-Caffeine 500-65 MG tablet Take 1 tablet by mouth Every 6 (Six) Hours As Needed (migraine).       No facility-administered medications prior to visit.       Opioid medication/s are on active medication list.  and I have evaluated her active treatment plan and pain score trends (see table).  Vitals:    12/29/22 0959   PainSc: 0-No pain     I have reviewed the chart for potential of high risk medication and harmful drug interactions in the elderly.            Aspirin is not on active medication list.  Aspirin use is contraindicated for this patient due to: current use of Eliquis.  .    Patient Active Problem List   Diagnosis   • Fibromyalgia   • CFS (chronic fatigue syndrome)   • Essential hypertension   • Chronic midline low back pain without sciatica   • HARLAN on CPAP   • Atrial fibrillation (HCC)   • Atrial fibrillation, unspecified type (HCC)   • Family history of early CAD father in 50 s   • Class 3 severe obesity due to excess calories with serious comorbidity and body mass index (BMI) of 50.0 to 59.9 in adult (HCC)   • Lower extremity edema   • Vitamin D deficiency   • Palpitations   • Ductal carcinoma in situ (DCIS) of left breast   • History of radiation therapy   • S/P lumpectomy, left breast   • Non-smoker   • Encounter for screening mammogram for malignant neoplasm of breast    • Fever   • BRBPR (bright red blood per rectum)   • Supratherapeutic INR   • PMB (postmenopausal bleeding)   • Family history of cerebrovascular accident (CVA)   • History of transient ischemic attack (TIA)      Advance Care Planning  Advance Directive is not on file.  ACP discussion was held with the patient during this visit. Patient does not have an advance directive, information provided.     Objective    Vitals:    12/29/22 0959   BP: 124/70   BP Location: Left arm   Patient Position: Sitting   Cuff Size: Adult   Pulse: 82   Temp: 97.6 °F (36.4 °C)   TempSrc: Temporal   SpO2: 98%   Weight: (!) 153 kg (338 lb 3.2 oz)   Height: 167.6 cm (66\")   PainSc: 0-No pain     Estimated body mass index is 54.59 kg/m² as calculated from the following:    Height as of this encounter: 167.6 cm (66\").    Weight as of this encounter: 153 kg (338 lb 3.2 oz).    Class 3 Severe Obesity (BMI >=40). Obesity-related health conditions include the following: hypertension. Obesity is improving with lifestyle modifications. BMI is is above average; BMI management plan is completed. We discussed portion control and increasing exercise.      Does the patient have evidence of cognitive impairment?   No    Lab Results   Component Value Date    HGBA1C 5.5 12/29/2022          HEALTH RISK ASSESSMENT    Smoking Status:  Social History     Tobacco Use   Smoking Status Never   Smokeless Tobacco Never     Alcohol Consumption:  Social History     Substance and Sexual Activity   Alcohol Use No     Fall Risk Screen:    STEADI Fall Risk Assessment was completed, and patient is at LOW risk for falls.Assessment completed on:12/29/2022    Depression Screening:  PHQ-2/PHQ-9 Depression Screening 12/29/2022   Little Interest or Pleasure in Doing Things 0-->not at all   Feeling Down, Depressed or Hopeless 0-->not at all   PHQ-9: Brief Depression Severity Measure Score 0       Health Habits and Functional and Cognitive Screening:  Functional & Cognitive Status 12/29/2022   Do you have difficulty preparing food and eating? No   Do you have difficulty bathing yourself, getting dressed or grooming yourself? No   Do you have difficulty using the toilet? No   Do you  have difficulty moving around from place to place? No   Do you have trouble with steps or getting out of a bed or a chair? No   Current Diet Diabetic Diet   Dental Exam Not up to date   Eye Exam Up to date   Exercise (times per week) 3 times per week   Current Exercises Include House Cleaning;Home Exercise Program (TV, Computer, Etc.)        Exercise Comment -   Current Exercise Activities Include -   Do you need help using the phone?  No   Are you deaf or do you have serious difficulty hearing?  No   Do you need help with transportation? No   Do you need help shopping? No   Do you need help preparing meals?  No   Do you need help with housework?  No   Do you need help with laundry? No   Do you need help taking your medications? No   Do you need help managing money? No   Do you ever drive or ride in a car without wearing a seat belt? No   Have you felt unusual stress, anger or loneliness in the last month? No   Who do you live with? Spouse   If you need help, do you have trouble finding someone available to you? No   Have you been bothered in the last four weeks by sexual problems? No   Do you have difficulty concentrating, remembering or making decisions? Yes       Age-appropriate Screening Schedule:  Refer to the list below for future screening recommendations based on patient's age, sex and/or medical conditions. Orders for these recommended tests are listed in the plan section. The patient has been provided with a written plan.    Health Maintenance   Topic Date Due   • TDAP/TD VACCINES (1 - Tdap) Never done   • ZOSTER VACCINE (1 of 2) Never done   • MAMMOGRAM  11/19/2021   • LIPID PANEL  05/12/2022   • INFLUENZA VACCINE  08/01/2022   • PAP SMEAR  06/08/2024                CMS Preventative Services Quick Reference  Risk Factors Identified During Encounter:    Immunizations Discussed/Encouraged: Tdap, Influenza, Shingrix and COVID19    The above risks/problems have been discussed with the patient.  Pertinent  information has been shared with the patient in the After Visit Summary.    Diagnoses and all orders for this visit:    1. Medicare annual wellness visit, subsequent (Primary)    2. Class 3 severe obesity due to excess calories with serious comorbidity and body mass index (BMI) of 50.0 to 59.9 in adult (HCC)  -     POC Glycosylated Hemoglobin (Hb A1C)  -     Comprehensive Metabolic Panel; Future  -     Lipid panel; Future    3. Abnormal finding of blood chemistry, unspecified  -     POC Glycosylated Hemoglobin (Hb A1C)    4. Need for immunization against influenza  -     FluLaval/Fluzone >6 mos  ([id number])    5. Encounter for screening mammogram for malignant neoplasm of breast  -     Mammo Screening Bilateral With CAD    6. Screen for colon cancer  -     Cologuard - Stool, Per Rectum; Future    7. Encounter for immunization  -     Tdap (ADACEL) 5-2-15.5 LF-MCG/0.5 injection; Inject 0.5 mL into the appropriate muscle as directed by prescriber 1 (One) Time for 1 dose.  Dispense: 0.5 mL; Refill: 0        Follow Up:   Next Medicare Wellness visit to be scheduled in 1 year.      An After Visit Summary and PPPS were made available to the patient.

## 2023-01-10 DIAGNOSIS — N32.81 OVERACTIVE BLADDER: ICD-10-CM

## 2023-01-10 RX ORDER — OXYBUTYNIN CHLORIDE 10 MG/1
TABLET, EXTENDED RELEASE ORAL
Qty: 180 TABLET | Refills: 1 | Status: SHIPPED | OUTPATIENT
Start: 2023-01-10

## 2023-01-17 DIAGNOSIS — I10 ESSENTIAL HYPERTENSION: ICD-10-CM

## 2023-01-17 RX ORDER — LOSARTAN POTASSIUM 100 MG/1
TABLET ORAL
Qty: 90 TABLET | Refills: 0 | Status: SHIPPED | OUTPATIENT
Start: 2023-01-17

## 2023-02-16 DIAGNOSIS — G89.29 CHRONIC JOINT PAIN: ICD-10-CM

## 2023-02-16 DIAGNOSIS — M25.50 CHRONIC JOINT PAIN: ICD-10-CM

## 2023-02-16 NOTE — TELEPHONE ENCOUNTER
Caller: Anthony Meza DOREEN    Relationship: Self    Best call back number: [phone]    Requested Prescriptions:   Requested Prescriptions     Pending Prescriptions Disp Refills   • traMADol (ULTRAM) 50 MG tablet 30 tablet 0     Sig: Take 1 tablet by mouth Every 8 (Eight) Hours As Needed for Moderate Pain. for pain        Pharmacy where request should be sent: [facility] [place], [place] - [address] - [phone] [facility] [phone] [facility]       Does the patient have less than a 3 day supply:  [x] Yes  [] No    Would you like a call back once the refill request has been completed: [] Yes [] No    If the office needs to give you a call back, can they leave a voicemail: [] Yes [] No    Martine House Rep   02/16/23 09:02 CST

## 2023-02-17 ENCOUNTER — TELEPHONE (OUTPATIENT)
Dept: FAMILY MEDICINE CLINIC | Facility: CLINIC | Age: 58
End: 2023-02-17
Payer: MEDICARE

## 2023-02-17 RX ORDER — TRAMADOL HYDROCHLORIDE 50 MG/1
50 TABLET ORAL EVERY 8 HOURS PRN
Qty: 30 TABLET | Refills: 0 | Status: SHIPPED | OUTPATIENT
Start: 2023-02-17

## 2023-02-17 NOTE — TELEPHONE ENCOUNTER
Caller: Anthony Meza    Relationship: Self    Best call back number:  [phone]    What is the best time to reach you:  ANYTIME    Who are you requesting to speak with (clinical staff, provider,  specific staff member):  CLINICAL    What was the call regarding:  PATIENT REPORTED THAT PHARMACY INFORMED HER THAT A NEW PRESCRIPTION IS NEEDED SINCE THE OLD PRESCRIPTION HAS .     Additional Notes:  REQUESTS CALL BACK WHEN FILLED

## 2023-02-28 ENCOUNTER — TELEPHONE (OUTPATIENT)
Dept: FAMILY MEDICINE CLINIC | Facility: CLINIC | Age: 58
End: 2023-02-28
Payer: MEDICARE

## 2023-02-28 DIAGNOSIS — F41.9 ANXIETY: ICD-10-CM

## 2023-03-07 DIAGNOSIS — I48.0 PAROXYSMAL ATRIAL FIBRILLATION: ICD-10-CM

## 2023-03-07 DIAGNOSIS — F41.9 ANXIETY: ICD-10-CM

## 2023-03-07 NOTE — TELEPHONE ENCOUNTER
Caller:  ANN MARIE IRENE     Relationship: SELF     Best call back number:    [phone] (Mobile)         Requested Prescriptions:       apixaban (Eliquis) 5 MG tablet tablet  5 mg, Every 12 Hours     buPROPion XL (WELLBUTRIN XL) 150 MG 24 hr tablet          Pharmacy where request should be sent:    [facility]/pharmacy #[id number] - [place], [place] - [address] - [phone]  - [phone]   [phone]  Additional details provided by patient: NONE   Does the patient have less than a 3 day supply:  [x] Yes  [] No    Would you like a call back once the refill request has been completed: [x] Yes [] No    If the office needs to give you a call back, can they leave a voicemail: [x] Yes [] No    Martine Woods Rep   03/07/23 10:49 CST

## 2023-03-08 RX ORDER — BUPROPION HYDROCHLORIDE 150 MG/1
TABLET ORAL
Qty: 270 TABLET | Refills: 0 | Status: CANCELLED | OUTPATIENT
Start: 2023-03-08

## 2023-03-08 RX ORDER — BUPROPION HYDROCHLORIDE 150 MG/1
TABLET ORAL
Qty: 270 TABLET | Refills: 1 | Status: SHIPPED | OUTPATIENT
Start: 2023-03-08

## 2023-03-22 ENCOUNTER — TELEPHONE (OUTPATIENT)
Dept: FAMILY MEDICINE CLINIC | Facility: CLINIC | Age: 58
End: 2023-03-22

## 2023-03-22 NOTE — TELEPHONE ENCOUNTER
DELETE AFTER REVIEWING: Telephone encounter to be sent to the clinical pool     Caller: Anthony Meza    Relationship: Self    Best call back number: [phone]    What is the medical concern/diagnosis:   FIBROMYALGIA     What specialty or service is being requested:   RHEUMATOLOGIST     What is the provider, practice or medical service name: [facility]     What is the office location:   [place]    What is the office phone number:   [phone]    Any additional details:   PLEASE CONTACT PATIENT AND ADVISE ONCE REFERRAL IS ENTERED.

## 2023-03-27 NOTE — TELEPHONE ENCOUNTER
Would prefer appt as we can discuss multiple treatment options that does not require specialty referral

## 2023-05-10 ENCOUNTER — TELEPHONE (OUTPATIENT)
Dept: FAMILY MEDICINE CLINIC | Facility: CLINIC | Age: 58
End: 2023-05-10
Payer: MEDICARE

## 2023-05-10 DIAGNOSIS — M62.838 MUSCLE SPASM: Primary | ICD-10-CM

## 2023-05-10 NOTE — TELEPHONE ENCOUNTER
Caller: Anthony Meza    Relationship: Self    Best call back number: [phone]    What medication are you requesting: SOMETHING FOR MUSCLE SPASMS    What are your current symptoms: MUSCLE SPASMS    How long have you been experiencing symptoms: LAST 2 WEEKS    Have you had these symptoms before:    [] Yes  [x] No    Have you been treated for these symptoms before:   [] Yes  [x] No    If a prescription is needed, what is your preferred pharmacy and phone number:    [facility] #[id number] - [place] - [address] [phone] [facility] [phone]    Additional notes: PATIENT FELL ASLEEP HOLDING GRANDDAUGHTER AND WAS LEANING WHEN SHE WOKE UP. SINCE THEN SHE'S HAD NECK SPASMS.      PLEASE CALL PATIENT

## 2023-05-11 RX ORDER — TIZANIDINE 4 MG/1
4 TABLET ORAL EVERY 8 HOURS PRN
Qty: 30 TABLET | Refills: 0 | Status: SHIPPED | OUTPATIENT
Start: 2023-05-11

## 2023-05-12 DIAGNOSIS — G89.29 CHRONIC JOINT PAIN: ICD-10-CM

## 2023-05-12 DIAGNOSIS — M25.50 CHRONIC JOINT PAIN: ICD-10-CM

## 2023-05-12 NOTE — TELEPHONE ENCOUNTER
Caller: Anthony Meza    Relationship: Self    Best call back number: [phone]    Requested Prescriptions:   Requested Prescriptions     Pending Prescriptions Disp Refills   • traMADol (ULTRAM) 50 MG tablet 30 tablet 0     Sig: Take 1 tablet by mouth Every 8 (Eight) Hours As Needed for Moderate Pain. for pain        Pharmacy where request should be sent: [id number] [place] [facility] [address] [phone] [facility] [phone]      Last office visit with prescribing clinician: 12/29/2022   Last telemedicine visit with prescribing clinician: 5/10/2023   Next office visit with prescribing clinician: 6/29/2023     Additional details provided by patient: PATIENT STATES THIS WILL BE THE FIRST TIME FILLING THIS MEDICATION AT THIS PHARMACY     Does the patient have less than a 3 day supply:  [] Yes  [x] No    Would you like a call back once the refill request has been completed: [] Yes [x] No      Martine Calderon Rep   05/12/23 11:33 CDT

## 2023-05-12 NOTE — TELEPHONE ENCOUNTER
Rx Refill Note  Requested Prescriptions     Pending Prescriptions Disp Refills   • traMADol (ULTRAM) 50 MG tablet 30 tablet 0     Sig: Take 1 tablet by mouth Every 8 (Eight) Hours As Needed for Moderate Pain. for pain      Last office visit with prescribing clinician: 12/29/2022   Last telemedicine visit with prescribing clinician: 5/10/2023   Next office visit with prescribing clinician: 6/29/2023                         Would you like a call back once the refill request has been completed: [] Yes [] No    If the office needs to give you a call back, can they leave a voicemail: [] Yes [] No    MEKA Mcdonald  05/12/23, 14:06 CDT

## 2023-05-15 DIAGNOSIS — I10 ESSENTIAL HYPERTENSION: ICD-10-CM

## 2023-05-15 DIAGNOSIS — J30.9 ALLERGIC RHINITIS, UNSPECIFIED SEASONALITY, UNSPECIFIED TRIGGER: ICD-10-CM

## 2023-05-15 RX ORDER — LORATADINE 10 MG/1
10 TABLET ORAL DAILY
Qty: 30 TABLET | Refills: 2 | Status: SHIPPED | OUTPATIENT
Start: 2023-05-15

## 2023-05-15 RX ORDER — TRAMADOL HYDROCHLORIDE 50 MG/1
50 TABLET ORAL EVERY 8 HOURS PRN
Qty: 30 TABLET | Refills: 0 | Status: SHIPPED | OUTPATIENT
Start: 2023-05-15

## 2023-05-15 NOTE — TELEPHONE ENCOUNTER
Caller: Anthony Meza    Relationship: Self    Best call back number: [phone]    Requested Prescriptions:   Requested Prescriptions     Pending Prescriptions Disp Refills   • loratadine (CLARITIN) 10 MG tablet 30 tablet 2     Sig: Take 1 tablet by mouth Daily. As antihistamine for allergies   • metoprolol tartrate (LOPRESSOR) 25 MG tablet 180 tablet 3     Sig: Take 1 tablet by mouth 2 (Two) Times a Day.        Pharmacy where request should be sent: [facility]/PHARMACY #[id number] - [facility] [address] [place] [address] [phone] [facility] [phone]      Last office visit with prescribing clinician: 12/29/2022   Last telemedicine visit with prescribing clinician: 5/12/2023   Next office visit with prescribing clinician: 6/29/2023     Additional details provided by patient: TOTALLY OUT OF 1 OF THEM    Does the patient have less than a 3 day supply:  [x] Yes  [] No    Would you like a call back once the refill request has been completed: [] Yes [x] No    If the office needs to give you a call back, can they leave a voicemail: [] Yes [x] No    Martine Adkins Rep   05/15/23 11:39 CDT

## 2023-06-15 DIAGNOSIS — M79.89 LEG SWELLING: ICD-10-CM

## 2023-06-15 RX ORDER — FUROSEMIDE 20 MG/1
TABLET ORAL
Qty: 60 TABLET | Refills: 3 | Status: SHIPPED | OUTPATIENT
Start: 2023-06-15

## 2023-08-23 ENCOUNTER — TELEMEDICINE (OUTPATIENT)
Dept: FAMILY MEDICINE CLINIC | Facility: CLINIC | Age: 58
End: 2023-08-23
Payer: MEDICARE

## 2023-08-23 VITALS — TEMPERATURE: 98.2 F | HEIGHT: 66 IN | RESPIRATION RATE: 16 BRPM | BODY MASS INDEX: 54.61 KG/M2

## 2023-08-23 DIAGNOSIS — F41.9 ANXIETY: ICD-10-CM

## 2023-08-23 DIAGNOSIS — F40.10 SOCIAL ANXIETY DISORDER: Primary | ICD-10-CM

## 2023-08-23 PROCEDURE — 1159F MED LIST DOCD IN RCRD: CPT | Performed by: NURSE PRACTITIONER

## 2023-08-23 PROCEDURE — 1160F RVW MEDS BY RX/DR IN RCRD: CPT | Performed by: NURSE PRACTITIONER

## 2023-08-23 PROCEDURE — 99213 OFFICE O/P EST LOW 20 MIN: CPT | Performed by: NURSE PRACTITIONER

## 2023-08-23 RX ORDER — ESCITALOPRAM OXALATE 10 MG/1
10 TABLET ORAL DAILY
Qty: 30 TABLET | Refills: 2 | Status: SHIPPED | OUTPATIENT
Start: 2023-08-23

## 2023-08-23 RX ORDER — ALPRAZOLAM 0.5 MG/1
.25-.5 TABLET ORAL NIGHTLY PRN
Qty: 30 TABLET | Refills: 1 | Status: SHIPPED | OUTPATIENT
Start: 2023-08-23

## 2023-08-23 RX ORDER — BUSPIRONE HYDROCHLORIDE 5 MG/1
5 TABLET ORAL 3 TIMES DAILY
Qty: 90 TABLET | Refills: 2 | Status: SHIPPED | OUTPATIENT
Start: 2023-08-23 | End: 2023-09-22

## 2023-08-23 NOTE — PATIENT INSTRUCTIONS
Start Lexapro 10 mg once/day; we will consider increasing this dose if needed when we talk again  Buspirone 5mg, 1 by mouth up to 3 times/day.   Video visit 2 weeks for follow up

## 2023-08-23 NOTE — PROGRESS NOTES
JAYLYN Lamas  [facility]   Family Medicine  [address]  [place] [address]  Phone: [phone]  Fax: [phone]     Anthony Meza is a 57 y.o. female.   : 1965    This visit is conducted today via video visit.  You have chosen to receive care through a telehealth visit.  Do you consent to use a video/audio connection for your medical care today? YES    Pt located at Home and provider located at work office.       CC:   Chief Complaint   Patient presents with    Panic Attack        HPI: Anthony Meza is a 57 y.o. female that is an established patient. She  is here for evaluation of the above complaint.     Anthony is her today via video visit for evaluation of social anxiety and panic which started 3 weeks ago. She had one other episode like this about 6 years ago and was started on Buproprion, of which she takes 450mg 24 hour every morning. That has kept her from having the social anxiety until recently. She has taken the Alprazolam the last week or so with some benefit. The thought of getting out of the house and around people causes her heart to race and feelings of panic, can make her tearful. She lives with her , adult son and 4 yr old granddaughter. She has shared with friends and some extended family how she's feeling, and feels like she has good support. We discussed adding counseling in, but would like to wait until she feels like she can be around other people.        The following portions of the patient's history were reviewed and updated as appropriate: allergies, current medications, past family history, past medical history, past social history, past surgical history, and problem list.  Past Medical History:   Diagnosis Date    A-fib     Abnormal ECG     Anxiety     Arthritis     Atrial fibrillation     Back pain     Breast cancer 2018    CFS (chronic fatigue syndrome)     Chest pain     Chronic pain disorder     Depression     Dizziness      "Ductal carcinoma in situ (DCIS) of left breast     BREAST LEFT    Fibromyalgia     Hx of radiation therapy     Hypertension     IBS (irritable bowel syndrome)     Incontinence     Migraines     Mononucleosis     Sleep apnea     uses a c-pap    Wears glasses      Family History   Problem Relation Age of Onset    Transient ischemic attack Mother     Rheum arthritis Mother     Fibromyalgia Mother     Heart disease Father     Asthma Father     Hypertension Father     Rheum arthritis Father     Obesity Father     Obesity Sister     Obesity Maternal Grandmother     Ovarian cancer Maternal Grandmother     Obesity Paternal Grandfather     Hypertension Paternal Grandfather     Heart disease Paternal Grandfather     Liver cancer Maternal Aunt     Breast cancer Neg Hx      Social History     Socioeconomic History    Marital status:     Number of children: 2   Tobacco Use    Smoking status: Never    Smokeless tobacco: Never   Vaping Use    Vaping Use: Never used   Substance and Sexual Activity    Alcohol use: No    Drug use: No    Sexual activity: Yes     Partners: Male     Birth control/protection: Surgical     Comment: LMP: Menopausal     Review of Systems   Constitutional: Negative.    HENT: Negative.     Eyes: Negative.    Respiratory: Negative.     Cardiovascular: Negative.    Gastrointestinal: Negative.    Endocrine: Negative.    Genitourinary: Negative.    Musculoskeletal: Negative.    Skin: Negative.    Allergic/Immunologic: Negative.    Neurological: Negative.    Hematological: Negative.    Psychiatric/Behavioral:  The patient is nervous/anxious.    Temp 98.2 øF (36.8 øC)   Resp 16   Ht 167.6 cm (65.98\")   LMP  (LMP Unknown)   BMI 54.61 kg/mý   Physical Exam  Constitutional:       Appearance: Normal appearance. She is obese.   HENT:      Head: Normocephalic and atraumatic.      Nose: Nose normal.   Eyes:      Conjunctiva/sclera: Conjunctivae normal.   Musculoskeletal:      Cervical back: Normal range of " motion.   Neurological:      General: No focal deficit present.      Mental Status: She is alert and oriented to person, place, and time.   Psychiatric:         Mood and Affect: Mood normal.         Behavior: Behavior normal.         Thought Content: Thought content normal.         Judgment: Judgment normal.         Assessment and Plan:   Diagnoses and all orders for this visit:    1. Social anxiety disorder (Primary)  -     ALPRAZolam (XANAX) 0.5 MG tablet; Take 0.5-1 tablets by mouth At Night As Needed for Anxiety.  Dispense: 30 tablet; Refill: 1  -     busPIRone (BUSPAR) 5 MG tablet; Take 1 tablet by mouth 3 (Three) Times a Day for 30 days.  Dispense: 90 tablet; Refill: 2  -     escitalopram (Lexapro) 10 MG tablet; Take 1 tablet by mouth Daily.  Dispense: 30 tablet; Refill: 2    2. Anxiety  -     ALPRAZolam (XANAX) 0.5 MG tablet; Take 0.5-1 tablets by mouth At Night As Needed for Anxiety.  Dispense: 30 tablet; Refill: 1  -     busPIRone (BUSPAR) 5 MG tablet; Take 1 tablet by mouth 3 (Three) Times a Day for 30 days.  Dispense: 90 tablet; Refill: 2  -     escitalopram (Lexapro) 10 MG tablet; Take 1 tablet by mouth Daily.  Dispense: 30 tablet; Refill: 2          Patient Instructions   Start Lexapro 10 mg once/day; we will consider increasing this dose if needed when we talk again  Buspirone 5mg, 1 by mouth up to 3 times/day.   Video visit 2 weeks for follow up    Discussion:     We will f/u in 2 weeks for video visit, sooner if needed. We will address care gaps at that time    I spent 22 minutes caring for Anthony on this date of service. This time includes time spent by me in the following activities: preparing for the visit, reviewing tests, obtaining and/or reviewing a separately obtained history, performing a medically appropriate examination and/or evaluation, counseling and educating the patient/family/caregiver, ordering medications, tests, or procedures, documenting information in the medical record, and  independently interpreting results and communicating that information with the patient/family/caregiver     Follow up: 2 weeks, video visit  JAYLYN Lamas  8/23/2023  09:48 CDT

## 2023-08-29 DIAGNOSIS — J30.9 ALLERGIC RHINITIS, UNSPECIFIED SEASONALITY, UNSPECIFIED TRIGGER: ICD-10-CM

## 2023-08-29 DIAGNOSIS — I10 ESSENTIAL HYPERTENSION: ICD-10-CM

## 2023-08-29 DIAGNOSIS — J98.01 COUGH DUE TO BRONCHOSPASM: ICD-10-CM

## 2023-08-29 RX ORDER — LORATADINE 10 MG/1
10 TABLET ORAL DAILY
Qty: 30 TABLET | Refills: 2 | Status: SHIPPED | OUTPATIENT
Start: 2023-08-29

## 2023-08-29 RX ORDER — MONTELUKAST SODIUM 10 MG/1
TABLET ORAL
Qty: 30 TABLET | Refills: 1 | Status: SHIPPED | OUTPATIENT
Start: 2023-08-29

## 2023-10-17 DIAGNOSIS — F41.9 ANXIETY: ICD-10-CM

## 2023-10-17 DIAGNOSIS — M79.89 LEG SWELLING: ICD-10-CM

## 2023-10-17 RX ORDER — FUROSEMIDE 20 MG/1
20 TABLET ORAL 2 TIMES DAILY
Qty: 60 TABLET | Refills: 1 | Status: SHIPPED | OUTPATIENT
Start: 2023-10-17 | End: 2023-12-16

## 2023-10-17 NOTE — TELEPHONE ENCOUNTER
Rx Refill Note  Requested Prescriptions      No prescriptions requested or ordered in this encounter      Last office visit with prescribing clinician: 12/29/2022   Last telemedicine visit with prescribing clinician: 8/23/2023   Next office visit with prescribing clinician: Visit date not found                         Would you like a call back once the refill request has been completed: [] Yes [] No    If the office needs to give you a call back, can they leave a voicemail: [] Yes [] No    Enma Bragg, PCT  10/17/23, 15:23 CDT

## 2023-10-17 NOTE — TELEPHONE ENCOUNTER
Caller: Anthony Meza DOREEN    Relationship: Self    Best call back number:[phone]     Requested Prescriptions:   Requested Prescriptions     Pending Prescriptions Disp Refills    furosemide (LASIX) 20 MG tablet 60 tablet 3     Sig: Take 1 tablet by mouth 2 (Two) Times a Day.        Pharmacy where request should be sent: [id number] [place] [facility] [address] [phone] [facility] [phone]      Last office visit with prescribing clinician: 12/29/2022   Last telemedicine visit with prescribing clinician: 8/23/2023   Next office visit with prescribing clinician: Visit date not found         Does the patient have less than a 3 day supply:  [x] Yes  [] No    Would you like a call back once the refill request has been completed: [x] Yes [] No    If the office needs to give you a call back, can they leave a voicemail: [x] Yes [] No    Martine Valverde Rep   10/17/23 10:35 CDT

## 2023-10-22 RX ORDER — BUPROPION HYDROCHLORIDE 150 MG/1
TABLET ORAL
Qty: 270 TABLET | Refills: 1 | Status: SHIPPED | OUTPATIENT
Start: 2023-10-22

## 2023-11-12 DIAGNOSIS — J98.01 COUGH DUE TO BRONCHOSPASM: ICD-10-CM

## 2023-11-12 DIAGNOSIS — F40.10 SOCIAL ANXIETY DISORDER: ICD-10-CM

## 2023-11-12 DIAGNOSIS — J30.9 ALLERGIC RHINITIS, UNSPECIFIED SEASONALITY, UNSPECIFIED TRIGGER: ICD-10-CM

## 2023-11-12 DIAGNOSIS — F41.9 ANXIETY: ICD-10-CM

## 2023-11-13 ENCOUNTER — TELEPHONE (OUTPATIENT)
Dept: FAMILY MEDICINE CLINIC | Facility: CLINIC | Age: 58
End: 2023-11-13

## 2023-11-13 DIAGNOSIS — N32.81 OVERACTIVE BLADDER: ICD-10-CM

## 2023-11-13 RX ORDER — MONTELUKAST SODIUM 10 MG/1
TABLET ORAL
Qty: 30 TABLET | Refills: 1 | Status: SHIPPED | OUTPATIENT
Start: 2023-11-13

## 2023-11-13 RX ORDER — OXYBUTYNIN CHLORIDE 10 MG/1
10 TABLET, EXTENDED RELEASE ORAL DAILY
Qty: 180 TABLET | Refills: 1 | Status: SHIPPED | OUTPATIENT
Start: 2023-11-13

## 2023-11-13 RX ORDER — BUSPIRONE HYDROCHLORIDE 5 MG/1
TABLET ORAL
Qty: 90 TABLET | Refills: 2 | Status: SHIPPED | OUTPATIENT
Start: 2023-11-13

## 2023-11-13 RX ORDER — ESCITALOPRAM OXALATE 10 MG/1
10 TABLET ORAL DAILY
Qty: 30 TABLET | Refills: 2 | Status: SHIPPED | OUTPATIENT
Start: 2023-11-13

## 2023-11-13 NOTE — TELEPHONE ENCOUNTER
Caller: Anthony Meza    Relationship: Self    Best call back number:  [phone]     What is the best time to reach you: ANY    Who are you requesting to speak with (clinical staff, provider,  specific staff member): CLINICAL         What was the call regarding: LEGS AND FEET ARE SWELLING, SHE DOES TAKE LASIX, SHE IS ASKING IF SHE CAN INCREASE THE DIURETIC, AND OR WHAT CAN SHE DO? PLEASE CALL BACK AND ADVISE.

## 2023-11-13 NOTE — TELEPHONE ENCOUNTER
Caller: Anthony Meza    Relationship: Self    Best call back number: [phone]     Requested Prescriptions:   Requested Prescriptions     Pending Prescriptions Disp Refills    oxybutynin XL (DITROPAN-XL) 10 MG 24 hr tablet 180 tablet 1     Sig: Take 2 tablets by mouth Daily.        Pharmacy where request should be sent: [facility]/PHARMACY #[id number] - [place], [place] - [address] [facility] [phone] [facility] [phone]      Last office visit with prescribing clinician: 12/29/2022   Last telemedicine visit with prescribing clinician: 8/23/2023   Next office visit with prescribing clinician: Visit date not found     Additional details provided by patient:     Does the patient have less than a 3 day supply:  [x] Yes  [] No    Would you like a call back once the refill request has been completed: [] Yes [] No    If the office needs to give you a call back, can they leave a voicemail: [] Yes [] No    Martine Waterman Rep   11/13/23 12:45 CST

## 2023-11-29 ENCOUNTER — TELEPHONE (OUTPATIENT)
Dept: FAMILY MEDICINE CLINIC | Facility: CLINIC | Age: 58
End: 2023-11-29
Payer: MEDICARE

## 2023-11-29 NOTE — TELEPHONE ENCOUNTER
Caller: Anthony Meza    Relationship to patient: Self    Best call back number: [phone]     Chief complaint: COUGH, FLUID RETENTION IN LEGS, HEADACHE    Type of visit: OFFICE VISIT    Requested date: 11.30.23 ANYTIME     If rescheduling, when is the original appointment: N/A     Additional notes:PATIENT WOULD LIKE TO SCHEDULE AN APPOINTMENT FOR TOMORROW, UNABLE TO WARM TRANSFER, TRYING TO WARM TRANSFER BECAUSE OF INSURANCE. PLEASE CALL TO SCHEDULE ASAP.

## 2023-11-30 ENCOUNTER — OFFICE VISIT (OUTPATIENT)
Dept: FAMILY MEDICINE CLINIC | Facility: CLINIC | Age: 58
End: 2023-11-30
Payer: MEDICARE

## 2023-11-30 VITALS
HEIGHT: 66 IN | HEART RATE: 85 BPM | SYSTOLIC BLOOD PRESSURE: 118 MMHG | DIASTOLIC BLOOD PRESSURE: 80 MMHG | WEIGHT: 293 LBS | TEMPERATURE: 97.4 F | BODY MASS INDEX: 47.09 KG/M2 | OXYGEN SATURATION: 96 % | RESPIRATION RATE: 18 BRPM

## 2023-11-30 DIAGNOSIS — R06.01 ORTHOPNEA: ICD-10-CM

## 2023-11-30 DIAGNOSIS — E66.01 CLASS 3 SEVERE OBESITY DUE TO EXCESS CALORIES WITH SERIOUS COMORBIDITY AND BODY MASS INDEX (BMI) OF 50.0 TO 59.9 IN ADULT: ICD-10-CM

## 2023-11-30 DIAGNOSIS — M79.89 LEG SWELLING: ICD-10-CM

## 2023-11-30 DIAGNOSIS — F41.9 ANXIETY: Primary | ICD-10-CM

## 2023-11-30 DIAGNOSIS — G47.00 INSOMNIA, UNSPECIFIED TYPE: ICD-10-CM

## 2023-11-30 DIAGNOSIS — R60.0 LOWER EXTREMITY EDEMA: ICD-10-CM

## 2023-11-30 DIAGNOSIS — I10 ESSENTIAL HYPERTENSION: ICD-10-CM

## 2023-11-30 DIAGNOSIS — Z23 ENCOUNTER FOR IMMUNIZATION: ICD-10-CM

## 2023-11-30 DIAGNOSIS — R60.0 LOCALIZED EDEMA: ICD-10-CM

## 2023-11-30 RX ORDER — SPIRONOLACTONE 25 MG/1
25 TABLET ORAL DAILY
Qty: 90 TABLET | Refills: 0 | Status: SHIPPED | OUTPATIENT
Start: 2023-11-30

## 2023-11-30 RX ORDER — FUROSEMIDE 40 MG/1
40 TABLET ORAL 2 TIMES DAILY
Qty: 180 TABLET | Refills: 1 | Status: SHIPPED | OUTPATIENT
Start: 2023-11-30

## 2023-11-30 RX ORDER — TRAZODONE HYDROCHLORIDE 50 MG/1
50 TABLET ORAL NIGHTLY
Qty: 30 TABLET | Refills: 0 | Status: SHIPPED | OUTPATIENT
Start: 2023-11-30

## 2023-12-07 ENCOUNTER — TELEPHONE (OUTPATIENT)
Dept: FAMILY MEDICINE CLINIC | Facility: CLINIC | Age: 58
End: 2023-12-07
Payer: MEDICARE

## 2023-12-07 DIAGNOSIS — R05.9 COUGH, UNSPECIFIED TYPE: Primary | ICD-10-CM

## 2023-12-07 NOTE — TELEPHONE ENCOUNTER
Caller: Anthony Meza    Relationship: Self    Best call back number:      [phone]       What medication are you requesting: COUGH IS THE BIGGEST PROBLEM, WANTS SOMETHING FOR THIS.  CAN BE PRESCRIPTION OR OTC.      What are your current symptoms: HEADACHE, COUGH, CONGESTION, NO FEVER, SOMETIMES WILL HAVE PRODUCTIVE COUGH, NOSE IS RUNNY, BUT CLEAR.      How long have you been experiencing symptoms:  24 HOURS    Have you had these symptoms before:    [x] Yes  [] No    Have you been treated for these symptoms before:   [x] Yes  [] No    If a prescription is needed, what is your preferred pharmacy and phone number:      Additional notes:  PLEASE CALL BACK AND ADVISE.  DO NOT NOT SEE ANYTHING FOR VIRTUAL VISIT TODAY

## 2023-12-11 RX ORDER — BENZONATATE 100 MG/1
100 CAPSULE ORAL 3 TIMES DAILY PRN
Qty: 30 CAPSULE | Refills: 0 | Status: SHIPPED | OUTPATIENT
Start: 2023-12-11

## 2023-12-19 NOTE — PROGRESS NOTES
"Chief Complaint  Follow-up (Fluid retention in legs), Cough (Started about a week coughing up mucus, clear color. Seems like when eating nose will run frequently more. ), Headache (Has frequent headache has been worse lately and pain increases in afternoon ), and Memory Loss (Having trouble remembering things as well as not sleeping very well )    Subjective        Anthony Meza presents to [facility]  Cough  Associated symptoms include headaches.   Headache  Memory Loss  Associated symptoms include coughing and headaches.     Struggling with more LE edema and dry cough preventing sleep, worsening HA that increases in the afternoon. Has to prop herself up to sleep, has had more anxiety with some more stress lately    Objective   Vital Signs:  /80   Pulse 85   Temp 97.4 °F (36.3 °C)   Resp 18   Ht 167.6 cm (65.98\")   Wt (!) 148 kg (327 lb)   SpO2 96%   BMI 52.81 kg/m²   Estimated body mass index is 52.81 kg/m² as calculated from the following:    Height as of this encounter: 167.6 cm (65.98\").    Weight as of this encounter: 148 kg (327 lb).               Physical Exam  Vitals and nursing note reviewed.   Constitutional:       General: She is not in acute distress.     Appearance: She is not diaphoretic.   HENT:      Head: Normocephalic and atraumatic.      Nose: Nose normal.   Eyes:      General: No scleral icterus.        Right eye: No discharge.         Left eye: No discharge.      Conjunctiva/sclera: Conjunctivae normal.   Neck:      Trachea: No tracheal deviation.   Cardiovascular:      Rate and Rhythm: Normal rate and regular rhythm.      Heart sounds: Normal heart sounds. No murmur heard.     No friction rub. No gallop.   Pulmonary:      Effort: Pulmonary effort is normal. No respiratory distress.      Breath sounds: Normal breath sounds. No wheezing or rales.   Musculoskeletal:      Right lower leg: Edema present.      Left lower leg: Edema present.   Skin:     " General: Skin is warm and dry.      Coloration: Skin is not pale.   Neurological:      Mental Status: She is alert and oriented to person, place, and time.   Psychiatric:         Behavior: Behavior normal.         Thought Content: Thought content normal.         Judgment: Judgment normal.        Result Review :                   Assessment and Plan   Diagnoses and all orders for this visit:    1. Anxiety (Primary)    2. Essential hypertension  -     Adult Transthoracic Echo Complete W/ Cont if Necessary Per Protocol; Future    3. Class 3 severe obesity due to excess calories with serious comorbidity and body mass index (BMI) of 50.0 to 59.9 in adult  -     Lipid panel; Future    4. Lower extremity edema  -     spironolactone (Aldactone) 25 MG tablet; Take 1 tablet by mouth Daily.  Dispense: 90 tablet; Refill: 0  -     Comprehensive Metabolic Panel; Future    5. Encounter for immunization  -     Zoster Vac Recomb Adjuvanted 50 MCG/0.5ML reconstituted suspension; Inject 0.5 mL into the appropriate muscle as directed by prescriber 1 (One) Time for 1 dose.  Dispense: 1 each; Refill: 0  -     Tdap (ADACEL) 5-2-15.5 LF-MCG/0.5 injection; Inject 0.5 mL into the appropriate muscle as directed by prescriber 1 (One) Time for 1 dose.  Dispense: 0.5 mL; Refill: 0    6. Insomnia, unspecified type  -     traZODone (DESYREL) 50 MG tablet; Take 1 tablet by mouth Every Night.  Dispense: 30 tablet; Refill: 0    7. Leg swelling  -     furosemide (LASIX) 40 MG tablet; Take 1 tablet by mouth 2 (Two) Times a Day.  Dispense: 180 tablet; Refill: 1  -     Adult Transthoracic Echo Complete W/ Cont if Necessary Per Protocol; Future    8. Orthopnea  -     spironolactone (Aldactone) 25 MG tablet; Take 1 tablet by mouth Daily.  Dispense: 90 tablet; Refill: 0  -     Comprehensive Metabolic Panel; Future  -     Adult Transthoracic Echo Complete W/ Cont if Necessary Per Protocol; Future    9. Localized edema  -     Adult Transthoracic Echo Complete  W/ Cont if Necessary Per Protocol; Future             Follow Up   Return in about 3 months (around 2/29/2024).  Patient was given instructions and counseling regarding her condition or for health maintenance advice. Please see specific information pulled into the AVS if appropriate.

## 2023-12-28 DIAGNOSIS — G47.00 INSOMNIA, UNSPECIFIED TYPE: ICD-10-CM

## 2023-12-28 RX ORDER — TRAZODONE HYDROCHLORIDE 50 MG/1
50 TABLET ORAL
Qty: 30 TABLET | Refills: 0 | Status: SHIPPED | OUTPATIENT
Start: 2023-12-28

## 2024-01-08 DIAGNOSIS — M25.50 CHRONIC JOINT PAIN: ICD-10-CM

## 2024-01-08 DIAGNOSIS — G89.29 CHRONIC JOINT PAIN: ICD-10-CM

## 2024-01-16 RX ORDER — TRAMADOL HYDROCHLORIDE 50 MG/1
50 TABLET ORAL EVERY 8 HOURS PRN
Qty: 30 TABLET | Refills: 0 | Status: SHIPPED | OUTPATIENT
Start: 2024-01-16

## 2024-02-03 DIAGNOSIS — J98.01 COUGH DUE TO BRONCHOSPASM: ICD-10-CM

## 2024-02-03 DIAGNOSIS — J30.9 ALLERGIC RHINITIS, UNSPECIFIED SEASONALITY, UNSPECIFIED TRIGGER: ICD-10-CM

## 2024-02-04 DIAGNOSIS — G47.00 INSOMNIA, UNSPECIFIED TYPE: ICD-10-CM

## 2024-02-05 DIAGNOSIS — J98.01 COUGH DUE TO BRONCHOSPASM: ICD-10-CM

## 2024-02-05 DIAGNOSIS — J30.9 ALLERGIC RHINITIS, UNSPECIFIED SEASONALITY, UNSPECIFIED TRIGGER: ICD-10-CM

## 2024-02-05 DIAGNOSIS — G47.00 INSOMNIA, UNSPECIFIED TYPE: ICD-10-CM

## 2024-02-05 RX ORDER — TRAZODONE HYDROCHLORIDE 50 MG/1
50 TABLET ORAL
Qty: 30 TABLET | Refills: 0 | Status: SHIPPED | OUTPATIENT
Start: 2024-02-05

## 2024-02-05 RX ORDER — MONTELUKAST SODIUM 10 MG/1
TABLET ORAL
Qty: 30 TABLET | Refills: 1 | OUTPATIENT
Start: 2024-02-05

## 2024-02-05 RX ORDER — MONTELUKAST SODIUM 10 MG/1
TABLET ORAL
Qty: 30 TABLET | Refills: 1 | Status: SHIPPED | OUTPATIENT
Start: 2024-02-05

## 2024-02-05 RX ORDER — TRAZODONE HYDROCHLORIDE 50 MG/1
50 TABLET ORAL
Qty: 30 TABLET | Refills: 0 | OUTPATIENT
Start: 2024-02-05

## 2024-02-26 DIAGNOSIS — R06.01 ORTHOPNEA: ICD-10-CM

## 2024-02-26 DIAGNOSIS — R60.0 LOWER EXTREMITY EDEMA: ICD-10-CM

## 2024-02-26 NOTE — TELEPHONE ENCOUNTER
Rx Refill Note  Requested Prescriptions     Pending Prescriptions Disp Refills    spironolactone (ALDACTONE) 25 MG tablet [Pharmacy Med Name: SPIRONOLACTONE 25 MG TABLET] 90 tablet 0     Sig: TAKE 1 TABLET BY MOUTH EVERY DAY      Last office visit with prescribing clinician: 11/30/2023   Last telemedicine visit with prescribing clinician: Visit date not found   Next office visit with prescribing clinician: 2/29/2024                         Would you like a call back once the refill request has been completed: [] Yes [] No    If the office needs to give you a call back, can they leave a voicemail: [] Yes [] No    Suma Waldrop MA  02/26/24, 08:21 CST

## 2024-02-27 RX ORDER — SPIRONOLACTONE 25 MG/1
25 TABLET ORAL DAILY
Qty: 90 TABLET | Refills: 1 | Status: SHIPPED | OUTPATIENT
Start: 2024-02-27

## 2024-03-04 DIAGNOSIS — G47.00 INSOMNIA, UNSPECIFIED TYPE: ICD-10-CM

## 2024-03-04 RX ORDER — TRAZODONE HYDROCHLORIDE 50 MG/1
50 TABLET ORAL
Qty: 30 TABLET | Refills: 0 | OUTPATIENT
Start: 2024-03-04

## 2024-03-30 DIAGNOSIS — J98.01 COUGH DUE TO BRONCHOSPASM: ICD-10-CM

## 2024-03-30 DIAGNOSIS — J30.9 ALLERGIC RHINITIS, UNSPECIFIED SEASONALITY, UNSPECIFIED TRIGGER: ICD-10-CM

## 2024-04-01 RX ORDER — MONTELUKAST SODIUM 10 MG/1
TABLET ORAL
Qty: 30 TABLET | Refills: 1 | OUTPATIENT
Start: 2024-04-01

## 2024-05-30 DIAGNOSIS — M79.89 LEG SWELLING: ICD-10-CM

## 2024-05-30 RX ORDER — FUROSEMIDE 40 MG/1
40 TABLET ORAL 2 TIMES DAILY
Qty: 180 TABLET | Refills: 1 | OUTPATIENT
Start: 2024-05-30

## 2024-09-09 ENCOUNTER — TRANSCRIBE ORDERS (OUTPATIENT)
Dept: ADMINISTRATIVE | Facility: HOSPITAL | Age: 59
End: 2024-09-09
Payer: MEDICARE

## 2024-09-09 DIAGNOSIS — Z12.31 ENCOUNTER FOR SCREENING MAMMOGRAM FOR MALIGNANT NEOPLASM OF BREAST: Primary | ICD-10-CM

## 2024-10-24 ENCOUNTER — EXTERNAL PBMM DATA (OUTPATIENT)
Dept: PHARMACY | Facility: OTHER | Age: 59
End: 2024-10-24
Payer: MEDICARE

## 2024-12-01 ENCOUNTER — POP HEALTH PHARMACY (OUTPATIENT)
Dept: PHARMACY | Facility: OTHER | Age: 59
End: 2024-12-01
Payer: MEDICARE

## 2024-12-16 ENCOUNTER — POP HEALTH PHARMACY (OUTPATIENT)
Dept: PHARMACY | Facility: OTHER | Age: 59
End: 2024-12-16
Payer: MEDICARE

## 2025-05-16 ENCOUNTER — EXTERNAL PBMM DATA (OUTPATIENT)
Dept: PHARMACY | Facility: OTHER | Age: 60
End: 2025-05-16

## 2025-05-22 ENCOUNTER — POP HEALTH PHARMACY (OUTPATIENT)
Dept: PHARMACY | Facility: OTHER | Age: 60
End: 2025-05-22

## 2025-05-22 NOTE — PROGRESS NOTES
"Population Health Pharmacy Outreach      Anthony Meza was called today to discuss medication adherence with Atorvastatin , as she was identified as having care opportunities.    Program Details    [facility]  Status: Enrolled  Effective Dates: 5/20/2025 - present  Responsible Staff: Kelli Oconnor LPN          Opportunities Identified    Adherence- Cholesterol       Adherence and Medication Management    Adherence Questions   Patient Reported X Missed Doses in the Last 7 Days : all  Reasons for Non-Adherence : Adverse effects (Patient stated she stopped taking Atorvastatin \"a little over a month ago\", due to side effects.  She d/c medication on her own.  Physician is not aware.  Advised to call prescriber.)  Interested in a 90 day supply? : No  Does this require clinical escalation to a pharmacist?: Y (Please see note above.)         General Medication Management    Type of medication management: targeted medication review  Review Completed on: 5/22/25  Referred by: diabetes educator  Provider: licensed practical nurse  Visit type: initial           Medication Therapy Problems     Medication Therapy Recommendations  No medication therapy recommendations to display      Summary    Medication Management Summary    Topics discussed: reviewed medication changes since last visit, possible adverse effects and management discussed, lab monitoring and follow-up discussed, adherence and missed doses discussed  Time spent: 61 - 75 min         Kelli Oconnor LPN, 05/22/25, 10:25 AM EDT.   "

## 2025-05-27 ENCOUNTER — POP HEALTH PHARMACY (OUTPATIENT)
Dept: PHARMACY | Facility: OTHER | Age: 60
End: 2025-05-27

## 2025-07-11 ENCOUNTER — EXTERNAL PBMM DATA (OUTPATIENT)
Dept: PHARMACY | Facility: OTHER | Age: 60
End: 2025-07-11

## (undated) DEVICE — CVR PROB GEN PURP W ISOSILK 6X48

## (undated) DEVICE — 3M™ IOBAN™ 2 ANTIMICROBIAL INCISE DRAPE 6650EZ: Brand: IOBAN™ 2

## (undated) DEVICE — DRN JP RND NO TROC SIL 15F 3/16IN

## (undated) DEVICE — MODEL AT P65, P/N 701554-001KIT CONTENTS: HAND CONTROLLER, 3-WAY HIGH-PRESSURE STOPCOCK WITH ROTATING END AND PREMIUM HIGH-PRESSURE TUBING: Brand: ANGIOTOUCH® KIT

## (undated) DEVICE — SPNG GZ WOVN 4X4IN 12PLY 10/BX STRL

## (undated) DEVICE — RADIFOCUS OPTITORQUE ANGIOGRAPHIC CATHETER: Brand: OPTITORQUE

## (undated) DEVICE — SUT VIC 4/0 P3 18IN UD VCP494H

## (undated) DEVICE — PAD GRND REM POLYHESIVE A/ DISP

## (undated) DEVICE — DISPOSABLE SPECIMEN RADIOGRAPHY SYSTEM WITH LOCALIZING GRID, 4.65" RADIOLUCENT GRID: Brand: ACCUGRID

## (undated) DEVICE — CAP CONN RED

## (undated) DEVICE — GLV SURG SENSICARE W/ALOE PF LF SZ6 STRL

## (undated) DEVICE — CYSTO/BLADDER IRRIGATION SET, REGULATING CLAMP

## (undated) DEVICE — SOLIDIFIER LIQUI LOC PLUS 2000CC

## (undated) DEVICE — CATH F5 INF 3DRC 100CM: Brand: INFINITI

## (undated) DEVICE — GW STARTER FXD CORE J .035 3X150CM 3MM

## (undated) DEVICE — ELECTRD PAD DEFIB A/

## (undated) DEVICE — RESERVOIR,SUCTION,100CC,SILICONE: Brand: MEDLINE

## (undated) DEVICE — A2000 MULTI-USE SYRINGE KIT, P/N 701277-003KIT CONTENTS: 100ML CONTRAST RESERVOIR AND TUBING WITH CONTRAST SPIKE AND CLAMP: Brand: A2000 MULTI-USE SYRINGE KIT

## (undated) DEVICE — DRSNG SURESITE WNDW 4X4.5

## (undated) DEVICE — SUT SILK 2/0 SUTUPAK TIES 24IN SA75H

## (undated) DEVICE — GW STARTER FXD CORE J .035 3X260CM 3MM

## (undated) DEVICE — PK CATH CARD 30

## (undated) DEVICE — HARMONIC FOCUS SHEARS 9CM LENGTH + ADAPTIVE TISSUE TECHNOLOGY FOR USE WITH BLUE HAND PIECE ONLY: Brand: HARMONIC FOCUS

## (undated) DEVICE — ELECTRD BLD EDGE/INSUL1P 2.4X5.1MM STRL

## (undated) DEVICE — SUT SILK 2/0 SH 30IN K833H

## (undated) DEVICE — CANN CO2/O2 NASL A/

## (undated) DEVICE — PAD D&C: Brand: MEDLINE INDUSTRIES, INC.

## (undated) DEVICE — TRY PREP SCRB VAG PVP

## (undated) DEVICE — DRSNG TELFA PAD NONADH STR 1S 3X8IN

## (undated) DEVICE — ADHS LIQ MASTISOL 2/3ML

## (undated) DEVICE — PINNACLE INTRODUCER SHEATH: Brand: PINNACLE

## (undated) DEVICE — PK TURNOVER RM ADV

## (undated) DEVICE — FR5 INFINITI MULTIPAC: Brand: INFINITI

## (undated) DEVICE — MODEL BT2000 P/N 700287-012KIT CONTENTS: MANIFOLD WITH SALINE AND CONTRAST PORTS, SALINE TUBING WITH SPIKE AND HAND SYRINGE, TRANSDUCER: Brand: BT2000 AUTOMATED MANIFOLD KIT

## (undated) DEVICE — GLIDESHEATH SLENDER STAINLESS STEEL KIT: Brand: GLIDESHEATH SLENDER

## (undated) DEVICE — PAD MAJOR: Brand: MEDLINE INDUSTRIES, INC.

## (undated) DEVICE — SOL IRR NACL 0.9PCT BT 1000ML

## (undated) DEVICE — SUP ARMBRD ART/LINE BLU

## (undated) DEVICE — SHEET, T, LAPAROTOMY, STERILE: Brand: MEDLINE

## (undated) DEVICE — 3M™ STERI-STRIP™ REINFORCED ADHESIVE SKIN CLOSURES, R1546, 1/4 IN X 4 IN (6 MM X 100 MM), 10 STRIPS/ENVELOPE: Brand: 3M™ STERI-STRIP™

## (undated) DEVICE — GLV SURG TRIUMPH MICRO PF LTX 7.5 STRL

## (undated) DEVICE — NDL HYPO PRECISIONGLIDE REG 25G 1 1/2

## (undated) DEVICE — DRP FEM/RAD W/DUAL 86X135IN

## (undated) DEVICE — CATH DIAG IMPULSE AL2 5F 100CM

## (undated) DEVICE — COVER,MAYO STAND,STERILE: Brand: MEDLINE

## (undated) DEVICE — TR BAND RADIAL ARTERY COMPRESSION DEVICE: Brand: TR BAND

## (undated) DEVICE — SUT VIC 3/0 RB1 27IN UD VCP215H